# Patient Record
Sex: MALE | Race: WHITE | NOT HISPANIC OR LATINO | Employment: PART TIME | URBAN - METROPOLITAN AREA
[De-identification: names, ages, dates, MRNs, and addresses within clinical notes are randomized per-mention and may not be internally consistent; named-entity substitution may affect disease eponyms.]

---

## 2018-02-19 ENCOUNTER — EVALUATION (OUTPATIENT)
Dept: PHYSICAL THERAPY | Facility: CLINIC | Age: 63
End: 2018-02-19
Payer: COMMERCIAL

## 2018-02-19 DIAGNOSIS — M54.6 BILATERAL THORACIC BACK PAIN, UNSPECIFIED CHRONICITY: Primary | ICD-10-CM

## 2018-02-19 PROCEDURE — G8991 OTHER PT/OT GOAL STATUS: HCPCS

## 2018-02-19 PROCEDURE — G8990 OTHER PT/OT CURRENT STATUS: HCPCS

## 2018-02-19 PROCEDURE — 97162 PT EVAL MOD COMPLEX 30 MIN: CPT

## 2018-02-19 PROCEDURE — 97535 SELF CARE MNGMENT TRAINING: CPT

## 2018-02-19 RX ORDER — OXYCODONE AND ACETAMINOPHEN 10; 325 MG/1; MG/1
1 TABLET ORAL EVERY 4 HOURS PRN
COMMUNITY

## 2018-02-19 RX ORDER — SERTRALINE HYDROCHLORIDE 100 MG/1
50 TABLET, FILM COATED ORAL DAILY
COMMUNITY

## 2018-02-19 RX ORDER — ARIPIPRAZOLE 10 MG/1
10 TABLET ORAL DAILY
COMMUNITY

## 2018-02-19 RX ORDER — MORPHINE SULFATE 10 MG/5ML
SOLUTION ORAL EVERY 2 HOUR PRN
COMMUNITY

## 2018-02-19 NOTE — LETTER
2018    Jalyn Ding DO  [address] [facility] [address] [address]    Patient: Nilo Marquez   YOB: 1955   Date of Visit: 2018     Encounter Diagnosis     ICD-10-CM    1  Bilateral thoracic back pain, unspecified chronicity M54 6        Dear Dr Leigha Nugent:    Please review the attached Plan of Care from [facility] recent visit  Please verify that you agree therapy should continue by signing the attached document and sending it back to our office  If you have any questions or concerns, please don't hesitate to call  Sincerely,    Marycruz Luo, PT      Referring Provider:      I certify that I have read the below Plan of Care and certify the need for these services furnished under this plan of treatment while under my care  Jalyn Ding DO  [address] [facility]  [address] [place] [id number]  VIA Facsimile: [phone]          PT Evaluation     Today's date: 2018  Patient name: Nilo Marquez  : 1955  MRN: [id number]  Referring provider: Jackeline Escalante DO  Dx:   Encounter Diagnosis     ICD-10-CM    1  Bilateral thoracic back pain, unspecified chronicity M54 6                   Assessment  Impairments: abnormal gait, abnormal or restricted ROM, activity intolerance, impaired physical strength and pain with function    Assessment details: Pt presents with chronic LBP/LLE symptoms  Reports pain primarily left low back with symptoms radiating to lower extremity  PT notes decreased strength, rom, and impaired gait  Pt will benefit from PT tx to decrease symptoms and improve functional level  Prognosis: good    Goals  ST  Decrease pain 25% 4 wk  2  Increase trunk strength to Fair+ 4 wk  4  Increase BLE strength by 1/2 MMT grade 4 wk  LT  Pt will report 50% or greater decrease in pain 8 wk  2  Increase trunk ROM to WNL 8 wk  3  Increase trunk strength to Good 8 wk  4  Pt will report no limitations with ADL's 8 wk  5    Pt will report no limitations with ambulation 8 wk    Plan  Patient would benefit from: PT eval  Planned modality interventions: cryotherapy, thermotherapy: hydrocollator packs and unattended electrical stimulation  Planned therapy interventions: manual therapy, strengthening, stretching, therapeutic exercise, home exercise program, functional ROM exercises and flexibility  Frequency: 3x week  Duration in weeks: 8  Treatment plan discussed with: patient  Plan details: PT instructed and issued HEP  Pt had no questions/concerns regarding this  Subjective Evaluation    History of Present Illness  Mechanism of injury: Reports left sided sciatic symptoms with pain radiating from low back to left gluteal region  Reports bilateral feet going numb intermittently  Reports pain bilateral shoulder as well  Pt reports hx falling off a roof with Lumbar fusion afterwards  Had 8-9 months therapy afterwards  Has TENS unit which helps  Heat helps with symptoms  No BLE instability noted  No imaging performed  Sleep disrupted  Pain chronic in nature  Pain  Current pain ratin  At best pain ratin  At worst pain ratin  Quality: discomfort and sharp  Aggravating factors: standing and sitting          Objective     Tenderness     Lumbar Spine  Tenderness in the left transverse process and right transverse process  Left Hip   Tenderness in the PSIS  Right Hip   Tenderness in the PSIS  Additional Tenderness Details  Tender to palpation bilateral lumbar paraspinal mms Left > Right      Well healed incision noted lower T-spine/upper L-spine region with no tenderness    Active Range of Motion     Lumbar   Flexion: WFL and with pain  Extension: WFL and with pain  Left lateral flexion: WFL and with pain  Right lateral flexion: WFL and with pain  Left rotation: WFL and with pain  Right rotation: WFL and with pain    Strength/Myotome Testing     Left Hip   Planes of Motion   Flexion: 4  Extension: 4  Abduction: 4  Adduction: 4    Right Hip   Planes of Motion   Flexion: 4  Extension: 4  Abduction: 4  Adduction: 4    Left Knee   Flexion: 4  Extension: 4    Right Knee   Flexion: 4  Extension: 4    Left Ankle/Foot   Dorsiflexion: 3+  Plantar flexion: 4-    Right Ankle/Foot   Dorsiflexion: 4  Plantar flexion: 4    Additional Strength Details  Trunk strength Fair with seated resisted movement    Tests     Lumbar     Left   Positive passive SLR  Right   Negative passive SLR       Ambulation     Observational Gait     Additional Observational Gait Details  Intermittent foot drop noted left LE       Flowsheet Rows    Flowsheet Row Most Recent Value   PT/OT G-Codes   Assessment Type  Evaluation   G code set  Other PT/OT Primary   Other PT Primary Current Status ()  CL   Other PT Primary Goal Status ()  CJ          Precautions Hx Fusion    Specialty Daily Treatment Diary     Manual         Stretch BLE                                             Exercise Diary         [address] [facility]        UBE        St hip flex/abd/ext        TR/HR        Mini squats        U stance  (samantha)        LTP/MTP        Lat pulldown        PPT        LTR        abd crunch        bridges        Add sq        t-band hip abd                                                            Modalities        IFC/MHP

## 2018-02-19 NOTE — PROGRESS NOTES
PT Evaluation     Today's date: 2018  Patient name: Harvinder Flores  : 1955  MRN: [id number]  Referring provider: Sheeba Hurt DO  Dx:   Encounter Diagnosis     ICD-10-CM    1  Bilateral thoracic back pain, unspecified chronicity M54 6                   Assessment  Impairments: abnormal gait, abnormal or restricted ROM, activity intolerance, impaired physical strength and pain with function    Assessment details: Pt presents with chronic LBP/LLE symptoms  Reports pain primarily left low back with symptoms radiating to lower extremity  PT notes decreased strength, rom, and impaired gait  Pt will benefit from PT tx to decrease symptoms and improve functional level  Prognosis: good    Goals  ST  Decrease pain 25% 4 wk  2  Increase trunk strength to Fair+ 4 wk  4  Increase BLE strength by 1/2 MMT grade 4 wk  LT  Pt will report 50% or greater decrease in pain 8 wk  2  Increase trunk ROM to WNL 8 wk  3  Increase trunk strength to Good 8 wk  4  Pt will report no limitations with ADL's 8 wk  5  Pt will report no limitations with ambulation 8 wk    Plan  Patient would benefit from: PT eval  Planned modality interventions: cryotherapy, thermotherapy: hydrocollator packs and unattended electrical stimulation  Planned therapy interventions: manual therapy, strengthening, stretching, therapeutic exercise, home exercise program, functional ROM exercises and flexibility  Frequency: 3x week  Duration in weeks: 8  Treatment plan discussed with: patient  Plan details: PT instructed and issued HEP  Pt had no questions/concerns regarding this  Subjective Evaluation    History of Present Illness  Mechanism of injury: Reports left sided sciatic symptoms with pain radiating from low back to left gluteal region  Reports bilateral feet going numb intermittently  Reports pain bilateral shoulder as well  Pt reports hx falling off a roof with Lumbar fusion afterwards    Had 8-9 months therapy afterwards  Has TENS unit which helps  Heat helps with symptoms  No BLE instability noted  No imaging performed  Sleep disrupted  Pain chronic in nature  Pain  Current pain ratin  At best pain ratin  At worst pain ratin  Quality: discomfort and sharp  Aggravating factors: standing and sitting          Objective     Tenderness     Lumbar Spine  Tenderness in the left transverse process and right transverse process  Left Hip   Tenderness in the PSIS  Right Hip   Tenderness in the PSIS  Additional Tenderness Details  Tender to palpation bilateral lumbar paraspinal mms Left > Right  Well healed incision noted lower T-spine/upper L-spine region with no tenderness    Active Range of Motion     Lumbar   Flexion: WFL and with pain  Extension: WFL and with pain  Left lateral flexion: WFL and with pain  Right lateral flexion: WFL and with pain  Left rotation: WFL and with pain  Right rotation: WFL and with pain    Strength/Myotome Testing     Left Hip   Planes of Motion   Flexion: 4  Extension: 4  Abduction: 4  Adduction: 4    Right Hip   Planes of Motion   Flexion: 4  Extension: 4  Abduction: 4  Adduction: 4    Left Knee   Flexion: 4  Extension: 4    Right Knee   Flexion: 4  Extension: 4    Left Ankle/Foot   Dorsiflexion: 3+  Plantar flexion: 4-    Right Ankle/Foot   Dorsiflexion: 4  Plantar flexion: 4    Additional Strength Details  Trunk strength Fair with seated resisted movement    Tests     Lumbar     Left   Positive passive SLR  Right   Negative passive SLR       Ambulation     Observational Gait     Additional Observational Gait Details  Intermittent foot drop noted left LE       Flowsheet Rows    Flowsheet Row Most Recent Value   PT/OT G-Codes   Assessment Type  Evaluation   G code set  Other PT/OT Primary   Other PT Primary Current Status ()  CL   Other PT Primary Goal Status ()  CJ          Precautions Hx Fusion    Specialty Daily Treatment Diary     Manual Stretch BLE                                             Exercise Diary         [address] [facility]        UBE        St hip flex/abd/ext        TR/HR        Mini squats        U stance  (samantha)        LTP/MTP        Lat pulldown        PPT        LTR        abd crunch        bridges        Add sq        t-band hip abd                                                            Modalities        IFC/MHP

## 2018-02-21 ENCOUNTER — OFFICE VISIT (OUTPATIENT)
Dept: PHYSICAL THERAPY | Facility: CLINIC | Age: 63
End: 2018-02-21
Payer: COMMERCIAL

## 2018-02-21 ENCOUNTER — TRANSCRIBE ORDERS (OUTPATIENT)
Dept: PHYSICAL THERAPY | Facility: CLINIC | Age: 63
End: 2018-02-21

## 2018-02-21 DIAGNOSIS — M54.6 BILATERAL THORACIC BACK PAIN, UNSPECIFIED CHRONICITY: Primary | ICD-10-CM

## 2018-02-21 PROCEDURE — 97140 MANUAL THERAPY 1/> REGIONS: CPT

## 2018-02-21 PROCEDURE — 97110 THERAPEUTIC EXERCISES: CPT

## 2018-02-21 NOTE — PROGRESS NOTES
Daily Note     Today's date: 2018  Patient name: Chau Ledesma  : 1955  MRN: [id number]  Referring provider: Lorelei Justice DO  Dx:   Encounter Diagnosis     ICD-10-CM    1  Bilateral thoracic back pain, unspecified chronicity M54 6                   Subjective: The patient states that he is sore  Objective: See treatment diary below      Assessment: Tolerated treatment well  Patient demonstrated fatigue post treatment, exhibited good technique with therapeutic exercises and would benefit from continued PT  The patient did have some discomfort in his back with exercises  Plan: Continue per plan of care     Precautions Hx Fusion     Specialty Daily Treatment Diary      Manual  18           Stretch BLE   15'                                                                         Exercise Diary  18           [address] [facility] L1 12'           UBE             St hip flex/abd/ext             TR/HR             Mini squats             U stance  (samantha)             LTP/MTP Green 30x            Lat pulldown             PPT :05 30x           LTR :10x10           abd crunch             bridges 20x           Add sq :05 20x           t-band hip abd                                                                                                      Modalities 18           IFC/MHP Next Visit

## 2018-02-22 ENCOUNTER — OFFICE VISIT (OUTPATIENT)
Dept: PHYSICAL THERAPY | Facility: CLINIC | Age: 63
End: 2018-02-22
Payer: COMMERCIAL

## 2018-02-22 DIAGNOSIS — M54.6 BILATERAL THORACIC BACK PAIN, UNSPECIFIED CHRONICITY: Primary | ICD-10-CM

## 2018-02-22 PROCEDURE — 97140 MANUAL THERAPY 1/> REGIONS: CPT

## 2018-02-22 PROCEDURE — 97014 ELECTRIC STIMULATION THERAPY: CPT

## 2018-02-22 PROCEDURE — 97110 THERAPEUTIC EXERCISES: CPT

## 2018-02-26 ENCOUNTER — OFFICE VISIT (OUTPATIENT)
Dept: PHYSICAL THERAPY | Facility: CLINIC | Age: 63
End: 2018-02-26
Payer: COMMERCIAL

## 2018-02-26 DIAGNOSIS — M54.6 BILATERAL THORACIC BACK PAIN, UNSPECIFIED CHRONICITY: Primary | ICD-10-CM

## 2018-02-26 PROCEDURE — 97140 MANUAL THERAPY 1/> REGIONS: CPT

## 2018-02-26 PROCEDURE — 97110 THERAPEUTIC EXERCISES: CPT

## 2018-02-26 NOTE — PROGRESS NOTES
Daily Note     Today's date: 2018  Patient name: Davon Upton  : 1955  MRN: [id number]  Referring provider: Jing Knapp DO  Dx:   Encounter Diagnosis     ICD-10-CM    1  Bilateral thoracic back pain, unspecified chronicity M54 6                   Subjective: The patient states that he is doing ok today  Objective: See treatment diary below  Pain in 4/10  Pain out 3/10  Assessment: Tolerated treatment well  Patient exhibited good technique with therapeutic exercises and would benefit from continued PT  The patient had fair ROM in his B Le  Continue to work on increasing strength  Plan: Continue per plan of care     Specialty Daily Treatment Diary      Manual  18       Stretch BLE   15'  15  15'                                                                                               18  [address] [facility] L1 12'   L2 12'       UBE  10  10 10'        St hip flex/abd/ext            TR/HR    20 30x       Mini squats    10 30x       U stance  (samantha)             LTP/MTP Green 30x   green 30x Green 30x       Lat pulldown             PPT :05 30x  5 30x :05 30x       LTR :10x10  10x10 10x10       abd crunch             bridges 20x  20x 30x       Add sq :05 20x  20x :05 30x       t-band hip abd                                                                                                       Modalities 18         IFC/MHP Next Visit  15 MHP

## 2018-05-24 NOTE — PROGRESS NOTES
PT DISCHARGE    Today's date: 2018  Patient name: Justin Nurse  : 1955  MRN: [id number]  Referring provider: Natalia Pozo DO  Dx:   Encounter Diagnosis     ICD-10-CM    1  Bilateral thoracic back pain, unspecified chronicity M54 6        Start Time: 1500  Stop Time: 1600  Total time in clinic (min): 60 minutes    Assessment  Impairments: abnormal gait, abnormal or restricted ROM, activity intolerance, impaired physical strength and pain with function    Assessment details: Pt presents with chronic LBP/LLE symptoms  Reports pain primarily left low back with symptoms radiating to lower extremity  Pt attended 4 total PT sessions with last session on 18  He did not return after that time  D/C PT services  Findings per initial evaluation  Prognosis: good    Goals  ST  Decrease pain 25% 4 wk  2  Increase trunk strength to Fair+ 4 wk  4  Increase BLE strength by 1/2 MMT grade 4 wk  LT  Pt will report 50% or greater decrease in pain 8 wk  2  Increase trunk ROM to WNL 8 wk  3  Increase trunk strength to Good 8 wk  4  Pt will report no limitations with ADL's 8 wk  5  Pt will report no limitations with ambulation 8 wk    Plan  Patient would benefit from: PT eval  Planned modality interventions: cryotherapy, thermotherapy: hydrocollator packs and unattended electrical stimulation  Planned therapy interventions: manual therapy, strengthening, stretching, therapeutic exercise, home exercise program, functional ROM exercises and flexibility  Plan details: D/C PT services  Findings per initial evaluation         Subjective Evaluation    History of Present Illness  Mechanism of injury: Reports left sided sciatic symptoms with pain radiating from low back to left gluteal region  Reports bilateral feet going numb intermittently  Reports pain bilateral shoulder as well  Pt reports hx falling off a roof with Lumbar fusion afterwards  Had 8-9 months therapy afterwards    Has TENS unit which helps  Heat helps with symptoms  No BLE instability noted  No imaging performed  Sleep disrupted  Pain chronic in nature  Pain  Current pain ratin  At best pain ratin  At worst pain ratin  Quality: discomfort and sharp  Aggravating factors: standing and sitting          Objective     Tenderness     Lumbar Spine  Tenderness in the left transverse process and right transverse process  Left Hip   Tenderness in the PSIS  Right Hip   Tenderness in the PSIS  Additional Tenderness Details  Tender to palpation bilateral lumbar paraspinal mms Left > Right  Well healed incision noted lower T-spine/upper L-spine region with no tenderness    Active Range of Motion     Lumbar   Flexion: WFL and with pain  Extension: WFL and with pain  Left lateral flexion: WFL and with pain  Right lateral flexion: WFL and with pain  Left rotation: WFL and with pain  Right rotation: WFL and with pain    Strength/Myotome Testing     Left Hip   Planes of Motion   Flexion: 4  Extension: 4  Abduction: 4  Adduction: 4    Right Hip   Planes of Motion   Flexion: 4  Extension: 4  Abduction: 4  Adduction: 4    Left Knee   Flexion: 4  Extension: 4    Right Knee   Flexion: 4  Extension: 4    Left Ankle/Foot   Dorsiflexion: 3+  Plantar flexion: 4-    Right Ankle/Foot   Dorsiflexion: 4  Plantar flexion: 4    Additional Strength Details  Trunk strength Fair with seated resisted movement    Tests     Lumbar     Left   Positive passive SLR  Right   Negative passive SLR       Ambulation     Observational Gait     Additional Observational Gait Details  Intermittent foot drop noted left LE           Precautions Hx Fusion    Specialty Daily Treatment Diary     Manual         Stretch BLE                                             Exercise Diary         [address] [facility]        UBE        St hip flex/abd/ext        TR/HR        Mini squats        U stance  (samantha)        LTP/MTP        Lat pulldown        PPT        LTR        abd crunch        bridges        Add sq        t-band hip abd                                                            Modalities        IFC/MHP

## 2019-12-03 RX ORDER — NITROGLYCERIN 2.5 MG/D
PATCH TRANSDERMAL
COMMUNITY

## 2019-12-03 RX ORDER — OXYCODONE HYDROCHLORIDE 10 MG/1
TABLET ORAL
COMMUNITY
Start: 2016-06-11

## 2019-12-03 RX ORDER — NICOTINE POLACRILEX 4 MG/1
20 GUM, CHEWING ORAL
COMMUNITY
Start: 2018-02-05

## 2019-12-03 RX ORDER — TADALAFIL 20 MG/1
20 TABLET ORAL
COMMUNITY
Start: 2015-09-15

## 2019-12-04 ENCOUNTER — OFFICE VISIT (OUTPATIENT)
Dept: GASTROENTEROLOGY | Facility: CLINIC | Age: 64
End: 2019-12-04
Payer: COMMERCIAL

## 2019-12-04 ENCOUNTER — PREP FOR PROCEDURE (OUTPATIENT)
Dept: GASTROENTEROLOGY | Facility: CLINIC | Age: 64
End: 2019-12-04

## 2019-12-04 VITALS
WEIGHT: 220 LBS | HEART RATE: 72 BPM | SYSTOLIC BLOOD PRESSURE: 120 MMHG | HEIGHT: 68 IN | BODY MASS INDEX: 33.34 KG/M2 | RESPIRATION RATE: 18 BRPM | DIASTOLIC BLOOD PRESSURE: 80 MMHG

## 2019-12-04 DIAGNOSIS — R19.7 DIARRHEA, UNSPECIFIED TYPE: Primary | ICD-10-CM

## 2019-12-04 DIAGNOSIS — K21.9 GASTROESOPHAGEAL REFLUX DISEASE WITHOUT ESOPHAGITIS: ICD-10-CM

## 2019-12-04 DIAGNOSIS — R19.7 DIARRHEA, UNSPECIFIED TYPE: ICD-10-CM

## 2019-12-04 DIAGNOSIS — K21.9 GASTROESOPHAGEAL REFLUX DISEASE WITHOUT ESOPHAGITIS: Primary | ICD-10-CM

## 2019-12-04 DIAGNOSIS — R13.10 DYSPHAGIA, UNSPECIFIED TYPE: ICD-10-CM

## 2019-12-04 DIAGNOSIS — K62.5 RECTAL BLEEDING: ICD-10-CM

## 2019-12-04 DIAGNOSIS — K92.1 MELENA: ICD-10-CM

## 2019-12-04 PROCEDURE — 99203 OFFICE O/P NEW LOW 30 MIN: CPT | Performed by: PHYSICIAN ASSISTANT

## 2019-12-04 RX ORDER — CHOLESTYRAMINE 4 G/9G
4 POWDER, FOR SUSPENSION ORAL
COMMUNITY
Start: 2019-04-30 | End: 2019-12-12

## 2019-12-04 NOTE — PROGRESS NOTES
[facility] - Outpatient Consultation  Rick Bell 60 y o  male MRN: [id number]  Encounter: [id number]          ASSESSMENT AND PLAN:      1  Diarrhea, unspecified type  2  Dysphagia, unspecified type  3  Gastroesophageal reflux disease without esophagitis  4  Melena  5  Rectal bleeding  Will continue patient on cholestyramine 1 to 2 times a day  Will do EGD and colonoscopy with biopsy  Will start fiber supplementation and probiotics  Patient will have a follow-up appointment after procedures  ______________________________________________________________________    HPI:    73-year-old male who is here with diarrhea for 8 months  Patient reports that for the past 8 months he has been suffering with diarrhea that alternates between formed stool soft stool and liquid stool  He reports he goes multiple times a day  He reports abdominal tightness and discomfort  He also reports occasional melena and rectal bleeding  He reports that he thinks he does have a hemorrhoid  He reports his last colonoscopy was 3 years ago and he did have polyps removed at that time  He reports he also had endoscopy done 3 years ago and he is unsure with the findings of this were  He most recently reports that his primary care doctor started him on Questran and he has been taking 1 to 2 times a day which seems to be helping slightly  We have no information on patient's family history since he was adopted  Patient also reports that he is on omeprazole and he started taking this 5 years ago  He denies any hematemesis  He does report dysphagia  REVIEW OF SYSTEMS:    CONSTITUTIONAL: Denies any fever, chills, rigors, and weight loss  HEENT: No earache or tinnitus  Denies hearing loss or visual disturbances  CARDIOVASCULAR: No chest pain or palpitations  RESPIRATORY: Denies any cough, hemoptysis, shortness of breath or dyspnea on exertion    GASTROINTESTINAL: As noted in the History of Present Illness  GENITOURINARY: No problems with urination  Denies any hematuria or dysuria  NEUROLOGIC: No dizziness or vertigo, denies headaches  MUSCULOSKELETAL: Denies any muscle or joint pain  SKIN: Denies skin rashes or itching  ENDOCRINE: Denies excessive thirst  Denies intolerance to heat or cold  PSYCHOSOCIAL: Denies depression or anxiety  Denies any recent memory loss  Historical Information   Past Medical History:   Diagnosis Date    Bipolar 1 disorder ([facility] [address] )      Past Surgical History:   Procedure Laterality Date    CHOLECYSTECTOMY      LUMBAR FUSION      SPINE SURGERY       Social History   Social History     Substance and Sexual Activity   Alcohol Use Yes    Frequency: 2-4 times a month    Drinks per session: 3 or 4     Social History     Substance and Sexual Activity   Drug Use Not on file     Social History     Tobacco Use   Smoking Status Never Smoker   Smokeless Tobacco Never Used     Family History   Adopted: Yes       Meds/Allergies       Current Outpatient Medications:     ARIPiprazole (ABILIFY) 10 mg tablet    cholestyramine (QUESTRAN) 4 g packet    morphine 10 mg/5 mL solution    nitroglycerin (NITRODUR) 0 1 mg/hr    Omeprazole 20 MG TBEC    sertraline (ZOLOFT) 100 mg tablet    tadalafil (CIALIS) 20 MG tablet    oxyCODONE (ROXICODONE) 10 MG TABS    oxyCODONE-acetaminophen (PERCOCET)  mg per tablet    No Known Allergies        Objective     Blood pressure 120/80, pulse 72, resp  rate 18, height 5' 8" (1 727 m), weight 99 8 kg (220 lb)  Body mass index is 33 45 kg/m²  PHYSICAL EXAM:      General Appearance:   Alert, cooperative, no distress   HEENT:   Normocephalic, atraumatic, anicteric      Neck:  Supple, symmetrical, trachea midline   Lungs:   Clear to auscultation bilaterally; no rales, rhonchi or wheezing; respirations unlabored    Heart[de-identified]   Regular rate and rhythm; no murmur, rub, or gallop     Abdomen:   Soft, non-tender, non-distended; normal bowel sounds; no masses, no organomegaly    Genitalia:   Deferred    Rectal:   Deferred    Extremities:  No cyanosis, clubbing or edema    Pulses:  2+ and symmetric    Skin:  No jaundice, rashes, or lesions    Lymph nodes:  No palpable cervical lymphadenopathy        Lab Results:   No visits with results within 1 Day(s) from this visit  Latest known visit with results is:   No results found for any previous visit  Radiology Results:   No results found

## 2019-12-04 NOTE — PATIENT INSTRUCTIONS
Abdominal Pain   WHAT YOU NEED TO KNOW:   Abdominal pain can be dull, achy, or sharp  You may have pain in one area of your abdomen, or in your entire abdomen  Your pain may be caused by a condition such as constipation, food sensitivity or poisoning, infection, or a blockage  Abdominal pain can also be from a hernia, appendicitis, or an ulcer  Liver, gallbladder, or kidney conditions can also cause abdominal pain  The cause of your abdominal pain may be unknown  DISCHARGE INSTRUCTIONS:   Return to the emergency department if:   · You have new chest pain or shortness of breath  · You have pulsing pain in your upper abdomen or lower back that suddenly becomes constant  · Your pain is in the right lower abdominal area and worsens with movement  · You have a fever over 100 4°F (38°C) or shaking chills  · You are vomiting and cannot keep food or liquids down  · Your pain does not improve or gets worse over the next 8 to 12 hours  · You see blood in your vomit or bowel movements, or they look black and tarry  · Your skin or the whites of your eyes turn yellow  · You are a woman and have a large amount of vaginal bleeding that is not your monthly period  Contact your healthcare provider if:   · You have pain in your lower back  · You are a man and have pain in your testicles  · You have pain when you urinate  · You have questions or concerns about your condition or care  Follow up with your healthcare provider within 24 hours or as directed:  Write down your questions so you remember to ask them during your visits  Medicines:   · Medicines  may be given to calm your stomach and prevent vomiting or to decrease pain  Ask how to take pain medicine safely  · Take your medicine as directed  Contact your healthcare provider if you think your medicine is not helping or if you have side effects  Tell him of her if you are allergic to any medicine   Keep a list of the medicines, vitamins, and herbs you take  Include the amounts, and when and why you take them  Bring the list or the pill bottles to follow-up visits  Carry your medicine list with you in case of an emergency  © 2017 [employer] Information is for End User's use only and may not be sold, redistributed or otherwise used for commercial purposes  All illustrations and images included in CareNotes® are the copyrighted property of A D A M [employer] Inc  or [employer]  The above information is an  only  It is not intended as medical advice for individual conditions or treatments  Talk to your doctor, nurse or pharmacist before following any medical regimen to see if it is safe and effective for you

## 2019-12-09 ENCOUNTER — TELEPHONE (OUTPATIENT)
Dept: GASTROENTEROLOGY | Facility: CLINIC | Age: 64
End: 2019-12-09

## 2019-12-09 NOTE — TELEPHONE ENCOUNTER
Pt needs to know what time his procedure is on Thursday, or it has to be between 9am and noon due to his ride  Please call [phone]   Ty

## 2019-12-09 NOTE — TELEPHONE ENCOUNTER
Let patient know they will call Wednesday with time but put request to arrive at 10 si he can be done around noon time

## 2019-12-11 ENCOUNTER — ANESTHESIA EVENT (OUTPATIENT)
Dept: GASTROENTEROLOGY | Facility: HOSPITAL | Age: 64
End: 2019-12-11

## 2019-12-12 ENCOUNTER — HOSPITAL ENCOUNTER (OUTPATIENT)
Dept: GASTROENTEROLOGY | Facility: HOSPITAL | Age: 64
Setting detail: OUTPATIENT SURGERY
Discharge: HOME | End: 2019-12-12
Attending: INTERNAL MEDICINE | Admitting: INTERNAL MEDICINE
Payer: COMMERCIAL

## 2019-12-12 ENCOUNTER — ANESTHESIA (OUTPATIENT)
Dept: GASTROENTEROLOGY | Facility: HOSPITAL | Age: 64
End: 2019-12-12

## 2019-12-12 VITALS
BODY MASS INDEX: 33.04 KG/M2 | HEART RATE: 69 BPM | OXYGEN SATURATION: 96 % | SYSTOLIC BLOOD PRESSURE: 156 MMHG | HEIGHT: 67 IN | TEMPERATURE: 98.6 F | RESPIRATION RATE: 21 BRPM | DIASTOLIC BLOOD PRESSURE: 72 MMHG | WEIGHT: 210.54 LBS

## 2019-12-12 DIAGNOSIS — R19.7 DIARRHEA, UNSPECIFIED TYPE: ICD-10-CM

## 2019-12-12 DIAGNOSIS — R13.10 DYSPHAGIA, UNSPECIFIED TYPE: ICD-10-CM

## 2019-12-12 DIAGNOSIS — K21.9 GASTROESOPHAGEAL REFLUX DISEASE WITHOUT ESOPHAGITIS: ICD-10-CM

## 2019-12-12 PROCEDURE — 88305 TISSUE EXAM BY PATHOLOGIST: CPT | Performed by: PATHOLOGY

## 2019-12-12 PROCEDURE — NC001 PR NO CHARGE: Performed by: INTERNAL MEDICINE

## 2019-12-12 PROCEDURE — 88342 IMHCHEM/IMCYTCHM 1ST ANTB: CPT | Performed by: PATHOLOGY

## 2019-12-12 PROCEDURE — 45378 DIAGNOSTIC COLONOSCOPY: CPT | Performed by: INTERNAL MEDICINE

## 2019-12-12 PROCEDURE — 43239 EGD BIOPSY SINGLE/MULTIPLE: CPT | Performed by: INTERNAL MEDICINE

## 2019-12-12 PROCEDURE — 43248 EGD GUIDE WIRE INSERTION: CPT | Performed by: INTERNAL MEDICINE

## 2019-12-12 RX ORDER — PROPOFOL 10 MG/ML
INJECTION, EMULSION INTRAVENOUS AS NEEDED
Status: DISCONTINUED | OUTPATIENT
Start: 2019-12-12 | End: 2019-12-12 | Stop reason: SURG

## 2019-12-12 RX ORDER — SODIUM CHLORIDE, SODIUM LACTATE, POTASSIUM CHLORIDE, CALCIUM CHLORIDE 600; 310; 30; 20 MG/100ML; MG/100ML; MG/100ML; MG/100ML
125 INJECTION, SOLUTION INTRAVENOUS CONTINUOUS
Status: DISCONTINUED | OUTPATIENT
Start: 2019-12-12 | End: 2019-12-16 | Stop reason: HOSPADM

## 2019-12-12 RX ORDER — GLYCOPYRROLATE 0.2 MG/ML
INJECTION INTRAMUSCULAR; INTRAVENOUS AS NEEDED
Status: DISCONTINUED | OUTPATIENT
Start: 2019-12-12 | End: 2019-12-12 | Stop reason: SURG

## 2019-12-12 RX ORDER — METOCLOPRAMIDE HYDROCHLORIDE 5 MG/ML
10 INJECTION INTRAMUSCULAR; INTRAVENOUS ONCE
Status: COMPLETED | OUTPATIENT
Start: 2019-12-12 | End: 2019-12-12

## 2019-12-12 RX ORDER — LIDOCAINE HYDROCHLORIDE 20 MG/ML
INJECTION, SOLUTION INFILTRATION; PERINEURAL AS NEEDED
Status: DISCONTINUED | OUTPATIENT
Start: 2019-12-12 | End: 2019-12-12 | Stop reason: SURG

## 2019-12-12 RX ADMIN — PROPOFOL 100 MG: 10 INJECTION, EMULSION INTRAVENOUS at 10:13

## 2019-12-12 RX ADMIN — PROPOFOL 30 MG: 10 INJECTION, EMULSION INTRAVENOUS at 10:14

## 2019-12-12 RX ADMIN — METOCLOPRAMIDE 10 MG: 5 INJECTION, SOLUTION INTRAMUSCULAR; INTRAVENOUS at 11:03

## 2019-12-12 RX ADMIN — LIDOCAINE HYDROCHLORIDE 5 ML: 20 INJECTION, SOLUTION INFILTRATION; PERINEURAL at 10:13

## 2019-12-12 RX ADMIN — PROPOFOL 30 MG: 10 INJECTION, EMULSION INTRAVENOUS at 10:23

## 2019-12-12 RX ADMIN — PROPOFOL 20 MG: 10 INJECTION, EMULSION INTRAVENOUS at 10:20

## 2019-12-12 RX ADMIN — SODIUM CHLORIDE, SODIUM LACTATE, POTASSIUM CHLORIDE, AND CALCIUM CHLORIDE 125 ML/HR: .6; .31; .03; .02 INJECTION, SOLUTION INTRAVENOUS at 09:41

## 2019-12-12 RX ADMIN — PROPOFOL 30 MG: 10 INJECTION, EMULSION INTRAVENOUS at 10:17

## 2019-12-12 RX ADMIN — GLYCOPYRROLATE 0.2 MG: 0.2 INJECTION, SOLUTION INTRAMUSCULAR; INTRAVENOUS at 10:23

## 2019-12-12 RX ADMIN — PROPOFOL 20 MG: 10 INJECTION, EMULSION INTRAVENOUS at 10:15

## 2019-12-12 NOTE — ANESTHESIA PREPROCEDURE EVALUATION
Review of Systems/Medical History  Patient summary reviewed  Chart reviewed  No history of anesthetic complications     Cardiovascular  Exercise tolerance (METS): >4, Exercise comment: SOB with second flight of stairs    Pulmonary  Not a smoker , Asthma (uses albuterol ~1x per year associated with seasonal allergy symptoms) , well controlled/ stable Asthma type of rescue: PRN inhaler, No recent URI , No sleep apnea ,        GI/Hepatic    GERD well controlled, Bowel prep  Comment: Confirmed NPO appropriate     Negative  ROS        Endo/Other    Obesity    GYN       Hematology  Negative hematology ROS      Musculoskeletal  Back pain , lumbar pain,        Neurology  Negative neurology ROS      Psychology   Depression , bipolar disorder,   Chronic opioid dependence Chronic pain,            Physical Exam    Airway    Mallampati score: II  TM Distance: >3 FB  Neck ROM: full     Dental       Cardiovascular  Rhythm: regular, Rate: normal, Cardiovascular exam normal    Pulmonary  Pulmonary exam normal Breath sounds clear to auscultation,     Other Findings        Anesthesia Plan  ASA Score- 2     Anesthesia Type- IV sedation with anesthesia with ASA Monitors  Additional Monitors:   Airway Plan:     Comment: I discussed the risks and benefits of IV sedation anesthesia including the possibility of the need to convert to general anesthesia and the potential risk of awareness  The patient was given the opportunity to ask questions, which were answered        Plan Factors-    Induction- intravenous  Postoperative Plan-     Informed Consent- Anesthetic plan and risks discussed with patient

## 2019-12-12 NOTE — ANESTHESIA POSTPROCEDURE EVALUATION
Post-Op Assessment Note    CV Status:  Stable  Pain Score: 0    Pain management: adequate     Mental Status:  Lethargic   Hydration Status:  Stable and euvolemic   PONV Controlled:  None   Airway Patency:  Patent   Post Op Vitals Reviewed: Yes      Staff: CRNA           BP   128/84   Temp      Pulse  68   Resp   18   SpO2 98

## 2019-12-12 NOTE — H&P
History and Physical - [facility]  Kimber Oliveira 60 y o  male MRN: [id number]      HPI: Kimber Oliveira is a 60y o  year old male who presents for the evaluation of epigastric abdominal pain, diarrhea, gastroesophageal reflux disease, melena, and dysphagia      REVIEW OF SYSTEMS: Per the HPI, and otherwise unremarkable  Historical Information   Past Medical History:   Diagnosis Date    Bipolar 1 disorder ([facility] [address] )      Past Surgical History:   Procedure Laterality Date    CHOLECYSTECTOMY      LUMBAR FUSION      SPINE SURGERY       Social History   Social History     Substance and Sexual Activity   Alcohol Use Yes    Frequency: 2-4 times a month    Drinks per session: 3 or 4     Social History     Substance and Sexual Activity   Drug Use Not on file     Social History     Tobacco Use   Smoking Status Never Smoker   Smokeless Tobacco Never Used     Family History   Adopted: Yes       Meds/Allergies       (Not in a hospital admission)    No Known Allergies    Objective     There were no vitals taken for this visit  PHYSICAL EXAM    Gen: NAD  CV: RRR  CHEST: Clear  ABD: soft, NT/ND  EXT: no edema      ASSESSMENT/PLAN:  This is a 60y o  year old male here for esophagogastroduodenoscopy with possible biopsy and dilation, colonoscopy, and he is stable and optimized for his procedure

## 2019-12-12 NOTE — DISCHARGE INSTRUCTIONS

## 2020-12-07 ENCOUNTER — EVALUATION (OUTPATIENT)
Dept: PHYSICAL THERAPY | Facility: CLINIC | Age: 65
End: 2020-12-07
Payer: COMMERCIAL

## 2020-12-07 DIAGNOSIS — Z98.1 S/P CERVICAL SPINAL FUSION: ICD-10-CM

## 2020-12-07 DIAGNOSIS — M54.12 CERVICAL RADICULOPATHY: Primary | ICD-10-CM

## 2020-12-07 PROCEDURE — 97535 SELF CARE MNGMENT TRAINING: CPT

## 2020-12-07 PROCEDURE — 97162 PT EVAL MOD COMPLEX 30 MIN: CPT

## 2020-12-08 ENCOUNTER — TRANSCRIBE ORDERS (OUTPATIENT)
Dept: PHYSICAL THERAPY | Facility: CLINIC | Age: 65
End: 2020-12-08

## 2020-12-08 DIAGNOSIS — M54.12 CERVICAL RADICULOPATHY: Primary | ICD-10-CM

## 2020-12-09 ENCOUNTER — APPOINTMENT (OUTPATIENT)
Dept: PHYSICAL THERAPY | Facility: CLINIC | Age: 65
End: 2020-12-09
Payer: COMMERCIAL

## 2020-12-11 ENCOUNTER — OFFICE VISIT (OUTPATIENT)
Dept: PHYSICAL THERAPY | Facility: CLINIC | Age: 65
End: 2020-12-11
Payer: COMMERCIAL

## 2020-12-11 DIAGNOSIS — Z98.1 S/P CERVICAL SPINAL FUSION: ICD-10-CM

## 2020-12-11 DIAGNOSIS — M54.12 CERVICAL RADICULOPATHY: Primary | ICD-10-CM

## 2020-12-11 PROCEDURE — 97110 THERAPEUTIC EXERCISES: CPT

## 2020-12-14 ENCOUNTER — OFFICE VISIT (OUTPATIENT)
Dept: PHYSICAL THERAPY | Facility: CLINIC | Age: 65
End: 2020-12-14
Payer: COMMERCIAL

## 2020-12-14 DIAGNOSIS — M54.12 CERVICAL RADICULOPATHY: Primary | ICD-10-CM

## 2020-12-14 DIAGNOSIS — Z98.1 S/P CERVICAL SPINAL FUSION: ICD-10-CM

## 2020-12-14 PROCEDURE — 97110 THERAPEUTIC EXERCISES: CPT

## 2020-12-16 ENCOUNTER — APPOINTMENT (OUTPATIENT)
Dept: PHYSICAL THERAPY | Facility: CLINIC | Age: 65
End: 2020-12-16
Payer: COMMERCIAL

## 2020-12-18 ENCOUNTER — OFFICE VISIT (OUTPATIENT)
Dept: PHYSICAL THERAPY | Facility: CLINIC | Age: 65
End: 2020-12-18
Payer: COMMERCIAL

## 2020-12-18 DIAGNOSIS — M54.12 CERVICAL RADICULOPATHY: Primary | ICD-10-CM

## 2020-12-18 DIAGNOSIS — Z98.1 S/P CERVICAL SPINAL FUSION: ICD-10-CM

## 2020-12-18 PROCEDURE — 97110 THERAPEUTIC EXERCISES: CPT

## 2020-12-21 ENCOUNTER — OFFICE VISIT (OUTPATIENT)
Dept: PHYSICAL THERAPY | Facility: CLINIC | Age: 65
End: 2020-12-21
Payer: COMMERCIAL

## 2020-12-21 DIAGNOSIS — M54.12 CERVICAL RADICULOPATHY: Primary | ICD-10-CM

## 2020-12-21 DIAGNOSIS — Z98.1 S/P CERVICAL SPINAL FUSION: ICD-10-CM

## 2020-12-21 PROCEDURE — 97110 THERAPEUTIC EXERCISES: CPT

## 2020-12-23 ENCOUNTER — APPOINTMENT (OUTPATIENT)
Dept: PHYSICAL THERAPY | Facility: CLINIC | Age: 65
End: 2020-12-23
Payer: COMMERCIAL

## 2020-12-23 ENCOUNTER — OFFICE VISIT (OUTPATIENT)
Dept: PHYSICAL THERAPY | Facility: CLINIC | Age: 65
End: 2020-12-23
Payer: COMMERCIAL

## 2020-12-23 DIAGNOSIS — M54.12 CERVICAL RADICULOPATHY: Primary | ICD-10-CM

## 2020-12-23 DIAGNOSIS — Z98.1 S/P CERVICAL SPINAL FUSION: ICD-10-CM

## 2020-12-23 PROCEDURE — 97110 THERAPEUTIC EXERCISES: CPT

## 2021-01-07 ENCOUNTER — OFFICE VISIT (OUTPATIENT)
Dept: PHYSICAL THERAPY | Facility: CLINIC | Age: 66
End: 2021-01-07
Payer: COMMERCIAL

## 2021-01-07 DIAGNOSIS — Z98.1 S/P CERVICAL SPINAL FUSION: ICD-10-CM

## 2021-01-07 DIAGNOSIS — M54.12 CERVICAL RADICULOPATHY: Primary | ICD-10-CM

## 2021-01-07 PROCEDURE — 97110 THERAPEUTIC EXERCISES: CPT | Performed by: PHYSICAL THERAPIST

## 2021-01-07 NOTE — PROGRESS NOTES
Daily Note     Today's date: 2021  Patient name: Clarisa Elena  : 1955  MRN: [id number]  Referring provider: Kodi Barney MD  Dx:   Encounter Diagnosis     ICD-10-CM    1  Cervical radiculopathy  M54 12    2  S/P cervical spinal fusion  Z98 1        Start Time: 1300  Stop Time: 1345  Total time in clinic (min): 45 minutes    Subjective: Pt had f/u with doctor last week  Ok to d/c cervical collar  Lifting restriction no > than 5-10#  Objective: See treatment diary below      Assessment: Tolerated treatment well  Occasional cuing required for proper exercise technique  Added TB exercises with pt reporting increased challenge  Continue to progress as tolerated per restrictions  Patient would benefit from continued PT      Plan: Continue per plan of care        Precautions:  S/P Cervical Fusion  *No lifting >5-10#*       Manuals --20 20                                   Neuro Re-Ed                         Ther Ex        c-spine AROM 20x  Ea  3" 20x3" ea 20x ea dir 20x ea dir 20x ea dir   Shrugs/rolls 20x  3"  20x ea 20x ea 20x ea   retractions 20x  3"  20x 20x 20x   Posture slouch/correct   4 x 15" 4 x 15" 4 x 15"   nustep L2  12' L5 12 min  L1 12' L1 12' L2 12'   Cervical isometrics  15x ea 5" 15x5" ea  10x ea dir 5" hold 10x ea dir 5" 10x ea dir 5" hold   Seated T-spine ext in chair (in collar) 10x  10"        TB MTP/LTP  20x Green       TB T & Y   20x Green               HEP        Ther Activity                        Gait Training                        Modalities        MHP/CP  Declined  5' MHP 5' MHP 5'

## 2021-01-12 ENCOUNTER — APPOINTMENT (OUTPATIENT)
Dept: PHYSICAL THERAPY | Facility: CLINIC | Age: 66
End: 2021-01-12
Payer: COMMERCIAL

## 2021-01-14 ENCOUNTER — OFFICE VISIT (OUTPATIENT)
Dept: PHYSICAL THERAPY | Facility: CLINIC | Age: 66
End: 2021-01-14
Payer: COMMERCIAL

## 2021-01-14 DIAGNOSIS — M54.12 CERVICAL RADICULOPATHY: Primary | ICD-10-CM

## 2021-01-14 DIAGNOSIS — Z98.1 S/P CERVICAL SPINAL FUSION: ICD-10-CM

## 2021-01-14 PROCEDURE — 97110 THERAPEUTIC EXERCISES: CPT | Performed by: PHYSICAL THERAPIST

## 2021-01-19 ENCOUNTER — OFFICE VISIT (OUTPATIENT)
Dept: PHYSICAL THERAPY | Facility: CLINIC | Age: 66
End: 2021-01-19
Payer: COMMERCIAL

## 2021-01-19 DIAGNOSIS — Z98.1 S/P CERVICAL SPINAL FUSION: ICD-10-CM

## 2021-01-19 DIAGNOSIS — M54.12 CERVICAL RADICULOPATHY: Primary | ICD-10-CM

## 2021-01-19 PROCEDURE — 97110 THERAPEUTIC EXERCISES: CPT

## 2021-01-19 NOTE — PROGRESS NOTES
Daily Note     Today's date: 2021  Patient name: Rick Bell  : 1955  MRN: [id number]  Referring provider: Verónica Rose MD  Dx:   Encounter Diagnosis     ICD-10-CM    1  Cervical radiculopathy  M54 12    2  S/P cervical spinal fusion  Z98 1                   Subjective: The patient states that he is doing alright  Objective: See treatment diary below      Assessment: Tolerated treatment well  Patient exhibited good technique with therapeutic exercises and would benefit from continued PT  Patient demonstrated better strength  Plan: Continue per plan of care        Precautions:  S/P Cervical Fusion  *No lifting >5-10#*       Manuals -20 20                                   Neuro Re-Ed                         Ther Ex        c-spine AROM 20x  Ea  3" 20x3" ea 20x3" ea 20x ea dir 20x ea dir   Shrugs/rolls 20x  3"  20x3" ea 20x ea 20x ea   retractions 20x  3"  20x3" ea 20x 20x   Posture slouch/correct     4 x 15"   nustep L2  12' L5 12 min  L5 12 min L5 12' L2 12'   Cervical isometrics  15x ea 5" 15x5" ea  15x5" ea 15x ea dir 5" 10x ea dir 5" hold   Seated T-spine ext in chair (in collar) 10x  10"        TB MTP/LTP  20x Green  20x Green 20x Green    TB T & Y   20x Green  20x Green 20x Green            HEP        Ther Activity                        Gait Training                        Modalities        MHP/CP  Declined  Declined  Declined 5'

## 2021-01-21 ENCOUNTER — OFFICE VISIT (OUTPATIENT)
Dept: PHYSICAL THERAPY | Facility: CLINIC | Age: 66
End: 2021-01-21
Payer: COMMERCIAL

## 2021-01-21 DIAGNOSIS — Z98.1 S/P CERVICAL SPINAL FUSION: ICD-10-CM

## 2021-01-21 DIAGNOSIS — M54.12 CERVICAL RADICULOPATHY: Primary | ICD-10-CM

## 2021-01-21 PROCEDURE — 97110 THERAPEUTIC EXERCISES: CPT

## 2021-01-21 NOTE — PROGRESS NOTES
Daily Note     Today's date: 2021  Patient name: Jennifer Mc  : 1955  MRN: [id number]  Referring provider: Lisette Monet MD  Dx:   Encounter Diagnosis     ICD-10-CM    1  Cervical radiculopathy  M54 12    2  S/P cervical spinal fusion  Z98 1                   Subjective: The patient states that he is doing alright, just a little sore today  Objective: See treatment diary below      Assessment: Tolerated treatment well  Patient exhibited good technique with therapeutic exercises and would benefit from continued PT  Patient did have good ROM, but did have some difficulty with strengthening exercises  Plan: Continue per plan of care        Precautions:  S/P Cervical Fusion  *No lifting >5-10#*       Manuals 12-23-20 20                                   Neuro Re-Ed                         Ther Ex        c-spine AROM 20x  Ea  3" 20x3" ea 20x3" ea 20x ea dir 20x ea dir   Shrugs/rolls 20x  3"  20x3" ea 20x ea 20x ea   retractions 20x  3"  20x3" ea 20x 20x   Posture slouch/correct        nustep L2  12' L5 12 min  L5 12 min L5 12' L5 12'   Cervical isometrics  15x ea 5" 15x5" ea  15x5" ea 15x ea dir 5" 10x ea dir 5" hold   Seated T-spine ext in chair (in collar) 10x  10"        TB MTP/LTP  20x Green  20x Green 20x Green 20x Green   TB T & Y   20x Green  20x Green 20x Green 20x Green           HEP        Ther Activity                        Gait Training                        Modalities        MHP/CP  Declined  Declined  Declined 5'

## 2021-01-26 ENCOUNTER — APPOINTMENT (OUTPATIENT)
Dept: PHYSICAL THERAPY | Facility: CLINIC | Age: 66
End: 2021-01-26
Payer: COMMERCIAL

## 2021-01-27 ENCOUNTER — OFFICE VISIT (OUTPATIENT)
Dept: PHYSICAL THERAPY | Facility: CLINIC | Age: 66
End: 2021-01-27
Payer: COMMERCIAL

## 2021-01-27 DIAGNOSIS — Z98.1 S/P CERVICAL SPINAL FUSION: ICD-10-CM

## 2021-01-27 DIAGNOSIS — M54.12 CERVICAL RADICULOPATHY: Primary | ICD-10-CM

## 2021-01-27 PROCEDURE — 97110 THERAPEUTIC EXERCISES: CPT

## 2021-01-27 NOTE — PROGRESS NOTES
Daily Note     Today's date: 2021  Patient name: Army Marie  : 1955  MRN: [id number]  Referring provider: Luis Alberto Wheeler MD  Dx:   Encounter Diagnosis     ICD-10-CM    1  Cervical radiculopathy  M54 12    2  S/P cervical spinal fusion  Z98 1                   Subjective: The patient states that he is pretty sore today  Objective: See treatment diary below      Assessment: Tolerated treatment well  Patient exhibited good technique with therapeutic exercises and would benefit from continued PT  The patient's treatment was modified today secondary to patient request   Reported soreness with exercises  Progress as able  Plan: Continue per plan of care        Precautions:  S/P Cervical Fusion  *No lifting >5-10#*       Manuals 21                                   Neuro Re-Ed                         Ther Ex        c-spine AROM 20x  Ea  3" 20x3" ea 20x3" ea 20x ea dir 20x ea dir   Shrugs/rolls 20x  3"  20x3" ea 20x ea 20x ea   retractions 20x  3"  20x3" ea 20x 20x   Posture slouch/correct        nustep L5  12' L5 12 min  L5 12 min L5 12' L5 12'   Cervical isometrics  15x ea 5" 15x5" ea  15x5" ea 15x ea dir 5" 10x ea dir 5" hold   Seated T-spine ext in chair :10 x 15       TB MTP/LTP Held per pt request 20x Green  20x Green 20x Green 20x Green   TB T & Y  Held per pt request 20x Green  20x Green 20x Green 20x Green           HEP        Ther Activity                        Gait Training                        Modalities        MHP/CP Declined Declined  Declined  Declined 5'

## 2021-01-28 ENCOUNTER — APPOINTMENT (OUTPATIENT)
Dept: PHYSICAL THERAPY | Facility: CLINIC | Age: 66
End: 2021-01-28
Payer: COMMERCIAL

## 2021-01-29 ENCOUNTER — OFFICE VISIT (OUTPATIENT)
Dept: PHYSICAL THERAPY | Facility: CLINIC | Age: 66
End: 2021-01-29
Payer: COMMERCIAL

## 2021-01-29 DIAGNOSIS — Z98.1 S/P CERVICAL SPINAL FUSION: ICD-10-CM

## 2021-01-29 DIAGNOSIS — M54.12 CERVICAL RADICULOPATHY: Primary | ICD-10-CM

## 2021-01-29 PROCEDURE — 97110 THERAPEUTIC EXERCISES: CPT

## 2021-01-29 NOTE — PROGRESS NOTES
Daily Note     Today's date: 2021  Patient name: Peg Levin  : 1955  MRN: [id number]  Referring provider: Annita Merlos MD  Dx:   Encounter Diagnosis     ICD-10-CM    1  Cervical radiculopathy  M54 12    2  S/P cervical spinal fusion  Z98 1                   Subjective:  My mid-back is sore today  It was bothering me last night too  Objective: See treatment diary below      Assessment: Tolerated treatment well  Complains of symptoms mid back region  Modified program due to this  Reports improved c-spine AROM all planes  Improving functional levels reported  Patient would benefit from continued PT      Plan: Continue per plan of care        Precautions:  S/P Cervical Fusion  *No lifting >5-10#*       Manuals 21                                   Neuro Re-Ed                         Ther Ex        c-spine AROM 20x  Ea  3" 20x3" ea 20x3" ea 20x ea dir 20x ea dir   Shrugs/rolls 20x  3" 20x  3" 20x3" ea 20x ea 20x ea   retractions 20x  3" 20x  3" 20x3" ea 20x 20x   Posture slouch/correct        nustep L5  12' L5 12' L5 12 min L5 12' L5 12'   UBE  4'  Alt        Cervical isometrics  15x ea 5" 15x5" ea  15x5" ea 15x ea dir 5" 10x ea dir 5" hold   Seated T-spine ext in chair :10 x 15       TB MTP/LTP Held per pt request  20x Green 20x Green 20x Green   TB T & Y  Held per pt request   20x Green 20x Green 20x Green           HEP        Ther Activity                        Gait Training                        Modalities        MHP/CP Declined Declined  Declined  Declined 5'

## 2021-02-03 ENCOUNTER — APPOINTMENT (OUTPATIENT)
Dept: PHYSICAL THERAPY | Facility: CLINIC | Age: 66
End: 2021-02-03
Payer: COMMERCIAL

## 2021-02-03 ENCOUNTER — OFFICE VISIT (OUTPATIENT)
Dept: PHYSICAL THERAPY | Facility: CLINIC | Age: 66
End: 2021-02-03
Payer: COMMERCIAL

## 2021-02-03 DIAGNOSIS — M54.12 CERVICAL RADICULOPATHY: Primary | ICD-10-CM

## 2021-02-03 DIAGNOSIS — Z98.1 S/P CERVICAL SPINAL FUSION: ICD-10-CM

## 2021-02-03 PROCEDURE — 97110 THERAPEUTIC EXERCISES: CPT

## 2021-02-03 NOTE — PROGRESS NOTES
Daily Note     Today's date: 2/3/2021  Patient name: Ben Ac  : 1955  MRN: [id number]  Referring provider: Fadi Anaya MD  Dx:   Encounter Diagnosis     ICD-10-CM    1  Cervical radiculopathy  M54 12    2  S/P cervical spinal fusion  Z98 1                   Subjective: The patient states that he is very sore from shoveling  Objective: See treatment diary below      Assessment: Tolerated treatment well  Patient exhibited good technique with therapeutic exercises and would benefit from continued PT  The patient did not want to do any exercises other than the NuStep due to being sore  Plan: Continue per plan of care        Precautions:  S/P Cervical Fusion  *No lifting >5-10#*       Manuals 1-27-21 1/29/21 2/3/2021 1/19/21 1/21/21                                   Neuro Re-Ed                         Ther Ex        c-spine AROM 20x  Ea  3" 20x3" ea  20x ea dir 20x ea dir   Shrugs/rolls 20x  3" 20x  3"  20x ea 20x ea   retractions 20x  3" 20x  3"  20x 20x   Posture slouch/correct        nustep L5  12' L5 12' L5 12 min L5 12' L5 12'   UBE  4'  Alt        Cervical isometrics  15x ea 5" 15x5" ea   15x ea dir 5" 10x ea dir 5" hold   Seated T-spine ext in chair :10 x 15       TB MTP/LTP Held per pt request   20x Green 20x Green   TB T & Y  Held per pt request    20x Green 20x Green           HEP        Ther Activity                        Gait Training                        Modalities        MHP/CP Declined Declined  Declined  Declined 5'

## 2021-02-10 ENCOUNTER — OFFICE VISIT (OUTPATIENT)
Dept: PHYSICAL THERAPY | Facility: CLINIC | Age: 66
End: 2021-02-10
Payer: COMMERCIAL

## 2021-02-10 ENCOUNTER — TRANSCRIBE ORDERS (OUTPATIENT)
Dept: PHYSICAL THERAPY | Facility: CLINIC | Age: 66
End: 2021-02-10

## 2021-02-10 DIAGNOSIS — M54.12 CERVICAL RADICULOPATHY: Primary | ICD-10-CM

## 2021-02-10 DIAGNOSIS — Z98.1 S/P CERVICAL SPINAL FUSION: ICD-10-CM

## 2021-02-10 PROCEDURE — 97164 PT RE-EVAL EST PLAN CARE: CPT

## 2021-02-10 PROCEDURE — 97110 THERAPEUTIC EXERCISES: CPT

## 2021-02-10 NOTE — PROGRESS NOTES
PT RE-EVALUATION     Today's date: 2/10/2021  Patient name: Daryl Marquez  : 1955  MRN: [id number]  Referring provider: Bettina Reed MD  Dx:   Encounter Diagnosis     ICD-10-CM    1  Cervical radiculopathy  M54 12    2  S/P cervical spinal fusion  Z98 1                   Assessment  Assessment details: Pt presented s/p anterior cervical spine fusion C4-7  Presented in [id number] [employer] collar  2-10-21:  Pt progressing steadily with PT tx  He reports decreasing pain levels  Improving ROM and strength noted C-spine  No headaches noted  Pt still demonstrates deficits in functional level as higher level ADL's somewhat difficult  Reports most difficulty with activity above shoulder level  Needs continued improvement in strength, ROM, and activity tolerance  Pt would benefit from continued PT tx to reduce symptoms and restore normal functional level  Impairments: abnormal or restricted ROM, activity intolerance, impaired physical strength, pain with function and poor posture     Goals  ST  Decrease pain 50% 6 wk  2  Increase c-spine ROM to [facility]  6 wk  3  Increase c-spine strength to 4/5  6 wk  4  Increase BUE strength to 4/5 6 wk (partially met)  LT  Pt will report no pain 12 wk  2  Increase c-spine AROM to WNL 12 wk  3  Increase c-spine strength to WNL 12 wk  4  Pt will report no limitations with ADL's 12 wk  5    Pt will report no limitations with ambulation 12 wk  (not met)    Plan  Patient would benefit from: PT eval and skilled physical therapy  Planned modality interventions: cryotherapy and thermotherapy: hydrocollator packs  Planned therapy interventions: manual therapy, postural training, strengthening, stretching, therapeutic activities, therapeutic exercise, neuromuscular re-education, flexibility, functional ROM exercises and home exercise program  Frequency: 3x week  Duration in weeks: 12  Treatment plan discussed with: patient        Subjective Evaluation    History of Present Illness  Date of surgery: 2020  Mechanism of injury: Pt presents s/p Anterior Cervical Fusion on 20  Present in [facility] cervical collar  Surgery due to radiating symptoms LUE (pain/altered sensation)  Had 3 levels fused  Reports no symptoms LUE since surgery  Continued pain c-spine  Sleep disrupted due to symptoms (1-3 hours)  Reports difficulty with ambulation  No headaches  No difficulty swallowing  Reports MD advised 10# lifting restriction and will be in collar a total of 6 weeks  Pain  Current pain ratin  At best pain rating: 3  At worst pain ratin  Location: C-spine   Quality: dull ache  Relieving factors: heat and medications  Aggravating factors: lifting  Progression: improved    Exercise history: plays guitar/keyboard     Treatments  Previous treatment: injection treatment and medication  Patient Goals  Patient goals for therapy: decreased pain, return to sport/leisure activities, independence with ADLs/IADLs and increased strength  Patient goal: walk longer distances, play music         Objective     Concurrent Complaints  Positive for disturbed sleep  Negative for night pain, headaches and trouble swallowing    Additional Special Questions  Well healing incision noted anterior c-spine  Minimal tenderness to palpation proximal aspect  Sensation intact around incision     Postural Observations  Seated posture: fair  Standing posture: fair    Additional Postural Observation Details  Kyphotic posture seated  Forward Head and Forward Rounded Shoulders    Palpation   Left   Tenderness of the upper trapezius       Neurological Testing     Sensation   Cervical/Thoracic   Left   Intact: light touch    Right   Intact: light touch    Active Range of Motion   Cervical/Thoracic Spine       Cervical    Flexion: 30 degrees   Extension: 12 degrees      Left lateral flexion: 20 degrees      Right lateral flexion: 22 degrees      Left rotation: 45 degrees  Right rotation: 45 degrees Additional Active Range of Motion Details  Reports tightness with movement c-spine region    Strength/Myotome Testing   Cervical Spine   Neck extension: 3+  Neck flexion: 3+    Left   Neck lateral flexion (C3): 3+    Right   Neck lateral flexion (C3): 3+    Left Shoulder     Planes of Motion   Flexion: 4   Abduction: 4   External rotation at 0°: 4   Internal rotation at 0°: 4     Isolated Muscles   Upper trapezius: 4     Right Shoulder     Planes of Motion   Flexion: 4   Abduction: 4   External rotation at 0°: 4   Internal rotation at 0°: 4     Isolated Muscles   Upper trapezius: 4     Left Elbow   Flexion: 4  Extension: 4    Right Elbow   Flexion: 4  Extension: 4    General Comments:      Shoulder Comments   Shoulder AROM WFL  Neuro Exam:     Headaches   Patient reports headaches: No         Precautions:  S/P Cervical Fusion  *No lifting >5-10#*       Manuals 1-27-21 1/29/21 2/3/2021 2/10/21 1/21/21                                   Neuro Re-Ed                         Ther Ex        c-spine AROM 20x  Ea  3" 20x3" ea  20x ea 3" 20x ea dir   Shrugs/rolls 20x  3" 20x  3"  20x ea  3" 20x ea   retractions 20x  3" 20x  3"  20x  3" 20x   Posture slouch/correct        nustep L5  12' L5 12' L5 12 min L5 12' L5 12'   UBE  4'  Alt    6'  alt    Cervical isometrics  15x ea 5" 15x5" ea   15x ea dir 5" 10x ea dir 5" hold   Seated T-spine ext in chair :10 x 15       TB MTP/LTP Held per pt request    20x Green   TB T & Y  Held per pt request     20x Green           HEP        Ther Activity                        Gait Training                        Modalities        MHP/CP Declined Declined  Declined  Declined 5'

## 2021-02-10 NOTE — LETTER
February 10, 2021    MD Matheus Pryor [id number]    Patient: Norberto Peterson   YOB: 1955   Date of Visit: 2/10/2021     Encounter Diagnosis     ICD-10-CM    1  Cervical radiculopathy  M54 12    2  S/P cervical spinal fusion  Z98 1        Dear Dr Law Sow: Thank you for your recent referral of Norberto Peterson  Please review the attached evaluation summary from Scottie's recent visit  Please verify that you agree with the plan of care by signing the attached order  If you have any questions or concerns, please do not hesitate to call  I sincerely appreciate the opportunity to share in the care of one of your patients and hope to have another opportunity to work with you in the near future  Sincerely,    Anuja Russo PT      Referring Provider:      I certify that I have read the below Plan of Care and certify the need for these services furnished under this plan of treatment while under my care  MD Matheus Pryor [id number]  Via Fax: [phone]          PT RE-EVALUATION     Today's date: 2/10/2021  Patient name: Norberto Peterson  : 1955  MRN: [id number]  Referring provider: Omi Ceron MD  Dx:   Encounter Diagnosis     ICD-10-CM    1  Cervical radiculopathy  M54 12    2  S/P cervical spinal fusion  Z98 1                   Assessment  Assessment details: Pt presented s/p anterior cervical spine fusion C4-7  Presented in [address] [facility]  2-10-21:  Pt progressing steadily with PT tx  He reports decreasing pain levels  Improving ROM and strength noted C-spine  No headaches noted  Pt still demonstrates deficits in functional level as higher level ADL's somewhat difficult  Reports most difficulty with activity above shoulder level  Needs continued improvement in strength, ROM, and activity tolerance    Pt would benefit from continued PT tx to reduce symptoms and restore normal functional level     Impairments: abnormal or restricted ROM, activity intolerance, impaired physical strength, pain with function and poor posture     Goals  ST  Decrease pain 50% 6 wk  2  Increase c-spine ROM to [facility]  6 wk  3  Increase c-spine strength to 4/5  6 wk  4  Increase BUE strength to 4/5 6 wk (partially met)  LT  Pt will report no pain 12 wk  2  Increase c-spine AROM to WNL 12 wk  3  Increase c-spine strength to WNL 12 wk  4  Pt will report no limitations with ADL's 12 wk  5  Pt will report no limitations with ambulation 12 wk  (not met)    Plan  Patient would benefit from: PT eval and skilled physical therapy  Planned modality interventions: cryotherapy and thermotherapy: hydrocollator packs  Planned therapy interventions: manual therapy, postural training, strengthening, stretching, therapeutic activities, therapeutic exercise, neuromuscular re-education, flexibility, functional ROM exercises and home exercise program  Frequency: 3x week  Duration in weeks: 12  Treatment plan discussed with: patient        Subjective Evaluation    History of Present Illness  Date of surgery: 2020  Mechanism of injury: Pt presents s/p Anterior Cervical Fusion on 20  Present in [place] cervical collar  Surgery due to radiating symptoms LUE (pain/altered sensation)  Had 3 levels fused  Reports no symptoms LUE since surgery  Continued pain c-spine  Sleep disrupted due to symptoms (1-3 hours)  Reports difficulty with ambulation  No headaches  No difficulty swallowing  Reports MD advised 10# lifting restriction and will be in collar a total of 6 weeks      Pain  Current pain ratin  At best pain rating: 3  At worst pain ratin  Location: C-spine   Quality: dull ache  Relieving factors: heat and medications  Aggravating factors: lifting  Progression: improved    Exercise history: plays guitar/keyboard     Treatments  Previous treatment: injection treatment and medication  Patient Goals  Patient goals for therapy: decreased pain, return to sport/leisure activities, independence with ADLs/IADLs and increased strength  Patient goal: walk longer distances, play music         Objective     Concurrent Complaints  Positive for disturbed sleep  Negative for night pain, headaches and trouble swallowing    Additional Special Questions  Well healing incision noted anterior c-spine  Minimal tenderness to palpation proximal aspect  Sensation intact around incision     Postural Observations  Seated posture: fair  Standing posture: fair    Additional Postural Observation Details  Kyphotic posture seated  Forward Head and Forward Rounded Shoulders    Palpation   Left   Tenderness of the upper trapezius       Neurological Testing     Sensation   Cervical/Thoracic   Left   Intact: light touch    Right   Intact: light touch    Active Range of Motion   Cervical/Thoracic Spine       Cervical    Flexion: 30 degrees   Extension: 12 degrees      Left lateral flexion: 20 degrees      Right lateral flexion: 22 degrees      Left rotation: 45 degrees  Right rotation: 45 degrees           Additional Active Range of Motion Details  Reports tightness with movement c-spine region    Strength/Myotome Testing   Cervical Spine   Neck extension: 3+  Neck flexion: 3+    Left   Neck lateral flexion (C3): 3+    Right   Neck lateral flexion (C3): 3+    Left Shoulder     Planes of Motion   Flexion: 4   Abduction: 4   External rotation at 0°: 4   Internal rotation at 0°: 4     Isolated Muscles   Upper trapezius: 4     Right Shoulder     Planes of Motion   Flexion: 4   Abduction: 4   External rotation at 0°: 4   Internal rotation at 0°: 4     Isolated Muscles   Upper trapezius: 4     Left Elbow   Flexion: 4  Extension: 4    Right Elbow   Flexion: 4  Extension: 4    General Comments:      Shoulder Comments   Shoulder AROM WFL  Neuro Exam:     Headaches   Patient reports headaches: No         Precautions:  S/P Cervical Fusion  *No lifting >5-10#*       Manuals 1-27-21 1/29/21 2/3/2021 2/10/21 1/21/21                                   Neuro Re-Ed                         Ther Ex        c-spine AROM 20x  Ea  3" 20x3" ea  20x ea 3" 20x ea dir   Shrugs/rolls 20x  3" 20x  3"  20x ea  3" 20x ea   retractions 20x  3" 20x  3"  20x  3" 20x   Posture slouch/correct        nustep L5  12' L5 12' L5 12 min L5 12' L5 12'   UBE  4'  Alt    6'  alt    Cervical isometrics  15x ea 5" 15x5" ea   15x ea dir 5" 10x ea dir 5" hold   Seated T-spine ext in chair :10 x 15       TB MTP/LTP Held per pt request    20x Green   TB T & Y  Held per pt request     20x Green           HEP        Ther Activity                        Gait Training                        Modalities        MHP/CP Declined Declined  Declined  Declined 5'

## 2021-02-15 ENCOUNTER — OFFICE VISIT (OUTPATIENT)
Dept: PHYSICAL THERAPY | Facility: CLINIC | Age: 66
End: 2021-02-15
Payer: COMMERCIAL

## 2021-02-17 ENCOUNTER — OFFICE VISIT (OUTPATIENT)
Dept: PHYSICAL THERAPY | Facility: CLINIC | Age: 66
End: 2021-02-17
Payer: COMMERCIAL

## 2021-02-17 DIAGNOSIS — Z98.1 S/P CERVICAL SPINAL FUSION: ICD-10-CM

## 2021-02-17 DIAGNOSIS — M54.12 CERVICAL RADICULOPATHY: Primary | ICD-10-CM

## 2021-02-17 PROCEDURE — 97110 THERAPEUTIC EXERCISES: CPT

## 2021-02-17 NOTE — PROGRESS NOTES
Daily Note     Today's date: 2021  Patient name: Ben Ac  : 1955  MRN: [id number]  Referring provider: Fadi Anaya MD  Dx:   Encounter Diagnosis     ICD-10-CM    1  Cervical radiculopathy  M54 12    2  S/P cervical spinal fusion  Z98 1                   Subjective:  I saw the doctor  He said everything looks good      Objective: See treatment diary below      Assessment: Tolerated treatment well  No pain noted with TE  Does continue to fatigue with activity  Feels he needs continued improvement in strength  Patient would benefit from continued PT      Plan: Continue per plan of care        Precautions:  S/P Cervical Fusion  *No lifting >5-10#*       Manuals 1-27-21 1/29/21 2/3/2021 2/10/21 2/17/21                                   Neuro Re-Ed                         Ther Ex        c-spine AROM 20x  Ea  3" 20x3" ea  20x ea 3" 20x ea dir   Shrugs/rolls 20x  3" 20x  3"  20x ea  3" 20x ea   retractions 20x  3" 20x  3"  20x  3" 20x   Posture slouch/correct        nustep L5  12' L5 12' L5 12 min L5 12' L5 12'   UBE  4'  Alt    6'  alt 4' alt  (80 speed)   Cervical isometrics  15x ea 5" 15x5" ea   15x ea dir 5" 15x ea dir 5" hold   Seated T-spine ext in chair :10 x 15       T-band Press     20x  Red   TB MTP/LTP Held per pt request    20x Red   TB T & Y  Held per pt request                HEP        Ther Activity                        Gait Training                        Modalities        MHP/CP Declined Declined  Declined  Declined Declined

## 2021-02-24 ENCOUNTER — APPOINTMENT (OUTPATIENT)
Dept: PHYSICAL THERAPY | Facility: CLINIC | Age: 66
End: 2021-02-24
Payer: COMMERCIAL

## 2021-03-12 ENCOUNTER — OFFICE VISIT (OUTPATIENT)
Dept: PHYSICAL THERAPY | Facility: CLINIC | Age: 66
End: 2021-03-12
Payer: COMMERCIAL

## 2021-03-12 DIAGNOSIS — M54.12 CERVICAL RADICULOPATHY: Primary | ICD-10-CM

## 2021-03-12 DIAGNOSIS — Z98.1 S/P CERVICAL SPINAL FUSION: ICD-10-CM

## 2021-03-12 PROCEDURE — 97110 THERAPEUTIC EXERCISES: CPT

## 2021-03-17 ENCOUNTER — OFFICE VISIT (OUTPATIENT)
Dept: PHYSICAL THERAPY | Facility: CLINIC | Age: 66
End: 2021-03-17
Payer: COMMERCIAL

## 2021-03-17 DIAGNOSIS — M54.12 CERVICAL RADICULOPATHY: Primary | ICD-10-CM

## 2021-03-17 DIAGNOSIS — Z98.1 S/P CERVICAL SPINAL FUSION: ICD-10-CM

## 2021-03-17 PROCEDURE — 97110 THERAPEUTIC EXERCISES: CPT

## 2021-03-17 NOTE — PROGRESS NOTES
Daily Note     Today's date: 3/17/2021  Patient name: Tommy Castro  : 1955  MRN: [id number]  Referring provider: Erica Richardson MD  Dx:   Encounter Diagnosis     ICD-10-CM    1  Cervical radiculopathy  M54 12    2  S/P cervical spinal fusion  Z98 1                   Subjective: Patient states that he is doing alright  Objective: See treatment diary below      Assessment: Tolerated treatment well  Patient exhibited good technique with therapeutic exercises and would benefit from continued PT  Increase resistance next visit as able  Patient was fatigued with exercises  Plan: Continue per plan of care        Precautions:  S/P Cervical Fusion         Manuals 3-12-21 3/17/21 2/3/2021 2/10/21 2/17/21                                   Neuro Re-Ed                         Ther Ex        c-spine AROM 20x  Ea  3" lateral flexion and rotation right held 20x3" ea  20x ea 3" 20x ea dir   Shrugs/rolls 20x  3" 20x  3"  20x ea  3" 20x ea   retractions 20x  3" 20x  3"  20x  3" 20x   Posture slouch/correct        nustep L5  12' L5 12' L5 12 min L5 12' L5 12'   UBE  7' Alt 80 speed 7'  Alt 80 Speed    6'  alt 4' alt  (80 speed)   Cervical isometrics  15x  5" ea 15x5" ea   15x ea dir 5" 15x ea dir 5" hold   Lat Pulldown 15#  30x 15# 30x      Seated Row 10#  30x 10# 30x      Chest Press  10#  20x 10# 30x              T-band Press     20x  Red   TB MTP/LTP     20x Red   TB T & Y                  HEP        Ther Activity                        Gait Training                        Modalities        MHP/CP MHP 5' mid-session Declined  Declined  Declined Declined

## 2021-03-24 ENCOUNTER — OFFICE VISIT (OUTPATIENT)
Dept: PHYSICAL THERAPY | Facility: CLINIC | Age: 66
End: 2021-03-24
Payer: COMMERCIAL

## 2021-03-24 DIAGNOSIS — M54.12 CERVICAL RADICULOPATHY: Primary | ICD-10-CM

## 2021-03-24 DIAGNOSIS — Z98.1 S/P CERVICAL SPINAL FUSION: ICD-10-CM

## 2021-03-24 PROCEDURE — 97110 THERAPEUTIC EXERCISES: CPT

## 2021-03-24 NOTE — PROGRESS NOTES
Daily Note     Today's date: 3/24/2021  Patient name: Paloma Harper  : 1955  MRN: [id number]  Referring provider: Gianna Garza MD  Dx:   Encounter Diagnosis     ICD-10-CM    1  Cervical radiculopathy  M54 12    2  S/P cervical spinal fusion  Z98 1                   Subjective: The patient states that he is doing alright but does have some soreness on his right side  Objective: See treatment diary below      Assessment: Tolerated treatment well  Patient exhibited good technique with therapeutic exercises and would benefit from continued PT  Increased resistance to help with increasing function  Continue to work on strength as able  Plan: Continue per plan of care        Precautions:  S/P Cervical Fusion         Manuals 3-12-21 3/17/21 3/24/2021 2/10/21 2/17/21                                   Neuro Re-Ed                         Ther Ex        c-spine AROM 20x  Ea  3" lateral flexion and rotation right held 20x3" ea 20x 3" R side held 20x ea 3" 20x ea dir   Shrugs/rolls 20x  3" 20x  3" 20x 3" 20x ea  3" 20x ea   retractions 20x  3" 20x  3" 20x 3" 20x  3" 20x   Posture slouch/correct        nustep L5  12' L5 12' L5 12 min L5 12' L5 12'   UBE  7' Alt 80 speed 7'  Alt 80 Speed   8' alt 80 RPM 6'  alt 4' alt  (80 speed)   Cervical isometrics  15x  5" ea 15x5" ea   15x ea dir 5" 15x ea dir 5" hold   Lat Pulldown 15#  30x 15# 30x 20# 30x     Seated Row 10#  30x 10# 30x 15# 30x     Chest Press  10#  20x 10# 30x 15# 30x             T-band Press     20x  Red   TB MTP/LTP     20x Red   TB T & Y                  HEP        Ther Activity                        Gait Training                        Modalities        MHP/CP MHP 5' mid-session Declined  Declined  Declined Declined

## 2021-03-31 ENCOUNTER — OFFICE VISIT (OUTPATIENT)
Dept: PHYSICAL THERAPY | Facility: CLINIC | Age: 66
End: 2021-03-31
Payer: COMMERCIAL

## 2021-03-31 DIAGNOSIS — Z98.1 S/P CERVICAL SPINAL FUSION: ICD-10-CM

## 2021-03-31 DIAGNOSIS — M54.12 CERVICAL RADICULOPATHY: Primary | ICD-10-CM

## 2021-03-31 PROCEDURE — 97110 THERAPEUTIC EXERCISES: CPT

## 2021-03-31 NOTE — PROGRESS NOTES
Daily Note     Today's date: 3/31/2021  Patient name: Abel Villavicencio  : 1955  MRN: [id number]  Referring provider: Grace Massey MD  Dx:   Encounter Diagnosis     ICD-10-CM    1  Cervical radiculopathy  M54 12    2  S/P cervical spinal fusion  Z98 1                   Subjective: The patient states that he is in severe pain today and he had to call his MD         Objective: See treatment diary below      Assessment: Tolerated treatment fair  Patient exhibited good technique with therapeutic exercises and would benefit from continued PT  Modified treatment today due to increase in pain  Patient arrived to his therapy treatment 15 minutes late  Plan: Continue per plan of care        Precautions:  S/P Cervical Fusion         Manuals 3-12-21 3/17/21 3/24/2021 3/31/21 2/17/21                                   Neuro Re-Ed                         Ther Ex        c-spine AROM 20x  Ea  3" lateral flexion and rotation right held 20x3" ea 20x 3" R side held  20x ea dir   Shrugs/rolls 20x  3" 20x  3" 20x 3"  20x ea   retractions 20x  3" 20x  3" 20x 3"  20x   Posture slouch/correct        nustep L5  12' L5 12' L5 12 min L5 12' L5 12'   UBE  7' Alt 80 speed 7'  Alt 80 Speed   8' alt 80 RPM 7'  Alt 80 RPM 4' alt  (80 speed)   Cervical isometrics  15x  5" ea 15x5" ea    15x ea dir 5" hold   Lat Pulldown 15#  30x 15# 30x 20# 30x 20# 30x    Seated Row 10#  30x 10# 30x 15# 30x 15# 30x    Chest Press  10#  20x 10# 30x 15# 30x 15# 30x            T-band Press     20x  Red   TB MTP/LTP     20x Red   TB T & Y                  HEP        Ther Activity                        Gait Training                        Modalities        MHP/CP MHP 5' mid-session Declined  Declined  Declined Declined

## 2021-04-23 NOTE — PROGRESS NOTES
PT DISCHARGE     Today's date: 2021  Patient name: Peg Levin  : 1955  MRN: [id number]  Referring provider: Annita Merlos MD  Dx:   Encounter Diagnosis     ICD-10-CM    1  Cervical radiculopathy  M54 12    2  S/P cervical spinal fusion  Z98 1        Start Time: 0830  Stop Time: 0900  Total time in clinic (min): 30 minutes    Assessment  Assessment details: Pt presented s/p anterior cervical spine fusion C4-7  Presented [place] [address] [employer] collar  D/C:  Pt progressed well with PT tx  He reported decreasing pain levels  Improved ROM and strength noted C-spine  No headaches noted  He reported continued improvement with ADL's and activity outside PT  Pt last attended session was on 3-31-21  He did not return after that time  D/C PT services  Impairments: abnormal or restricted ROM, activity intolerance, impaired physical strength, pain with function and poor posture     Goals  ST  Decrease pain 50% 6 wk  2  Increase c-spine ROM to [facility]  6 wk  3  Increase c-spine strength to 4/5  6 wk  4  Increase BUE strength to 4/5 6 wk (partially met)  LT  Pt will report no pain 12 wk  2  Increase c-spine AROM to WNL 12 wk  3  Increase c-spine strength to WNL 12 wk  4  Pt will report no limitations with ADL's 12 wk  5    Pt will report no limitations with ambulation 12 wk  (Partially met)    Plan  Plan details: D/C PT services  Findings per last re-evaluation   Patient would benefit from: PT eval and skilled physical therapy  Planned modality interventions: cryotherapy and thermotherapy: hydrocollator packs  Planned therapy interventions: manual therapy, postural training, strengthening, stretching, therapeutic activities, therapeutic exercise, neuromuscular re-education, flexibility, functional ROM exercises and home exercise program        Subjective Evaluation    History of Present Illness  Date of surgery: 2020  Mechanism of injury: Pt presents s/p Anterior Cervical Fusion on 11-20  Present in [place] cervical collar  Surgery due to radiating symptoms LUE (pain/altered sensation)  Had 3 levels fused  Reports no symptoms LUE since surgery  Continued pain c-spine  Sleep disrupted due to symptoms (1-3 hours)  Reports difficulty with ambulation  No headaches  No difficulty swallowing  Reports MD advised 10# lifting restriction and will be in collar a total of 6 weeks  Pain  Current pain ratin  At best pain rating: 3  At worst pain ratin  Location: C-spine   Quality: dull ache  Relieving factors: heat and medications  Aggravating factors: lifting  Progression: improved    Exercise history: plays guitar/keyboard     Treatments  Previous treatment: injection treatment and medication  Patient Goals  Patient goals for therapy: decreased pain, return to sport/leisure activities, independence with ADLs/IADLs and increased strength  Patient goal: walk longer distances, play music         Objective     Concurrent Complaints  Positive for disturbed sleep  Negative for night pain, headaches and trouble swallowing    Additional Special Questions  Well healing incision noted anterior c-spine  Minimal tenderness to palpation proximal aspect  Sensation intact around incision     Postural Observations  Seated posture: fair  Standing posture: fair    Additional Postural Observation Details  Kyphotic posture seated  Forward Head and Forward Rounded Shoulders    Palpation   Left   Tenderness of the upper trapezius       Neurological Testing     Sensation   Cervical/Thoracic   Left   Intact: light touch    Right   Intact: light touch    Active Range of Motion   Cervical/Thoracic Spine       Cervical    Flexion: 30 degrees   Extension: 12 degrees      Left lateral flexion: 20 degrees      Right lateral flexion: 22 degrees      Left rotation: 45 degrees  Right rotation: 45 degrees           Additional Active Range of Motion Details  Reports tightness with movement c-spine region    Strength/Myotome Testing   Cervical Spine   Neck extension: 3+  Neck flexion: 3+    Left   Neck lateral flexion (C3): 3+    Right   Neck lateral flexion (C3): 3+    Left Shoulder     Planes of Motion   Flexion: 4   Abduction: 4   External rotation at 0°: 4   Internal rotation at 0°: 4     Isolated Muscles   Upper trapezius: 4     Right Shoulder     Planes of Motion   Flexion: 4   Abduction: 4   External rotation at 0°: 4   Internal rotation at 0°: 4     Isolated Muscles   Upper trapezius: 4     Left Elbow   Flexion: 4  Extension: 4    Right Elbow   Flexion: 4  Extension: 4    General Comments:      Shoulder Comments   Shoulder AROM WFL  Neuro Exam:     Headaches   Patient reports headaches: No        Flowsheet Rows      Most Recent Value   PT/OT G-Codes   Current Score  65   Projected Score  55

## 2021-05-20 NOTE — LETTER
December 4, 2019     Elisabeth Valdivia, DO  [facility] [address] [facility]  [address]    Patient: Clary Claudio   YOB: 1955   Date of Visit: 12/4/2019       Dear Dr Osei Virk: Thank you for referring Albino Calhoun to me for evaluation  Below are my notes for this consultation  If you have questions, please do not hesitate to call me  I look forward to following your patient along with you  Sincerely,        Irma Holbrook PA-C        CC: No Recipients  Irma Holbrook [place]  12/4/2019 10:06 AM  Sign at close encounter  [facility] - Outpatient Consultation  Clary Claudio 60 y o  male MRN: [id number]  Encounter: [id number]          ASSESSMENT AND PLAN:      1  Diarrhea, unspecified type  2  Dysphagia, unspecified type  3  Gastroesophageal reflux disease without esophagitis  4  Melena  5  Rectal bleeding  Will continue patient on cholestyramine 1 to 2 times a day  Will do EGD and colonoscopy with biopsy  Will start fiber supplementation and probiotics  Patient will have a follow-up appointment after procedures  ______________________________________________________________________    HPI:    72-year-old male who is here with diarrhea for 8 months  Patient reports that for the past 8 months he has been suffering with diarrhea that alternates between formed stool soft stool and liquid stool  He reports he goes multiple times a day  He reports abdominal tightness and discomfort  He also reports occasional melena and rectal bleeding  He reports that he thinks he does have a hemorrhoid  He reports his last colonoscopy was 3 years ago and he did have polyps removed at that time  He reports he also had endoscopy done 3 years ago and he is unsure with the findings of this were  He most recently reports that his primary care doctor started him on Questran and he has been taking 1 to 2 times a day which seems to be helping slightly    We have no information on patient's family history since he was adopted  Patient also reports that he is on omeprazole and he started taking this 5 years ago  He denies any hematemesis  He does report dysphagia  REVIEW OF SYSTEMS:    CONSTITUTIONAL: Denies any fever, chills, rigors, and weight loss  HEENT: No earache or tinnitus  Denies hearing loss or visual disturbances  CARDIOVASCULAR: No chest pain or palpitations  RESPIRATORY: Denies any cough, hemoptysis, shortness of breath or dyspnea on exertion  GASTROINTESTINAL: As noted in the History of Present Illness  GENITOURINARY: No problems with urination  Denies any hematuria or dysuria  NEUROLOGIC: No dizziness or vertigo, denies headaches  MUSCULOSKELETAL: Denies any muscle or joint pain  SKIN: Denies skin rashes or itching  ENDOCRINE: Denies excessive thirst  Denies intolerance to heat or cold  PSYCHOSOCIAL: Denies depression or anxiety  Denies any recent memory loss         Historical Information   Past Medical History:   Diagnosis Date    Bipolar 1 disorder ([facility] [address] )      Past Surgical History:   Procedure Laterality Date    CHOLECYSTECTOMY      LUMBAR FUSION      SPINE SURGERY       Social History   Social History     Substance and Sexual Activity   Alcohol Use Yes    Frequency: 2-4 times a month    Drinks per session: 3 or 4     Social History     Substance and Sexual Activity   Drug Use Not on file     Social History     Tobacco Use   Smoking Status Never Smoker   Smokeless Tobacco Never Used     Family History   Adopted: Yes       Meds/Allergies       Current Outpatient Medications:     ARIPiprazole (ABILIFY) 10 mg tablet    cholestyramine (QUESTRAN) 4 g packet    morphine 10 mg/5 mL solution    nitroglycerin (NITRODUR) 0 1 mg/hr    Omeprazole 20 MG TBEC    sertraline (ZOLOFT) 100 mg tablet    tadalafil (CIALIS) 20 MG tablet    oxyCODONE (ROXICODONE) 10 MG TABS    oxyCODONE-acetaminophen (PERCOCET)  mg per tablet    No Known Allergies        Objective     Blood pressure 120/80, pulse 72, resp  rate 18, height 5' 8" (1 727 m), weight 99 8 kg (220 lb)  Body mass index is 33 45 kg/m²  PHYSICAL EXAM:      General Appearance:   Alert, cooperative, no distress   HEENT:   Normocephalic, atraumatic, anicteric      Neck:  Supple, symmetrical, trachea midline   Lungs:   Clear to auscultation bilaterally; no rales, rhonchi or wheezing; respirations unlabored    Heart[de-identified]   Regular rate and rhythm; no murmur, rub, or gallop  Abdomen:   Soft, non-tender, non-distended; normal bowel sounds; no masses, no organomegaly    Genitalia:   Deferred    Rectal:   Deferred    Extremities:  No cyanosis, clubbing or edema    Pulses:  2+ and symmetric    Skin:  No jaundice, rashes, or lesions    Lymph nodes:  No palpable cervical lymphadenopathy        Lab Results:   No visits with results within 1 Day(s) from this visit  Latest known visit with results is:   No results found for any previous visit  Radiology Results:   No results found  Cryotherapy Text: The wound bed was treated with cryotherapy after the biopsy was performed.

## 2022-02-13 ENCOUNTER — HOSPITAL ENCOUNTER (EMERGENCY)
Facility: HOSPITAL | Age: 67
Discharge: HOME | End: 2022-02-13
Attending: EMERGENCY MEDICINE | Admitting: EMERGENCY MEDICINE
Payer: COMMERCIAL

## 2022-02-13 VITALS
RESPIRATION RATE: 18 BRPM | HEIGHT: 67 IN | SYSTOLIC BLOOD PRESSURE: 134 MMHG | WEIGHT: 210 LBS | BODY MASS INDEX: 32.96 KG/M2 | TEMPERATURE: 98.2 F | DIASTOLIC BLOOD PRESSURE: 80 MMHG | OXYGEN SATURATION: 98 % | HEART RATE: 68 BPM

## 2022-02-13 DIAGNOSIS — S39.92XA INJURY OF LOW BACK, INITIAL ENCOUNTER: Primary | ICD-10-CM

## 2022-02-13 DIAGNOSIS — S39.012A STRAIN OF LUMBAR PARASPINOUS MUSCLE, INITIAL ENCOUNTER: ICD-10-CM

## 2022-02-13 PROCEDURE — 99284 EMERGENCY DEPT VISIT MOD MDM: CPT | Performed by: EMERGENCY MEDICINE

## 2022-02-13 PROCEDURE — 99283 EMERGENCY DEPT VISIT LOW MDM: CPT

## 2022-02-13 RX ORDER — OXYCODONE HYDROCHLORIDE 10 MG/1
10 TABLET ORAL EVERY 6 HOURS PRN
Qty: 20 TABLET | Refills: 0 | Status: SHIPPED | OUTPATIENT
Start: 2022-02-13

## 2022-02-13 NOTE — DISCHARGE INSTRUCTIONS
Ice to the area of soreness  Rest  Oxycodone 10 mg, 1 every 6 hours as needed for pain  Contact here pain specialist next business day  Follow-up with your doctor

## 2022-02-13 NOTE — ED PROVIDER NOTES
History  Chief Complaint   Patient presents with    Back Pain     Pt went to move something that was heavier than he thought  He tried grabbing it and pulled his back out     HPI patient is a 70-year-old male, reports that he was moving a box, apparently it was heavier than he thought, as he moved off of what it was standing on he felt a pull in his back  Patient reports a twisting kind of motion with a pole in his left lower back and developed pain in that area  Denies any pain radiating down his legs  Denies any numbness or weakness  The patient reports he can walk  Patient is reports severe pain a muscle spasm in that area  Patient has history of chronic low back pain chronically on oxycodone for pain  Patient has a pain specialist cc  Patient reports today due to the injury requires something else for pain  He denies any direct trauma to his back  Denies any falling  Patient primarily reports a pulling sensation in his left low back after lifting an object  Past medical history of back disorder, back surgery neck surgery anxiety asthma  Family history noncontributory  Social history nonsmoker no history of drug abuse  Prior to Admission Medications   Prescriptions Last Dose Informant Patient Reported? Taking?    ARIPiprazole (ABILIFY) 10 mg tablet   Yes No   Sig: Take 10 mg by mouth daily   Omeprazole 20 MG TBEC   Yes No   Sig: Take 20 mg by mouth   morphine 10 mg/5 mL solution   Yes No   Sig: Take by mouth every 2 (two) hours as needed for severe pain   nitroglycerin (NITRODUR) 0 1 mg/hr   Yes No   Sig: Place on the skin   oxyCODONE (ROXICODONE) 10 MG TABS   Yes No   oxyCODONE-acetaminophen (PERCOCET)  mg per tablet   Yes No   Sig: Take 1 tablet by mouth every 4 (four) hours as needed for moderate pain   sertraline (ZOLOFT) 100 mg tablet   Yes No   Sig: Take 50 mg by mouth daily   tadalafil (CIALIS) 20 MG tablet   Yes No   Sig: Take 20 mg by mouth      Facility-Administered Medications: None       Past Medical History:   Diagnosis Date    Anxiety     Asthma     Bipolar 1 disorder ([facility] [address] )     Depression        Past Surgical History:   Procedure Laterality Date    ANTERIOR FUSION CERVICAL SPINE      CATARACT EXTRACTION      CHOLECYSTECTOMY      EYE SURGERY      LUMBAR FUSION      SPINE SURGERY         Family History   Adopted: Yes     I have reviewed and agree with the history as documented  E-Cigarette/Vaping     E-Cigarette/Vaping Substances     Social History     Tobacco Use    Smoking status: Never Smoker    Smokeless tobacco: Never Used   Substance Use Topics    Alcohol use: Not Currently    Drug use: Yes     Frequency: 5 0 times per week     Types: Marijuana     Comment: 3 days ago       Review of Systems   Constitutional: Negative for diaphoresis, fatigue and fever  HENT: Negative for congestion, ear pain, nosebleeds and sore throat  Eyes: Negative for photophobia, pain, discharge and visual disturbance  Respiratory: Negative for cough, choking, chest tightness, shortness of breath and wheezing  Cardiovascular: Negative for chest pain and palpitations  Gastrointestinal: Negative for abdominal distention, abdominal pain, diarrhea and vomiting  Genitourinary: Negative for dysuria, flank pain and frequency  Musculoskeletal: Positive for back pain  Negative for gait problem and joint swelling  Skin: Negative for color change and rash  Neurological: Negative for dizziness, syncope and headaches  Psychiatric/Behavioral: Negative for behavioral problems and confusion  The patient is not nervous/anxious  All other systems reviewed and are negative  Physical Exam  Physical Exam  Vitals and nursing note reviewed  Constitutional:       Appearance: He is well-developed  HENT:      Head: Normocephalic        Right Ear: External ear normal       Left Ear: External ear normal       Nose: Nose normal    Eyes:      General: Lids are normal       Pupils: Pupils are equal, round, and reactive to light  Cardiovascular:      Rate and Rhythm: Normal rate and regular rhythm  Pulses: Normal pulses  Heart sounds: Normal heart sounds  Pulmonary:      Effort: Pulmonary effort is normal  No respiratory distress  Musculoskeletal:         General: No deformity  Cervical back: Normal range of motion and neck supple  Comments: There is left-sided paraspinal tenderness and muscle spasm, negative bilateral straight leg raise, no focal weakness  The patient can stand and walk  Skin:     General: Skin is warm and dry  Neurological:      Mental Status: He is alert and oriented to person, place, and time  Pulse oximetry 98% on room air adequate oxygenation, there is no hypoxia    Vital Signs  ED Triage Vitals [02/13/22 1116]   Temperature Pulse Respirations Blood Pressure SpO2   98 2 °F (36 8 °C) 68 18 134/80 98 %      Temp Source Heart Rate Source Patient Position - Orthostatic VS BP Location FiO2 (%)   Tympanic Monitor Sitting Left arm --      Pain Score       --           Vitals:    02/13/22 1116   BP: 134/80   Pulse: 68   Patient Position - Orthostatic VS: Sitting         Visual Acuity      ED Medications  Medications - No data to display    Diagnostic Studies  Results Reviewed     None                 No orders to display              Procedures  Procedures         ED Course                Patient was seen during the outbreak of the corona virus epidemic  Resources are limited due to the severity of patient illnesses associated with virus  Testing is also limited at this time  Discussed with patient at the time of this evaluation  Due to the fact that limited resources are available -treatment options are limited  Seen in the hallway             discussed with patient, mechanism is a twisting injury there is no fall there was no direct trauma to the area    I think it is unlikely there is a fracture and I do not believe there is any indication for imaging he agrees  SBIRT 20yo+      Most Recent Value   SBIRT (24 yo +)    In order to provide better care to our patients, we are screening all of our patients for alcohol and drug use  Would it be okay to ask you these screening questions? Yes Filed at: 02/13/2022 1130   Initial Alcohol Screen: US AUDIT-C     1  How often do you have a drink containing alcohol? 0 Filed at: 02/13/2022 1130   2  How many drinks containing alcohol do you have on a typical day you are drinking? 0 Filed at: 02/13/2022 1130   3a  Male UNDER 65: How often do you have five or more drinks on one occasion? 0 Filed at: 02/13/2022 1130   3b  FEMALE Any Age, or MALE 65+: How often do you have 4 or more drinks on one occassion? 0 Filed at: 02/13/2022 1130   Audit-C Score 0 Filed at: 02/13/2022 1130   JD: How many times in the past year have you    Used an illegal drug or used a prescription medication for non-medical reasons? Never Filed at: 02/13/2022 1130                    [facility] medical decision making 61-year-old male presents emergency department with left-sided paraspinal pain after lifting a box and twisting complains of severe muscle spasm and pain  Denies any focal weakness  Denies any direct trauma to the area  Discussed analgesics and follow-up with his spine specialist we discussed indications to return      Disposition  Final diagnoses:   Injury of low back, initial encounter   Strain of lumbar paraspinous muscle, initial encounter     Time reflects when diagnosis was documented in both MDM as applicable and the Disposition within this note     Time User Action Codes Description Comment    2/13/2022 11:25 AM Darryl Yee Add [S39 92XA] Injury of low back, initial encounter     2/13/2022 11:25 AM Darryl Yee Add [S39 012A] Strain of lumbar paraspinous muscle, initial encounter       ED Disposition     ED Disposition Condition Date/Time Comment    Discharge Stable Sun Feb 13, 2022 11:25 AM [facility] discharge to home/self care  Follow-up Information    None         Discharge Medication List as of 2/13/2022 11:28 AM      START taking these medications    Details   !! oxyCODONE (ROXICODONE) 10 MG TABS Take 1 tablet (10 mg total) by mouth every 6 (six) hours as needed for moderate pain for up to 20 doses Max Daily Amount: 40 mg, Starting Sun 2/13/2022, Normal       !! - Potential duplicate medications found  Please discuss with provider  CONTINUE these medications which have NOT CHANGED    Details   ARIPiprazole (ABILIFY) 10 mg tablet Take 10 mg by mouth daily, Historical Med      morphine 10 mg/5 mL solution Take by mouth every 2 (two) hours as needed for severe pain, Historical Med      nitroglycerin (NITRODUR) 0 1 mg/hr Place on the skin, Historical Med      Omeprazole 20 MG TBEC Take 20 mg by mouth, Starting Mon 2/5/2018, Historical Med      !! oxyCODONE (ROXICODONE) 10 MG TABS Starting Sat 6/11/2016, Historical Med      oxyCODONE-acetaminophen (PERCOCET)  mg per tablet Take 1 tablet by mouth every 4 (four) hours as needed for moderate pain, Historical Med      sertraline (ZOLOFT) 100 mg tablet Take 50 mg by mouth daily, Historical Med      tadalafil (CIALIS) 20 MG tablet Take 20 mg by mouth, Starting Tue 9/15/2015, Historical Med       !! - Potential duplicate medications found  Please discuss with provider  No discharge procedures on file      PDMP Review     None          ED Provider  Electronically Signed by           Zoila Toribio MD  02/13/22 4176

## 2022-04-16 ENCOUNTER — HOSPITAL ENCOUNTER (EMERGENCY)
Facility: HOSPITAL | Age: 67
Discharge: HOME | End: 2022-04-17
Attending: EMERGENCY MEDICINE
Payer: OTHER MISCELLANEOUS

## 2022-04-16 ENCOUNTER — APPOINTMENT (EMERGENCY)
Dept: CT IMAGING | Facility: HOSPITAL | Age: 67
End: 2022-04-16
Payer: OTHER MISCELLANEOUS

## 2022-04-16 DIAGNOSIS — M54.9 BACK PAIN: ICD-10-CM

## 2022-04-16 DIAGNOSIS — M25.511 RIGHT SHOULDER PAIN: Primary | ICD-10-CM

## 2022-04-16 LAB
ALBUMIN SERPL BCP-MCNC: 4.1 G/DL (ref 3.5–5)
ALP SERPL-CCNC: 105 U/L (ref 46–116)
ALT SERPL W P-5'-P-CCNC: 34 U/L (ref 12–78)
ANION GAP SERPL CALCULATED.3IONS-SCNC: 6 MMOL/L (ref 4–13)
AST SERPL W P-5'-P-CCNC: 36 U/L (ref 5–45)
BASOPHILS # BLD AUTO: 0.03 THOUSANDS/ΜL (ref 0–0.1)
BASOPHILS NFR BLD AUTO: 0 % (ref 0–1)
BILIRUB SERPL-MCNC: 0.5 MG/DL (ref 0.2–1)
BUN SERPL-MCNC: 14 MG/DL (ref 5–25)
CALCIUM SERPL-MCNC: 8.5 MG/DL (ref 8.3–10.1)
CHLORIDE SERPL-SCNC: 104 MMOL/L (ref 100–108)
CO2 SERPL-SCNC: 28 MMOL/L (ref 21–32)
CREAT SERPL-MCNC: 0.61 MG/DL (ref 0.6–1.3)
EOSINOPHIL # BLD AUTO: 0.83 THOUSAND/ΜL (ref 0–0.61)
EOSINOPHIL NFR BLD AUTO: 8 % (ref 0–6)
ERYTHROCYTE [DISTWIDTH] IN BLOOD BY AUTOMATED COUNT: 12.2 % (ref 11.6–15.1)
GFR SERPL CREATININE-BSD FRML MDRD: 105 ML/MIN/1.73SQ M
GLUCOSE SERPL-MCNC: 89 MG/DL (ref 65–140)
HCT VFR BLD AUTO: 41.7 % (ref 36.5–49.3)
HGB BLD-MCNC: 15.1 G/DL (ref 12–17)
IMM GRANULOCYTES # BLD AUTO: 0.07 THOUSAND/UL (ref 0–0.2)
IMM GRANULOCYTES NFR BLD AUTO: 1 % (ref 0–2)
LYMPHOCYTES # BLD AUTO: 2.37 THOUSANDS/ΜL (ref 0.6–4.47)
LYMPHOCYTES NFR BLD AUTO: 23 % (ref 14–44)
MCH RBC QN AUTO: 33.9 PG (ref 26.8–34.3)
MCHC RBC AUTO-ENTMCNC: 36.2 G/DL (ref 31.4–37.4)
MCV RBC AUTO: 94 FL (ref 82–98)
MONOCYTES # BLD AUTO: 0.7 THOUSAND/ΜL (ref 0.17–1.22)
MONOCYTES NFR BLD AUTO: 7 % (ref 4–12)
NEUTROPHILS # BLD AUTO: 6.29 THOUSANDS/ΜL (ref 1.85–7.62)
NEUTS SEG NFR BLD AUTO: 61 % (ref 43–75)
NRBC BLD AUTO-RTO: 0 /100 WBCS
PLATELET # BLD AUTO: 204 THOUSANDS/UL (ref 149–390)
PMV BLD AUTO: 9.3 FL (ref 8.9–12.7)
POTASSIUM SERPL-SCNC: 3.9 MMOL/L (ref 3.5–5.3)
PROT SERPL-MCNC: 7.5 G/DL (ref 6.4–8.2)
RBC # BLD AUTO: 4.46 MILLION/UL (ref 3.88–5.62)
SODIUM SERPL-SCNC: 138 MMOL/L (ref 136–145)
WBC # BLD AUTO: 10.29 THOUSAND/UL (ref 4.31–10.16)

## 2022-04-16 PROCEDURE — 36415 COLL VENOUS BLD VENIPUNCTURE: CPT | Performed by: EMERGENCY MEDICINE

## 2022-04-16 PROCEDURE — 99284 EMERGENCY DEPT VISIT MOD MDM: CPT

## 2022-04-16 PROCEDURE — 80053 COMPREHEN METABOLIC PANEL: CPT | Performed by: EMERGENCY MEDICINE

## 2022-04-16 PROCEDURE — 96374 THER/PROPH/DIAG INJ IV PUSH: CPT

## 2022-04-16 PROCEDURE — 71260 CT THORAX DX C+: CPT

## 2022-04-16 PROCEDURE — 85025 COMPLETE CBC W/AUTO DIFF WBC: CPT | Performed by: EMERGENCY MEDICINE

## 2022-04-16 RX ORDER — KETOROLAC TROMETHAMINE 30 MG/ML
15 INJECTION, SOLUTION INTRAMUSCULAR; INTRAVENOUS ONCE
Status: COMPLETED | OUTPATIENT
Start: 2022-04-16 | End: 2022-04-16

## 2022-04-16 RX ORDER — KETOROLAC TROMETHAMINE 30 MG/ML
15 INJECTION, SOLUTION INTRAMUSCULAR; INTRAVENOUS ONCE
Status: DISCONTINUED | OUTPATIENT
Start: 2022-04-16 | End: 2022-04-16

## 2022-04-16 RX ADMIN — KETOROLAC TROMETHAMINE 15 MG: 30 INJECTION, SOLUTION INTRAMUSCULAR at 21:36

## 2022-04-16 RX ADMIN — IOHEXOL 100 ML: 350 INJECTION, SOLUTION INTRAVENOUS at 22:14

## 2022-04-16 NOTE — Clinical Note
Wilmer Stapleton was seen and treated in our emergency department on 4/16/2022  Diagnosis:     Milana Cushing  may return to work on return date  He may return on this date: 04/25/2022         If you have any questions or concerns, please don't hesitate to call        Edwin Brock MD    ______________________________           _______________          _______________  Hospital Representative                              Date                                Time

## 2022-04-17 VITALS
TEMPERATURE: 97.9 F | DIASTOLIC BLOOD PRESSURE: 79 MMHG | HEART RATE: 78 BPM | BODY MASS INDEX: 32.91 KG/M2 | OXYGEN SATURATION: 97 % | WEIGHT: 210.1 LBS | SYSTOLIC BLOOD PRESSURE: 146 MMHG | RESPIRATION RATE: 18 BRPM

## 2022-04-17 PROCEDURE — 99285 EMERGENCY DEPT VISIT HI MDM: CPT | Performed by: EMERGENCY MEDICINE

## 2022-04-17 PROCEDURE — 96375 TX/PRO/DX INJ NEW DRUG ADDON: CPT

## 2022-04-17 RX ORDER — MORPHINE SULFATE 4 MG/ML
4 INJECTION, SOLUTION INTRAMUSCULAR; INTRAVENOUS ONCE
Status: COMPLETED | OUTPATIENT
Start: 2022-04-17 | End: 2022-04-17

## 2022-04-17 RX ORDER — METHOCARBAMOL 500 MG/1
500 TABLET, FILM COATED ORAL 2 TIMES DAILY
Qty: 20 TABLET | Refills: 0 | Status: SHIPPED | OUTPATIENT
Start: 2022-04-17

## 2022-04-17 RX ORDER — IBUPROFEN 800 MG/1
800 TABLET ORAL 3 TIMES DAILY
Qty: 21 TABLET | Refills: 0 | Status: SHIPPED | OUTPATIENT
Start: 2022-04-17

## 2022-04-17 RX ADMIN — MORPHINE SULFATE 4 MG: 4 INJECTION INTRAVENOUS at 00:10

## 2022-04-18 NOTE — ED PROVIDER NOTES
History  Chief Complaint   Patient presents with    Shoulder Injury     Pt reports being hit in the back and right shoulder by a large truss support while at work  17-year-old male presents emergency department for evaluation shoulder injury  Patient states that was working, he was struck in the upper left back/shoulder by a heavy construction draws, he has aching pain from his right-sided parathoracic back radiating her shoulder, no pain going down the arm the elbow or hand  No numbness weakness or tingling  No head strike, pain or injury, no numbness weakness or tingling  Prior to Admission Medications   Prescriptions Last Dose Informant Patient Reported? Taking?    ARIPiprazole (ABILIFY) 10 mg tablet   Yes No   Sig: Take 10 mg by mouth daily   Omeprazole 20 MG TBEC   Yes No   Sig: Take 20 mg by mouth   morphine 10 mg/5 mL solution   Yes No   Sig: Take by mouth every 2 (two) hours as needed for severe pain   nitroglycerin (NITRODUR) 0 1 mg/hr   Yes No   Sig: Place on the skin   oxyCODONE (ROXICODONE) 10 MG TABS   Yes No   oxyCODONE (ROXICODONE) 10 MG TABS   No No   Sig: Take 1 tablet (10 mg total) by mouth every 6 (six) hours as needed for moderate pain for up to 20 doses Max Daily Amount: 40 mg   oxyCODONE-acetaminophen (PERCOCET)  mg per tablet   Yes No   Sig: Take 1 tablet by mouth every 4 (four) hours as needed for moderate pain   sertraline (ZOLOFT) 100 mg tablet   Yes No   Sig: Take 50 mg by mouth daily   tadalafil (CIALIS) 20 MG tablet   Yes No   Sig: Take 20 mg by mouth      Facility-Administered Medications: None       Past Medical History:   Diagnosis Date    Anxiety     Asthma     Bipolar 1 disorder ([facility] [address] )     Depression        Past Surgical History:   Procedure Laterality Date    ANTERIOR FUSION CERVICAL SPINE      CATARACT EXTRACTION      CHOLECYSTECTOMY      EYE SURGERY      LUMBAR FUSION      SPINE SURGERY         Family History   Adopted: Yes     I have reviewed and agree with the history as documented  E-Cigarette/Vaping     E-Cigarette/Vaping Substances     Social History     Tobacco Use    Smoking status: Never Smoker    Smokeless tobacco: Never Used   Substance Use Topics    Alcohol use: Not Currently    Drug use: Yes     Frequency: 5 0 times per week     Types: Marijuana     Comment: 3 days ago       Review of Systems   Constitutional: Negative for appetite change, chills, fatigue and fever  HENT: Negative for sneezing and sore throat  Eyes: Negative for visual disturbance  Respiratory: Negative for cough, choking, chest tightness, shortness of breath and wheezing  Cardiovascular: Negative for chest pain and palpitations  Gastrointestinal: Negative for abdominal pain, constipation, diarrhea, nausea and vomiting  Genitourinary: Negative for difficulty urinating and dysuria  Musculoskeletal: Positive for arthralgias and back pain  Neurological: Negative for dizziness, weakness, light-headedness, numbness and headaches  All other systems reviewed and are negative  Physical Exam  Physical Exam  Vitals and nursing note reviewed  Constitutional:       General: He is not in acute distress  Appearance: He is well-developed  He is not diaphoretic  HENT:      Head: Normocephalic and atraumatic  Eyes:      Pupils: Pupils are equal, round, and reactive to light  Neck:      Vascular: No JVD  Trachea: No tracheal deviation  Cardiovascular:      Rate and Rhythm: Regular rhythm  Tachycardia present  Heart sounds: Normal heart sounds  No murmur heard  No friction rub  No gallop  Pulmonary:      Effort: Pulmonary effort is normal  No respiratory distress  Breath sounds: Normal breath sounds  No wheezing or rales  Abdominal:      General: Bowel sounds are normal  There is no distension  Palpations: Abdomen is soft  Tenderness: There is no abdominal tenderness  There is no guarding or rebound     Musculoskeletal: Comments: Right-sided parathoracic tenderness, right shoulder tenderness  Neurovascularly intact distally in the right upper extremity  Skin:     General: Skin is warm and dry  Coloration: Skin is not pale  Comments: No external signs of trauma  No bruises ecchymosis or contusions, no abrasions  Neurological:      Mental Status: He is alert and oriented to person, place, and time  Cranial Nerves: No cranial nerve deficit  Motor: No abnormal muscle tone     Psychiatric:         Behavior: Behavior normal          Vital Signs  ED Triage Vitals   Temperature Pulse Respirations Blood Pressure SpO2   04/16/22 1832 04/16/22 1832 04/16/22 1832 04/16/22 1832 04/16/22 1832   97 9 °F (36 6 °C) 71 18 143/70 96 %      Temp Source Heart Rate Source Patient Position - Orthostatic VS BP Location FiO2 (%)   04/16/22 1832 04/16/22 1832 04/16/22 2147 04/16/22 2147 --   Oral Monitor Sitting Left arm       Pain Score       04/16/22 2136       10 - Worst Possible Pain           Vitals:    04/16/22 1832 04/16/22 2147 04/17/22 0105   BP: 143/70 140/78 146/79   Pulse: 71 70 78   Patient Position - Orthostatic VS:  Sitting          Visual Acuity      ED Medications  Medications   ketorolac (TORADOL) injection 15 mg (15 mg Intravenous Given 4/16/22 2136)   iohexol (OMNIPAQUE) 350 MG/ML injection (SINGLE-DOSE) 100 mL (100 mL Intravenous Given 4/16/22 2214)   morphine (PF) 4 mg/mL injection 4 mg (4 mg Intravenous Given 4/17/22 0010)       Diagnostic Studies  Results Reviewed     Procedure Component Value Units Date/Time    Comprehensive metabolic panel [[id number]] Collected: 04/16/22 2135    Lab Status: Final result Specimen: Blood from Arm, Left Updated: 04/16/22 2159     Sodium 138 mmol/L      Potassium 3 9 mmol/L      Chloride 104 mmol/L      CO2 28 mmol/L      ANION GAP 6 mmol/L      BUN 14 mg/dL      Creatinine 0 61 mg/dL      Glucose 89 mg/dL      Calcium 8 5 mg/dL      AST 36 U/L      ALT 34 U/L      Alkaline Phosphatase 105 U/L      Total Protein 7 5 g/dL      Albumin 4 1 g/dL      Total Bilirubin 0 50 mg/dL      eGFR 105 ml/min/1 73sq m     Narrative:      [place] guidelines for Chronic Kidney Disease (CKD):     Stage 1 with normal or high GFR (GFR > 90 mL/min/1 73 square meters)    Stage 2 Mild CKD (GFR = 60-89 mL/min/1 73 square meters)    Stage 3A Moderate CKD (GFR = 45-59 mL/min/1 73 square meters)    Stage 3B Moderate CKD (GFR = 30-44 mL/min/1 73 square meters)    Stage 4 Severe CKD (GFR = 15-29 mL/min/1 73 square meters)    Stage 5 End Stage CKD (GFR <15 mL/min/1 73 square meters)  Note: GFR calculation is accurate only with a steady state creatinine    CBC and differential [[id number]]  (Abnormal) Collected: 04/16/22 2135    Lab Status: Final result Specimen: Blood from Arm, Left Updated: 04/16/22 2144     WBC 10 29 Thousand/uL      RBC 4 46 Million/uL      Hemoglobin 15 1 g/dL      Hematocrit 41 7 %      MCV 94 fL      MCH 33 9 pg      MCHC 36 2 g/dL      RDW 12 2 %      MPV 9 3 fL      Platelets 603 Thousands/uL      nRBC 0 /100 WBCs      Neutrophils Relative 61 %      Immat GRANS % 1 %      Lymphocytes Relative 23 %      Monocytes Relative 7 %      Eosinophils Relative 8 %      Basophils Relative 0 %      Neutrophils Absolute 6 29 Thousands/µL      Immature Grans Absolute 0 07 Thousand/uL      Lymphocytes Absolute 2 37 Thousands/µL      Monocytes Absolute 0 70 Thousand/µL      Eosinophils Absolute 0 83 Thousand/µL      Basophils Absolute 0 03 Thousands/µL                  CT chest with contrast   Final Result by Bijal Elliott MD (04/17 0059)      No evidence of acute traumatic injury within the thorax  No definite acute fracture  Postsurgical changes in the visualized spine as above with hardware grossly intact           Workstation performed: [id number]                    Procedures  Procedures         ED Course                                             MDM  Number of Diagnoses or Management Options  Back pain  Right shoulder pain  Diagnosis management comments: 30-year-old male with traumatic right upper back/shoulder pain  Will check CT to evaluate for posterior rib fracture or other acute traumatic injury, will give pain meds, reassess  Disposition  Final diagnoses:   Right shoulder pain   Back pain     Time reflects when diagnosis was documented in both MDM as applicable and the Disposition within this note     Time User Action Codes Description Comment    4/17/2022  1:08 AM Karley Jensen Add [M25 511] Right shoulder pain     4/17/2022  1:08 AM Kaley Tyson Add [M54 9] Back pain       ED Disposition     ED Disposition Condition Date/Time Comment    Discharge Stable Sun Apr 17, 2022  1:08 AM [facility] discharge to home/self care              Follow-up Information     Follow up With Specialties Details Why [address] [place] [address]   [address] [address] [place] [address]  [address]  [phone]            Discharge Medication List as of 4/17/2022  1:10 AM      START taking these medications    Details   ibuprofen (MOTRIN) 800 mg tablet Take 1 tablet (800 mg total) by mouth 3 (three) times a day, Starting Sun 4/17/2022, Normal      methocarbamol (ROBAXIN) 500 mg tablet Take 1 tablet (500 mg total) by mouth 2 (two) times a day, Starting Sun 4/17/2022, Normal         CONTINUE these medications which have NOT CHANGED    Details   ARIPiprazole (ABILIFY) 10 mg tablet Take 10 mg by mouth daily, Historical Med      morphine 10 mg/5 mL solution Take by mouth every 2 (two) hours as needed for severe pain, Historical Med      nitroglycerin (NITRODUR) 0 1 mg/hr Place on the skin, Historical Med      Omeprazole 20 MG TBEC Take 20 mg by mouth, Starting Mon 2/5/2018, Historical Med      !! oxyCODONE (ROXICODONE) 10 MG TABS Starting Sat 6/11/2016, Historical Med      !! oxyCODONE (ROXICODONE) 10 MG TABS Take 1 tablet (10 mg total) by mouth every 6 (six) hours as needed for moderate pain for up to 20 doses Max Daily Amount: 40 mg, Starting Sun 2/13/2022, Normal      oxyCODONE-acetaminophen (PERCOCET)  mg per tablet Take 1 tablet by mouth every 4 (four) hours as needed for moderate pain, Historical Med      sertraline (ZOLOFT) 100 mg tablet Take 50 mg by mouth daily, Historical Med      tadalafil (CIALIS) 20 MG tablet Take 20 mg by mouth, Starting Tue 9/15/2015, Historical Med       !! - Potential duplicate medications found  Please discuss with provider  No discharge procedures on file      PDMP Review     None          ED Provider  Electronically Signed by           Callie Wharton MD  04/18/22 7401

## 2022-04-25 ENCOUNTER — OFFICE VISIT (OUTPATIENT)
Dept: URGENT CARE | Facility: CLINIC | Age: 67
End: 2022-04-25
Payer: OTHER MISCELLANEOUS

## 2022-04-25 ENCOUNTER — APPOINTMENT (OUTPATIENT)
Dept: RADIOLOGY | Facility: CLINIC | Age: 67
End: 2022-04-25
Payer: OTHER MISCELLANEOUS

## 2022-04-25 DIAGNOSIS — S40.011A CONTUSION OF RIGHT SHOULDER, INITIAL ENCOUNTER: ICD-10-CM

## 2022-04-25 DIAGNOSIS — S40.011A CONTUSION OF RIGHT SHOULDER, INITIAL ENCOUNTER: Primary | ICD-10-CM

## 2022-04-25 PROCEDURE — 99213 OFFICE O/P EST LOW 20 MIN: CPT | Performed by: PHYSICIAN ASSISTANT

## 2022-04-25 PROCEDURE — 73030 X-RAY EXAM OF SHOULDER: CPT

## 2022-06-08 ENCOUNTER — EVALUATION (OUTPATIENT)
Dept: PHYSICAL THERAPY | Facility: CLINIC | Age: 67
End: 2022-06-08
Payer: OTHER MISCELLANEOUS

## 2022-06-08 DIAGNOSIS — M54.2 NECK PAIN: ICD-10-CM

## 2022-06-08 DIAGNOSIS — G95.9 CERVICAL MYELOPATHY (HCC): Primary | ICD-10-CM

## 2022-06-08 PROCEDURE — 97140 MANUAL THERAPY 1/> REGIONS: CPT

## 2022-06-08 PROCEDURE — 97535 SELF CARE MNGMENT TRAINING: CPT

## 2022-06-08 PROCEDURE — 97162 PT EVAL MOD COMPLEX 30 MIN: CPT

## 2022-06-08 NOTE — PROGRESS NOTES
PT Evaluation     Today's date: 2022  Patient name: Peg Levin  : 1955  MRN: [id number]  Referring provider: Annita Merlos MD  Dx:   Encounter Diagnosis     ICD-10-CM    1  Cervical myelopathy (HCC)  G95 9    2  Neck pain  M54 2                   Assessment  Assessment details: Pt presents with signs and symptoms of myelopathy in the right UE  Symptoms mostly increased with tension and palpation of right brachial plexus and ulnar nerve bed  Pt's significantly poor posture appears to be a large contributor to symptoms  Impairments: abnormal or restricted ROM, impaired physical strength, lacks appropriate home exercise program, pain with function and poor posture     Symptom irritability: moderateUnderstanding of Dx/Px/POC: good   Prognosis: fair    Goals  ST) Pt  Will be able to sleep through the night without being awoken with pain and with minimal to no pain upon waking in 6 weeks  2) Pt  Will have not headaches while at work or with computer work at home  3)  Pt 's radicular symptoms will be centralized to neck in 6 weeks  LT) Pt  Will be able to complete all chores at home without pain or limitations in 12 weeks  2)  Pt  Will be able to to complete all physical tasks at work without restriction or limitations in 12 weeks  ST) Pt  Will be able to sleep through the night without being awoken with pain and with minimal to no pain upon waking in 6 weeks  2) Pt  Will have not headaches while at work or with computer work at home  3)  Pt 's radicular symptoms will be centralized to neck in 6 weeks  LT) Pt  Will be able to complete all chores at home without pain or limitations in 12 weeks  2)  Pt  Will be able to to complete all physical tasks at work without restriction or limitations in 12 weeks        Plan  Patient would benefit from: skilled physical therapy and PT eval  Referral necessary: Yes  Planned modality interventions: cryotherapy, thermotherapy: hydrocollator packs and unattended electrical stimulation  Planned therapy interventions: manual therapy, neuromuscular re-education, patient education, self care, therapeutic exercise, therapeutic activities and home exercise program  Other planned therapy interventions: cupping, graston and K-taping of right plexus and ulnar nerve beds  Frequency: 3x week  Duration in weeks: 12  Treatment plan discussed with: patient        Subjective Evaluation    History of Present Illness  Date of onset: 2022  Mechanism of injury: Hit by a large roof tress  Has history of multiple cervical fusions in 2019 and 2020  Not a recurrent problem   Quality of life: fair    Pain  Current pain ratin  At best pain ratin  At worst pain ratin  Location: upper cervical pain  Quality: burning, radiating, discomfort, dull ache and cramping  Relieving factors: change in position and medications  Progression: no change      Diagnostic Tests    FCE comments: Not working at this time due to injury  Was working as a  but was tasked with helping to build a [employer], where he hurt himself  Treatments  No previous or current treatments  Patient Goals  Patient goal: wants to get his neck fixed and return to work  Objective     Static Posture     Head  Forward  Shoulders  Asymmetric shoulders  Postural Observations  Seated posture: poor  Standing posture: poor  Correction of posture: has no consistent effect    Additional Postural Observation Details  Significant forward head and rounded shoulders along with bilateral scapular protraction  Palpation     Additional Palpation Details  Palpation along right brachial plexus and ulnar nerve beds yields discomfort and reproduction of ulnar nerve symptoms      Neurological Testing     Reflexes   Left   Biceps (C5/C6): trace (1+)  Brachioradialis (C6): trace (1+)  Triceps (C7): trace (1+)    Right   Biceps (C5/C6): trace (1+)  Brachioradialis (C6): trace (1+)  Triceps (C7): trace (1+)    Active Range of Motion   Cervical/Thoracic Spine       Cervical  Subcranial protraction:   Restriction level: minimal  Subcranial retraction:  with pain   Restriction level: moderate  Flexion:  Restriction level: minimal  Extension:  with pain Restriction level: maximal  Left lateral flexion:  Restriction level: moderate  Right lateral flexion:  with pain Restriction level maximal  Left rotation:  with pain Restriction level: moderate  Right rotation:  with pain Restriction level: maximal    Strength/Myotome Testing   Cervical Spine     Left   Interossei strength (t1): 3+    Right   Interossei strength (t1): 2+    Left Shoulder     Planes of Motion   Abduction: 4   External rotation at 0°: 4     Right Shoulder     Planes of Motion   Abduction: 2+   External rotation at 0°: 3+     Left Elbow   Flexion: 4  Extension: 4    Right Elbow   Flexion: 2+  Extension: 2+    Left Wrist/Hand   Wrist extension: 4  Wrist flexion: 4  Thumb extension: 4    Right Wrist/Hand   Wrist extension: 3  Wrist flexion: 3  Thumb extension: 3                Precautions:  Multi-Level cervical fusion       Manuals 6-8-22       Right brachial plexus and ulnar nerve mobilization reshma Will and K-taping  25' total                               Neuro Re-Ed         Cervical retraction        Chin tucks        Thoracic extension seated w/ roll        LTP and MTP while holding good scapular and cervical posture  Ther Ex                                                                        Ther Activity                                                                        Pt Ed/ HEP Pathology and involved anatomy as well as issuing and reviewing of HEP   14'               Gait Training                        Modalities

## 2022-06-10 ENCOUNTER — APPOINTMENT (OUTPATIENT)
Dept: PHYSICAL THERAPY | Facility: CLINIC | Age: 67
End: 2022-06-10
Payer: OTHER MISCELLANEOUS

## 2022-06-14 ENCOUNTER — APPOINTMENT (OUTPATIENT)
Dept: PHYSICAL THERAPY | Facility: CLINIC | Age: 67
End: 2022-06-14
Payer: OTHER MISCELLANEOUS

## 2022-06-14 NOTE — PROGRESS NOTES
Daily Note     Today's date: 2022  Patient name: Regina Katz  : 1955  MRN: [id number]  Referring provider: Samantha Nagy MD  Dx:   Encounter Diagnosis     ICD-10-CM    1  Cervical myelopathy (HCC)  G95 9    2  Neck pain  M54 2                   Subjective: ***      Objective: See treatment diary below      Assessment: Tolerated treatment {Tolerated treatment :}  Patient {assessment:}      Plan: {PLAN:[id number]}        Precautions:  Multi-Level cervical fusion       Manuals 22       Right brachial plexus and ulnar nerve mobilization Graston, cupping and K-taping  25' total                               Neuro Re-Ed         Cervical retraction        Chin tucks        Thoracic extension seated w/ roll        LTP and MTP while holding good scapular and cervical posture  Ther Ex                                                                        Ther Activity                                                                        Pt Ed/ HEP Pathology and involved anatomy as well as issuing and reviewing of HEP   14'               Gait Training                        Modalities

## 2022-06-17 ENCOUNTER — OFFICE VISIT (OUTPATIENT)
Dept: PHYSICAL THERAPY | Facility: CLINIC | Age: 67
End: 2022-06-17
Payer: OTHER MISCELLANEOUS

## 2022-06-17 DIAGNOSIS — G95.9 CERVICAL MYELOPATHY (HCC): Primary | ICD-10-CM

## 2022-06-17 DIAGNOSIS — M54.2 NECK PAIN: ICD-10-CM

## 2022-06-17 PROCEDURE — 97010 HOT OR COLD PACKS THERAPY: CPT

## 2022-06-17 PROCEDURE — 97112 NEUROMUSCULAR REEDUCATION: CPT

## 2022-06-17 PROCEDURE — 97140 MANUAL THERAPY 1/> REGIONS: CPT

## 2022-06-17 NOTE — PROGRESS NOTES
Daily Note     Today's date: 2022  Patient name: Peg Levin  : 1955  MRN: [id number]  Referring provider: Annita Merlos MD  Dx:   Encounter Diagnosis     ICD-10-CM    1  Cervical myelopathy (HCC)  G95 9    2  Neck pain  M54 2                   Subjective: Pt reports numbness is getting worse      Objective: See treatment diary below      Assessment: Tolerated treatment well  Patient would benefit from continued PT      Plan: Progress treatment as tolerated  Precautions:  Multi-Level cervical fusion       Manuals 22      Right brachial plexus and ulnar nerve mobilization Graston, cupian and K-taping  25' total 25'                              Neuro Re-Ed         Cervical retraction  2x10      Chin tucks  10" x12      Thoracic extension seated w/ roll  10" x12      LTP and MTP while holding good scapular and cervical posture  Green 3x10 each                              Ther Ex                                                                        Ther Activity                                                                        Pt Ed/ HEP Pathology and involved anatomy as well as issuing and reviewing of HEP   15' MHP 10' post tx              Gait Training                        Modalities

## 2022-06-21 ENCOUNTER — OFFICE VISIT (OUTPATIENT)
Dept: PHYSICAL THERAPY | Facility: CLINIC | Age: 67
End: 2022-06-21
Payer: OTHER MISCELLANEOUS

## 2022-06-21 DIAGNOSIS — M54.2 NECK PAIN: ICD-10-CM

## 2022-06-21 DIAGNOSIS — G95.9 CERVICAL MYELOPATHY (HCC): Primary | ICD-10-CM

## 2022-06-21 PROCEDURE — 97112 NEUROMUSCULAR REEDUCATION: CPT

## 2022-06-21 PROCEDURE — 97140 MANUAL THERAPY 1/> REGIONS: CPT

## 2022-06-21 NOTE — PROGRESS NOTES
Daily Note     Today's date: 2022  Patient name: Krishna Ceron  : 1955  MRN: [id number]  Referring provider: Alix Gonsalez MD  Dx:   Encounter Diagnosis     ICD-10-CM    1  Cervical myelopathy (HCC)  G95 9    2  Neck pain  M54 2                   Subjective: Pt reports no changes in symptoms for the better or worse  Objective: See treatment diary below      Assessment: Tolerated treatment fair  Patient would benefit from continued PT      Plan: Progress treatment as tolerated  Precautions:  Multi-Level cervical fusion       Manuals 22     Right brachial plexus and ulnar nerve mobilization Graston, cupping and K-taping  25' total 25' 25'                             Neuro Re-Ed         Cervical retraction  2x10 2x10     Chin tucks  10" x12 10" x12     Thoracic extension seated w/ roll  10" x12 10" x12     LTP and MTP while holding good scapular and cervical posture  Green 3x10 each Green 3x10                             Ther Ex                                                                        Ther Activity                                                                        Pt Ed/ HEP Pathology and involved anatomy as well as issuing and reviewing of HEP   15' MHP 10' post tx              Gait Training                        Modalities

## 2022-06-23 ENCOUNTER — OFFICE VISIT (OUTPATIENT)
Dept: PHYSICAL THERAPY | Facility: CLINIC | Age: 67
End: 2022-06-23
Payer: OTHER MISCELLANEOUS

## 2022-06-23 DIAGNOSIS — M54.2 NECK PAIN: ICD-10-CM

## 2022-06-23 DIAGNOSIS — G95.9 CERVICAL MYELOPATHY (HCC): Primary | ICD-10-CM

## 2022-06-23 PROCEDURE — 97140 MANUAL THERAPY 1/> REGIONS: CPT

## 2022-06-23 PROCEDURE — 97112 NEUROMUSCULAR REEDUCATION: CPT

## 2022-06-23 NOTE — PROGRESS NOTES
Daily Note     Today's date: 2022  Patient name: Annmarie Wells  : 1955  MRN: [id number]  Referring provider: Camden Moreira MD  Dx:   Encounter Diagnosis     ICD-10-CM    1  Cervical myelopathy (HCC)  G95 9    2  Neck pain  M54 2                   Subjective: No new complaints      Objective: See treatment diary below      Assessment: Tolerated treatment fair  Patient would benefit from continued PT      Plan: Progress treatment as tolerated  Precautions:  Multi-Level cervical fusion       Manuals 22    Right brachial plexus and ulnar nerve mobilization Graston, cupping and K-taping  25' total 25' 25' 25'                            Neuro Re-Ed         Cervical retraction  2x10 2x10 2x10    Chin tucks  10" x12 10" x12 10"x12    Thoracic extension seated w/ roll  10" x12 10" x12 10"x12    LTP and MTP while holding good scapular and cervical posture  Green 3x10 each Green 3x10 Green 3x10                            Ther Ex                                                                        Ther Activity                                                                        Pt Ed/ HEP Pathology and involved anatomy as well as issuing and reviewing of HEP   15' MHP 10' post tx              Gait Training                        Modalities

## 2022-06-27 ENCOUNTER — OFFICE VISIT (OUTPATIENT)
Dept: PHYSICAL THERAPY | Facility: CLINIC | Age: 67
End: 2022-06-27
Payer: OTHER MISCELLANEOUS

## 2022-06-27 DIAGNOSIS — M54.2 NECK PAIN: ICD-10-CM

## 2022-06-27 DIAGNOSIS — G95.9 CERVICAL MYELOPATHY (HCC): Primary | ICD-10-CM

## 2022-06-27 PROCEDURE — 97112 NEUROMUSCULAR REEDUCATION: CPT

## 2022-06-27 PROCEDURE — 97140 MANUAL THERAPY 1/> REGIONS: CPT

## 2022-06-27 PROCEDURE — 97014 ELECTRIC STIMULATION THERAPY: CPT

## 2022-06-27 NOTE — PROGRESS NOTES
Daily Note     Today's date: 2022  Patient name: Jessica Gustafson  : 1955  MRN: [id number]  Referring provider: Mi Griffin MD  Dx:   Encounter Diagnosis     ICD-10-CM    1  Cervical myelopathy (HCC)  G95 9    2  Neck pain  M54 2                   Subjective: Pt reports having a strong right sided headache as well as strong radiating pain down right arm  Objective: See treatment diary below      Assessment: Tolerated treatment fair  Patient would benefit from continued PT      Plan: Progress treatment as tolerated  Precautions:  Multi-Level cervical fusion       Manuals 22   Right brachial plexus and ulnar nerve mobilization Graston, cupping and K-taping  25' total 25' 25' 25' 25'                           Neuro Re-Ed         Cervical retraction  2x10 2x10 2x10 2x10   Chin tucks  10" x12 10" x12 10"x12 10" x12   Thoracic extension seated w/ roll  10" x12 10" x12 10"x12 10" x12   LTP and MTP while holding good scapular and cervical posture  Green 3x10 each Green 3x10 Green 3x10 Blue 3x10                           Ther Ex                                                                        Ther Activity                                                                        Pt Ed/ HEP Pathology and involved anatomy as well as issuing and reviewing of HEP   14' MHP 10' post tx              Gait Training                        Modalities             MHP and IF stim to left shoulder girdle 15'

## 2022-06-29 ENCOUNTER — OFFICE VISIT (OUTPATIENT)
Dept: PHYSICAL THERAPY | Facility: CLINIC | Age: 67
End: 2022-06-29
Payer: OTHER MISCELLANEOUS

## 2022-06-29 DIAGNOSIS — M54.2 NECK PAIN: ICD-10-CM

## 2022-06-29 DIAGNOSIS — G95.9 CERVICAL MYELOPATHY (HCC): Primary | ICD-10-CM

## 2022-06-29 PROCEDURE — 97010 HOT OR COLD PACKS THERAPY: CPT

## 2022-06-29 PROCEDURE — 97112 NEUROMUSCULAR REEDUCATION: CPT

## 2022-06-29 PROCEDURE — 97164 PT RE-EVAL EST PLAN CARE: CPT

## 2022-06-29 PROCEDURE — 97140 MANUAL THERAPY 1/> REGIONS: CPT

## 2022-06-29 NOTE — LETTER
2022    Gladis Galdamez MD  [address] [place] [address] Osteopathy    Patient: Jenniefr Mc   YOB: 1955   Date of Visit: 2022     Encounter Diagnosis     ICD-10-CM    1  Cervical myelopathy (HCC)  G95 9    2  Neck pain  M54 2        Dear Dr Alejandra Villa: Thank you for your recent referral of Jennifer Mc  Please review the attached evaluation summary from Scottie's recent visit  Please verify that you agree with the plan of care by signing the attached order  If you have any questions or concerns, please do not hesitate to call  I sincerely appreciate the opportunity to share in the care of one of your patients and hope to have another opportunity to work with you in the near future  Sincerely,    Andres Martinez, PT      Referring Provider:      I certify that I have read the below Plan of Care and certify the need for these services furnished under this plan of treatment while under my care  Gladis Galdamez MD  [facility] [address]  Via Fax: [phone]          PT Evaluation     Today's date: 2022  Patient name: Jennifer Mc  : 1955  MRN: [id number]  Referring provider: Lisette Monet MD  Dx:   Encounter Diagnosis     ICD-10-CM    1  Cervical myelopathy (HCC)  G95 9    2  Neck pain  M54 2                   Assessment  Assessment details: Pt presents with signs and symptoms of myelopathy in the right UE  Symptoms mostly increased with tension and palpation of right brachial plexus and ulnar nerve bed  Pt's significantly poor posture appears to be a large contributor to symptoms  22:  -No changes in patient's significant postural abnormalities, no changes in strength, no changes in neck pain or radicular symptoms  Pt originally presented with significant chronic postural abnormalities and mobility deficiencies; which may indicate that changing these may take some more time    Impairments: abnormal or restricted ROM, impaired physical strength, lacks appropriate home exercise program, pain with function and poor posture     Symptom irritability: moderateUnderstanding of Dx/Px/POC: good   Prognosis: fair    Goals  ST) Pt  Will be able to sleep through the night without being awoken with pain and with minimal to no pain upon waking in 6 weeks  -SLIGHT PROGRESS 2) Pt  Will have not headaches while at work or with computer work at home  -NO PROGRESS 3)  Pt 's radicular symptoms will be centralized to neck in 6 weeks  LT) Pt  Will be able to complete all chores at home without pain or limitations in 12 weeks  2)  Pt  Will be able to to complete all physical tasks at work without restriction or limitations in 12 weeks  ST) Pt  Will be able to sleep through the night without being awoken with pain and with minimal to no pain upon waking in 6 weeks  2) Pt  Will have not headaches while at work or with computer work at home  3)  Pt 's radicular symptoms will be centralized to neck in 6 weeks  -NO PROGRESS  LT) Pt  Will be able to complete all chores at home without pain or limitations in 12 weeks  -NO PROGRESS  2)  Pt  Will be able to to complete all physical tasks at work without restriction or limitations in 12 weeks  -NO PROGRESS    Plan  Patient would benefit from: skilled physical therapy and PT eval  Referral necessary: Yes  Planned modality interventions: cryotherapy, thermotherapy: hydrocollator packs and unattended electrical stimulation  Planned therapy interventions: manual therapy, neuromuscular re-education, patient education, self care, therapeutic exercise, therapeutic activities and home exercise program  Other planned therapy interventions: cupping, graston and K-taping of right plexus and ulnar nerve beds    Frequency: 2x week  Duration in weeks: 8  Treatment plan discussed with: patient        Subjective Evaluation    History of Present Illness  Date of onset: 2022  Mechanism of injury: Hit by a large roof tress  Has history of multiple cervical fusions in 2019 and 22:  -Pt reports no significant changes in neck discomfort or numbness in his right arm  Not a recurrent problem   Quality of life: fair    Pain  Current pain ratin  At best pain ratin  At worst pain ratin  Location: upper cervical pain  Quality: burning, radiating, discomfort, dull ache and cramping  Relieving factors: change in position and medications  Progression: no change      Diagnostic Tests    FCE comments: Not working at this time due to injury  Was working as a  but was tasked with helping to build a [employer], where he hurt himself  Treatments  No previous or current treatments  Current treatment: physical therapy  Patient Goals  Patient goal: wants to get his neck fixed and return to work  Objective     Static Posture     Head  Forward  Shoulders  Asymmetric shoulders  Postural Observations  Seated posture: poor  Standing posture: poor  Correction of posture: has no consistent effect    Additional Postural Observation Details  Significant forward head and rounded shoulders along with bilateral scapular protraction  Palpation     Additional Palpation Details  Palpation along right brachial plexus and ulnar nerve beds yields discomfort and reproduction of ulnar nerve symptoms      Neurological Testing     Reflexes   Left   Biceps (C5/C6): trace (1+)  Brachioradialis (C6): trace (1+)  Triceps (C7): trace (1+)    Right   Biceps (C5/C6): trace (1+)  Brachioradialis (C6): trace (1+)  Triceps (C7): trace (1+)    Active Range of Motion   Cervical/Thoracic Spine       Cervical  Subcranial protraction:   Restriction level: minimal  Subcranial retraction:  with pain   Restriction level: moderate  Flexion:  Restriction level: minimal  Extension:  with pain Restriction level: maximal  Left lateral flexion:  Restriction level: moderate  Right lateral flexion:  with pain Restriction level maximal  Left rotation:  with pain Restriction level: moderate  Right rotation:  with pain Restriction level: maximal    Strength/Myotome Testing   Cervical Spine     Left   Interossei strength (t1): 3+    Right   Interossei strength (t1): 2+    Left Shoulder     Planes of Motion   Abduction: 4   External rotation at 0°: 4     Right Shoulder     Planes of Motion   Abduction: 2+   External rotation at 0°: 3+     Left Elbow   Flexion: 4  Extension: 4    Right Elbow   Flexion: 2+  Extension: 2+    Left Wrist/Hand   Wrist extension: 4  Wrist flexion: 4  Thumb extension: 4    Right Wrist/Hand   Wrist extension: 3  Wrist flexion: 3  Thumb extension: 3                Precautions:  Multi-Level cervical fusion       Manuals 6-29-22 6-17-22 6-21-22 6-23-22 6-27-22   Right brachial plexus and ulnar nerve mobilization reshma Will and K-taping  25' total 25' 25' 25' 25'                           Neuro Re-Ed         Cervical retraction 2x10 2x10 2x10 2x10 2x10   Chin tucks 10"x12 10" x12 10" x12 10"x12 10" x12   Thoracic extension seated w/ roll 10"x12 10" x12 10" x12 10"x12 10" x12   LTP and MTP while holding good scapular and cervical posture  10"x12 Green 3x10 each Green 3x10 Green 3x10 Blue 3x10                           Ther Ex                                                                        Ther Activity                                                                        Pt Ed/ HEP Pathology and involved anatomy as well as issuing and reviewing of HEP   14' MHP 10' post tx              Gait Training                        Modalities         MHP 15'    MHP and IF stim to left shoulder girdle 15'

## 2022-06-29 NOTE — PROGRESS NOTES
PT Evaluation     Today's date: 2022  Patient name: Kimber Oliveira  : 1955  MRN: [id number]  Referring provider: Gary Arnold MD  Dx:   Encounter Diagnosis     ICD-10-CM    1  Cervical myelopathy (HCC)  G95 9    2  Neck pain  M54 2                   Assessment  Assessment details: Pt presents with signs and symptoms of myelopathy in the right UE  Symptoms mostly increased with tension and palpation of right brachial plexus and ulnar nerve bed  Pt's significantly poor posture appears to be a large contributor to symptoms  22:  -No changes in patient's significant postural abnormalities, no changes in strength, no changes in neck pain or radicular symptoms  Pt originally presented with significant chronic postural abnormalities and mobility deficiencies; which may indicate that changing these may take some more time  Impairments: abnormal or restricted ROM, impaired physical strength, lacks appropriate home exercise program, pain with function and poor posture     Symptom irritability: moderateUnderstanding of Dx/Px/POC: good   Prognosis: fair    Goals  ST) Pt  Will be able to sleep through the night without being awoken with pain and with minimal to no pain upon waking in 6 weeks  -SLIGHT PROGRESS 2) Pt  Will have not headaches while at work or with computer work at home  -NO PROGRESS 3)  Pt 's radicular symptoms will be centralized to neck in 6 weeks  LT) Pt  Will be able to complete all chores at home without pain or limitations in 12 weeks  2)  Pt  Will be able to to complete all physical tasks at work without restriction or limitations in 12 weeks  ST) Pt  Will be able to sleep through the night without being awoken with pain and with minimal to no pain upon waking in 6 weeks  2) Pt  Will have not headaches while at work or with computer work at home  3)  Pt 's radicular symptoms will be centralized to neck in 6 weeks  -NO PROGRESS  LT) Pt   Will be able to complete all chores at home without pain or limitations in 12 weeks  -NO PROGRESS  2)  Pt  Will be able to to complete all physical tasks at work without restriction or limitations in 12 weeks  -NO PROGRESS    Plan  Patient would benefit from: skilled physical therapy and PT eval  Referral necessary: Yes  Planned modality interventions: cryotherapy, thermotherapy: hydrocollator packs and unattended electrical stimulation  Planned therapy interventions: manual therapy, neuromuscular re-education, patient education, self care, therapeutic exercise, therapeutic activities and home exercise program  Other planned therapy interventions: cupping, graston and K-taping of right plexus and ulnar nerve beds  Frequency: 2x week  Duration in weeks: 8  Treatment plan discussed with: patient        Subjective Evaluation    History of Present Illness  Date of onset: 2022  Mechanism of injury: Hit by a large roof tress  Has history of multiple cervical fusions in 2019 and 22:  -Pt reports no significant changes in neck discomfort or numbness in his right arm  Not a recurrent problem   Quality of life: fair    Pain  Current pain ratin  At best pain ratin  At worst pain ratin  Location: upper cervical pain  Quality: burning, radiating, discomfort, dull ache and cramping  Relieving factors: change in position and medications  Progression: no change      Diagnostic Tests    FCE comments: Not working at this time due to injury  Was working as a  but was tasked with helping to build a [employer], where he hurt himself  Treatments  No previous or current treatments  Current treatment: physical therapy  Patient Goals  Patient goal: wants to get his neck fixed and return to work  Objective     Static Posture     Head  Forward  Shoulders  Asymmetric shoulders      Postural Observations  Seated posture: poor  Standing posture: poor  Correction of posture: has no consistent effect    Additional Postural Observation Details  Significant forward head and rounded shoulders along with bilateral scapular protraction  Palpation     Additional Palpation Details  Palpation along right brachial plexus and ulnar nerve beds yields discomfort and reproduction of ulnar nerve symptoms      Neurological Testing     Reflexes   Left   Biceps (C5/C6): trace (1+)  Brachioradialis (C6): trace (1+)  Triceps (C7): trace (1+)    Right   Biceps (C5/C6): trace (1+)  Brachioradialis (C6): trace (1+)  Triceps (C7): trace (1+)    Active Range of Motion   Cervical/Thoracic Spine       Cervical  Subcranial protraction:   Restriction level: minimal  Subcranial retraction:  with pain   Restriction level: moderate  Flexion:  Restriction level: minimal  Extension:  with pain Restriction level: maximal  Left lateral flexion:  Restriction level: moderate  Right lateral flexion:  with pain Restriction level maximal  Left rotation:  with pain Restriction level: moderate  Right rotation:  with pain Restriction level: maximal    Strength/Myotome Testing   Cervical Spine     Left   Interossei strength (t1): 3+    Right   Interossei strength (t1): 2+    Left Shoulder     Planes of Motion   Abduction: 4   External rotation at 0°: 4     Right Shoulder     Planes of Motion   Abduction: 2+   External rotation at 0°: 3+     Left Elbow   Flexion: 4  Extension: 4    Right Elbow   Flexion: 2+  Extension: 2+    Left Wrist/Hand   Wrist extension: 4  Wrist flexion: 4  Thumb extension: 4    Right Wrist/Hand   Wrist extension: 3  Wrist flexion: 3  Thumb extension: 3                Precautions:  Multi-Level cervical fusion       Manuals 6-29-22 6-17-22 6-21-22 6-23-22 6-27-22   Right brachial plexus and ulnar nerve mobilization reshma Will and K-taping  25' total 25' 25' 25' 25'                           Neuro Re-Ed         Cervical retraction 2x10 2x10 2x10 2x10 2x10   Chin tucks 10"x12 10" x12 10" x12 10"x12 10" x12   Thoracic extension seated w/ roll 10"x12 10" x12 10" x12 10"x12 10" x12   LTP and MTP while holding good scapular and cervical posture  10"x12 Green 3x10 each Green 3x10 Green 3x10 Blue 3x10                           Ther Ex                                                                        Ther Activity                                                                        Pt Ed/ HEP Pathology and involved anatomy as well as issuing and reviewing of HEP   14' MHP 10' post tx              Gait Training                        Modalities         MHP 15'    MHP and IF stim to left shoulder girdle 15'

## 2022-07-05 ENCOUNTER — OFFICE VISIT (OUTPATIENT)
Dept: PHYSICAL THERAPY | Facility: CLINIC | Age: 67
End: 2022-07-05
Payer: OTHER MISCELLANEOUS

## 2022-07-05 DIAGNOSIS — G95.9 CERVICAL MYELOPATHY (HCC): Primary | ICD-10-CM

## 2022-07-05 DIAGNOSIS — M54.2 NECK PAIN: ICD-10-CM

## 2022-07-05 PROCEDURE — 97140 MANUAL THERAPY 1/> REGIONS: CPT

## 2022-07-05 PROCEDURE — 97112 NEUROMUSCULAR REEDUCATION: CPT

## 2022-07-05 NOTE — PROGRESS NOTES
Daily Note     Today's date: 2022  Patient name: Nilo Marquez  : 1955  MRN: [id number]  Referring provider: Belén Priest MD  Dx:   Encounter Diagnosis     ICD-10-CM    1  Cervical myelopathy (HCC)  G95 9    2  Neck pain  M54 2                   Subjective: Only getting temporary relief from the (sub occipital distraction)  Objective: See treatment diary below      Assessment: Tolerated treatment fair  Patient would benefit from continued PT      Plan: Progress treatment as tolerated  Precautions:  Multi-Level cervical fusion       Manuals 22   Right brachial plexus and ulnar nerve mobilization Graston, cupping and K-taping  25' total 25' 25' 25' 25'                           Neuro Re-Ed         Cervical retraction 2x10 2x10 2x10 2x10 2x10   Chin tucks 10"x12 10" x12 10" x12 10"x12 10" x12   Thoracic extension seated w/ roll 10"x12 10" x12 10" x12 10"x12 10" x12   LTP and MTP while holding good scapular and cervical posture  10"x12 Blue 3x10 each Green 3x10 Green 3x10 Blue 3x10                           Ther Ex                                                                        Ther Activity                                                                        Pt Ed/ HEP Pathology and involved anatomy as well as issuing and reviewing of HEP   14'               Gait Training                        Modalities         MHP 15'    MHP and IF stim to left shoulder girdle 15'

## 2022-07-07 ENCOUNTER — OFFICE VISIT (OUTPATIENT)
Dept: PHYSICAL THERAPY | Facility: CLINIC | Age: 67
End: 2022-07-07
Payer: OTHER MISCELLANEOUS

## 2022-07-07 DIAGNOSIS — M54.2 NECK PAIN: ICD-10-CM

## 2022-07-07 DIAGNOSIS — G95.9 CERVICAL MYELOPATHY (HCC): Primary | ICD-10-CM

## 2022-07-07 PROCEDURE — 97140 MANUAL THERAPY 1/> REGIONS: CPT

## 2022-07-07 PROCEDURE — 97112 NEUROMUSCULAR REEDUCATION: CPT

## 2022-07-07 NOTE — PROGRESS NOTES
Daily Note     Today's date: 2022  Patient name: Lilly Pradhan  : 1955  MRN: [id number]  Referring provider: Janay Bhardwaj MD  Dx:   Encounter Diagnosis     ICD-10-CM    1  Cervical myelopathy (HCC)  G95 9    2  Neck pain  M54 2                   Subjective: Pt continues to report only relief is had through (sub-occipital inhibitive distraction)  Objective: See treatment diary below      Assessment: Tolerated treatment well  Patient would benefit from continued PT      Plan: Progress treatment as tolerated  Precautions:  Multi-Level cervical fusion       Manuals 22   Right brachial plexus and ulnar nerve mobilization Graston, cupping and K-taping  25' total 25' 25' 25' 25'                           Neuro Re-Ed         Cervical retraction 2x10 2x10 2x10 2x10 2x10   Chin tucks 10"x12 10" x12 10" x12 10"x12 10" x12   Thoracic extension seated w/ roll 10"x12 10" x12 10" x12 10"x12 10" x12   LTP and MTP while holding good scapular and cervical posture  10"x12 Blue 3x10 each Blue 3x10 Green 3x10 Blue 3x10                           Ther Ex                                                                        Ther Activity                                                                        Pt Ed/ HEP Pathology and involved anatomy as well as issuing and reviewing of HEP   14'               Gait Training                        Modalities         MHP 15'    MHP and IF stim to left shoulder girdle 15'

## 2022-07-12 ENCOUNTER — OFFICE VISIT (OUTPATIENT)
Dept: PHYSICAL THERAPY | Facility: CLINIC | Age: 67
End: 2022-07-12
Payer: OTHER MISCELLANEOUS

## 2022-07-12 DIAGNOSIS — G95.9 CERVICAL MYELOPATHY (HCC): Primary | ICD-10-CM

## 2022-07-12 DIAGNOSIS — M54.2 NECK PAIN: ICD-10-CM

## 2022-07-12 PROCEDURE — 97112 NEUROMUSCULAR REEDUCATION: CPT

## 2022-07-12 PROCEDURE — 97140 MANUAL THERAPY 1/> REGIONS: CPT

## 2022-07-12 NOTE — PROGRESS NOTES
Daily Note     Today's date: 2022  Patient name: Davon Upton  : 1955  MRN: [id number]  Referring provider: Tamara Rivas MD  Dx:   Encounter Diagnosis     ICD-10-CM    1  Cervical myelopathy (HCC)  G95 9    2  Neck pain  M54 2                   Subjective: Pt reports still having the same level of pain and radiating symptoms in right arm  Objective: See treatment diary below      Assessment: Tolerated treatment well  Patient would benefit from continued PT      Plan: Progress treatment as tolerated  Precautions:  Multi-Level cervical fusion       Manuals 22   Right brachial plexus and ulnar nerve mobilization Graston, cupping and K-taping  25' total 25' 25' 25' 25'                           Neuro Re-Ed         Cervical retraction 2x10 2x10 2x10 2x10 2x10   Chin tucks 10"x12 10" x12 10" x12 10"x12 10" x12   Thoracic extension seated w/ roll 10"x12 10" x12 10" x12 10"x12 10" x12   LTP and MTP while holding good scapular and cervical posture  10"x12 Blue 3x10 each Blue 3x10 Blue 3x10 Blue 3x10                           Ther Ex                                                                        Ther Activity                                                                        Pt Ed/ HEP Pathology and involved anatomy as well as issuing and reviewing of HEP   14'               Gait Training                        Modalities         MHP 15'    MHP and IF stim to left shoulder girdle 15'

## 2022-07-14 ENCOUNTER — APPOINTMENT (OUTPATIENT)
Dept: PHYSICAL THERAPY | Facility: CLINIC | Age: 67
End: 2022-07-14
Payer: OTHER MISCELLANEOUS

## 2022-07-20 ENCOUNTER — OFFICE VISIT (OUTPATIENT)
Dept: PHYSICAL THERAPY | Facility: CLINIC | Age: 67
End: 2022-07-20
Payer: OTHER MISCELLANEOUS

## 2022-07-20 DIAGNOSIS — M54.2 NECK PAIN: Primary | ICD-10-CM

## 2022-07-20 DIAGNOSIS — G95.9 CERVICAL MYELOPATHY (HCC): ICD-10-CM

## 2022-07-20 PROCEDURE — 97112 NEUROMUSCULAR REEDUCATION: CPT

## 2022-07-20 PROCEDURE — 97140 MANUAL THERAPY 1/> REGIONS: CPT

## 2022-07-20 NOTE — PROGRESS NOTES
Daily Note     Today's date: 2022  Patient name: Geo Gilbert  : 1955  MRN: [id number]  Referring provider: Lucho Carvajal MD  Dx:   Encounter Diagnosis     ICD-10-CM    1  Cervical myelopathy (HCC)  G95 9    2  Neck pain  M54 2                   Subjective: patient reports slow improvement with therapy in regards to neck pain  He continues to have tingling/numbness down R UE and into all digits  Objective: See treatment diary below      Assessment: Patient educated to avoid pushing through an increase in tingling and numbness down R UE with improved tolerance  Pillow underneath arm during manuals lessened radicular intensity  Tactile cues to relax b/l UT during seated and standing exercises  Improvement in soft tissue quality following STM to R UT/cerv paraspinals  Pt would continue to benefit from skilled PT  Plan: Continue with current POC to address pt deficits  Precautions:  Multi-Level cervical fusion       Manuals 22   Right brachial plexus and ulnar nerve mobilization Graston, cupping and K-taping  25' total 25' 25' 25' 23' STM R UT/cerv paraspinals, gentle NG                           Neuro Re-Ed         Cervical retraction 2x10 2x10 2x10 2x10 2x10   Chin tucks 10"x12 10" x12 10" x12 10"x12 10"x12   Thoracic extension seated w/ roll 10"x12 10" x12 10" x12 10"x12 10"x12   LTP and MTP while holding good scapular and cervical posture  10"x12 Blue 3x10 each Blue 3x10 Blue 3x10 Blue 3x10   Self NG     5'                    Ther Ex                                                                        Ther Activity                                                                        Pt Ed/ HEP Pathology and involved anatomy as well as issuing and reviewing of HEP   14'               Gait Training                        Modalities         P 15'

## 2022-07-22 ENCOUNTER — OFFICE VISIT (OUTPATIENT)
Dept: PHYSICAL THERAPY | Facility: CLINIC | Age: 67
End: 2022-07-22
Payer: OTHER MISCELLANEOUS

## 2022-07-22 DIAGNOSIS — M54.2 NECK PAIN: Primary | ICD-10-CM

## 2022-07-22 DIAGNOSIS — G95.9 CERVICAL MYELOPATHY (HCC): ICD-10-CM

## 2022-07-22 PROCEDURE — 97112 NEUROMUSCULAR REEDUCATION: CPT

## 2022-07-22 PROCEDURE — 97140 MANUAL THERAPY 1/> REGIONS: CPT

## 2022-07-22 NOTE — PROGRESS NOTES
Daily Note     Today's date: 2022  Patient name: Savanna Fatima  : 1955  MRN: [id number]  Referring provider: Hailey Ward MD  Dx:   Encounter Diagnosis     ICD-10-CM    1  Neck pain  M54 2    2  Cervical myelopathy ([facility] [address] )  G95 9        Start Time: 48  Stop Time: 0820  Total time in clinic (min): 42 minutes    Subjective: Pt denies any medical changes since previous session  He reports pain was elevated this morning, requiring him to take a pain medication at 5am, he reports mod relief of sx and residual drowsiness  He verbalizes pain is min at the moment  Objective: See treatment diary below      Assessment: Tolerated treatment well  Pt is given intermittent cues on proper form/reps for each exercise  He reports mild increase in nerve sx with manual technique, sx subside after modifying ROM  He reports no sig increase in sx at the end of his session only soreness from completing exercise  Patient demonstrated fatigue post treatment, exhibited good technique with therapeutic exercises and would benefit from continued PT      Plan: Continue per plan of care  Precautions:  Multi-Level cervical fusion       Manuals 22   Right brachial plexus and ulnar nerve mobilization Graston, cupping and K-taping  25' total 25' 25' 25' 23' STM R UT/cerv paraspinals, gentle NG 25' STM R UT/cerv paraspinals, gentle NG                              Neuro Re-Ed          Cervical retraction 2x10 2x10 2x10 2x10 2x10 2x10   Chin tucks 10"x12 10" x12 10" x12 10"x12 10"x12 10''x12   Thoracic extension seated w/ roll 10"x12 10" x12 10" x12 10"x12 10"x12 10'''x12   LTP and MTP while holding good scapular and cervical posture   10"x12 Blue 3x10 each Blue 3x10 Blue 3x10 Blue 3x10 Blue 3x10   Self NG     5'  5'                     Ther Ex                                                                                 Ther Activity Pt Ed/ HEP Pathology and involved anatomy as well as issuing and reviewing of HEP   14'                 Gait Training                           Modalities          P 15'

## 2022-07-26 ENCOUNTER — OFFICE VISIT (OUTPATIENT)
Dept: PHYSICAL THERAPY | Facility: CLINIC | Age: 67
End: 2022-07-26
Payer: OTHER MISCELLANEOUS

## 2022-07-26 DIAGNOSIS — G95.9 CERVICAL MYELOPATHY (HCC): ICD-10-CM

## 2022-07-26 DIAGNOSIS — M54.2 NECK PAIN: Primary | ICD-10-CM

## 2022-07-26 PROCEDURE — 97112 NEUROMUSCULAR REEDUCATION: CPT

## 2022-07-26 PROCEDURE — 97110 THERAPEUTIC EXERCISES: CPT

## 2022-07-26 PROCEDURE — 97140 MANUAL THERAPY 1/> REGIONS: CPT

## 2022-07-26 NOTE — PROGRESS NOTES
Daily Note     Today's date: 2022  Patient name: Savanna Fatima  : 1955  MRN: [id number]  Referring provider: Hailey Ward MD  Dx:   Encounter Diagnosis     ICD-10-CM    1  Neck pain  M54 2    2  Cervical myelopathy (HCC)  G95 9                   Subjective: No major changes noted  Objective: See treatment diary below      Assessment: Tolerated treatment fair  Patient would benefit from continued PT      Plan: Progress treatment as tolerated  Precautions:  Multi-Level cervical fusion       Manuals 22   Right brachial plexus and ulnar nerve mobilization Graston, cupping and K-taping  25' total 25' 25' 25' 23' STM R UT/cerv paraspinals, gentle NG 25' STM R UT/cerv paraspinals, gentle NG                              Neuro Re-Ed          Cervical retraction 2x10 2x10 2x10 2x10 2x10 2x10   Chin tucks 10"x12 10" x12 10" x12 10"x12 10"x12 10''x12   Thoracic extension seated w/ roll 10"x12 @ 2 levels 10" x12 10" x12 10"x12 10"x12 10'''x12   LTP and MTP while holding good scapular and cervical posture  Blue 3x10 Blue 3x10 each Blue 3x10 Blue 3x10 Blue 3x10 Blue 3x10   Self NG 5'    5'  5'   scap retract/ ER at doorway Green 2x10                 Ther Ex         Biceps curls 5# 3x10        Triceps extension 3x10 blue band                                                              Ther Activity                                                                                 Pt Ed/ HEP Pathology and involved anatomy as well as issuing and reviewing of HEP   14'                 Gait Training                           Modalities          [facility] 15'

## 2022-07-28 ENCOUNTER — OFFICE VISIT (OUTPATIENT)
Dept: PHYSICAL THERAPY | Facility: CLINIC | Age: 67
End: 2022-07-28
Payer: OTHER MISCELLANEOUS

## 2022-07-28 DIAGNOSIS — G95.9 CERVICAL MYELOPATHY (HCC): ICD-10-CM

## 2022-07-28 DIAGNOSIS — M54.2 NECK PAIN: Primary | ICD-10-CM

## 2022-07-28 PROCEDURE — 97112 NEUROMUSCULAR REEDUCATION: CPT

## 2022-07-28 PROCEDURE — 97140 MANUAL THERAPY 1/> REGIONS: CPT

## 2022-07-28 NOTE — PROGRESS NOTES
Daily Note     Today's date: 2022  Patient name: Naif Mccain  : 1955  MRN: [id number]  Referring provider: Marquis Anita MD  Dx:   Encounter Diagnosis     ICD-10-CM    1  Neck pain  M54 2    2  Cervical myelopathy (HCC)  G95 9                   Subjective: Slight improvements after last visit but nothing substantial       Objective: See treatment diary below      Assessment: Tolerated treatment well  Patient would benefit from continued PT      Plan: Progress treatment as tolerated  Precautions:  Multi-Level cervical fusion       Manuals 22   Right brachial plexus and ulnar nerve mobilization Graston, cupping and K-taping  25' total 25' 25' 25' 23' STM R UT/cerv paraspinals, gentle NG 25' STM R UT/cerv paraspinals, gentle NG                              Neuro Re-Ed          Cervical retraction 2x10 2x10 2x10 2x10 2x10 2x10   Chin tucks 10"x12 10" x12 10" x12 10"x12 10"x12 10''x12   Thoracic extension seated w/ roll 10"x12 @ 2 levels 10" x12 10" x12 10"x12 10"x12 10'''x12   LTP and MTP while holding good scapular and cervical posture  Blue 3x10 Blue 3x10 each Blue 3x10 Blue 3x10 Blue 3x10 Blue 3x10   Self NG 5' 5'   5'  5'   scap retract/ ER at doorway Green 2x10 Green 3x10                Ther Ex         Biceps curls 5# 3x10 5# 3x10       Triceps extension 3x10 blue band 3x10 blue                                                             Ther Activity                                                                                 Pt Ed/ HEP Pathology and involved anatomy as well as issuing and reviewing of HEP   14'                 Gait Training                           Modalities          P 15'

## 2022-08-02 ENCOUNTER — OFFICE VISIT (OUTPATIENT)
Dept: PHYSICAL THERAPY | Facility: CLINIC | Age: 67
End: 2022-08-02
Payer: OTHER MISCELLANEOUS

## 2022-08-02 DIAGNOSIS — G95.9 CERVICAL MYELOPATHY (HCC): ICD-10-CM

## 2022-08-02 DIAGNOSIS — M54.2 NECK PAIN: Primary | ICD-10-CM

## 2022-08-02 PROCEDURE — 97112 NEUROMUSCULAR REEDUCATION: CPT

## 2022-08-02 PROCEDURE — 97140 MANUAL THERAPY 1/> REGIONS: CPT

## 2022-08-02 PROCEDURE — 97110 THERAPEUTIC EXERCISES: CPT

## 2022-08-02 NOTE — PROGRESS NOTES
Daily Note     Today's date: 2022  Patient name: Rashid Hernandez  : 1955  MRN: [id number]  Referring provider: Ramsey David MD  Dx:   Encounter Diagnosis     ICD-10-CM    1  Neck pain  M54 2    2  Cervical myelopathy (HCC)  G95 9                   Subjective: pt reports slightly less pain after being able to rest over the weekend  Objective: See treatment diary below      Assessment: Tolerated treatment well  He reports less pain after manuals  Good effort noted throughout session  Patient demonstrated fatigue post treatment and would benefit from continued PT  Plan: Continue per plan of care  Precautions:  Multi-Level cervical fusion       Manuals -22   Right brachial plexus and ulnar nerve mobilization Graston, cupping and K-taping  25' total 25' 25' STM R UT, paraspinals, R posterior shoulder 25' 23' STM R UT/cerv paraspinals, gentle NG 25' STM R UT/cerv paraspinals, gentle NG                              Neuro Re-Ed          Cervical retraction 2x10 2x10 2x10 2x10 2x10 2x10   Chin tucks 10"x12 10" x12 10" x12 10"x12 10"x12 10''x12   Thoracic extension seated w/ roll 10"x12 @ 2 levels 10" x12 10" x12 10"x12 10"x12 10'''x12   LTP and MTP while holding good scapular and cervical posture  Blue 3x10 Blue 3x10 each Blue 3x10 Blue 3x10 Blue 3x10 Blue 3x10   Self NG 5' 5'   5'  5'   scap retract/ ER at doorway Green 2x10 Green 3x10 Blue 3x10               Ther Ex         Biceps curls 5# 3x10 5# 3x10 5# 3x10      Triceps extension 3x10 blue band 3x10 blue 3x10 blue                                                            Ther Activity                                                                                 Pt Ed/ HEP Pathology and involved anatomy as well as issuing and reviewing of HEP   14'                 Gait Training                           Modalities          [facility] 15'

## 2022-08-04 ENCOUNTER — APPOINTMENT (OUTPATIENT)
Dept: PHYSICAL THERAPY | Facility: CLINIC | Age: 67
End: 2022-08-04
Payer: OTHER MISCELLANEOUS

## 2022-08-04 NOTE — PROGRESS NOTES
Daily Note     Today's date: 2022  Patient name: Abhijeet Massey  : 1955  MRN: [id number]  Referring provider: Marylen Bryant, MD  Dx:   Encounter Diagnosis     ICD-10-CM    1  Neck pain  M54 2    2  Cervical myelopathy (HCC)  G95 9                   Subjective: Kiara Dupont reports less constant nerve pain down R UE; reports it being more intermittent  Objective: See treatment diary below      Assessment: Visual and VC t/o visit to ensure correct scap postioning/posture to avoid increases in radicular symptoms with improved tolerance  Decreased strength in R UE vs L UE noted during bicep curls  Appropriate fatigue following therapy session but denied any increases in pain or radicular symptoms  Progress as able  Plan: Continue with current POC to address pt deficits  Precautions:  Multi-Level cervical fusion       Manuals --22   Right brachial plexus and ulnar nerve mobilization Graston, cupping and K-taping  25' total 25' 25' STM R UT, paraspinals, R posterior shoulder 23' STM R UT, paraspinals, R post shoulder 23' STM R UT/cerv paraspinals, gentle NG 25' STM R UT/cerv paraspinals, gentle NG                              Neuro Re-Ed          Cervical retraction 2x10 2x10 2x10 2x10 2x10 2x10   Chin tucks 10"x12 10" x12 10" x12 held 10"x12 10''x12   Thoracic extension seated w/ roll 10"x12 @ 2 levels 10" x12 10" x12 10"x12 10"x12 10'''x12   LTP and MTP while holding good scapular and cervical posture   Blue 3x10 Blue 3x10 each Blue 3x10 Blue 3x10 ea Blue 3x10 Blue 3x10   Self NG 5' 5'  5'  5'  5'   scap retract/ ER at doorway Green 2x10 Green 3x10 Blue 3x10 Blue 3x10              Ther Ex         Biceps curls 5# 3x10 5# 3x10 5# 3x10 5# 3x10     Triceps extension 3x10 blue band 3x10 blue 3x10 blue 3x10 blue                                                            Ther Activity Pt Ed/ HEP Pathology and involved anatomy as well as issuing and reviewing of HEP   14'                 Gait Training                           Modalities          P 15'

## 2022-08-05 ENCOUNTER — OFFICE VISIT (OUTPATIENT)
Dept: PHYSICAL THERAPY | Facility: CLINIC | Age: 67
End: 2022-08-05
Payer: OTHER MISCELLANEOUS

## 2022-08-05 DIAGNOSIS — G95.9 CERVICAL MYELOPATHY (HCC): ICD-10-CM

## 2022-08-05 DIAGNOSIS — M54.2 NECK PAIN: Primary | ICD-10-CM

## 2022-08-05 PROCEDURE — 97112 NEUROMUSCULAR REEDUCATION: CPT

## 2022-08-05 PROCEDURE — 97110 THERAPEUTIC EXERCISES: CPT

## 2022-08-09 ENCOUNTER — OFFICE VISIT (OUTPATIENT)
Dept: PHYSICAL THERAPY | Facility: CLINIC | Age: 67
End: 2022-08-09
Payer: OTHER MISCELLANEOUS

## 2022-08-09 DIAGNOSIS — M54.2 NECK PAIN: Primary | ICD-10-CM

## 2022-08-09 DIAGNOSIS — G95.9 CERVICAL MYELOPATHY (HCC): ICD-10-CM

## 2022-08-09 PROCEDURE — 97110 THERAPEUTIC EXERCISES: CPT

## 2022-08-09 PROCEDURE — 97530 THERAPEUTIC ACTIVITIES: CPT

## 2022-08-09 PROCEDURE — 97140 MANUAL THERAPY 1/> REGIONS: CPT

## 2022-08-09 NOTE — PROGRESS NOTES
Daily Note     Today's date: 2022  Patient name: Thomas Oliveira  : 1955  MRN: [id number]  Referring provider: Liberty Lubin MD  Dx:   Encounter Diagnosis     ICD-10-CM    1  Neck pain  M54 2    2  Cervical myelopathy (HCC)  G95 9                   Subjective: Pt reports he finally has an appointment set up with pain management in a couple days  Objective: See treatment diary below      Assessment: Tolerated treatment well  Patient would benefit from continued PT      Plan: Progress treatment as tolerated  Precautions:  Multi-Level cervical fusion       Manuals -22   Right brachial plexus and ulnar nerve mobilization Graston, cupping and K-taping  25' total 25' 25' STM R UT, paraspinals, R posterior shoulder 23' STM R UT, paraspinals, R post shoulder 23' STM R UT/cerv paraspinals, gentle NG 25' STM R UT/cerv paraspinals, gentle NG                              Neuro Re-Ed          Cervical retraction 2x10 2x10 2x10 2x10 2x10 2x10   Chin tucks 10"x12 10" x12 10" x12 held 10"x12 10''x12   Thoracic extension seated w/ roll 10"x12 @ 2 levels 10" x12 10" x12 10"x12 10"x12 10'''x12   LTP and MTP while holding good scapular and cervical posture  Blue 3x10 Blue 3x10 each Blue 3x10 Blue 3x10 ea Blue 3x10 Blue 3x10   Self NG 5' 5'  5'  5'  5'   scap retract/ ER at doorway Green 2x10 Green 3x10 Blue 3x10 Blue 3x10 Blue 3x10             Ther Ex         Biceps curls 5# 3x10 5# 3x10 5# 3x10 5# 3x10 5# 3x10    Triceps extension 3x10 blue band 3x10 blue 3x10 blue 3x10 blue  3x10 Blue                                                          Ther Activity                                                                                 Pt Ed/ HEP Pathology and involved anatomy as well as issuing and reviewing of HEP   14'                 Gait Training                           Modalities          P 15'

## 2022-08-11 ENCOUNTER — APPOINTMENT (OUTPATIENT)
Dept: PHYSICAL THERAPY | Facility: CLINIC | Age: 67
End: 2022-08-11
Payer: OTHER MISCELLANEOUS

## 2022-08-12 ENCOUNTER — OFFICE VISIT (OUTPATIENT)
Dept: PHYSICAL THERAPY | Facility: CLINIC | Age: 67
End: 2022-08-12
Payer: OTHER MISCELLANEOUS

## 2022-08-12 DIAGNOSIS — M54.2 NECK PAIN: Primary | ICD-10-CM

## 2022-08-12 DIAGNOSIS — G95.9 CERVICAL MYELOPATHY (HCC): ICD-10-CM

## 2022-08-12 PROCEDURE — 97140 MANUAL THERAPY 1/> REGIONS: CPT

## 2022-08-12 PROCEDURE — 97112 NEUROMUSCULAR REEDUCATION: CPT

## 2022-08-12 PROCEDURE — 97110 THERAPEUTIC EXERCISES: CPT

## 2022-08-12 NOTE — PROGRESS NOTES
Daily Note     Today's date: 2022  Patient name: Harvinder Flores  : 1955  MRN: [id number]  Referring provider: Leoncio Tse MD  Dx:   Encounter Diagnosis     ICD-10-CM    1  Neck pain  M54 2    2  Cervical myelopathy (HCC)  G95 9                   Subjective: Pt reports being able to get a consult with pain management and needs to have workers comp approve injections  Objective: See treatment diary below      Assessment: Tolerated treatment well  Patient would benefit from continued PT      Plan: Progress treatment as tolerated  Precautions:  Multi-Level cervical fusion       Manuals -22   Right brachial plexus and ulnar nerve mobilization Graston, cupping and K-taping  25' total 25' 25' STM R UT, paraspinals, R posterior shoulder 23' STM R UT, paraspinals, R post shoulder 23' STM R UT/cerv paraspinals, gentle NG 25' STM R UT/cerv paraspinals, gentle NG                              Neuro Re-Ed          Cervical retraction 2x10 2x10 2x10 2x10 2x10 2x10   Chin tucks 10"x12 10" x12 10" x12 held 10"x12 10''x12   Thoracic extension seated w/ roll 10"x12 @ 2 levels 10" x12 10" x12 10"x12 10"x12 10'''x12   LTP and MTP while holding good scapular and cervical posture  Blue 3x10 Blue 3x10 each Blue 3x10 Blue 3x10 ea Blue 3x10 Blue 3x10   Self NG 5' 5'  5'  5'  5'   scap retract/ ER at doorway Green 2x10 Green 3x10 Blue 3x10 Blue 3x10 Blue 3x10 Blue 3x10            Ther Ex         Biceps curls 5# 3x10 5# 3x10 5# 3x10 5# 3x10 5# 3x10 6# 3x10   Triceps extension 3x10 blue band 3x10 blue 3x10 blue 3x10 blue  3x10 Blue 3x10                                                         Ther Activity                                                                                 Pt Ed/ HEP Pathology and involved anatomy as well as issuing and reviewing of HEP   14'                 Gait Training                           Modalities          [facility] 15

## 2022-08-16 ENCOUNTER — OFFICE VISIT (OUTPATIENT)
Dept: PHYSICAL THERAPY | Facility: CLINIC | Age: 67
End: 2022-08-16
Payer: OTHER MISCELLANEOUS

## 2022-08-16 DIAGNOSIS — M54.2 NECK PAIN: Primary | ICD-10-CM

## 2022-08-16 DIAGNOSIS — G95.9 CERVICAL MYELOPATHY (HCC): ICD-10-CM

## 2022-08-16 PROCEDURE — 97112 NEUROMUSCULAR REEDUCATION: CPT

## 2022-08-16 PROCEDURE — 97140 MANUAL THERAPY 1/> REGIONS: CPT

## 2022-08-16 NOTE — PROGRESS NOTES
Daily Note     Today's date: 2022  Patient name: Nav Beck  : 1955  MRN: [id number]  Referring provider: Lauri Moran MD  Dx:   Encounter Diagnosis     ICD-10-CM    1  Neck pain  M54 2    2  Cervical myelopathy (HCC)  G95 9                   Subjective: Pt reports his (radicular symptoms) are improving slightly      Objective: See treatment diary below      Assessment: Tolerated treatment well  Patient would benefit from continued PT  Pt still having a lot of difficulty       Plan: Progress treatment as tolerated  Precautions:  Multi-Level cervical fusion       Manuals 22   Right brachial plexus and ulnar nerve mobilization Graston, cupping and K-taping  25' total 25' 25' STM R UT, paraspinals, R posterior shoulder 23' STM R UT, paraspinals, R post shoulder 23' STM R UT/cerv paraspinals, gentle NG 25' STM R UT/cerv paraspinals, gentle NG                              Neuro Re-Ed          Cervical retraction 2x10 2x10 2x10 2x10 2x10 2x10   Chin tucks 10"x12 10" x12 10" x12 held 10"x12 10''x12   Thoracic extension seated w/ roll 10"x12 @ 2 levels 10" x12 10" x12 10"x12 10"x12 10'''x12   LTP and MTP while holding good scapular and cervical posture  Blue 3x10 Blue 3x10 each Blue 3x10 Blue 3x10 ea Blue 3x10 Blue 3x10   Self NG 5' 5'  5'  5'  5'   scap retract/ ER at doorway Green 2x10 Green 3x10 Blue 3x10 Blue 3x10 Blue 3x10 Blue 3x10            Ther Ex         Biceps curls 6# 3x10 5# 3x10 5# 3x10 5# 3x10 5# 3x10 6# 3x10   Triceps extension 3x10 blue band 3x10 blue 3x10 blue 3x10 blue  3x10 Blue 3x10                                                         Ther Activity                                                                                 Pt Ed/ HEP Pathology and involved anatomy as well as issuing and reviewing of HEP   14'                 Gait Training                           Modalities          [facility] 15

## 2022-08-18 ENCOUNTER — OFFICE VISIT (OUTPATIENT)
Dept: PHYSICAL THERAPY | Facility: CLINIC | Age: 67
End: 2022-08-18
Payer: OTHER MISCELLANEOUS

## 2022-08-18 DIAGNOSIS — M54.2 NECK PAIN: Primary | ICD-10-CM

## 2022-08-18 DIAGNOSIS — G95.9 CERVICAL MYELOPATHY (HCC): ICD-10-CM

## 2022-08-18 PROCEDURE — 97112 NEUROMUSCULAR REEDUCATION: CPT

## 2022-08-18 PROCEDURE — 97110 THERAPEUTIC EXERCISES: CPT

## 2022-08-18 PROCEDURE — 97140 MANUAL THERAPY 1/> REGIONS: CPT

## 2022-08-18 NOTE — PROGRESS NOTES
Daily Note     Today's date: 2022  Patient name: Geo Gilbert  : 1955  MRN: [id number]  Referring provider: Lucho Carvajal MD  Dx:   Encounter Diagnosis     ICD-10-CM    1  Neck pain  M54 2    2  Cervical myelopathy (HCC)  G95 9                   Subjective: Patient reports his R UE symptoms are becoming less intense  Objective: See treatment diary below      Assessment: Tolerated treatment well  Cuing throughout for proper posture  Patient is fatigued maintaining neutral c-spine posture  Adhesions felt R cervical paraspinals  Improved tissue quality following manuals  Attempted to increase sets with B ER, but patient was appropriately fatigued by first two sets  Demonstrates R UE weakness >L with bicep curls  Patient demonstrated fatigue post treatment and would benefit from continued PT      Plan: Continue per plan of care  Precautions:  Multi-Level cervical fusion       Manuals --22   Right brachial plexus and ulnar nerve mobilization Graston, cupping and K-taping  25' total 10' STM, R UT/cerv paraspinals JUNIOR  20' Graston, cupping, taping - [facility]  23' STM R UT, paraspinals, R post shoulder 23' STM R UT/cerv paraspinals, gentle NG 25' STM R UT/cerv paraspinals, gentle NG                              Neuro Re-Ed          Cervical retraction 2x10   2x10 2x10 2x10   Chin tucks 10"x12 10" x 12  held 10"x12 10''x12   Thoracic extension seated w/ roll 10"x12 @ 2 levels 10" x12 @ 2 levels   10"x12 10"x12 10'''x12   LTP and MTP while holding good scapular and cervical posture   Blue 3x10 Blue 3x10  Blue 3x10 ea Blue 3x10 Blue 3x10   Self NG 5' Supine 3'   5'  5'  5'   scap retract/ ER at doorway Green 2x10 Green 2x10  Blue 3x10 Blue 3x10 Blue 3x10            Ther Ex         Biceps curls 6# 3x10 5# 3x10   5# 3x10 5# 3x10 6# 3x10   Triceps extension 3x10 blue band 3x10 blue band  3x10 blue  3x10 Blue 3x10 Ther Activity                                                                                 Pt Ed/ HEP Pathology and involved anatomy as well as issuing and reviewing of HEP   14'                 Gait Training                           Modalities          P 15'

## 2022-08-23 ENCOUNTER — OFFICE VISIT (OUTPATIENT)
Dept: PHYSICAL THERAPY | Facility: CLINIC | Age: 67
End: 2022-08-23
Payer: OTHER MISCELLANEOUS

## 2022-08-23 DIAGNOSIS — G95.9 CERVICAL MYELOPATHY (HCC): ICD-10-CM

## 2022-08-23 DIAGNOSIS — M54.2 NECK PAIN: Primary | ICD-10-CM

## 2022-08-23 PROCEDURE — 97112 NEUROMUSCULAR REEDUCATION: CPT

## 2022-08-23 PROCEDURE — 97140 MANUAL THERAPY 1/> REGIONS: CPT

## 2022-08-23 PROCEDURE — 97110 THERAPEUTIC EXERCISES: CPT

## 2022-08-23 PROCEDURE — 97014 ELECTRIC STIMULATION THERAPY: CPT

## 2022-08-23 NOTE — PROGRESS NOTES
Daily Note     Today's date: 2022  Patient name: Eliot Magana  : 1955  MRN: [id number]  Referring provider: Deepika Healy MD  Dx:   Encounter Diagnosis     ICD-10-CM    1  Neck pain  M54 2    2  Cervical myelopathy (HCC)  G95 9                   Subjective: Pt reports neck is feeling a little better but right arm remains weak  Objective: See treatment diary below      Assessment: Tolerated treatment well  Patient would benefit from continued PT      Plan: Progress treatment as tolerated  Precautions:  Multi-Level cervical fusion       Manuals 22   Right brachial plexus and ulnar nerve mobilization Graston, cupping and K-taping  25' total 10' STM, R UT/cerv paraspinals JUNIOR  20' Graston, cupping, taping - [facility] 23' 23' STM R UT, paraspinals, R post shoulder 23' STM R UT/cerv paraspinals, gentle NG 25' STM R UT/cerv paraspinals, gentle NG                              Neuro Re-Ed          Cervical retraction 2x10  home 2x10 2x10 2x10   Chin tucks 10"x12 10" x 12 home held 10"x12 10''x12   Thoracic extension seated w/ roll 10"x12 @ 2 levels 10" x12 @ 2 levels  10" nx12 @ 2 levels 10"x12 10"x12 10'''x12   LTP and MTP while holding good scapular and cervical posture  Blue 3x10 Blue 3x10 Blue 3x10 each Blue 3x10 ea Blue 3x10 Blue 3x10   Self NG 5' Supine 3'  5' 5'  5'  5'   scap retract/ ER at doorway Green 2x10 Green 2x10 Blue 3x10 Blue 3x10 Blue 3x10 Blue 3x10            Ther Ex         Biceps curls 6# 3x10 5# 3x10  6# 3x10 5# 3x10 5# 3x10 6# 3x10   Triceps extension 3x10 blue band 3x10 blue band 3x12 blue 3x10 blue  3x10 Blue 3x10                                                         Ther Activity                                                                                 Pt Ed/ HEP Pathology and involved anatomy as well as issuing and reviewing of HEP   14'                 Gait Training                           Modalities          [facility] 15' P 12'

## 2022-08-25 ENCOUNTER — APPOINTMENT (OUTPATIENT)
Dept: PHYSICAL THERAPY | Facility: CLINIC | Age: 67
End: 2022-08-25
Payer: OTHER MISCELLANEOUS

## 2022-08-26 ENCOUNTER — OFFICE VISIT (OUTPATIENT)
Dept: PHYSICAL THERAPY | Facility: CLINIC | Age: 67
End: 2022-08-26
Payer: OTHER MISCELLANEOUS

## 2022-08-26 DIAGNOSIS — G95.9 CERVICAL MYELOPATHY (HCC): ICD-10-CM

## 2022-08-26 DIAGNOSIS — M54.2 NECK PAIN: Primary | ICD-10-CM

## 2022-08-26 PROCEDURE — 97112 NEUROMUSCULAR REEDUCATION: CPT

## 2022-08-26 PROCEDURE — 97110 THERAPEUTIC EXERCISES: CPT

## 2022-08-26 PROCEDURE — 97140 MANUAL THERAPY 1/> REGIONS: CPT

## 2022-08-26 NOTE — PROGRESS NOTES
Daily Note     Today's date: 2022  Patient name: Geo Gilbert  : 1955  MRN: [id number]  Referring provider: Lucho Carvajal MD  Dx:   Encounter Diagnosis     ICD-10-CM    1  Neck pain  M54 2    2  Cervical myelopathy (HCC)  G95 9                   Subjective: no new complaints      Objective: See treatment diary below      Assessment: Tolerated treatment well  Patient would benefit from continued PT      Plan: Progress treatment as tolerated  Precautions:  Multi-Level cervical fusion       Manuals 22   Right brachial plexus and ulnar nerve mobilization Graston, cupping and K-taping  25' total 10' STM, R UT/cerv paraspinals JUNIOR  20' Graston, cupping, taping - [facility] 23' 23' STM R UT, paraspinals, R post shoulder 23' STM R UT/cerv paraspinals, gentle NG 25' STM R UT/cerv paraspinals, gentle NG                              Neuro Re-Ed          Cervical retraction 2x10  home 2x10 2x10 2x10   Chin tucks 10"x12 10" x 12 home held 10"x12 10''x12   Thoracic extension seated w/ roll 10"x12 @ 2 levels 10" x12 @ 2 levels  10" nx12 @ 2 levels 10"x12 10"x12 10'''x12   LTP and MTP while holding good scapular and cervical posture  Blue 3x10 Blue 3x10 Blue 3x10 each Blue 3x10 ea Blue 3x10 Blue 3x10   Self NG 5' Supine 3'  5' 5'  5'  5'   scap retract/ ER at doorway Green 2x10 Green 2x10 Blue 3x10 Blue 3x10 Blue 3x10 Blue 3x10            Ther Ex         Biceps curls 6# 3x10 5# 3x10  6# 3x10 6# 3x10 5# 3x10 6# 3x10   Triceps extension 3x10 blue band 3x10 blue band 3x12 blue 3x10 blue  3x10 Blue 3x10                                                         Ther Activity                                                                                 Pt Ed/ HEP Pathology and involved anatomy as well as issuing and reviewing of HEP   14'                 Gait Training                           Modalities          [facility] 15'  [facility] 12'

## 2022-08-30 ENCOUNTER — EVALUATION (OUTPATIENT)
Dept: PHYSICAL THERAPY | Facility: CLINIC | Age: 67
End: 2022-08-30
Payer: OTHER MISCELLANEOUS

## 2022-08-30 DIAGNOSIS — M54.2 NECK PAIN: Primary | ICD-10-CM

## 2022-08-30 DIAGNOSIS — G95.9 CERVICAL MYELOPATHY (HCC): ICD-10-CM

## 2022-08-30 PROCEDURE — 97140 MANUAL THERAPY 1/> REGIONS: CPT

## 2022-08-30 PROCEDURE — 97164 PT RE-EVAL EST PLAN CARE: CPT

## 2022-08-30 PROCEDURE — 97112 NEUROMUSCULAR REEDUCATION: CPT

## 2022-08-30 NOTE — LETTER
2022    April Gonsalez MD  [address] [place] [address] Osteopathy    Patient: Cecilia Mckeon   YOB: 1955   Date of Visit: 2022     Encounter Diagnosis     ICD-10-CM    1  Neck pain  M54 2    2  Cervical myelopathy [facility])  G95 9        Dear Dr Hiwot Butterfield: Thank you for your recent referral of Cecilia Mckeon  Please review the attached evaluation summary from Scottie's recent visit  Please verify that you agree with the plan of care by signing the attached order  If you have any questions or concerns, please do not hesitate to call  I sincerely appreciate the opportunity to share in the care of one of your patients and hope to have another opportunity to work with you in the near future  Sincerely,    Johana Virk, PT      Referring Provider:      I certify that I have read the below Plan of Care and certify the need for these services furnished under this plan of treatment while under my care  April Gonsalez MD  [facility] [address]  Via Fax: [phone]          PT Re-Evaluation     Today's date: 2022  Patient name: Cecilia Mckeon  : 1955  MRN: [id number]  Referring provider: Jake Ta MD  Dx:   Encounter Diagnosis     ICD-10-CM    1  Neck pain  M54 2    2  Cervical myelopathy ([facility])  G95 9          Assessment  Assessment details: Pt presents with signs and symptoms of myelopathy in the right UE  Symptoms mostly increased with tension and palpation of right brachial plexus and ulnar nerve bed  Pt's significantly poor posture appears to be a large contributor to symptoms  22:  -No changes in patient's significant postural abnormalities, no changes in strength, no changes in neck pain or radicular symptoms  Pt originally presented with significant chronic postural abnormalities and mobility deficiencies; which may indicate that changing these may take some more time    Impairments: abnormal or restricted ROM, impaired physical strength, lacks appropriate home exercise program, pain with function and poor posture     22:  Pt having some slight reduction in right arm symptoms but neck remains very painful and overall function has not improved  Symptom irritability: moderateUnderstanding of Dx/Px/POC: good   Prognosis: fair    Goals  ST) Pt  Will be able to sleep through the night without being awoken with pain and with minimal to no pain upon waking in 6 weeks  -NO PROGRESS SINCE LAST ASSESSMENT   2) Pt  Will have not headaches while at work or with computer work at home  -NO PROGRESS 3)  Pt 's radicular symptoms will be centralized to neck in 6 weeks  -SLIGHT PROGRESS  LT) Pt  Will be able to complete all chores at home without pain or limitations in 12 weeks  -SLIGHT PROGRESS  2)  Pt  Will be able to to complete all physical tasks at work without restriction or limitations in 12 weeks  -NO PROGRESS  ST) Pt  Will be able to sleep through the night without being awoken with pain and with minimal to no pain upon waking in 6 weeks  -SLIGHT PROGRESS 2) Pt  Will have not headaches while at work or with computer work at home  SLIGHT PROGRESS  3)  Pt 's radicular symptoms will be centralized to neck in 6 weeks  -NO PROGRESS  LT) Pt  Will be able to complete all chores at home without pain or limitations in 12 weeks  -NO PROGRESS  2)  Pt  Will be able to to complete all physical tasks at work without restriction or limitations in 12 weeks    -NO PROGRESS    Plan  Patient would benefit from: skilled physical therapy and PT eval  Referral necessary: Yes  Planned modality interventions: cryotherapy, thermotherapy: hydrocollator packs and unattended electrical stimulation  Planned therapy interventions: manual therapy, neuromuscular re-education, patient education, self care, therapeutic exercise, therapeutic activities and home exercise program  Other planned therapy interventions: cupping, graston and K-taping of right plexus and ulnar nerve beds  Frequency: 2x week  Duration in weeks: 8  Treatment plan discussed with: patient        Subjective Evaluation    History of Present Illness  Date of onset: 2022  Mechanism of injury: Hit by a large roof tress  Has history of multiple cervical fusions in 2019 and 22:  -Pt reports no significant changes in neck discomfort or numbness in his right arm  Not a recurrent problem   Quality of life: fair    Pain  Current pain ratin  At best pain ratin  At worst pain ratin  Location: upper cervical pain  Quality: burning, radiating, discomfort, dull ache and cramping  Relieving factors: change in position and medications  Progression: no change      Diagnostic Tests    FCE comments: Not working at this time due to injury  Was working as a  but was tasked with helping to build a [employer], where he hurt himself  Treatments  No previous or current treatments  Current treatment: physical therapy  Patient Goals  Patient goal: wants to get his neck fixed and return to work  Objective     Static Posture     Head  Forward  Shoulders  Asymmetric shoulders  Postural Observations  Seated posture: poor  Standing posture: poor  Correction of posture: has no consistent effect    Additional Postural Observation Details  Significant forward head and rounded shoulders along with bilateral scapular protraction  Palpation     Additional Palpation Details  Palpation along right brachial plexus and ulnar nerve beds yields discomfort and reproduction of ulnar nerve symptoms      Neurological Testing     Reflexes   Left   Biceps (C5/C6): trace (1+)  Brachioradialis (C6): trace (1+)  Triceps (C7): trace (1+)    Right   Biceps (C5/C6): trace (1+)  Brachioradialis (C6): trace (1+)  Triceps (C7): trace (1+)    Active Range of Motion   Cervical/Thoracic Spine       Cervical  Subcranial protraction: Restriction level: minimal  Subcranial retraction:  with pain   Restriction level: moderate  Flexion:  Restriction level: minimal  Extension:  with pain Restriction level: maximal  Left lateral flexion:  Restriction level: moderate  Right lateral flexion:  with pain Restriction level maximal  Left rotation:  with pain Restriction level: moderate  Right rotation:  with pain Restriction level: maximal    Strength/Myotome Testing   Cervical Spine     Left   Interossei strength (t1): 3+    Right   Interossei strength (t1): 2+    Left Shoulder     Planes of Motion   Abduction: 4   External rotation at 0°: 4     Right Shoulder     Planes of Motion   Abduction: 2+   External rotation at 0°: 3+     Left Elbow   Flexion: 4  Extension: 4    Right Elbow   Flexion: 2+  Extension: 2+    Left Wrist/Hand   Wrist extension: 4  Wrist flexion: 4  Thumb extension: 4    Right Wrist/Hand   Wrist extension: 3  Wrist flexion: 3  Thumb extension: 3            Assessment/Plan    Subjective    Objective           Precautions:  Multi-Level cervical fusion       Manuals 8-16--22 8/18 8-23-22 8/26/22 8-30-22 8-12-22   Right brachial plexus and ulnar nerve mobilization Graston, cupping and K-taping  25' total 10' STM, R UT/cerv paraspinals JUNIOR  20' Graston, cupping, taping - [facility] 23' 23' STM R UT, paraspinals, R post shoulder 23' STM R UT/cerv paraspinals, gentle NG 25' STM R UT/cerv paraspinals, gentle NG                              Neuro Re-Ed          Cervical retraction 2x10  home 2x10 2x10 2x10   Chin tucks 10"x12 10" x 12 home held 10"x12 10''x12   Thoracic extension seated w/ roll 10"x12 @ 2 levels 10" x12 @ 2 levels  10" nx12 @ 2 levels 10"x12 10"x12 10'''x12   LTP and MTP while holding good scapular and cervical posture   Blue 3x10 Blue 3x10 Blue 3x10 each Blue 3x10 ea Blue 3x10 Blue 3x10   Self NG 5' Supine 3'  5' 5'  5'  5'   scap retract/ ER at doorway Green 2x10 Green 2x10 Blue 3x10 Blue 3x10 Blue 3x10 Blue 3x10            Ther Ex Biceps curls 6# 3x10 5# 3x10  6# 3x10 6# 3x10 6# 3x10 6# 3x10   Triceps extension 3x10 blue band 3x10 blue band 3x12 blue 3x10 blue  3x10 Blue 3x10                                                         Ther Activity                                                                                 Pt Ed/ HEP Pathology and involved anatomy as well as issuing and reviewing of HEP   14'                 Gait Training                           Modalities          P 15'  [facility] 12'

## 2022-08-30 NOTE — PROGRESS NOTES
PT Re-Evaluation     Today's date: 2022  Patient name: Harvinder Flores  : 1955  MRN: [id number]  Referring provider: Leoncio Tse MD  Dx:   Encounter Diagnosis     ICD-10-CM    1  Neck pain  M54 2    2  Cervical myelopathy (HCC)  G95 9          Assessment  Assessment details: Pt presents with signs and symptoms of myelopathy in the right UE  Symptoms mostly increased with tension and palpation of right brachial plexus and ulnar nerve bed  Pt's significantly poor posture appears to be a large contributor to symptoms  22:  -No changes in patient's significant postural abnormalities, no changes in strength, no changes in neck pain or radicular symptoms  Pt originally presented with significant chronic postural abnormalities and mobility deficiencies; which may indicate that changing these may take some more time  Impairments: abnormal or restricted ROM, impaired physical strength, lacks appropriate home exercise program, pain with function and poor posture     22:  Pt having some slight reduction in right arm symptoms but neck remains very painful and overall function has not improved  Symptom irritability: moderateUnderstanding of Dx/Px/POC: good   Prognosis: fair    Goals  ST) Pt  Will be able to sleep through the night without being awoken with pain and with minimal to no pain upon waking in 6 weeks  -NO PROGRESS SINCE LAST ASSESSMENT   2) Pt  Will have not headaches while at work or with computer work at home  -NO PROGRESS 3)  Pt 's radicular symptoms will be centralized to neck in 6 weeks  -SLIGHT PROGRESS  LT) Pt  Will be able to complete all chores at home without pain or limitations in 12 weeks  -SLIGHT PROGRESS  2)  Pt  Will be able to to complete all physical tasks at work without restriction or limitations in 12 weeks  -NO PROGRESS  ST) Pt   Will be able to sleep through the night without being awoken with pain and with minimal to no pain upon waking in 6 weeks  -SLIGHT PROGRESS 2) Pt  Will have not headaches while at work or with computer work at home  SLIGHT PROGRESS  3)  Pt 's radicular symptoms will be centralized to neck in 6 weeks  -NO PROGRESS  LT) Pt  Will be able to complete all chores at home without pain or limitations in 12 weeks  -NO PROGRESS  2)  Pt  Will be able to to complete all physical tasks at work without restriction or limitations in 12 weeks  -NO PROGRESS    Plan  Patient would benefit from: skilled physical therapy and PT eval  Referral necessary: Yes  Planned modality interventions: cryotherapy, thermotherapy: hydrocollator packs and unattended electrical stimulation  Planned therapy interventions: manual therapy, neuromuscular re-education, patient education, self care, therapeutic exercise, therapeutic activities and home exercise program  Other planned therapy interventions: cupping, graston and K-taping of right plexus and ulnar nerve beds  Frequency: 2x week  Duration in weeks: 8  Treatment plan discussed with: patient        Subjective Evaluation    History of Present Illness  Date of onset: 2022  Mechanism of injury: Hit by a large roof tress  Has history of multiple cervical fusions in 2019 and 22:  -Pt reports no significant changes in neck discomfort or numbness in his right arm  Not a recurrent problem   Quality of life: fair    Pain  Current pain ratin  At best pain ratin  At worst pain ratin  Location: upper cervical pain  Quality: burning, radiating, discomfort, dull ache and cramping  Relieving factors: change in position and medications  Progression: no change      Diagnostic Tests    FCE comments: Not working at this time due to injury  Was working as a  but was tasked with helping to build a [employer], where he hurt himself  Treatments  No previous or current treatments  Current treatment: physical therapy  Patient Goals  Patient goal: wants to get his neck fixed and return to work  Objective     Static Posture     Head  Forward  Shoulders  Asymmetric shoulders  Postural Observations  Seated posture: poor  Standing posture: poor  Correction of posture: has no consistent effect    Additional Postural Observation Details  Significant forward head and rounded shoulders along with bilateral scapular protraction  Palpation     Additional Palpation Details  Palpation along right brachial plexus and ulnar nerve beds yields discomfort and reproduction of ulnar nerve symptoms      Neurological Testing     Reflexes   Left   Biceps (C5/C6): trace (1+)  Brachioradialis (C6): trace (1+)  Triceps (C7): trace (1+)    Right   Biceps (C5/C6): trace (1+)  Brachioradialis (C6): trace (1+)  Triceps (C7): trace (1+)    Active Range of Motion   Cervical/Thoracic Spine       Cervical  Subcranial protraction:   Restriction level: minimal  Subcranial retraction:  with pain   Restriction level: moderate  Flexion:  Restriction level: minimal  Extension:  with pain Restriction level: maximal  Left lateral flexion:  Restriction level: moderate  Right lateral flexion:  with pain Restriction level maximal  Left rotation:  with pain Restriction level: moderate  Right rotation:  with pain Restriction level: maximal    Strength/Myotome Testing   Cervical Spine     Left   Interossei strength (t1): 3+    Right   Interossei strength (t1): 2+    Left Shoulder     Planes of Motion   Abduction: 4   External rotation at 0°: 4     Right Shoulder     Planes of Motion   Abduction: 2+   External rotation at 0°: 3+     Left Elbow   Flexion: 4  Extension: 4    Right Elbow   Flexion: 2+  Extension: 2+    Left Wrist/Hand   Wrist extension: 4  Wrist flexion: 4  Thumb extension: 4    Right Wrist/Hand   Wrist extension: 3  Wrist flexion: 3  Thumb extension: 3            Assessment/Plan    Subjective    Objective           Precautions:  Multi-Level cervical fusion       Manuals 8-16--22 8/18 8-23-22 8/26/22 8-30-22 8-12-22   Right brachial plexus and ulnar nerve mobilization Graston, cupping and K-taping  25' total 10' STM, R UT/cerv paraspinals JUNIOR  20' Graston, cupping, taping - [facility] 23' 23' STM R UT, paraspinals, R post shoulder 23' STM R UT/cerv paraspinals, gentle NG 25' STM R UT/cerv paraspinals, gentle NG                              Neuro Re-Ed          Cervical retraction 2x10  home 2x10 2x10 2x10   Chin tucks 10"x12 10" x 12 home held 10"x12 10''x12   Thoracic extension seated w/ roll 10"x12 @ 2 levels 10" x12 @ 2 levels  10" nx12 @ 2 levels 10"x12 10"x12 10'''x12   LTP and MTP while holding good scapular and cervical posture  Blue 3x10 Blue 3x10 Blue 3x10 each Blue 3x10 ea Blue 3x10 Blue 3x10   Self NG 5' Supine 3'  5' 5'  5'  5'   scap retract/ ER at doorway Green 2x10 Green 2x10 Blue 3x10 Blue 3x10 Blue 3x10 Blue 3x10            Ther Ex         Biceps curls 6# 3x10 5# 3x10  6# 3x10 6# 3x10 6# 3x10 6# 3x10   Triceps extension 3x10 blue band 3x10 blue band 3x12 blue 3x10 blue  3x10 Blue 3x10                                                         Ther Activity                                                                                 Pt Ed/ HEP Pathology and involved anatomy as well as issuing and reviewing of HEP   14'                 Gait Training                           Modalities          MHP 15'  MHP 12'

## 2022-09-01 ENCOUNTER — OFFICE VISIT (OUTPATIENT)
Dept: PHYSICAL THERAPY | Facility: CLINIC | Age: 67
End: 2022-09-01
Payer: OTHER MISCELLANEOUS

## 2022-09-01 DIAGNOSIS — G95.9 CERVICAL MYELOPATHY (HCC): ICD-10-CM

## 2022-09-01 DIAGNOSIS — M54.2 NECK PAIN: Primary | ICD-10-CM

## 2022-09-01 PROCEDURE — 97112 NEUROMUSCULAR REEDUCATION: CPT

## 2022-09-01 PROCEDURE — 97140 MANUAL THERAPY 1/> REGIONS: CPT

## 2022-09-01 NOTE — PROGRESS NOTES
Daily Note     Today's date: 2022  Patient name: Jonathon Crook  : 1955  MRN: [id number]  Referring provider: Staci Singleton MD  Dx:   Encounter Diagnosis     ICD-10-CM    1  Neck pain  M54 2    2  Cervical myelopathy (HCC)  G95 9                   Subjective: Patient reports his neck and thoracic spine are stiff today  Objective: See treatment diary below      Assessment: Improvement in soft tissue quality/tone following STM to R UT/cerv paraspinals  Held cerv retractions this visit as they were causing an increase in R sided radicular symptoms but did resolve with rest  Appropriate fatigue following exercises  Intermittent tactile cues to avoid UT compensation during tband exercises  Pt would continue from skilled PT  Plan: Continue with current POC to address pt deficits  Precautions:  Multi-Level cervical fusion       Manuals 22   Right brachial plexus and ulnar nerve mobilization Graston, cupping and K-taping  25' total 10' STM, R UT/cerv paraspinals JUNIOR  20' Graston, cupping, taping - [facility] 23' 23' STM R UT, paraspinals, R post shoulder 23' STM R UT/cerv paraspinals, gentle NG 15' STM R UT/cerv paraspinals-TB  KT tape, cupping:                              Neuro Re-Ed          Cervical retraction 2x10  home 2x10 2x10 x10 then held due to increase in radic symptoms   Chin tucks 10"x12 10" x 12 home held 10"x12 10"x12   Thoracic extension seated w/ roll 10"x12 @ 2 levels 10" x12 @ 2 levels  10" nx12 @ 2 levels 10"x12 10"x12 10"x12   LTP and MTP while holding good scapular and cervical posture   Blue 3x10 Blue 3x10 Blue 3x10 each Blue 3x10 ea Blue 3x10 Blue 3x10   Self NG 5' Supine 3'  5' 5'  5'  5'    scap retract/ ER at doorway Green 2x10 Green 2x10 Blue 3x10 Blue 3x10 Blue 3x10 Blue 3x10            Ther Ex         Biceps curls 6# 3x10 5# 3x10  6# 3x10 6# 3x10 6# 3x10 6# 3x10   Triceps extension 3x10 blue band 3x10 blue band 3x12 blue 3x10 blue  3x10 Blue 3x10 blue                                                         Ther Activity                                                                                 Pt Ed/ HEP Pathology and involved anatomy as well as issuing and reviewing of HEP   14'                 Gait Training                           Modalities          MHP 15'  MHP 12'

## 2022-09-06 ENCOUNTER — OFFICE VISIT (OUTPATIENT)
Dept: PHYSICAL THERAPY | Facility: CLINIC | Age: 67
End: 2022-09-06
Payer: OTHER MISCELLANEOUS

## 2022-09-06 DIAGNOSIS — G95.9 CERVICAL MYELOPATHY (HCC): ICD-10-CM

## 2022-09-06 DIAGNOSIS — M54.2 NECK PAIN: Primary | ICD-10-CM

## 2022-09-06 PROCEDURE — 97112 NEUROMUSCULAR REEDUCATION: CPT

## 2022-09-06 PROCEDURE — 97140 MANUAL THERAPY 1/> REGIONS: CPT

## 2022-09-06 NOTE — PROGRESS NOTES
Daily Note     Today's date: 2022  Patient name: Vince Steele  : 1955  MRN: [id number]  Referring provider: Nabeel Lofton MD  Dx:   Encounter Diagnosis     ICD-10-CM    1  Neck pain  M54 2    2  Cervical myelopathy (HCC)  G95 9                   Subjective: Pt reports he is noticing less pain in his right arm, but neck continues to hurt significantly  Objective: See treatment diary below      Assessment: Tolerated treatment well  Pt appears to be benefiting from nerve bed treatments for radicular pain  Patient would benefit from continued PT      Plan: Progress treatment as tolerated  Precautions:  Multi-Level cervical fusion       Manuals -22   Right brachial plexus and ulnar nerve mobilization Graston, cupping and K-taping  25' total 10' STM, R UT/cerv paraspinals JUNIOR  20' Graston, cupping, taping - [facility] 23' 23' STM R UT, paraspinals, R post shoulder 23' STM R UT/cerv paraspinals, gentle NG 15' STM R UT/cerv paraspinals-TB  KT tape, cupping:                              Neuro Re-Ed          Cervical retraction 2x10  home 2x10 2x10 x10 then held due to increase in radic symptoms   Chin tucks 10"x12 10" x 12 home held 10"x12 10"x12   Thoracic extension seated w/ roll 10"x12 @ 2 levels 10" x12 @ 2 levels  10" nx12 @ 2 levels 10"x12 10"x12 10"x12   LTP and MTP while holding good scapular and cervical posture   Blue 3x10 Blue 3x10 Blue 3x10 each Blue 3x10 ea Blue 3x10 Blue 3x10   Self NG 5' Supine 3'  5' 5'  5'  5'    scap retract/ ER at doorway Green 2x10 Green 2x10 Blue 3x10 Blue 3x10 Blue 3x10 Blue 3x10            Ther Ex         Biceps curls 6# 3x10 5# 3x10  6# 3x10 6# 3x10 6# 3x10 6# 3x10   Triceps extension 3x10 blue band 3x10 blue band 3x12 blue 3x10 blue  3x10 Blue 3x10 blue                                                         Ther Activity                                                                                 Pt Ed/ HEP Pathology and involved anatomy as well as issuing and reviewing of HEP   14'                 Gait Training                           Modalities          MHP 15'  MHP 12'

## 2022-09-08 ENCOUNTER — OFFICE VISIT (OUTPATIENT)
Dept: PHYSICAL THERAPY | Facility: CLINIC | Age: 67
End: 2022-09-08
Payer: OTHER MISCELLANEOUS

## 2022-09-08 DIAGNOSIS — M54.2 NECK PAIN: Primary | ICD-10-CM

## 2022-09-08 DIAGNOSIS — G95.9 CERVICAL MYELOPATHY (HCC): ICD-10-CM

## 2022-09-08 PROCEDURE — 97110 THERAPEUTIC EXERCISES: CPT

## 2022-09-08 PROCEDURE — 97140 MANUAL THERAPY 1/> REGIONS: CPT

## 2022-09-08 PROCEDURE — 97112 NEUROMUSCULAR REEDUCATION: CPT

## 2022-09-08 NOTE — PROGRESS NOTES
Daily Note     Today's date: 2022  Patient name: Wes Felix  : 1955  MRN: [id number]  Referring provider: Gene Aviles MD  Dx:   Encounter Diagnosis     ICD-10-CM    1  Neck pain  M54 2    2  Cervical myelopathy (HCC)  G95 9                   Subjective: Pt reports having improvements over the past couple weeks and feels PT is starting to work a little  Objective: See treatment diary below      Assessment: Tolerated treatment well  Patient would benefit from continued PT      Plan: Progress treatment as tolerated  Precautions:  Multi-Level cervical fusion       Manuals --22   Right brachial plexus and ulnar nerve mobilization Graston, cupping and K-taping  25' total 10' STM, R UT/cerv paraspinals JUNIOR  20' Graston, cupping, taping - [facility] 23' 23' STM R UT, paraspinals, R post shoulder 23' STM R UT/cerv paraspinals, gentle NG 15' STM R UT/cerv paraspinals-TB  KT tape, cupping:                              Neuro Re-Ed          Cervical retraction 2x10  home 2x10 2x10 x10 then held due to increase in radic symptoms   Chin tucks 10"x12 10" x 12 home held 10"x12 10"x12   Thoracic extension seated w/ roll 10"x12 @ 2 levels 10" x12 @ 2 levels  10" nx12 @ 2 levels 10"x12 10"x12 10"x12   LTP and MTP while holding good scapular and cervical posture   Blue 3x10 Blue 3x10 Blue 3x12 each Blue 3x10 ea Blue 3x10 Blue 3x10   Self NG 5' Supine 3'  5' 5'  5'  5'    scap retract/ ER at doorway Green 2x10 Green 2x10 Blue 3x10 Blue 3x10 Blue 3x10 Blue 3x10            Ther Ex         Biceps curls 6# 3x10 5# 3x10  6# 3x12 6# 3x10 6# 3x10 6# 3x10   Triceps extension 3x10 blue band 3x10 blue band 3x12 blue 3x10 blue  3x10 Blue 3x10 blue   Lateral raises   2# 2x10                                                   Ther Activity                                                                                 Pt Ed/ HEP Pathology and involved anatomy as well as issuing and reviewing of HEP   14'                 Gait Training                           Modalities          MHP 15'  MHP 12'

## 2022-09-13 ENCOUNTER — OFFICE VISIT (OUTPATIENT)
Dept: PHYSICAL THERAPY | Facility: CLINIC | Age: 67
End: 2022-09-13
Payer: OTHER MISCELLANEOUS

## 2022-09-13 DIAGNOSIS — G95.9 CERVICAL MYELOPATHY (HCC): ICD-10-CM

## 2022-09-13 DIAGNOSIS — M54.2 NECK PAIN: Primary | ICD-10-CM

## 2022-09-13 PROCEDURE — 97112 NEUROMUSCULAR REEDUCATION: CPT

## 2022-09-13 PROCEDURE — 97140 MANUAL THERAPY 1/> REGIONS: CPT

## 2022-09-13 NOTE — PROGRESS NOTES
Daily Note     Today's date: 2022  Patient name: Rashid Hernandez  : 1955  MRN: [id number]  Referring provider: Ramsey David MD  Dx:   Encounter Diagnosis     ICD-10-CM    1  Neck pain  M54 2    2  Cervical myelopathy (HCC)  G95 9                   Subjective: Pt reports his arm symptoms are less intense and only reaching up to his elbow now  Objective: See treatment diary below      Assessment: Tolerated treatment well  Patient would benefit from continued PT      Plan: Progress treatment as tolerated  Precautions:  Multi-Level cervical fusion       Manuals -22   Right brachial plexus and ulnar nerve mobilization Graston, cupping and K-taping  25' total 10' STM, R UT/cerv paraspinals JUNIOR  20' Graston, cupping, taping - [facility] 23' 23' STM R UT, paraspinals, R post shoulder 23' STM R UT/cerv paraspinals, gentle NG 15' STM R UT/cerv paraspinals-TB  KT tape, cupping:                              Neuro Re-Ed          Cervical retraction 2x10  home 2x10 2x10 x10 then held due to increase in radic symptoms   Chin tucks 10"x12 10" x 12 home held 10"x12 10"x12   Thoracic extension seated w/ roll 10"x12 @ 2 levels 10" x12 @ 2 levels  10" nx12 @ 2 levels 10"x12 10"x12 10"x12   LTP and MTP while holding good scapular and cervical posture   Blue 3x10 Blue 3x10 Blue 3x12 each Blue 3x10 ea Blue 3x10 Blue 3x10   Self NG 5' Supine 3'  5' 5'  5'  5'    scap retract/ ER at doorway Green 2x10 Green 2x10 Blue 3x10 Blue 3x10 Blue 3x10 Blue 3x10            Ther Ex         Biceps curls 6# 3x10 5# 3x10  6# 3x12 6# 3x10 6# 3x10 6# 3x10   Triceps extension 3x10 blue band 3x10 blue band 3x12 blue 3x10 blue  3x10 Blue 3x10 blue   Lateral raises   2# 2x10                                                   Ther Activity                                                                                 Pt Ed/ HEP Pathology and involved anatomy as well as issuing and reviewing of HEP  14'                 Gait Training                           Modalities          MHP 15'  MHP 15'

## 2022-09-15 ENCOUNTER — OFFICE VISIT (OUTPATIENT)
Dept: PHYSICAL THERAPY | Facility: CLINIC | Age: 67
End: 2022-09-15
Payer: OTHER MISCELLANEOUS

## 2022-09-15 DIAGNOSIS — M54.2 NECK PAIN: Primary | ICD-10-CM

## 2022-09-15 DIAGNOSIS — G95.9 CERVICAL MYELOPATHY (HCC): ICD-10-CM

## 2022-09-15 PROCEDURE — 97140 MANUAL THERAPY 1/> REGIONS: CPT

## 2022-09-15 PROCEDURE — 97112 NEUROMUSCULAR REEDUCATION: CPT

## 2022-09-15 NOTE — PROGRESS NOTES
Daily Note     Today's date: 9/15/2022  Patient name: Vince Steele  : 1955  MRN: [id number]  Referring provider: Nabeel Lofton MD  Dx:   Encounter Diagnosis     ICD-10-CM    1  Neck pain  M54 2    2  Cervical myelopathy (HCC)  G95 9                   Subjective: overall achiness is present but numbness still doing better  Objective: See treatment diary below      Assessment: Tolerated treatment well  Patient would benefit from continued PT      Plan: Progress treatment as tolerated  Precautions:  Multi-Level cervical fusion       Manuals -22 9-8-22 9-13-22 9-15-22 8-30-22 9/1/22   Right brachial plexus and ulnar nerve mobilization Graston, cupping and K-taping  25' total 10' STM, R UT/cerv paraspinals JUNIOR  20' Graston, cupping, taping - [facility] 23' 23' STM R UT, paraspinals, R post shoulder 23' STM R UT/cerv paraspinals, gentle NG 15' STM R UT/cerv paraspinals-TB  KT tape, cupping:                              Neuro Re-Ed          Cervical retraction 2x10  home 2x10 2x10 x10 then held due to increase in radic symptoms   Chin tucks 10"x12 10" x 12 home held 10"x12 10"x12   Thoracic extension seated w/ roll 10"x12 @ 2 levels 10" x12 @ 2 levels  10" nx12 @ 2 levels 10"x12 10"x12 10"x12   LTP and MTP while holding good scapular and cervical posture  Blue 3x10 Blue 3x10 Blue 3x12 each Blue 3x12 ea Blue 3x10 Blue 3x10   Self NG 5' Supine 3'  5' 5'  5'  5'    scap retract/ ER at doorway Green 2x10 Green 2x10 Blue 3x10 Blue 3x10 Blue 3x10 Blue 3x10            Ther Ex         Biceps curls 6# 3x10 5# 3x10  6# 3x12 7# 3x10 6# 3x10 6# 3x10   Triceps extension 3x10 blue band 3x10 blue band 3x12 blue 3x20 blue  3x10 Blue 3x10 blue   Lateral raises   2# 2x10 2# 2x10                                                  Ther Activity                                                                                 Pt Ed/ HEP Pathology and involved anatomy as well as issuing and reviewing of HEP   14 Gait Training                           Modalities          MHP 15'  MHP 15' MHP 15'

## 2022-09-19 ENCOUNTER — APPOINTMENT (OUTPATIENT)
Dept: PHYSICAL THERAPY | Facility: CLINIC | Age: 67
End: 2022-09-19
Payer: OTHER MISCELLANEOUS

## 2022-09-20 ENCOUNTER — OFFICE VISIT (OUTPATIENT)
Dept: PHYSICAL THERAPY | Facility: CLINIC | Age: 67
End: 2022-09-20
Payer: OTHER MISCELLANEOUS

## 2022-09-20 DIAGNOSIS — M54.2 NECK PAIN: Primary | ICD-10-CM

## 2022-09-20 DIAGNOSIS — G95.9 CERVICAL MYELOPATHY (HCC): ICD-10-CM

## 2022-09-20 PROCEDURE — 97140 MANUAL THERAPY 1/> REGIONS: CPT

## 2022-09-20 PROCEDURE — 97110 THERAPEUTIC EXERCISES: CPT

## 2022-09-20 PROCEDURE — 97112 NEUROMUSCULAR REEDUCATION: CPT

## 2022-09-20 NOTE — PROGRESS NOTES
Daily Note     Today's date: 2022  Patient name: Davon Upton  : 1955  MRN: [id number]  Referring provider: Tamara Rivas MD  Dx:   Encounter Diagnosis     ICD-10-CM    1  Neck pain  M54 2    2  Cervical myelopathy (HCC)  G95 9                   Subjective: Pt reports having some extra sensitivity in his neck and (anterior brachial plexus pattern)  Symptoms still aren't traveling past his elbow  Objective: See treatment diary below      Assessment: Tolerated treatment well  Patient would benefit from continued PT      Plan: Progress treatment as tolerated  Precautions:  Multi-Level cervical fusion       Manuals 9-6--22 9-8-22 9-13-22 9-15-22 9-20-22 9/1/22   Right brachial plexus and ulnar nerve mobilization Graston, cupping and K-taping  25' total 10' STM, R UT/cerv paraspinals JUNIOR  20' Graston, cupping, taping - [facility] 23' 23' STM R UT, paraspinals, R post shoulder 23' STM R UT/cerv paraspinals, gentle NG 15' STM R UT/cerv paraspinals-TB  KT tape, cupping:                              Neuro Re-Ed          Cervical retraction 2x10  home 2x10 2x10 x10 then held due to increase in radic symptoms   Chin tucks 10"x12 10" x 12 home held 10"x12 10"x12   Thoracic extension seated w/ roll 10"x12 @ 2 levels 10" x12 @ 2 levels  10" nx12 @ 2 levels 10"x12 10"x12 10"x12   LTP and MTP while holding good scapular and cervical posture   Blue 3x10 Blue 3x10 Blue 3x12 each Blue 3x12 ea Blue 3x12 Blue 3x10   Self NG 5' Supine 3'  5' 5'  5'  5'    scap retract/ ER at doorway Green 2x10 Green 2x10 Blue 3x10 Blue 3x10 Blue 3x10 Blue 3x10            Ther Ex         Biceps curls 6# 3x10 5# 3x10  6# 3x12 7# 3x10 6# 3x12 6# 3x10   Triceps extension 3x10 blue band 3x10 blue band 3x12 blue 3x20 blue  3x20 Blue 3x10 blue   Lateral raises   2# 2x10 2# 2x10 2# 2x10                                                 Ther Activity                                                                                 Pt Ed/ HEP Pathology and involved anatomy as well as issuing and reviewing of HEP   14'                 Gait Training                           Modalities          MHP 15'  MHP 15' MHP 12'

## 2022-09-22 ENCOUNTER — OFFICE VISIT (OUTPATIENT)
Dept: PHYSICAL THERAPY | Facility: CLINIC | Age: 67
End: 2022-09-22
Payer: OTHER MISCELLANEOUS

## 2022-09-22 DIAGNOSIS — M54.2 NECK PAIN: Primary | ICD-10-CM

## 2022-09-22 DIAGNOSIS — G95.9 CERVICAL MYELOPATHY (HCC): ICD-10-CM

## 2022-09-22 PROCEDURE — 97110 THERAPEUTIC EXERCISES: CPT

## 2022-09-22 PROCEDURE — 97112 NEUROMUSCULAR REEDUCATION: CPT

## 2022-09-22 PROCEDURE — 97140 MANUAL THERAPY 1/> REGIONS: CPT

## 2022-09-22 NOTE — PROGRESS NOTES
Daily Note     Today's date: 2022  Patient name: Luis Waterman  : 1955  MRN: [id number]  Referring provider: Christine Leung MD  Dx:   Encounter Diagnosis     ICD-10-CM    1  Neck pain  M54 2    2  Cervical myelopathy (HCC)  G95 9                   Subjective: Pt reports he is trying, and is so far able, to get a little more tasks/ light projects done at home  Objective: See treatment diary below      Assessment: Tolerated treatment well  Patient would benefit from continued PT      Plan: Progress treatment as tolerated  Precautions:  Multi-Level cervical fusion       Manuals -22 9-8-22 9-13-22 9-15-22 9-20-22 9/22/22   Right brachial plexus and ulnar nerve mobilization Graston, cupping and K-taping  25' total 10' STM, R UT/cerv paraspinals JUNIOR  20' Graston, cupping, taping - [facility] 23' 23' STM R UT, paraspinals, R post shoulder 23' STM R UT/cerv paraspinals, gentle NG 15' STM R UT/cerv paraspinals-TB  KT tape, cupping:                              Neuro Re-Ed          Cervical retraction 2x10  home 2x10 2x10 x10 then held due to increase in radic symptoms   Chin tucks 10"x12 10" x 12 home held 10"x12 10"x12   Thoracic extension seated w/ roll 10"x12 @ 2 levels 10" x12 @ 2 levels  10" nx12 @ 2 levels 10"x12 10"x12 10"x12   LTP and MTP while holding good scapular and cervical posture   Blue 3x10 Blue 3x10 Blue 3x12 each Blue 3x12 ea Blue 3x12 Blue 3x10   Self NG 5' Supine 3'  5' 5'  5'  5'    scap retract/ ER at doorway Green 2x10 Green 2x10 Blue 3x10 Blue 3x10 Blue 3x10 Blue 3x10            Ther Ex         Biceps curls 6# 3x10 5# 3x10  6# 3x12 7# 3x10 6# 3x12 7# 3x10   Triceps extension 3x10 blue band 3x10 blue band 3x12 blue 3x20 blue  3x20 Blue 3x20 blue   Lateral raises   2# 2x10 2# 2x10 2# 2x10 Unable due to pain                                                Ther Activity                                                                                 Pt Ed/ HEP Pathology and involved anatomy as well as issuing and reviewing of HEP   14'                 Gait Training                           Modalities          MHP 15'  MHP 15' MHP 12'

## 2022-09-27 ENCOUNTER — OFFICE VISIT (OUTPATIENT)
Dept: PHYSICAL THERAPY | Facility: CLINIC | Age: 67
End: 2022-09-27
Payer: OTHER MISCELLANEOUS

## 2022-09-27 DIAGNOSIS — G95.9 CERVICAL MYELOPATHY (HCC): ICD-10-CM

## 2022-09-27 DIAGNOSIS — M54.2 NECK PAIN: Primary | ICD-10-CM

## 2022-09-27 PROCEDURE — 97110 THERAPEUTIC EXERCISES: CPT

## 2022-09-27 PROCEDURE — 97140 MANUAL THERAPY 1/> REGIONS: CPT

## 2022-09-27 PROCEDURE — 97112 NEUROMUSCULAR REEDUCATION: CPT

## 2022-09-27 NOTE — PROGRESS NOTES
Daily Note     Today's date: 2022  Patient name: Vera Altamirano  : 1955  MRN: [id number]  Referring provider: Courtney Doty MD  Dx:   Encounter Diagnosis     ICD-10-CM    1  Neck pain  M54 2    2  Cervical myelopathy (HCC)  G95 9                   Subjective: Pt reports some areas of the neck are tender but arm is feeling better  Objective: See treatment diary below      Assessment: Tolerated treatment well  Patient would benefit from continued PT      Plan: Progress treatment as tolerated  Precautions:  Multi-Level cervical fusion       Manuals 9-27--22 9-8-22 9-13-22 9-15-22 9-20-22 9/22/22   Right brachial plexus and ulnar nerve mobilization Graston, cupping and K-taping  25' total 10' STM, R UT/cerv paraspinals JUNIOR  20' Graston, cupping, taping - [facility] BOB 23' 23' STM R UT, paraspinals, R post shoulder 23' STM R UT/cerv paraspinals, gentle NG 15' STM R UT/cerv paraspinals-TB  KT tape, cupping:                              Neuro Re-Ed          Cervical retraction 2x10  home 2x10 2x10 x10 then held due to increase in radic symptoms   Chin tucks 10"x12 10" x 12 home held 10"x12 10"x12   Thoracic extension seated w/ roll 10"x12 @ 2 levels 10" x12 @ 2 levels  10" nx12 @ 2 levels 10"x12 10"x12 10"x12   LTP and MTP while holding good scapular and cervical posture   Blue 3x10 Blue 3x10 Blue 3x12 each Blue 3x12 ea Blue 3x12 Blue 3x10   Self NG 5' Supine 3'  5' 5'  5'  5'    scap retract/ ER at doorway Blue 3x10 Green 2x10 Blue 3x10 Blue 3x10 Blue 3x10 Blue 3x10            Ther Ex         Biceps curls 7# 3x10 5# 3x10  6# 3x12 7# 3x10 6# 3x12 7# 3x10   Triceps extension 3x10 blue band 3x10 blue band 3x12 blue 3x20 blue  3x20 Blue 3x20 blue   Lateral raises 0# 2x10 samantha  2# 2x10 2# 2x10 2# 2x10 Unable due to pain                                                Ther Activity                                                                                 Pt Ed/ HEP Pathology and involved anatomy as well as issuing and reviewing of HEP   14'                 Gait Training                           Modalities            MHP 12' MHP 12'

## 2022-09-29 ENCOUNTER — APPOINTMENT (OUTPATIENT)
Dept: PHYSICAL THERAPY | Facility: CLINIC | Age: 67
End: 2022-09-29
Payer: OTHER MISCELLANEOUS

## 2022-09-30 ENCOUNTER — OFFICE VISIT (OUTPATIENT)
Dept: PHYSICAL THERAPY | Facility: CLINIC | Age: 67
End: 2022-09-30
Payer: OTHER MISCELLANEOUS

## 2022-09-30 DIAGNOSIS — M54.2 NECK PAIN: Primary | ICD-10-CM

## 2022-09-30 DIAGNOSIS — G95.9 CERVICAL MYELOPATHY (HCC): ICD-10-CM

## 2022-09-30 PROCEDURE — 97112 NEUROMUSCULAR REEDUCATION: CPT

## 2022-09-30 PROCEDURE — 97010 HOT OR COLD PACKS THERAPY: CPT

## 2022-09-30 PROCEDURE — 97140 MANUAL THERAPY 1/> REGIONS: CPT

## 2022-09-30 PROCEDURE — 97110 THERAPEUTIC EXERCISES: CPT

## 2022-10-04 ENCOUNTER — OFFICE VISIT (OUTPATIENT)
Dept: PHYSICAL THERAPY | Facility: CLINIC | Age: 67
End: 2022-10-04
Payer: OTHER MISCELLANEOUS

## 2022-10-04 DIAGNOSIS — G95.9 CERVICAL MYELOPATHY (HCC): ICD-10-CM

## 2022-10-04 DIAGNOSIS — M54.2 NECK PAIN: Primary | ICD-10-CM

## 2022-10-04 PROCEDURE — 97140 MANUAL THERAPY 1/> REGIONS: CPT

## 2022-10-04 PROCEDURE — 97110 THERAPEUTIC EXERCISES: CPT

## 2022-10-04 PROCEDURE — 97112 NEUROMUSCULAR REEDUCATION: CPT

## 2022-10-04 NOTE — PROGRESS NOTES
Daily Note     Today's date: 10/4/2022  Patient name: Regina Katz  : 1955  MRN: [id number]  Referring provider: Samantha Nagy MD  Dx:   Encounter Diagnosis     ICD-10-CM    1  Neck pain  M54 2    2  Cervical myelopathy (HCC)  G95 9                   Subjective: Pt reports doctor is pleased with the progress made thus far and wants him to continue therapyu  Objective: See treatment diary below      Assessment: Tolerated treatment well  Patient would benefit from continued PT      Plan: Progress treatment as tolerated  Precautions:  Multi-Level cervical fusion       Manuals -22 9-30-22 10-4-22 9-15-22 9-20-22 9/22/22   Right brachial plexus and ulnar nerve mobilization Graston, cupping and K-taping  25' total 10' STM, R UT/cerv paraspinals JUNIOR  20' Graston, cupping, taping - [facility] BBO 23' 23' STM R UT, paraspinals, R post shoulder 23' STM R UT/cerv paraspinals, gentle NG 15' STM R UT/cerv paraspinals-TB  KT tape, cupping:                              Neuro Re-Ed          Cervical retraction 2x10  home 2x10 2x10 x10 then held due to increase in radic symptoms   Chin tucks 10"x12 10" x 12 home held 10"x12 10"x12   Thoracic extension seated w/ roll 10"x12 @ 2 levels 10" x12 @ 2 levels  10" nx12 @ 2 levels 10"x12 10"x12 10"x12   LTP and MTP while holding good scapular and cervical posture   Blue 3x10 Blue 3x10 Blue 3x12 each Blue 3x12 ea Blue 3x12 Blue 3x10   Self NG 5' Supine 3'  5' 5'  5'  5'    scap retract/ ER at doorway Blue 3x10 Blue 3x10 Blue 3x10 Blue 3x10 Blue 3x10 Blue 3x10            Ther Ex         Biceps curls 7# 3x10 7# 3x10  7# 3x12 7# 3x10 6# 3x12 7# 3x10   Triceps extension 3x10 blue band 3x10 blue band 3x12 blue 3x20 blue  3x20 Blue 3x20 blue   Lateral raises 0# 2x10 samantha 0# 2x10 0# 2x10 2# 2x10 2# 2x10 Unable due to pain                                                Ther Activity                                                                                 Pt Ed/ HEP Pathology and involved anatomy as well as issuing and reviewing of HEP   14'                 Gait Training                           Modalities           MHP 12' MHP 15' P 12'

## 2022-10-06 ENCOUNTER — OFFICE VISIT (OUTPATIENT)
Dept: PHYSICAL THERAPY | Facility: CLINIC | Age: 67
End: 2022-10-06
Payer: OTHER MISCELLANEOUS

## 2022-10-06 DIAGNOSIS — M54.2 NECK PAIN: Primary | ICD-10-CM

## 2022-10-06 DIAGNOSIS — G95.9 CERVICAL MYELOPATHY (HCC): ICD-10-CM

## 2022-10-06 PROCEDURE — 97140 MANUAL THERAPY 1/> REGIONS: CPT

## 2022-10-06 PROCEDURE — 97110 THERAPEUTIC EXERCISES: CPT

## 2022-10-06 NOTE — PROGRESS NOTES
Daily Note     Today's date: 10/6/2022  Patient name: Beryle Quest  : 1955  MRN: [id number]  Referring provider: Agnieszka Hope MD  Dx:   Encounter Diagnosis     ICD-10-CM    1  Neck pain  M54 2    2  Cervical myelopathy (HCC)  G95 9                   Subjective: Eduarda Fan reports not sleeping well secondary to neck pain  Objective: See treatment diary below      Assessment: Improvement in soft tissue quality following STM to R UT/ant delt/bicep  K-tape/cupping performed by primary therapist [facility]  Denied any increases in pain with exercises performed  Progress as able per patient tolerance  Plan: Continue with current POC to address pt deficits  Precautions:  Multi-Level cervical fusion       Manuals -22 9-30-22 10-4-22 10/6/22 9-20-22 9/22/22   Right brachial plexus and ulnar nerve mobilization Graston, cupping and K-taping  25' total 10' STM, R UT/cerv paraspinals JUNIOR  20' Graston, cupping, taping - [facility] 23' 15' STM R UT/following R brachial plexus/ulnar N  mobilization  21' STM R UT/cerv paraspinals, gentle NG 15' STM R UT/cerv paraspinals-TB  KT tape, cupping:DARCIE   K-tape/cupping brachial plexus     [address] [place] [address]                       Neuro Re-Ed          Cervical retraction 2x10  home  2x10 x10 then held due to increase in radic symptoms   Chin tucks 10"x12 10" x 12 home 10"x12 10"x12 10"x12   Thoracic extension seated w/ roll 10"x12 @ 2 levels 10" x12 @ 2 levels  10" nx12 @ 2 levels  10"x12 10"x12   LTP and MTP while holding good scapular and cervical posture   Blue 3x10 Blue 3x10 Blue 3x12 each Blue 3x12 ea  Blue 3x12 Blue 3x10   Self NG 5' Supine 3'  5'  5'  5'    scap retract/ ER at doorway Blue 3x10 Blue 3x10 Blue 3x10  Blue 3x10 Blue 3x10            Ther Ex         Biceps curls 7# 3x10 7# 3x10  7# 3x12 5# 3x12 6# 3x12 7# 3x10   Triceps extension 3x10 blue band 3x10 blue band 3x12 blue 3x12 blue  3x20 Blue 3x20 blue   Lateral raises 0# 2x10 samantha 0# 2x10 0# 2x10 0# 2x10 2# 2x10 Unable due to pain                                                Ther Activity                                                                                 Pt Ed/ HEP Pathology and involved anatomy as well as issuing and reviewing of HEP   14'                 Gait Training                           Modalities           MHP 12' MHP 12'

## 2022-10-11 ENCOUNTER — OFFICE VISIT (OUTPATIENT)
Dept: PHYSICAL THERAPY | Facility: CLINIC | Age: 67
End: 2022-10-11
Payer: OTHER MISCELLANEOUS

## 2022-10-11 DIAGNOSIS — M54.2 NECK PAIN: Primary | ICD-10-CM

## 2022-10-11 DIAGNOSIS — G95.9 CERVICAL MYELOPATHY (HCC): ICD-10-CM

## 2022-10-11 PROCEDURE — 97110 THERAPEUTIC EXERCISES: CPT

## 2022-10-11 PROCEDURE — 97112 NEUROMUSCULAR REEDUCATION: CPT

## 2022-10-11 PROCEDURE — 97140 MANUAL THERAPY 1/> REGIONS: CPT

## 2022-10-11 NOTE — PROGRESS NOTES
Daily Note     Today's date: 10/11/2022  Patient name: Mary Lou Schumacher  : 1955  MRN: [id number]  Referring provider: Tai Joseph MD  Dx:   Encounter Diagnosis     ICD-10-CM    1  Neck pain  M54 2    2  Cervical myelopathy (HCC)  G95 9                   Subjective: ***      Objective: See treatment diary below      Assessment: Tolerated treatment {Tolerated treatment :2431555715}  Patient {assessment:9150099367}      Plan: {PLAN:5268802646}     Precautions:  Multi-Level cervical fusion       Manuals -22 9-30-22 10-4-22 10/6/22 10-11-22 9/22/22   Right brachial plexus and ulnar nerve mobilization Graston, cupping and K-taping  25' total 10' STM, R UT/cerv paraspinals JUNIOR  20' Graston, cupping, taping - [facility] 23' 15' STM R UT/following R brachial plexus/ulnar N  mobilization  21' STM R UT/cerv paraspinals, gentle NG 15' STM R UT/cerv paraspinals-TB  KT tape, cupping:   K-tape/cupping brachial plexus     [facility]                       Neuro Re-Ed          Cervical retraction 2x10  home  2x10 x10 then held due to increase in radic symptoms   Chin tucks 10"x12 10" x 12 home 10"x12 10"x12 10"x12   Thoracic extension seated w/ roll 10"x12 @ 2 levels 10" x12 @ 2 levels  10" nx12 @ 2 levels  10"x12 10"x12   LTP and MTP while holding good scapular and cervical posture   Blue 3x10 Blue 3x10 Blue 3x12 each Blue 3x12 ea  Blue 3x12 Blue 3x10   Self NG 5' Supine 3'  5'  5'  5'    scap retract/ ER at doorway Blue 3x10 Blue 3x10 Blue 3x10  Blue 3x10 Blue 3x10            Ther Ex         Biceps curls 7# 3x10 7# 3x10  7# 3x12 5# 3x12 8# 3x12 7# 3x10   Triceps extension 3x10 blue band 3x10 blue band 3x12 blue 3x12 blue  3x20 Blue 3x20 blue   Lateral raises 0# 2x10 samantha 0# 2x10 0# 2x10 0# 2x10 0# 2x10 Unable due to pain                                                Ther Activity                                                                                 Pt Ed/ HEP Pathology and involved anatomy as well as issuing and reviewing of HEP  14'                 Gait Training                           Modalities           MHP 12' MHP 12'                  Daily Note     Today's date: 10/11/2022  Patient name: Nona Dominguez  : 1955  MRN: [id number]  Referring provider: Dave Ohaar MD  Dx:   Encounter Diagnosis     ICD-10-CM    1  Neck pain  M54 2    2  Cervical myelopathy (HCC)  G95 9                   Subjective: ***      Objective: See treatment diary below      Assessment: Tolerated treatment {Tolerated treatment :9817224667}  Patient {assessment:[id number]}      Plan: {PLAN:2596453932}     Precautions:  Multi-Level cervical fusion       Manuals -22 9-30-22 10-4-22 10/6/22 9-20-22 9/22/22   Right brachial plexus and ulnar nerve mobilization Graston, cupping and K-taping  25' total 10' STM, R UT/cerv paraspinals JUNIOR  20' Graston, cupping, taping - [facility] BOB 23' 15' STM R UT/following R brachial plexus/ulnar N  mobilization  21' STM R UT/cerv paraspinals, gentle NG 15' STM R UT/cerv paraspinals-TB  KT tape, cupping:   K-tape/cupping brachial plexus     [facility]                       Neuro Re-Ed          Cervical retraction 2x10  home  2x10 x10 then held due to increase in radic symptoms   Chin tucks 10"x12 10" x 12 home 10"x12 10"x12 10"x12   Thoracic extension seated w/ roll 10"x12 @ 2 levels 10" x12 @ 2 levels  10" nx12 @ 2 levels  10"x12 10"x12   LTP and MTP while holding good scapular and cervical posture   Blue 3x10 Blue 3x10 Blue 3x12 each Blue 3x12 ea  Blue 3x12 Blue 3x10   Self NG 5' Supine 3'  5'  5'  5'    scap retract/ ER at doorway Blue 3x10 Blue 3x10 Blue 3x10  Blue 3x10 Blue 3x10            Ther Ex         Biceps curls 7# 3x10 7# 3x10  7# 3x12 5# 3x12 6# 3x12 7# 3x10   Triceps extension 3x10 blue band 3x10 blue band 3x12 blue 3x12 blue  3x20 Blue 3x20 blue   Lateral raises 0# 2x10 samantha 0# 2x10 0# 2x10 0# 2x10 2# 2x10 Unable due to pain                                                Ther Activity Pt Ed/ HEP Pathology and involved anatomy as well as issuing and reviewing of HEP   14'                 Gait Training                           Modalities           MHP 12' MHP 12'

## 2022-10-11 NOTE — PROGRESS NOTES
Daily Note     Today's date: 10/11/2022  Patient name: Flako Lawton  : 1955  MRN: [id number]  Referring provider: Wicho Anne MD  Dx:   Encounter Diagnosis     ICD-10-CM    1  Neck pain  M54 2    2  Cervical myelopathy ([facility] [address] )  G95 9        Start Time: 0730  Stop Time: 0830  Total time in clinic (min): 60 minutes    Subjective: neck continues small but steady improvements    Objective: See treatment diary below      Assessment: Tolerated treatment well  Patient would benefit from continued PT      Plan: Continue with plan     Precautions:  Multi-Level cervical fusion       Manuals -22 9-30-22 10-4-22 10/6/22 10-11-22 9/22/22   Right brachial plexus and ulnar nerve mobilization Graston, cupping and K-taping  25' total 10' STM, R UT/cerv paraspinals JUNIOR  20' Graston, cupping, taping - [facility] BOB 23' 15' STM R UT/following R brachial plexus/ulnar N  mobilization  21' STM R UT/cerv paraspinals, gentle NG 15' STM R UT/cerv paraspinals-TB  KT tape, cupping:   K-tape/cupping brachial plexus     [facility]                       Neuro Re-Ed          Cervical retraction 2x10  home  2x10 x10 then held due to increase in radic symptoms   Chin tucks 10"x12 10" x 12 home 10"x12 10"x12 10"x12   Thoracic extension seated w/ roll 10"x12 @ 2 levels 10" x12 @ 2 levels  10" nx12 @ 2 levels  10"x12 10"x12   LTP and MTP while holding good scapular and cervical posture   Blue 3x10 Blue 3x10 Blue 3x12 each Blue 3x12 ea  Blue 3x12 Blue 3x10   Self NG 5' Supine 3'  5'  5'  5'    scap retract/ ER at doorway Blue 3x10 Blue 3x10 Blue 3x10  Blue 3x10 Blue 3x10            Ther Ex         Biceps curls 7# 3x10 7# 3x10  7# 3x12 5# 3x12 6# 3x12 7# 3x10   Triceps extension 3x10 blue band 3x10 blue band 3x12 blue 3x12 blue  3x20 Blue 3x20 blue   Lateral raises 0# 2x10 samantha 0# 2x10 0# 2x10 0# 2x10 2# 2x10 Unable due to pain                                                Ther Activity Pt Ed/ HEP Pathology and involved anatomy as well as issuing and reviewing of HEP   14'                 Gait Training                           Modalities           MHP 12' MHP 12'

## 2022-10-13 ENCOUNTER — OFFICE VISIT (OUTPATIENT)
Dept: PHYSICAL THERAPY | Facility: CLINIC | Age: 67
End: 2022-10-13
Payer: OTHER MISCELLANEOUS

## 2022-10-13 DIAGNOSIS — M54.2 NECK PAIN: Primary | ICD-10-CM

## 2022-10-13 DIAGNOSIS — G95.9 CERVICAL MYELOPATHY (HCC): ICD-10-CM

## 2022-10-13 PROCEDURE — 97112 NEUROMUSCULAR REEDUCATION: CPT

## 2022-10-13 PROCEDURE — 97140 MANUAL THERAPY 1/> REGIONS: CPT

## 2022-10-13 PROCEDURE — 97110 THERAPEUTIC EXERCISES: CPT

## 2022-10-13 NOTE — PROGRESS NOTES
Daily Note     Today's date: 10/13/2022  Patient name: Beryle Quest  : 1955  MRN: [id number]  Referring provider: Agnieszka Hope MD  Dx:   Encounter Diagnosis     ICD-10-CM    1  Neck pain  M54 2    2  Cervical myelopathy (HCC)  G95 9                   Subjective: Eduarda Fan reports that physical therapy has been helping  He reports radicular symptoms down to R  Medial arm today  Objective: See treatment diary below      Assessment: Progressed tband exercises to black with tactile/visual cues to avoid UT compensation  Improvement in soft tissue quality tone to cerv parapsinals/UT following STM  Cues for "thumbs up" during lateral raises decreased pain in neck  Pt would continue to benefit from skilled PT  Plan: Continue with current POC to address pt deficits  Precautions:  Multi-Level cervical fusion       Manuals -22 9-30-22 10-4-22 10/6/22 10-11-22 10/13/22   Right brachial plexus and ulnar nerve mobilization Graston, cupping and K-taping  25' total 10' STM, R UT/cerv paraspinals JUNIOR  20' Graston, cupping, taping - [facility] 23' 15' STM R UT/following R brachial plexus/ulnar N  mobilization  21' STM R UT/cerv paraspinals, gentle NG 15' STM R UT/cerv paraspinals   K-tape/cupping brachial plexus     10' [facility] BOB  5' [facility] B[facility]                     Neuro Re-Ed          Cervical retraction 2x10  home  2x10 2x10   Chin tucks 10"x12 10" x 12 home 10"x12 10"x12 10"x12   Thoracic extension seated w/ roll 10"x12 @ 2 levels 10" x12 @ 2 levels  10" nx12 @ 2 levels  10"x12 10"x12   LTP and MTP while holding good scapular and cervical posture   Blue 3x10 Blue 3x10 Blue 3x12 each Blue 3x12 ea  Blue [id number] Back [id number] ea    Self NG 5' Supine 3'  5'  5'     scap retract/ ER at doorway Blue 3x10 Blue 3x10 Blue 3x10  Blue 3x10 Blue 3x10            Ther Ex         Biceps curls 7# 3x10 7# 3x10  7# 3x12 5# 3x12 6# 3x12 6# 3x12   Triceps extension 3x10 blue band 3x10 blue band 3x12 blue 3x12 blue  3x20 Blue 3x12 black    Lateral raises 0# 2x10 samantha 0# 2x10 0# 2x10 0# 2x10 2# 2x10 0# 2x10                                                Ther Activity                                                                                 Pt Ed/ HEP Pathology and involved anatomy as well as issuing and reviewing of HEP   14'                 Gait Training                           Modalities           P 12' [facility] 12'

## 2022-10-18 ENCOUNTER — EVALUATION (OUTPATIENT)
Dept: PHYSICAL THERAPY | Facility: CLINIC | Age: 67
End: 2022-10-18
Payer: COMMERCIAL

## 2022-10-18 ENCOUNTER — OFFICE VISIT (OUTPATIENT)
Dept: PHYSICAL THERAPY | Facility: CLINIC | Age: 67
End: 2022-10-18
Payer: OTHER MISCELLANEOUS

## 2022-10-18 DIAGNOSIS — M54.50 CHRONIC LOW BACK PAIN, UNSPECIFIED BACK PAIN LATERALITY, UNSPECIFIED WHETHER SCIATICA PRESENT: ICD-10-CM

## 2022-10-18 DIAGNOSIS — M96.1 POSTLAMINECTOMY SYNDROME, THORACIC: Primary | ICD-10-CM

## 2022-10-18 DIAGNOSIS — M54.2 NECK PAIN: Primary | ICD-10-CM

## 2022-10-18 DIAGNOSIS — G95.9 CERVICAL MYELOPATHY (HCC): ICD-10-CM

## 2022-10-18 DIAGNOSIS — G89.29 CHRONIC LOW BACK PAIN, UNSPECIFIED BACK PAIN LATERALITY, UNSPECIFIED WHETHER SCIATICA PRESENT: ICD-10-CM

## 2022-10-18 PROCEDURE — 97535 SELF CARE MNGMENT TRAINING: CPT

## 2022-10-18 PROCEDURE — 97110 THERAPEUTIC EXERCISES: CPT

## 2022-10-18 PROCEDURE — 97112 NEUROMUSCULAR REEDUCATION: CPT

## 2022-10-18 PROCEDURE — 97140 MANUAL THERAPY 1/> REGIONS: CPT

## 2022-10-18 PROCEDURE — 97162 PT EVAL MOD COMPLEX 30 MIN: CPT

## 2022-10-18 NOTE — LETTER
2022    Scharlene Epley, [address] [place] [id number]    Patient: Mary Lou Schumacher   YOB: 1955   Date of Visit: 10/18/2022     Encounter Diagnosis     ICD-10-CM    1  Postlaminectomy syndrome, thoracic  M96 1    2  Chronic low back pain, unspecified back pain laterality, unspecified whether sciatica present  M54 50     G89 29        Dear Dr Miky Gonzalez:    Thank you for your recent referral of Mary Lou Schumacher  Please review the attached evaluation summary from Scottie's recent visit  Please verify that you agree with the plan of care by signing the attached order  If you have any questions or concerns, please do not hesitate to call  I sincerely appreciate the opportunity to share in the care of one of your patients and hope to have another opportunity to work with you in the near future  Sincerely,    Charla Garduno, PT      Referring Provider:      I certify that I have read the below Plan of Care and certify the need for these services furnished under this plan of treatment while under my care  Scharlene Epley, MD  [facility] [address] [facility] [address]  Via Fax: [phone]          PT Evaluation     Today's date: 10/18/2022  Patient name: Mary Lou Schumacher  : 1955  MRN: [id number]  Referring provider: Tai Joseph MD  Dx:   Encounter Diagnosis     ICD-10-CM    1  Postlaminectomy syndrome, thoracic  M96 1    2  Chronic low back pain, unspecified back pain laterality, unspecified whether sciatica present  M54 50     G89 29                   Assessment  Assessment details: Pt presents with signs and symptoms consistent with significant degenerative changes in the thoraco-lumbar spine  He appears to be responding to flexion movement well    Impairments: abnormal or restricted ROM, impaired physical strength, lacks appropriate home exercise program, pain with function and poor posture     Symptom irritability: moderateUnderstanding of Dx/Px/POC: good   Prognosis: good    Goals  ST) Pt  Will have centralized radicular symptoms in 6 weeks  2) Pt  Will be able to sit as long as desired without pain in 6 weeks  3)  Pt  Will be able to return to work under light/ modified duty in 6 weeks  4)  Pt  Will be able to perform all routine chores at home without pain or limitations in 6 weeks  5) Pt  Will be able to perform all LE dressing tasks without limitations  LT) Pt  Will be phyllis to stand as long as desired without pain in 12 weeks  2) Pt  Will be able to return to full time full duty work in 12 weeks  3)  Pt  Will be able to perform all heavy activity at home, such as garbage, moving furniture, carrying weighted items on all surfaces without pain or limitation in 12 weeks  Plan  Patient would benefit from: skilled physical therapy and PT eval  Planned modality interventions: cryotherapy, thermotherapy: hydrocollator packs and unattended electrical stimulation  Planned therapy interventions: manual therapy, neuromuscular re-education, patient education, self care, therapeutic activities, therapeutic exercise and home exercise program  Frequency: 3x week  Duration in weeks: 8  Treatment plan discussed with: patient        Subjective Evaluation    History of Present Illness  Date of onset: 10/18/2009  Date of surgery: 2010 (exact date of surgery unknown to PT)  Mechanism of injury: surgery and trauma  Mechanism of injury: Laminectomy/ Fusion on T11-L1          Recurrent probem    Quality of life: fair    Pain  Current pain ratin  At best pain ratin  At worst pain ratin  Location: lumbar spine with occasional radicular symptoms into left buttox    Quality: dull ache and throbbing  Relieving factors: change in position  Aggravating factors: sitting  Progression: no change      Diagnostic Tests    FCE comments: Pt not working at the time due to this and cervical painTreatments  Previous treatment: injection treatment  Current treatment: injection treatment  Patient Goals  Patient goal: Pt wants to decrease his pain  Objective     Static Posture     Lumbar Spine   Decreased lordosis  Active Range of Motion   Cervical/Thoracic Spine       Thoracic    Flexion:  Restriction level: moderate  Extension:  Restriction level: moderate    Lumbar   Flexion:  Restriction level: moderate  Extension:  with pain Restriction level: maximal  Left lateral flexion:  Restriction level: moderate  Right lateral flexion:  Restriction level: moderate    Joint Play     Hypomobile: T10, T11, T12, L1 and L2     Strength/Myotome Testing     Left Hip   Planes of Motion   Flexion: 4    Right Hip   Planes of Motion   Flexion: 4    Left Knee   Flexion: 4  Extension: 4-    Right Knee   Flexion: 4  Extension: 4    Left Ankle/Foot   Plantar flexion: 4  Great toe extension: 4    Right Ankle/Foot   Plantar flexion: 4  Great toe extension: 4    Muscle Activation   Patient able to activate left transverse abdominals, left external obliques, left internal obliques, right transverse abdominals, right external obliques and right internal obliques  Tests     Lumbar     Left   Positive quadrant  Left Hip   Positive CHEMO                   Precautions: None Identified       Manuals                                        Neuro Re-Ed                                                                 Ther Ex                                                        Pt Ed/ HEP Pathology and involved anatomy as well as isuing and reviewing of HEP- 15'               Ther Activity                        Gait Training                        Modalities

## 2022-10-18 NOTE — PROGRESS NOTES
Daily Note     Today's date: 10/18/2022  Patient name: Cecilia Mckeon  : 1955  MRN: [id number]  Referring provider: Jake Ta MD  Dx:   Encounter Diagnosis     ICD-10-CM    1  Neck pain  M54 2    2  Cervical myelopathy (HCC)  G95 9                   Subjective: no new complaint      Objective: See treatment diary below      Assessment: Tolerated treatment well  Patient would benefit from continued PT      Plan: Progress treatment as tolerated  Precautions:  Multi-Level cervical fusion       Manuals 10-18--22 9-30-22 10-4-22 10/6/22 10-11-22 10/13/22   Right brachial plexus and ulnar nerve mobilization Graston, cupping and K-taping  25' total 10' STM, R UT/cerv paraspinals JUNIOR  20' Graston, cupping, taping - [facility] 23' 15' STM R UT/following R brachial plexus/ulnar N  mobilization  21' STM R UT/cerv paraspinals, gentle NG 15' STM R UT/cerv paraspinals   K-tape/cupping brachial plexus     10' [facility]  5' [facility]                     Neuro Re-Ed          Cervical retraction 2x10  home  2x10 2x10   Chin tucks 10"x12 10" x 12 home 10"x12 10"x12 10"x12   Thoracic extension seated w/ roll 10"x12 @ 2 levels 10" x12 @ 2 levels  10" nx12 @ 2 levels  10"x12 10"x12   LTP and MTP while holding good scapular and cervical posture   Blue 3x10 Blue 3x10 Blue 3x12 each Blue 3x12 ea  Blue [id number] Back [id number] ea    Self NG 5' Supine 3'  5'  5'     scap retract/ ER at doorway Blue 3x10 Blue 3x10 Blue 3x10  Blue 3x10 Blue 3x10            Ther Ex         Biceps curls 7# 3x10 7# 3x10  7# 3x12 5# 3x12 6# 3x12 6# 3x12   Triceps extension 3x10 blue band 3x10 blue band 3x12 blue 3x12 blue  3x20 Blue 3x12 black    Lateral raises 0# 2x10 samantha 0# 2x10 0# 2x10 0# 2x10 2# 2x10 0# 2x10                                                Ther Activity                                                                                 Pt Ed/ HEP                  Gait Training                           Modalities           MHP 12' MHP 12'

## 2022-10-18 NOTE — PROGRESS NOTES
PT Evaluation     Today's date: 10/18/2022  Patient name: Sid Nino  : 1955  MRN: [id number]  Referring provider: Jason Young MD  Dx:   Encounter Diagnosis     ICD-10-CM    1  Postlaminectomy syndrome, thoracic  M96 1    2  Chronic low back pain, unspecified back pain laterality, unspecified whether sciatica present  M54 50     G89 29                   Assessment  Assessment details: Pt presents with signs and symptoms consistent with significant degenerative changes in the thoraco-lumbar spine  He appears to be responding to flexion movement well  Impairments: abnormal or restricted ROM, impaired physical strength, lacks appropriate home exercise program, pain with function and poor posture     Symptom irritability: moderateUnderstanding of Dx/Px/POC: good   Prognosis: good    Goals  ST) Pt  Will have centralized radicular symptoms in 6 weeks  2) Pt  Will be able to sit as long as desired without pain in 6 weeks  3)  Pt  Will be able to return to work under light/ modified duty in 6 weeks  4)  Pt  Will be able to perform all routine chores at home without pain or limitations in 6 weeks  5) Pt  Will be able to perform all LE dressing tasks without limitations  LT) Pt  Will be phyllis to stand as long as desired without pain in 12 weeks  2) Pt  Will be able to return to full time full duty work in 12 weeks  3)  Pt  Will be able to perform all heavy activity at home, such as garbage, moving furniture, carrying weighted items on all surfaces without pain or limitation in 12 weeks        Plan  Patient would benefit from: skilled physical therapy and PT eval  Planned modality interventions: cryotherapy, thermotherapy: hydrocollator packs and unattended electrical stimulation  Planned therapy interventions: manual therapy, neuromuscular re-education, patient education, self care, therapeutic activities, therapeutic exercise and home exercise program  Frequency: 3x week  Duration in weeks: 8  Treatment plan discussed with: patient        Subjective Evaluation    History of Present Illness  Date of onset: 10/18/2009  Date of surgery: 2010 (exact date of surgery unknown to PT)  Mechanism of injury: surgery and trauma  Mechanism of injury: Laminectomy/ Fusion on T11-L1          Recurrent probem    Quality of life: fair    Pain  Current pain ratin  At best pain ratin  At worst pain ratin  Location: lumbar spine with occasional radicular symptoms into left buttox  Quality: dull ache and throbbing  Relieving factors: change in position  Aggravating factors: sitting  Progression: no change      Diagnostic Tests    FCE comments: Pt not working at the time due to this and cervical painTreatments  Previous treatment: injection treatment  Current treatment: injection treatment  Patient Goals  Patient goal: Pt wants to decrease his pain  Objective     Static Posture     Lumbar Spine   Decreased lordosis  Active Range of Motion   Cervical/Thoracic Spine       Thoracic    Flexion:  Restriction level: moderate  Extension:  Restriction level: moderate    Lumbar   Flexion:  Restriction level: moderate  Extension:  with pain Restriction level: maximal  Left lateral flexion:  Restriction level: moderate  Right lateral flexion:  Restriction level: moderate    Joint Play     Hypomobile: T10, T11, T12, L1 and L2     Strength/Myotome Testing     Left Hip   Planes of Motion   Flexion: 4    Right Hip   Planes of Motion   Flexion: 4    Left Knee   Flexion: 4  Extension: 4-    Right Knee   Flexion: 4  Extension: 4    Left Ankle/Foot   Plantar flexion: 4  Great toe extension: 4    Right Ankle/Foot   Plantar flexion: 4  Great toe extension: 4    Muscle Activation   Patient able to activate left transverse abdominals, left external obliques, left internal obliques, right transverse abdominals, right external obliques and right internal obliques  Tests     Lumbar     Left   Positive quadrant  Left Hip   Positive CHEMO                   Precautions: None Identified       Manuals                                        Neuro Re-Ed                                                                 Ther Ex                                                        Pt Ed/ HEP Pathology and involved anatomy as well as isuing and reviewing of HEP- 15'               Ther Activity                        Gait Training                        Modalities

## 2022-10-20 ENCOUNTER — OFFICE VISIT (OUTPATIENT)
Dept: PHYSICAL THERAPY | Facility: CLINIC | Age: 67
End: 2022-10-20
Payer: OTHER MISCELLANEOUS

## 2022-10-20 DIAGNOSIS — G95.9 CERVICAL MYELOPATHY (HCC): ICD-10-CM

## 2022-10-20 DIAGNOSIS — M54.2 NECK PAIN: Primary | ICD-10-CM

## 2022-10-20 PROCEDURE — 97110 THERAPEUTIC EXERCISES: CPT

## 2022-10-20 PROCEDURE — 97140 MANUAL THERAPY 1/> REGIONS: CPT

## 2022-10-20 PROCEDURE — 97112 NEUROMUSCULAR REEDUCATION: CPT

## 2022-10-20 NOTE — PROGRESS NOTES
Daily Note     Today's date: 10/20/2022  Patient name: Nona Dominguez  : 1955  MRN: [id number]  Referring provider: Dave Ohara MD  Dx:   Encounter Diagnosis     ICD-10-CM    1  Neck pain  M54 2    2  Cervical myelopathy (HCC)  G95 9                   Subjective: Continuing to see benefit from neck therapy  Objective: See treatment diary below      Assessment: Tolerated treatment well  Patient would benefit from continued PT      Plan: Progress treatment as tolerated  Precautions:  Multi-Level cervical fusion       Manuals 10-20-22 9-30-22 10-4-22 10/6/22 10-11-22 10/13/22   Right brachial plexus and ulnar nerve mobilization Graston, cupping and K-taping  25' total 10' STM, R UT/cerv paraspinals JUNIOR  20' Graston, cupping, taping - [facility] BOB 23' 15' STM R UT/following R brachial plexus/ulnar N  mobilization  21' STM R UT/cerv paraspinals, gentle NG 15' STM R UT/cerv paraspinals   K-tape/cupping brachial plexus     10' [facility] BOB  5' [facility] BOB                     Neuro Re-Ed          Cervical retraction 2x10  home  2x10 2x10   Chin tucks 10"x12 10" x 12 home 10"x12 10"x12 10"x12   Thoracic extension seated w/ roll 10"x12 @ 2 levels 10" x12 @ 2 levels  10" nx12 @ 2 levels  10"x12 10"x12   LTP and MTP while holding good scapular and cervical posture  Blue 3x10 Blue 3x10 Blue 3x12 each Blue 3x12 ea  Blue [id number] Back [id number] ea    Self NG 5' Supine 3'  5'  5'     scap retract/ ER at doorway Blue 3x10 Blue 3x10 Blue 3x10  Blue 3x10 Blue 3x10            Ther Ex         Biceps curls 7# 3x10 7# 3x10  7# 3x12 5# 3x12 6# 3x12 6# 3x12   Triceps extension 3x10 blue band 3x10 blue band 3x12 blue 3x12 blue  3x20 Blue 3x12 black    Lateral raises 0# 2x10 samantha 0# 2x10 0# 2x10 0# 2x10 2# 2x10 0# 2x10                                                Ther Activity                                                                                 Pt Ed/ HEP Pathology and involved anatomy as well as issuing and reviewing of HEP   14 Gait Training                           Modalities           MHP 12' MHP 15'

## 2022-10-24 ENCOUNTER — APPOINTMENT (OUTPATIENT)
Dept: PHYSICAL THERAPY | Facility: CLINIC | Age: 67
End: 2022-10-24

## 2022-10-24 ENCOUNTER — OFFICE VISIT (OUTPATIENT)
Dept: PHYSICAL THERAPY | Facility: CLINIC | Age: 67
End: 2022-10-24
Payer: COMMERCIAL

## 2022-10-24 ENCOUNTER — OFFICE VISIT (OUTPATIENT)
Dept: PHYSICAL THERAPY | Facility: CLINIC | Age: 67
End: 2022-10-24
Payer: OTHER MISCELLANEOUS

## 2022-10-24 DIAGNOSIS — G95.9 CERVICAL MYELOPATHY (HCC): ICD-10-CM

## 2022-10-24 DIAGNOSIS — M96.1 POSTLAMINECTOMY SYNDROME, THORACIC: Primary | ICD-10-CM

## 2022-10-24 DIAGNOSIS — M54.50 CHRONIC LOW BACK PAIN, UNSPECIFIED BACK PAIN LATERALITY, UNSPECIFIED WHETHER SCIATICA PRESENT: ICD-10-CM

## 2022-10-24 DIAGNOSIS — G89.29 CHRONIC LOW BACK PAIN, UNSPECIFIED BACK PAIN LATERALITY, UNSPECIFIED WHETHER SCIATICA PRESENT: ICD-10-CM

## 2022-10-24 DIAGNOSIS — M54.2 NECK PAIN: Primary | ICD-10-CM

## 2022-10-24 PROCEDURE — 97110 THERAPEUTIC EXERCISES: CPT

## 2022-10-24 PROCEDURE — 97112 NEUROMUSCULAR REEDUCATION: CPT

## 2022-10-24 PROCEDURE — 97140 MANUAL THERAPY 1/> REGIONS: CPT

## 2022-10-24 NOTE — PROGRESS NOTES
Daily Note     Today's date: 10/24/2022  Patient name: Hermilo Moore  : 1955  MRN: [id number]  Referring provider: Oj Molina MD  Dx:   Encounter Diagnosis     ICD-10-CM    1  Neck pain  M54 2    2  Cervical myelopathy (HCC)  G95 9                   Subjective: continued decrease in radicular sx      Objective: See treatment diary below      Assessment: Tolerated treatment well  Patient would benefit from continued PT      Plan: Progress treatment as tolerated  Precautions:  Multi-Level cervical fusion       Manuals 10-20-22 10-24-22 10-4-22 10/6/22 10-11-22 10/13/22   Right brachial plexus and ulnar nerve mobilization Graston, cupping and K-taping  25' total 10' STM, R UT/cerv paraspinals JUNIOR  20' Graston, cupping, taping - [employer] BOB 23' 15' STM R UT/following R brachial plexus/ulnar N  mobilization  21' STM R UT/cerv paraspinals, gentle NG 15' STM R UT/cerv paraspinals   K-tape/cupping brachial plexus     10' [employer] BOB  5' [employer] BOB                     Neuro Re-Ed          Cervical retraction 2x10  home  2x10 2x10   Chin tucks 10"x12 10" x 12 home 10"x12 10"x12 10"x12   Thoracic extension seated w/ roll 10"x12 @ 2 levels 10" x12 @ 2 levels  10" nx12 @ 2 levels  10"x12 10"x12   LTP and MTP while holding good scapular and cervical posture  Blue 3x10 Blue 3x12 Blue 3x12 each Blue 3x12 ea  Blue [id number] Back 3x12 ea    Self NG 5' Supine 3'  5'  5'     scap retract/ ER at doorway Blue 3x10 Blue 3x10 Blue 3x10  Blue 3x10 Blue 3x10            Ther Ex         Biceps curls 7# 3x10 7# 3x10  7# 3x12 5# 3x12 6# 3x12 6# 3x12   Triceps extension 3x10 blue band 3x12 blue band 3x12 blue 3x12 blue  3x20 Blue 3x12 black    Lateral raises 0# 2x10 samantha 0# 2x10 0# 2x10 0# 2x10 2# 2x10 0# 2x10                                                Ther Activity                                                                                 Pt Ed/ HEP Pathology and involved anatomy as well as issuing and reviewing of HEP   14' Gait Training                           Modalities           MHP 12' MHP 15'

## 2022-10-24 NOTE — PROGRESS NOTES
Daily Note     Today's date: 10/24/2022  Patient name: Viet Jay  : 1955  MRN: [id number]  Referring provider: Lulu Gonzalez MD  Dx:   Encounter Diagnosis     ICD-10-CM    1  Postlaminectomy syndrome, thoracic  M96 1    2  Chronic low back pain, unspecified back pain laterality, unspecified whether sciatica present  M54 50     G89 29                   Subjective: no new complaints      Objective: See treatment diary below      Assessment: Tolerated treatment fair  Patient would benefit from continued PT      Plan: Progress treatment as tolerated            Precautions: None Identified       Manuals  10-24-22                                      Neuro Re-Ed         PPT  10" hold, 2x10      Bridges w/ isometric abdominals  2x10 5" hold      Supine marches w/ isometric abs  3x10                                      Ther Ex        [address] [facility]  8' L1      DKTC  10" x12      LTR  10" x12      Supine hamstring stretch  10" x12 samantha                      Pt Ed/ HEP Pathology and involved anatomy as well as isuing and reviewing of HEP- 15'               Ther Activity                        Gait Training                        Modalities

## 2022-10-25 ENCOUNTER — APPOINTMENT (OUTPATIENT)
Dept: PHYSICAL THERAPY | Facility: CLINIC | Age: 67
End: 2022-10-25

## 2022-10-27 ENCOUNTER — OFFICE VISIT (OUTPATIENT)
Dept: PHYSICAL THERAPY | Facility: CLINIC | Age: 67
End: 2022-10-27
Payer: COMMERCIAL

## 2022-10-27 DIAGNOSIS — M54.2 NECK PAIN: Primary | ICD-10-CM

## 2022-10-27 DIAGNOSIS — G89.29 CHRONIC LOW BACK PAIN, UNSPECIFIED BACK PAIN LATERALITY, UNSPECIFIED WHETHER SCIATICA PRESENT: ICD-10-CM

## 2022-10-27 DIAGNOSIS — G95.9 CERVICAL MYELOPATHY (HCC): ICD-10-CM

## 2022-10-27 DIAGNOSIS — M54.50 CHRONIC LOW BACK PAIN, UNSPECIFIED BACK PAIN LATERALITY, UNSPECIFIED WHETHER SCIATICA PRESENT: ICD-10-CM

## 2022-10-27 DIAGNOSIS — M96.1 POSTLAMINECTOMY SYNDROME, THORACIC: ICD-10-CM

## 2022-10-27 DIAGNOSIS — M96.1 POSTLAMINECTOMY SYNDROME, THORACIC: Primary | ICD-10-CM

## 2022-10-27 PROCEDURE — 97140 MANUAL THERAPY 1/> REGIONS: CPT

## 2022-10-27 PROCEDURE — 97112 NEUROMUSCULAR REEDUCATION: CPT

## 2022-10-27 PROCEDURE — 97110 THERAPEUTIC EXERCISES: CPT

## 2022-10-27 NOTE — PROGRESS NOTES
Daily Note     Today's date: 10/27/2022  Patient name: Viet Jay  : 1955  MRN: [id number]  Referring provider: Awilda Fields MD  Dx:   Encounter Diagnosis     ICD-10-CM    1  Postlaminectomy syndrome, thoracic  M96 1    2  Chronic low back pain, unspecified back pain laterality, unspecified whether sciatica present  M54 50     G89 29                   Subjective: No new complaints      Objective: See treatment diary below      Assessment: Tolerated treatment well  Patient would benefit from continued PT      Plan: Progress treatment as tolerated            Precautions: None Identified       Manuals  10-24-22 10-27-22                                     Neuro Re-Ed         PPT  10" hold, 2x10 10" hold, 2x10     Bridges w/ isometric abdominals  2x10 5" hold 2x10 10" hold     Supine marches w/ isometric abs  3x10 3x10                                     Ther Ex        [address] [facility]  8' L1      DKTC  10" x12 10" x12     LTR  10" x12 10" x12     Supine hamstring stretch  10" x12 samantha 10" x12                     Pt Ed/ HEP Pathology and involved anatomy as well as isuing and reviewing of HEP- 15'               Ther Activity                        Gait Training                        Modalities

## 2022-10-27 NOTE — PROGRESS NOTES
Daily Note     Today's date: 10/27/2022  Patient name: Viet Jay  : 1955  MRN: [id number]  Referring provider: Awilda Fields MD  Dx:   Encounter Diagnosis     ICD-10-CM    1  Neck pain  M54 2    2  Cervical myelopathy (HCC)  G95 9    3  Postlaminectomy syndrome, thoracic  M96 1    4  Chronic low back pain, unspecified back pain laterality, unspecified whether sciatica present  M54 50     G89 29                   Subjective: Pt reports his neck more sensitive than his arms the past few days  Objective: See treatment diary below      Assessment: Tolerated treatment fair  Patient would benefit from continued PT      Plan: Progress treatment as tolerated  Precautions:  Multi-Level cervical fusion       Manuals 10-20-22 10-24-22 [id number] 10/6/22 10-11-22 10/13/22   Right brachial plexus and ulnar nerve mobilization Graston, cupping and K-taping  25' total 10' STM, R UT/cerv paraspinals JUNIOR  20' Graston, cupping, taping - [employer] BOB 23' 15' STM R UT/following R brachial plexus/ulnar N  mobilization  21' STM R UT/cerv paraspinals, gentle NG 15' STM R UT/cerv paraspinals   K-tape/cupping brachial plexus     10' [employer] BOB  5' [employer] BOB                     Neuro Re-Ed          Cervical retraction 2x10  home  2x10 2x10   Chin tucks 10"x12 10" x 12 home 10"x12 10"x12 10"x12   Thoracic extension seated w/ roll 10"x12 @ 2 levels 10" x12 @ 2 levels  10" nx12 @ 2 levels  10"x12 10"x12   LTP and MTP while holding good scapular and cervical posture   Blue 3x10 Blue 3x12 Blue 3x12 each Blue 3x12 ea  Blue [id number] Back 3x12 ea    Self NG 5' Supine 3'  5'  5'     scap retract/ ER at doorway Blue 3x10 Blue 3x10 Blue 3x10  Blue 3x10 Blue 3x10            Ther Ex         Biceps curls 7# 3x10 7# 3x10  7# 3x12 5# 3x12 6# 3x12 6# 3x12   Triceps extension 3x10 blue band 3x12 blue band 3x12 blue 3x12 blue  3x20 Blue 3x12 black    Lateral raises 0# 2x10 samantha 0# 2x10 0# 2x10 0# 2x10 2# 2x10 0# 2x10 Ther Activity                                                                                 Pt Ed/ HEP Pathology and involved anatomy as well as issuing and reviewing of HEP   14'                 Gait Training                           Modalities           MHP 12' MHP 12'

## 2022-11-02 ENCOUNTER — OFFICE VISIT (OUTPATIENT)
Dept: PHYSICAL THERAPY | Facility: CLINIC | Age: 67
End: 2022-11-02

## 2022-11-02 DIAGNOSIS — M96.1 POSTLAMINECTOMY SYNDROME, THORACIC: Primary | ICD-10-CM

## 2022-11-02 DIAGNOSIS — G95.9 CERVICAL MYELOPATHY (HCC): ICD-10-CM

## 2022-11-02 DIAGNOSIS — M54.50 CHRONIC LOW BACK PAIN, UNSPECIFIED BACK PAIN LATERALITY, UNSPECIFIED WHETHER SCIATICA PRESENT: ICD-10-CM

## 2022-11-02 DIAGNOSIS — M54.2 NECK PAIN: Primary | ICD-10-CM

## 2022-11-02 DIAGNOSIS — G89.29 CHRONIC LOW BACK PAIN, UNSPECIFIED BACK PAIN LATERALITY, UNSPECIFIED WHETHER SCIATICA PRESENT: ICD-10-CM

## 2022-11-02 NOTE — PROGRESS NOTES
Daily Note     Today's date: 2022  Patient name: Lesley Dahl  : 1955  MRN: [id number]  Referring provider: Annmarie Barboza MD  Dx:   Encounter Diagnosis     ICD-10-CM    1  Neck pain  M54 2    2  Cervical myelopathy (HCC)  G95 9                   Subjective: stiffness but much less radiating pain      Objective: See treatment diary below      Assessment: Tolerated treatment well  Patient would benefit from continued PT      Plan: Progress treatment as tolerated  Precautions:  Multi-Level cervical fusion       Manuals 10-20-22 10-24-22 [id number] 11-2-22 10-11-22 10/13/22   Right brachial plexus and ulnar nerve mobilization Graston, cupping and K-taping  25' total 10' STM, R UT/cerv paraspinals JUNIOR  20' Graston, cupping, taping - [employer] BOB 23' 15' STM R UT/following R brachial plexus/ulnar N  mobilization  21' STM R UT/cerv paraspinals, gentle NG 15' STM R UT/cerv paraspinals   K-tape/cupping brachial plexus     10' [employer] BOB  5' [employer] BOB                     Neuro Re-Ed          Cervical retraction 2x10  home  2x10 2x10   Chin tucks 10"x12 10" x 12 home 10"x12 10"x12 10"x12   Thoracic extension seated w/ roll 10"x12 @ 2 levels 10" x12 @ 2 levels  10" nx12 @ 2 levels 10" n x 12@ 2 levels 10"x12 10"x12   LTP and MTP while holding good scapular and cervical posture   Blue 3x10 Blue 3x12 Blue 3x12 each Blue 3x12 ea  Blue [id number] Back 3x12 ea    Self NG 5' Supine 3'  5'  5'     scap retract/ ER at doorway Blue 3x10 Blue 3x10 Blue 3x10 Blue 3x10 Blue 3x10 Blue 3x10            Ther Ex         Biceps curls 7# 3x10 7# 3x10  7# 3x12 7# 3x12 6# 3x12 6# 3x12   Triceps extension 3x10 blue band 3x12 blue band 3x12 blue 3x12 blue  3x20 Blue 3x12 black    Lateral raises 0# 2x10 samantha 0# 2x10 0# 2x10 0# 2x10 2# 2x10 0# 2x10                                                Ther Activity                                                                                 Pt Ed/ HEP Pathology and involved anatomy as well as issuing and reviewing of HEP   14'                 Gait Training                           Modalities           MHP 12' MHP 15' MHP 12'

## 2022-11-02 NOTE — PROGRESS NOTES
Daily Note     Today's date: 2022  Patient name: Scott Don  : 1955  MRN: [id number]  Referring provider: Dameon Appiah MD  Dx:   Encounter Diagnosis     ICD-10-CM    1  Postlaminectomy syndrome, thoracic  M96 1    2  Chronic low back pain, unspecified back pain laterality, unspecified whether sciatica present  M54 50     G89 29                    Subjective: No new complaints      Objective: See treatment diary below      Assessment: Pt showing increased tolerance to volume of exercise and performed all new ones well  Would benefit from gradual progression of flexibility and dynamic core stability  Plan: Pt will progress with flexibility and dynamic core stability          Manuals  10-24-22 10-27-22 11-2-22                                    Neuro Re-Ed         PPT  10" hold, 2x10 10" hold, 2x10 10" hold 2x10    Bridges w/ isometric abdominals  2x10 5" hold 2x10 10" hold 2x10 w/ 10" hold    Supine marches w/ isometric abs  3x10 3x10 3x10                                    Ther Ex        [address] [facility]  8' L1  8' L2    DKTC  10" x12 10" x12 10" x12    LTR  10" x12 10" x12 10" x12    Supine hamstring stretch  10" x12 samantha 10" x12 10" x12                    Pt Ed/ HEP Pathology and involved anatomy as well as isuing and reviewing of HEP- 15'               Ther Activity                        Gait Training                        Modalities

## 2022-11-04 ENCOUNTER — APPOINTMENT (OUTPATIENT)
Dept: PHYSICAL THERAPY | Facility: CLINIC | Age: 67
End: 2022-11-04

## 2022-11-04 ENCOUNTER — OFFICE VISIT (OUTPATIENT)
Dept: PHYSICAL THERAPY | Facility: CLINIC | Age: 67
End: 2022-11-04

## 2022-11-04 DIAGNOSIS — M54.2 NECK PAIN: Primary | ICD-10-CM

## 2022-11-04 DIAGNOSIS — G95.9 CERVICAL MYELOPATHY (HCC): ICD-10-CM

## 2022-11-04 NOTE — PROGRESS NOTES
Daily Note     Today's date: 2022  Patient name: Mehran Schmitt  : 1955  MRN: [id number]  Referring provider: Rob Bundy MD  Dx:   Encounter Diagnosis     ICD-10-CM    1  Neck pain  M54 2    2  Cervical myelopathy (HCC)  G95 9                   Subjective: Pt reports neck being very stiff today and points to more posterior right neck and upper trapezius area  Objective: See treatment diary below      Assessment: Pt had significant trigger points in right upper trap but these did resolve post treatment  K-Tape was additionally done to this area  Plan: Progress treatment as tolerated  Precautions:  Multi-Level cervical fusion       Manuals 10-20-22 10-24-22 [id number] 11-2-22 10-11-22 10/13/22   Right brachial plexus and ulnar nerve mobilization Graston, cupping and K-taping  25' total 10' STM, R UT/cerv paraspinals JUNIOR  20' Graston, cupping, taping - [employer] BOB 23' 15' STM R UT/following R brachial plexus/ulnar N  mobilization  21' STM R UT/cerv paraspinals, gentle NG 15' STM R UT/cerv paraspinals   K-tape/cupping brachial plexus     10' [employer] BOB  5' [employer] BOB                     Neuro Re-Ed          Cervical retraction 2x10  home  2x10 2x10   Chin tucks 10"x12 10" x 12 home 10"x12 10"x12 10"x12   Thoracic extension seated w/ roll 10"x12 @ 2 levels 10" x12 @ 2 levels  10" nx12 @ 2 levels 10" n x 12@ 2 levels 10"x12 10"x12   LTP and MTP while holding good scapular and cervical posture   Blue 3x10 Blue 3x12 Blue 3x12 each Blue 3x12 ea  Blue [id number] Back 3x12 ea    Self NG 5' Supine 3'  5'  5'     scap retract/ ER at doorway Blue 3x10 Blue 3x10 Blue 3x10 Blue 3x10 Blue 3x10 Blue 3x10            Ther Ex         Biceps curls 7# 3x10 7# 3x10  7# 3x12 7# 3x12 6# 3x12 6# 3x12   Triceps extension 3x10 blue band 3x12 blue band 3x12 blue 3x12 blue  3x20 Blue 3x12 black    Lateral raises 0# 2x10 samantha 0# 2x10 0# 2x10 0# 2x10 2# 2x10 0# 2x10                                                Ther Activity Pt Ed/ HEP Pathology and involved anatomy as well as issuing and reviewing of HEP   14'                 Gait Training                           Modalities           MHP 12' MHP 15' MHP 12'

## 2022-11-08 ENCOUNTER — OFFICE VISIT (OUTPATIENT)
Dept: PHYSICAL THERAPY | Facility: CLINIC | Age: 67
End: 2022-11-08

## 2022-11-08 ENCOUNTER — APPOINTMENT (OUTPATIENT)
Dept: PHYSICAL THERAPY | Facility: CLINIC | Age: 67
End: 2022-11-08

## 2022-11-08 DIAGNOSIS — M54.2 NECK PAIN: Primary | ICD-10-CM

## 2022-11-08 DIAGNOSIS — M96.1 POSTLAMINECTOMY SYNDROME, THORACIC: ICD-10-CM

## 2022-11-08 DIAGNOSIS — G89.29 CHRONIC LOW BACK PAIN, UNSPECIFIED BACK PAIN LATERALITY, UNSPECIFIED WHETHER SCIATICA PRESENT: ICD-10-CM

## 2022-11-08 DIAGNOSIS — G95.9 CERVICAL MYELOPATHY (HCC): ICD-10-CM

## 2022-11-08 DIAGNOSIS — M54.50 CHRONIC LOW BACK PAIN, UNSPECIFIED BACK PAIN LATERALITY, UNSPECIFIED WHETHER SCIATICA PRESENT: ICD-10-CM

## 2022-11-08 NOTE — PROGRESS NOTES
Daily Note     Today's date: 2022  Patient name: Tommy Castro  : 1955  MRN: [id number]  Referring provider: Erica Richardson MD  Dx:   Encounter Diagnosis     ICD-10-CM    1  Neck pain  M54 2    2  Cervical myelopathy (HCC)  G95 9    3  Postlaminectomy syndrome, thoracic  M96 1    4  Chronic low back pain, unspecified back pain laterality, unspecified whether sciatica present  M54 50     G89 29                   Subjective: Pt reports same levels of soreness in discomfort in low back  Objective: See treatment diary below      Assessment: Would benefit from progressing flexibility, mobility and core strength  Plan: Continue to progress mobility and dynamic core stability           Manuals  10-24-22 10-27-22 11-2-22 11-8-22                                   Neuro Re-Ed         PPT  10" hold, 2x10 10" hold, 2x10 10" hold 2x10 10" 2x10   Bridges w/ isometric abdominals  2x10 5" hold 2x10 10" hold 2x10 w/ 10" hold 2x10 w/ 10" hold   Supine marches w/ isometric abs  3x10 3x10 3x10 30x Darion                                   Ther Ex        [address] [facility]  8' L1  8' L2    DKTC  10" x12 10" x12 10" x12 10" x12   LTR  10" x12 10" x12 10" x12 10" x12   Supine hamstring stretch  10" x12 darion 10" x12 10" x12 10" x12                   Pt Ed/ HEP Pathology and involved anatomy as well as isuing and reviewing of HEP- 15'               Ther Activity                        Gait Training                        Modalities

## 2022-11-08 NOTE — PROGRESS NOTES
Daily Note     Today's date: 2022  Patient name: Clarisa Elena  : 1955  MRN: [id number]  Referring provider: Gilmer Ac MD  Dx:   Encounter Diagnosis     ICD-10-CM    1  Neck pain  M54 2    2  Cervical myelopathy (HCC)  G95 9                   Subjective: Posterio-lateral neck pain is resolved today but continues to have tenderness (in path of proximal brachial plexus)  Objective: See treatment diary below      Assessment: Pt able to tolerate program intensity well so far and would benefit from progression in dynamic core stability  Plan: Progress treatment as tolerated            Manuals  10-24-22 10-27-22 11-2-22 11-8-22                                   Neuro Re-Ed         PPT  10" hold, 2x10 10" hold, 2x10 10" hold 2x10 10" 2x10   Bridges w/ isometric abdominals  2x10 5" hold 2x10 10" hold 2x10 w/ 10" hold 2x10 w/ 10" hold   Supine marches w/ isometric abs  3x10 3x10 3x10 30x Darion                                   Ther Ex        [address] [facility]  8' L1  8' L2    DKTC  10" x12 10" x12 10" x12 10" x12   LTR  10" x12 10" x12 10" x12 10" x12   Supine hamstring stretch  10" x12 darion 10" x12 10" x12 10" x12                   Pt Ed/ HEP Pathology and involved anatomy as well as isuing and reviewing of HEP- 15'               Ther Activity                        Gait Training                        Modalities

## 2022-11-10 ENCOUNTER — OFFICE VISIT (OUTPATIENT)
Dept: PHYSICAL THERAPY | Facility: CLINIC | Age: 67
End: 2022-11-10

## 2022-11-10 ENCOUNTER — APPOINTMENT (OUTPATIENT)
Dept: PHYSICAL THERAPY | Facility: CLINIC | Age: 67
End: 2022-11-10

## 2022-11-10 DIAGNOSIS — M54.2 NECK PAIN: Primary | ICD-10-CM

## 2022-11-10 DIAGNOSIS — G95.9 CERVICAL MYELOPATHY (HCC): ICD-10-CM

## 2022-11-10 DIAGNOSIS — G89.29 CHRONIC LOW BACK PAIN, UNSPECIFIED BACK PAIN LATERALITY, UNSPECIFIED WHETHER SCIATICA PRESENT: ICD-10-CM

## 2022-11-10 DIAGNOSIS — M54.50 CHRONIC LOW BACK PAIN, UNSPECIFIED BACK PAIN LATERALITY, UNSPECIFIED WHETHER SCIATICA PRESENT: ICD-10-CM

## 2022-11-10 DIAGNOSIS — M96.1 POSTLAMINECTOMY SYNDROME, THORACIC: Primary | ICD-10-CM

## 2022-11-10 NOTE — PROGRESS NOTES
Daily Note     Today's date: 11/10/2022  Patient name: Norberto Peterson  : 1955  MRN: [id number]  Referring provider: Omi Ceron MD  Dx:   Encounter Diagnosis     ICD-10-CM    1  Postlaminectomy syndrome, thoracic  M96 1    2  Chronic low back pain, unspecified back pain laterality, unspecified whether sciatica present  M54 50     G89 29                   Subjective: no real changes one way or the other yet  Objective: See treatment diary below      Assessment: Still c/o significant fatigue with back exercises but does perform them well  Plan: Continue with attempts to progress mobility and dynamic core stability          Manuals 11-10-22 10-24-22 10-27-22 11-2-22 11-8-22                                   Neuro Re-Ed         PPT 10" hold 20x 10" hold, 2x10 10" hold, 2x10 10" hold 2x10 10" 2x10   Bridges w/ isometric abdominals 2x10 5" hold 2x10 5" hold 2x10 10" hold 2x10 w/ 10" hold 2x10 w/ 10" hold   Supine marches w/ isometric abs 3x10 3x10 3x10 3x10 30x Darion                                   Ther Ex        [facility]  8' L1  8' L2    DKTC 10" x12 10" x12 10" x12 10" x12 10" x12   LTR 10" x12 10" x12 10" x12 10" x12 10" x12   Supine hamstring stretch 10" x12 10" x12 darion 10" x12 10" x12 10" x12                   Pt Ed/ HEP Pathology and involved anatomy as well as isuing and reviewing of HEP- 15'               Ther Activity                        Gait Training                        Modalities

## 2022-11-10 NOTE — PROGRESS NOTES
Daily Note     Today's date: 11/10/2022  Patient name: Annmarie Wells  : 1955  MRN: [id number]  Referring provider: iGl Alfonso MD  Dx:   Encounter Diagnosis     ICD-10-CM    1  Neck pain  M54 2    2  Cervical myelopathy (HCC)  G95 9                   Subjective: Pt reports he felt pretty good the past few days since therapy  Objective: See treatment diary below      Assessment: Slow but continued improvements in reducing radicular pain and neck discomfort  Plan: Progress as able        Precautions:  Multi-Level cervical fusion       Manuals 10-20-22 10-24-22 [id number] 11-2-22 11-10-22 10/13/22   Right brachial plexus and ulnar nerve mobilization Graston, cupping and K-taping  25' total 10' STM, R UT/cerv paraspinals JUNIOR  20' [place], cupping, taping - [employer] BOB 23' 15' STM R UT/following R brachial plexus/ulnar N  mobilization  15' STM R UT/cerv paraspinals, gentle NG 15' STM R UT/cerv paraspinals   K-tape/cupping brachial plexus     10' [employer] BOB 10' 5' [employer] BOB                     Neuro Re-Ed          Cervical retraction 2x10  home  2x10 2x10   Chin tucks 10"x12 10" x 12 home 10"x12 10"x12 10"x12   Thoracic extension seated w/ roll 10"x12 @ 2 levels 10" x12 @ 2 levels  10" nx12 @ 2 levels 10" n x 12@ 2 levels 10"x12 10"x12   LTP and MTP while holding good scapular and cervical posture   Blue 3x10 Blue 3x12 Blue 3x12 each Blue 3x12 ea  Blue [id number] Back 3x12 ea    Self NG 5' Supine 3'  5'  5'     scap retract/ ER at doorway Blue 3x10 Blue 3x10 Blue 3x10 Blue 3x10 Blue 3x10 Blue 3x10            Ther Ex         Biceps curls 7# 3x10 7# 3x10  7# 3x12 7# 3x12 7# 3x12 6# 3x12   Triceps extension 3x10 blue band 3x12 blue band 3x12 blue 3x12 blue  3x20 Blue 3x12 black    Lateral raises 0# 2x10 samantha 0# 2x10 0# 2x10 0# 2x10 2# 2x10 0# 2x10                                                Ther Activity                                                                                 Pt Ed/ HEP Pathology and involved anatomy as well as issuing and reviewing of HEP   14'                 Gait Training                           Modalities           MHP 12' MHP 15' MHP 12'

## 2022-11-15 ENCOUNTER — OFFICE VISIT (OUTPATIENT)
Dept: PHYSICAL THERAPY | Facility: CLINIC | Age: 67
End: 2022-11-15

## 2022-11-15 DIAGNOSIS — G89.29 CHRONIC LOW BACK PAIN, UNSPECIFIED BACK PAIN LATERALITY, UNSPECIFIED WHETHER SCIATICA PRESENT: ICD-10-CM

## 2022-11-15 DIAGNOSIS — M54.2 NECK PAIN: Primary | ICD-10-CM

## 2022-11-15 DIAGNOSIS — M96.1 POSTLAMINECTOMY SYNDROME, THORACIC: Primary | ICD-10-CM

## 2022-11-15 DIAGNOSIS — G95.9 CERVICAL MYELOPATHY (HCC): ICD-10-CM

## 2022-11-15 DIAGNOSIS — M54.50 CHRONIC LOW BACK PAIN, UNSPECIFIED BACK PAIN LATERALITY, UNSPECIFIED WHETHER SCIATICA PRESENT: ICD-10-CM

## 2022-11-15 NOTE — PROGRESS NOTES
Daily Note     Today's date: 11/15/2022  Patient name: Nav Beck  : 1955  MRN: [id number]  Referring provider: Lauri Moran MD  Dx:   Encounter Diagnosis     ICD-10-CM    1  Neck pain  M54 2    2  Cervical myelopathy (HCC)  G95 9                   Subjective: Chandu Mercado reports his neck is painful today; he was unable to sleep due to pain in samantha shoulder blades  Objective: See treatment diary below      Assessment: Added STM/TPR to samantha rhomboids with improvement in soft tissue quality and tone  Visual and VC to ensure correct exercise technique and avoid UT compensation  Decreased resistance with bicep curls/lateral raises to ensure appropriate facilitation of musculature  Progress as able  Plan: Continue with current POC to address pt deficits  Precautions:  Multi-Level cervical fusion       Manuals 10-20-22 10-24-22 [id number] 11-2-22 11-10-22 11/15/22   Right brachial plexus and ulnar nerve mobilization Graston, cupping and K-taping  25' total 10' STM, R UT/cerv paraspinals JUNIOR  20' Gr[place], cupping, taping - [employer] BOB 23' 15' STM R UT/following R brachial plexus/ulnar N  mobilization  15' STM R UT/cerv paraspinals, gentle NG 12' STM R UT/cerv paraspinals, samantha rhomboids   K-tape/cupping brachial plexus     10' [employer] BOB 10' k-tape only 3'                      Neuro Re-Ed          Cervical retraction 2x10  home  2x10 2x10   Chin tucks 10"x12 10" x 12 home 10"x12 10"x12 10"x12   Thoracic extension seated w/ roll 10"x12 @ 2 levels 10" x12 @ 2 levels  10" nx12 @ 2 levels 10" n x 12@ 2 levels 10"x12 10"x12   LTP and MTP while holding good scapular and cervical posture   Blue 3x10 Blue 3x12 Blue 3x12 each Blue 3x12 [facility] 4U23 [place] 1K52 ea    Self NG 5' Supine 3'  5'  5'     scap retract/ ER at doorway Blue 3x10 Blue 3x10 Blue 3x10 Blue 3x10 Blue 3x10             Ther Ex         Biceps curls 7# 3x10 7# 3x10  7# 3x12 7# 3x12 7# 3x12 5# 3x10   Triceps extension 3x10 blue band 3x12 blue band 3x12 blue 3x12 blue  3x20 Blue 3x20 black    Lateral raises 0# 2x10 samantha 0# 2x10 0# 2x10 0# 2x10 2# 2x10 0# 2x10                                                Ther Activity                                                                                 Pt Ed/ HEP Pathology and involved anatomy as well as issuing and reviewing of HEP   14'                 Gait Training                           Modalities           P 12' [facility] 15' [facility] 12'

## 2022-11-15 NOTE — PROGRESS NOTES
Daily Note     Today's date: 11/15/2022  Patient name: Nilo Marquez  : 1955  MRN: [id number]  Referring provider: Phoenix Bateman MD  Dx:   Encounter Diagnosis     ICD-10-CM    1  Postlaminectomy syndrome, thoracic  M96 1    2  Chronic low back pain, unspecified back pain laterality, unspecified whether sciatica present  M54 50     G89 29                   Subjective: Daniele Gomez reports his low back has been more painful since starting physical therapy  Objective: See treatment diary below      Assessment: Visual and VC to ensure correct exercise technique and facilitate appropriate musculature to stretch/strengthen  Patient denied any increases in pain with exercises performed  Progress as able  Plan: Continue with current POC to address pt deficits           Precautions:  Multi-Level cervical fusion       Manuals 11-10-22 11/15/22 10-27-22 11-2-22 11-8-22                                   Neuro Re-Ed         PPT 10" hold 20x 5" x20 10" hold, 2x10 10" hold 2x10 10" 2x10   Bridges w/ isometric abdominals 2x10 5" hold  2x10 10" hold 2x10 w/ 10" hold 2x10 w/ 10" hold   Supine marches w/ isometric abs 3x10 3x10 samantha  3x10 3x10 30x Samantha                                   Ther Ex        [address] [facility]    8' L2    DKTC 10" x12 10"x12 10" x12 10" x12 10" x12   LTR 10" x12 10"x12 10" x12 10" x12 10" x12   Supine hamstring stretch 10" x12 10"x12 samantha  10" x12 10" x12 10" x12                   Pt Ed/ HEP Pathology and involved anatomy as well as isuing and reviewing of HEP- 15'               Ther Activity                        Gait Training                        Modalities

## 2022-11-17 ENCOUNTER — OFFICE VISIT (OUTPATIENT)
Dept: PHYSICAL THERAPY | Facility: CLINIC | Age: 67
End: 2022-11-17

## 2022-11-17 DIAGNOSIS — G95.9 CERVICAL MYELOPATHY (HCC): ICD-10-CM

## 2022-11-17 DIAGNOSIS — G89.29 CHRONIC LOW BACK PAIN, UNSPECIFIED BACK PAIN LATERALITY, UNSPECIFIED WHETHER SCIATICA PRESENT: ICD-10-CM

## 2022-11-17 DIAGNOSIS — M54.50 CHRONIC LOW BACK PAIN, UNSPECIFIED BACK PAIN LATERALITY, UNSPECIFIED WHETHER SCIATICA PRESENT: ICD-10-CM

## 2022-11-17 DIAGNOSIS — M96.1 POSTLAMINECTOMY SYNDROME, THORACIC: Primary | ICD-10-CM

## 2022-11-17 DIAGNOSIS — M54.2 NECK PAIN: Primary | ICD-10-CM

## 2022-11-17 NOTE — PROGRESS NOTES
Daily Note     Today's date: 2022  Patient name: Yeni Witt  : 1955  MRN: [id number]  Referring provider: Maria Antonia South MD  Dx:   Encounter Diagnosis     ICD-10-CM    1  Neck pain  M54 2       2  Cervical myelopathy (HCC)  G95 9                      Subjective: Sushma Scales reports improvement in pain levels in neck following manuals last visit  He reports discomfort to middle of spine today with insidious onset  Objective: See treatment diary below      Assessment: Decreased muscular tension with TPR to R rhomboids; overall improvement in soft tissue quality/tone following STM to R UT/cerv paraspinals/samantha rhomboids  Visual and VC to ensure correct exercise technique in order to facilitate appropriate musculature  Continues to have most difficulty with lateral raises  Plan: Continue with current POC to address pt deficits  Precautions:  Multi-Level cervical fusion       Manuals 11/17/22 10-24-22 [id number] 11-2-22 11-10-22 11/15/22   Right brachial plexus and ulnar nerve mobilization 15' STM R UT/cerv paraspinals/samantha rhomboids 10' STM, R UT/cerv paraspinals JUNIOR  20' [place], cupping, taping - [email or web address] BOB 23' 15' STM R UT/following R brachial plexus/ulnar N  mobilization  15' STM R UT/cerv paraspinals, gentle NG 12' STM R UT/cerv paraspinals, samantha rhomboids   K-tape/cupping brachial plexus     10' [employer] BOB 10' k-tape only 3'                      Neuro Re-Ed          Cervical retraction 2x10  home  2x10 2x10   Chin tucks  10" x 12 home 10"x12 10"x12 10"x12   Thoracic extension seated w/ roll 10"x12 @ 2 levels 10" x12 @ 2 levels  10" nx12 @ 2 levels 10" n x 12@ 2 levels 10"x12 10"x12   LTP and MTP while holding good scapular and cervical posture   Black 3x12 Blue 3x12 Blue 3x12 each Blue 3x12 [place] 2U87 [place] 1U86 ea    Self NG  Supine 3'  5'  5'     scap retract/ ER at doorway  Blue 3x10 Blue 3x10 Blue 3x10 Blue 3x10             Ther Ex         Biceps curls 5# 3x10 7# 3x10  7# 3x12 7# 3x12 7# 3x12 5# 3x10   Triceps extension 3x20 black  3x12 blue band 3x12 blue 3x12 blue  3x20 Blue 3x20 black    Lateral raises 0# 2x10 0# 2x10 0# 2x10 0# 2x10 2# 2x10 0# 2x10                                                Ther Activity                                                                                 Pt Ed/ HEP                  Gait Training                           Modalities           P 12' P 15' [facility] 12'

## 2022-11-17 NOTE — PROGRESS NOTES
Daily Note     Today's date: 2022  Patient name: Lilly Pradhan  : 1955  MRN: [id number]  Referring provider: Manuel Garcia MD  Dx:   Encounter Diagnosis     ICD-10-CM    1  Postlaminectomy syndrome, thoracic  M96 1       2  Chronic low back pain, unspecified back pain laterality, unspecified whether sciatica present  M54 50     G89 29                      Subjective: Kirti Martinez reports his mid back where is previous lumbar surgery was is painful with insidious onset today  Objective: See treatment diary below      Assessment: Visual and VC to ensure correct exercise technique  Added hip add/abd with core stab; denied any increases in pain  Cues to relax UE during stretches with strap  Progress as able  Plan: Continue with current POC to address pt deficits           Precautions:  Multi-Level cervical fusion       Manuals 11-10-22 11/15/22 11/17/22 11-2-22 11-8-22                                   Neuro Re-Ed         PPT 10" hold 20x 5" x20 5" x20 10" hold 2x10 10" 2x10   Bridges w/ isometric abdominals 2x10 5" hold   2x10 w/ 10" hold 2x10 w/ 10" hold   Supine marches w/ isometric abs 3x10 3x10 samantha  3x10 samantha 3x10 30x Samantha   Hip add iso w/core stab   5" x20     Hip abd w/core stab    5" x20 grn                      Ther Ex        [address] [facility]    8' L2    DKTC 10" x12 10"x12 SKTC w/strap 10"x12 samantha  10" x12 10" x12   LTR 10" x12 10"x12 10'x12 samantha  10" x12 10" x12   Supine hamstring stretch 10" x12 10"x12 samantha  10"x12 samantha  10" x12 10" x12                   Pt Ed/ HEP Pathology and involved anatomy as well as isuing and reviewing of HEP- 15'               Ther Activity                        Gait Training                        Modalities
no

## 2022-11-21 ENCOUNTER — APPOINTMENT (OUTPATIENT)
Dept: PHYSICAL THERAPY | Facility: CLINIC | Age: 67
End: 2022-11-21

## 2022-11-22 ENCOUNTER — APPOINTMENT (OUTPATIENT)
Dept: PHYSICAL THERAPY | Facility: CLINIC | Age: 67
End: 2022-11-22

## 2022-11-23 ENCOUNTER — OFFICE VISIT (OUTPATIENT)
Dept: PHYSICAL THERAPY | Facility: CLINIC | Age: 67
End: 2022-11-23

## 2022-11-23 DIAGNOSIS — M96.1 POSTLAMINECTOMY SYNDROME, THORACIC: Primary | ICD-10-CM

## 2022-11-23 DIAGNOSIS — M54.2 NECK PAIN: Primary | ICD-10-CM

## 2022-11-23 DIAGNOSIS — G95.9 CERVICAL MYELOPATHY (HCC): ICD-10-CM

## 2022-11-23 DIAGNOSIS — M54.50 CHRONIC LOW BACK PAIN, UNSPECIFIED BACK PAIN LATERALITY, UNSPECIFIED WHETHER SCIATICA PRESENT: ICD-10-CM

## 2022-11-23 DIAGNOSIS — G89.29 CHRONIC LOW BACK PAIN, UNSPECIFIED BACK PAIN LATERALITY, UNSPECIFIED WHETHER SCIATICA PRESENT: ICD-10-CM

## 2022-11-23 NOTE — PROGRESS NOTES
Daily Note     Today's date: 2022  Patient name: Kevin Andujar  : 1955  MRN: [id number]  Referring provider: Nichelle Alfonso MD  Dx:   Encounter Diagnosis     ICD-10-CM    1  Postlaminectomy syndrome, thoracic  M96 1       2  Chronic low back pain, unspecified back pain laterality, unspecified whether sciatica present  M54 50     G89 29                      Subjective: no new complaints      Objective: See treatment diary below      Assessment: Pt still weak in his abdominals but is showing signs of some progression        Plan: progress with flexibility and dynamic core stabilization          Precautions:  Multi-Level cervical fusion       Manuals 11-10-22 11/15/22 11/17/22 11-23-22 11-8-22                                   Neuro Re-Ed         PPT 10" hold 20x 5" x20 5" x20 10" hold 2x10 10" 2x10   Bridges w/ isometric abdominals 2x10 5" hold   2x10 w/ 10" hold 2x10 w/ 10" hold   Supine marches w/ isometric abs 3x10 3x10 darion  3x10 darion 3x10 30x Darion   Hip add iso w/core stab   5" x20 5" x20    Hip abd w/core stab    5" x20 grn  5" x20 Green                    Ther Ex        [address] [facility]    8' L2    DKTC 10" x12 10"x12 SKTC w/strap 10"x12 darion  10" x12 10" x12   LTR 10" x12 10"x12 10'x12 darion  10" x12 10" x12   Supine hamstring stretch 10" x12 10"x12 darion  10"x12 darion  10" x12 10" x12                   Pt Ed/ HEP Pathology and involved anatomy as well as isuing and reviewing of HEP- 15'               Ther Activity                        Gait Training                        Modalities

## 2022-11-23 NOTE — PROGRESS NOTES
Daily Note     Today's date: 2022  Patient name: Chun Ardon  : 1955  MRN: [id number]  Referring provider: Latisha Pollard MD  Dx:   Encounter Diagnosis     ICD-10-CM    1  Neck pain  M54 2       2  Cervical myelopathy (HCC)  G95 9                      Subjective: Pt reports that his chronic scapular/ UT spasm was eliminated with last treatment session  He also reports the pain in the right arm is better and strength slowly returning  Objective: See treatment diary below      Assessment:  Pt showing gradual and steady progress with centralizing radicular symptoms and increasing strength  Plan: Progress with cervical strengthening and postural stabilization along with upper body functional strengthening  Precautions:  Multi-Level cervical fusion       Manuals 22 [id number] 11-2-22 11-10-22 11/15/22   Right brachial plexus and ulnar nerve mobilization 15' STM R UT/cerv paraspinals/samantha rhomboids 10' STM, R UT/cerv paraspinals JUNIOR  20' [place], cupping, taping - [employer] B[facility] 23' 15' STM R UT/following R brachial plexus/ulnar N  mobilization  15' STM R UT/cerv paraspinals, gentle NG 12' STM R UT/cerv paraspinals, samantha rhomboids   K-tape/cupping brachial plexus     10' [employer] BOB 10' k-tape only 3'                      Neuro Re-Ed          Cervical retraction 2x10  home  2x10 2x10   Chin tucks  10" x 12 home 10"x12 10"x12 10"x12   Thoracic extension seated w/ roll 10"x12 @ 2 levels 10" x12 @ 2 levels  10" nx12 @ 2 levels 10" n x 12@ 2 levels 10"x12 10"x12   LTP and MTP while holding good scapular and cervical posture   Black 3x12 Blue 3x12 Blue 3x12 each Blue 3x12 [place] 2B42 [place] 7I06 ea    Self NG  Supine 3'  5'  5'     scap retract/ ER at doorway  Blue 3x10 Blue 3x10 Blue 3x10 Blue 3x10             Ther Ex         Biceps curls 5# 3x10 7# 3x10  7# 3x12 7# 3x12 7# 3x12 5# 3x10   Triceps extension 3x20 black  3x12 blue band 3x12 blue 3x12 blue  3x20 Blue 3x20 black    Lateral raises 0# 2x10 0# 2x10 0# 2x10 0# 2x10 2# 2x10 0# 2x10                                                Ther Activity                                                                                 Pt Ed/ HEP                  Gait Training                           Modalities           P 12' P 15' [facility] 12'

## 2022-11-29 ENCOUNTER — OFFICE VISIT (OUTPATIENT)
Dept: PHYSICAL THERAPY | Facility: CLINIC | Age: 67
End: 2022-11-29

## 2022-11-29 DIAGNOSIS — G89.29 CHRONIC LOW BACK PAIN, UNSPECIFIED BACK PAIN LATERALITY, UNSPECIFIED WHETHER SCIATICA PRESENT: ICD-10-CM

## 2022-11-29 DIAGNOSIS — G95.9 CERVICAL MYELOPATHY (HCC): ICD-10-CM

## 2022-11-29 DIAGNOSIS — M54.50 CHRONIC LOW BACK PAIN, UNSPECIFIED BACK PAIN LATERALITY, UNSPECIFIED WHETHER SCIATICA PRESENT: ICD-10-CM

## 2022-11-29 DIAGNOSIS — M54.2 NECK PAIN: Primary | ICD-10-CM

## 2022-11-29 DIAGNOSIS — M96.1 POSTLAMINECTOMY SYNDROME, THORACIC: Primary | ICD-10-CM

## 2022-11-29 NOTE — PROGRESS NOTES
Daily Note     Today's date: 2022  Patient name: Annmarie Wells  : 1955  MRN: [id number]  Referring provider: Camden Moreira MD  Dx:   Encounter Diagnosis     ICD-10-CM    1  Neck pain  M54 2       2  Cervical myelopathy (HCC)  G95 9                      Subjective: Pt reports having some right scapular spasms after carrying groceries yesterday  Objective: See treatment diary below      Assessment: Pt tolerated cupping and taping to newly spasmed area with improvement noted post treatment  Plan: continue to treat symptoms and progress postural and functional strengthening as able  Precautions:  Multi-Level cervical fusion       Manuals 22 [id number] 11-2-22 11-10-22 11/15/22   Right brachial plexus and ulnar nerve mobilization 15' STM R UT/cerv paraspinals/samantha rhomboids 10' STM, R UT/cerv paraspinals JUNIOR  20' [place], cupping, taping - [employer] BOB 15'  R UT and scapular area' 15' STM R UT/following R brachial plexus/ulnar N  mobilization  15' STM R UT/cerv paraspinals, gentle NG 12' STM R UT/cerv paraspinals, samantha rhomboids   K-tape/cupping brachial plexus    8' 10' [employer] BOB 10' k-tape only 3'                      Neuro Re-Ed          Cervical retraction 2x10  home  2x10 2x10   Chin tucks  10" x 12 home 10"x12 10"x12 10"x12   Thoracic extension seated w/ roll 10"x12 @ 2 levels 10" x12 @ 2 levels  10" nx12 @ 2 levels 10" n x 12@ 2 levels 10"x12 10"x12   LTP and MTP while holding good scapular and cervical posture   Black 3x12 Blue 3x12 Blue 3x12 each Blue 3x12 [place] 4S54 [place] 1T35 ea    Self NG  Supine 3'  5'  5'     scap retract/ ER at doorway  Blue 3x10 Blue 3x10 Blue 3x10 Blue 3x10             Ther Ex         Biceps curls 5# 3x10 7# 3x10  7# 3x12 7# 3x12 7# 3x12 5# 3x10   Triceps extension 3x20 black  3x12 blue band 3x12 blue 3x12 blue  3x20 Blue 3x20 black    Lateral raises 0# 2x10 0# 2x10 0# 2x10 0# 2x10 2# 2x10 0# 2x10                                                Ther Activity                                                                                 Pt Ed/ HEP                  Gait Training                           Modalities           MHP 12'  MHP 12'

## 2022-11-29 NOTE — PROGRESS NOTES
Daily Note     Today's date: 2022  Patient name: Abel Villavicencio  : 1955  MRN: [id number]  Referring provider: Tommy Pappas MD  Dx:   Encounter Diagnosis     ICD-10-CM    1  Postlaminectomy syndrome, thoracic  M96 1       2  Chronic low back pain, unspecified back pain laterality, unspecified whether sciatica present  M54 50     G89 29                      Subjective: Low back not bothering him today as much as upper thoracic spine  Objective: See treatment diary below      Assessment: Pt gradually allowing himself to be progressed in dynamic core stability  Need to continue to progress this in order to meet goals  Plan: Continue with dynamic core stabilization            Precautions:  Multi-Level cervical fusion       Manuals 11-10-22 11/15/22 11/17/22 11-23-22 11-29-22                                   Neuro Re-Ed         PPT 10" hold 20x 5" x20 5" x20 10" hold 2x10 10" 2x10   Bridges w/ isometric abdominals 2x10 5" hold   2x10 w/ 10" hold 2x10 w/ 10" hold   Supine marches w/ isometric abs 3x10 3x10 darion  3x10 darion 3x10 30x Darion   Hip add iso w/core stab   5" x20 5" x20    Hip abd w/core stab    5" x20 grn  5" x20 Green                    Ther Ex        [address] [facility]    8' L2    DKTC 10" x12 10"x12 SKTC w/strap 10"x12 darion  10" x12 10" x12   LTR 10" x12 10"x12 10'x12 darion  10" x12 10" x12   Supine hamstring stretch 10" x12 10"x12 darion  10"x12 darion  10" x12 10" x12                   Pt Ed/ HEP Pathology and involved anatomy as well as isuing and reviewing of HEP- 15'               Ther Activity                        Gait Training                        Modalities

## 2022-12-01 ENCOUNTER — OFFICE VISIT (OUTPATIENT)
Dept: PHYSICAL THERAPY | Facility: CLINIC | Age: 67
End: 2022-12-01

## 2022-12-01 DIAGNOSIS — M96.1 POSTLAMINECTOMY SYNDROME, THORACIC: Primary | ICD-10-CM

## 2022-12-01 DIAGNOSIS — G95.9 CERVICAL MYELOPATHY (HCC): ICD-10-CM

## 2022-12-01 DIAGNOSIS — G89.29 CHRONIC LOW BACK PAIN, UNSPECIFIED BACK PAIN LATERALITY, UNSPECIFIED WHETHER SCIATICA PRESENT: ICD-10-CM

## 2022-12-01 DIAGNOSIS — M54.50 CHRONIC LOW BACK PAIN, UNSPECIFIED BACK PAIN LATERALITY, UNSPECIFIED WHETHER SCIATICA PRESENT: ICD-10-CM

## 2022-12-01 DIAGNOSIS — M54.2 NECK PAIN: Primary | ICD-10-CM

## 2022-12-01 NOTE — PROGRESS NOTES
Daily Note     Today's date: 2022  Patient name: Abel Villavicencio  : 1955  MRN: [id number]  Referring provider: Tommy Pappas MD  Dx:   Encounter Diagnosis     ICD-10-CM    1  Postlaminectomy syndrome, thoracic  M96 1       2  Chronic low back pain, unspecified back pain laterality, unspecified whether sciatica present  M54 50     G89 29                      Subjective: No new complaints      Objective: See treatment diary below      Assessment: Tolerated treatment well  Patient would benefit from more progressive and dynamic core strengthening    Plan: Progress dynamic core stabilization                                                         Precautions:  Multi-Level cervical fusion       Manuals 12-1-22 11/15/22 11/17/22 11-23-22 11-29-22                                   Neuro Re-Ed         PPT 10" hold 20x 5" x20 5" x20 10" hold 2x10 10" 2x10   Bridges w/ isometric abdominals 2x10 5" hold   2x10 w/ 10" hold 2x10 w/ 10" hold   Supine marches w/ isometric abs 3x10 3x10 darion  3x10 darion 3x10 30x Darion   Hip add iso w/core stab   5" x20 5" x20    Hip abd w/core stab    5" x20 grn  5" x20 Green    Anti-rotations Green 2x10 bial               Ther Ex        [address] [facility]    8' L2    DKTC 10" x12 10"x12 SKTC w/strap 10"x12 darion  10" x12 10" x12   LTR 10" x12 10"x12 10'x12 darion  10" x12 10" x12   Supine hamstring stretch 10" x12 10"x12 darion  10"x12 darion  10" x12 10" x12                   Pt Ed/ HEP Pathology and involved anatomy as well as isuing and reviewing of HEP- 15'               Ther Activity                        Gait Training                        Modalities

## 2022-12-01 NOTE — PROGRESS NOTES
Daily Note     Today's date: 2022  Patient name: Clary Claudio  : 1955  MRN: [id number]  Referring provider: Leonela Garcia MD  Dx:   Encounter Diagnosis     ICD-10-CM    1  Neck pain  M54 2       2  Cervical myelopathy (HCC)  G95 9                      Subjective: Pt reports scapular pain from the previous visit is almost all gone  He does state he has had discomfort in his upper neck/ suboccipital area as well as significant rotation restrictions since last visit  Objective: See treatment diary below      Assessment: Significant improvement in pain and cervical rotation ROM following manual therapy  Plan: Plan to continue PT to improve functional mobility, posture and strength in order to reach goals  Precautions:  Multi-Level cervical fusion       Manuals 22 [id number] 11-30-22 11-10-22 11/15/22   Right brachial plexus and ulnar nerve mobilization 15' STM R UT/cerv paraspinals/samantha rhomboids 10' STM, R UT/cerv paraspinals JUNIOR  20' [place], cupping, taping - [facility] 15'  R UT and scapular area' 15' STM R UT/following R brachial plexus/ulnar N  mobilization  15' STM R UT/cerv paraspinals, gentle NG 12' STM R UT/cerv paraspinals, samantha rhomboids   K-tape/cupping brachial plexus    8'  5' taping [facility] 10' k-tape only 3'    Manual block C2 w/ patient AROM    10  X 5" each direction              Neuro Re-Ed          Cervical retraction 2x10  home  2x10 2x10   Chin tucks  10" x 12 home 10"x12 10"x12 10"x12   Thoracic extension seated w/ roll 10"x12 @ 2 levels 10" x12 @ 2 levels  10" nx12 @ 2 levels 10" n x 12@ 2 levels 10"x12 10"x12   LTP and MTP while holding good scapular and cervical posture   Black 3x12 Blue 3x12 Blue 3x12 each Blue 3x12 [employer] 6B71 [place] 3I28 ea    Self NG  Supine 3'  5'  5'     scap retract/ ER at doorway  Blue 3x10 Blue 3x10 Blue 3x10 Blue 3x10             Ther Ex         Biceps curls 5# 3x10 7# 3x10  7# 3x12 7# 3x12 7# 3x12 5# 3x10   Triceps extension 3x20 black  3x12 blue band 3x12 blue 3x12 blue  3x20 Blue 3x20 black    Lateral raises 0# 2x10 0# 2x10 0# 2x10 0# 2x10 2# 2x10 0# 2x10                                                Ther Activity                                                                                 Pt Ed/ HEP                  Gait Training                           Modalities           [facility] 12'  [facility] 12'

## 2022-12-06 ENCOUNTER — OFFICE VISIT (OUTPATIENT)
Dept: PHYSICAL THERAPY | Facility: CLINIC | Age: 67
End: 2022-12-06

## 2022-12-06 DIAGNOSIS — G95.9 CERVICAL MYELOPATHY (HCC): ICD-10-CM

## 2022-12-06 DIAGNOSIS — M54.2 NECK PAIN: Primary | ICD-10-CM

## 2022-12-06 DIAGNOSIS — M54.50 CHRONIC LOW BACK PAIN, UNSPECIFIED BACK PAIN LATERALITY, UNSPECIFIED WHETHER SCIATICA PRESENT: ICD-10-CM

## 2022-12-06 DIAGNOSIS — G89.29 CHRONIC LOW BACK PAIN, UNSPECIFIED BACK PAIN LATERALITY, UNSPECIFIED WHETHER SCIATICA PRESENT: ICD-10-CM

## 2022-12-06 DIAGNOSIS — M96.1 POSTLAMINECTOMY SYNDROME, THORACIC: Primary | ICD-10-CM

## 2022-12-06 NOTE — PROGRESS NOTES
Daily Note     Today's date: 2022  Patient name: Flako Lawton  : 1955  MRN: [id number]  Referring provider: Wicho Anne MD  Dx:   Encounter Diagnosis     ICD-10-CM    1  Neck pain  M54 2       2  Cervical myelopathy (HCC)  G95 9                      Subjective: Pt reports mostly stiffness in his neck but his right arm and scapula area are feeling good  Objective: See treatment diary below      Assessment: Tolerated treatment well  Patient would benefit from continued PT      Plan: Progress treatment as tolerated  Precautions:  Multi-Level cervical fusion       Manuals 22 17-10-63 11-30-22 12-6-22 11/15/22   Right brachial plexus and ulnar nerve mobilization 15' STM R UT/cerv paraspinals/samantha rhomboids 10' STM, R UT/cerv paraspinals JUNIOR  20' [place], cupping, taping - [facility] BOB 15'  R UT and scapular area' 15' STM R UT/following R brachial plexus/ulnar N  mobilization  15' STM R UT/cerv paraspinals, gentle NG 12' STM R UT/cerv paraspinals, samantha rhomboids   K-tape/cupping brachial plexus    8'  5' taping JH 10' k-tape only 3'    Manual block C2 w/ patient AROM    10  X 5" each direction              Neuro Re-Ed          Cervical retraction 2x10  home  2x10 2x10   Chin tucks  10" x 12 home 10"x12 10"x12 10"x12   Thoracic extension seated w/ roll 10"x12 @ 2 levels 10" x12 @ 2 levels  10" nx12 @ 2 levels 10" n x 12@ 2 levels 10"x12 10"x12   LTP and MTP while holding good scapular and cervical posture   Black 3x12 Blue 3x12 Blue 3x12 each Blue 3x12 [place] 2F37 [place] 7B78 ea    Self NG  Supine 3'  5'  5'     scap retract/ ER at doorway  Blue 3x10 Blue 3x10 Blue 3x10 Blue 3x10             Ther Ex         Biceps curls 5# 3x10 7# 3x10  7# 3x12 7# 3x12 7# 3x12 5# 3x10   Triceps extension 3x20 black  3x12 blue band 3x12 blue 3x12 blue  3x20 Blue 3x20 black    Lateral raises 0# 2x10 0# 2x10 0# 2x10 0# 2x10 1# 2x10 0# 2x10                                                Ther Activity                                                                                 Pt Ed/ HEP                  Gait Training                           Modalities           MHP 12'  MHP 12'

## 2022-12-08 ENCOUNTER — OFFICE VISIT (OUTPATIENT)
Dept: PHYSICAL THERAPY | Facility: CLINIC | Age: 67
End: 2022-12-08

## 2022-12-08 ENCOUNTER — APPOINTMENT (OUTPATIENT)
Dept: PHYSICAL THERAPY | Facility: CLINIC | Age: 67
End: 2022-12-08

## 2022-12-08 DIAGNOSIS — M96.1 POSTLAMINECTOMY SYNDROME, THORACIC: Primary | ICD-10-CM

## 2022-12-08 DIAGNOSIS — M54.2 NECK PAIN: ICD-10-CM

## 2022-12-08 DIAGNOSIS — M54.50 CHRONIC LOW BACK PAIN, UNSPECIFIED BACK PAIN LATERALITY, UNSPECIFIED WHETHER SCIATICA PRESENT: ICD-10-CM

## 2022-12-08 DIAGNOSIS — G95.9 CERVICAL MYELOPATHY (HCC): ICD-10-CM

## 2022-12-08 DIAGNOSIS — G89.29 CHRONIC LOW BACK PAIN, UNSPECIFIED BACK PAIN LATERALITY, UNSPECIFIED WHETHER SCIATICA PRESENT: ICD-10-CM

## 2022-12-08 NOTE — PROGRESS NOTES
Daily Note     Today's date: 2022  Patient name: Nihsi Weathers  : 1955  MRN: [id number]  Referring provider: Regine Michaels MD  Dx:   Encounter Diagnosis     ICD-10-CM    1  Postlaminectomy syndrome, thoracic  M96 1       2  Chronic low back pain, unspecified back pain laterality, unspecified whether sciatica present  M54 50     G89 29       3  Neck pain  M54 2       4  Cervical myelopathy (HCC)  G95 9                      Subjective: Pt reports progress has definitely been made on his neck  Objective: See treatment diary below      Assessment: still managing radicular symptoms well  Neck remains very stiff  Plan: Continue per plan of care  Precautions:  Multi-Level cervical fusion       Manuals 22   Right brachial plexus and ulnar nerve mobilization 15' STM R UT/cerv paraspinals/samantha rhomboids 10' STM, R UT/cerv paraspinals JUNIOR  20' [place], cupping, taping - [facility] 15'  R UT and scapular area' 15' STM R UT/following R brachial plexus/ulnar N  mobilization  15' STM R UT/cerv paraspinals, gentle NG 12' STM R UT/cerv paraspinals, samantha rhomboids   K-tape/cupping brachial plexus    8'  5' taping JH 10' k-tape only 3'    Manual block C2 w/ patient AROM    10  X 5" each direction              Neuro Re-Ed          Cervical retraction 2x10  home  2x10 2x10   Chin tucks  10" x 12 home 10"x12 10"x12 10"x12   Thoracic extension seated w/ roll 10"x12 @ 2 levels 10" x12 @ 2 levels  10" nx12 @ 2 levels 10" n x 12@ 2 levels 10"x12 10"x12   LTP and MTP while holding good scapular and cervical posture   Black 3x12 Blue 3x12 Blue 3x12 each Blue 3x12 Steve 3J96 [place] 2K90 ea    Self NG  Supine 3'  5'  5'     scap retract/ ER at doorway  Blue 3x10 Blue 3x10 Blue 3x10 Blue 3x10             Ther Ex         Biceps curls 5# 3x10 7# 3x10  7# 3x12 7# 3x12 7# 3x12 5# 3x10   Triceps extension 3x20 black  3x12 blue band 3x12 blue 3x12 blue  3x20 Blue 3x20 black    Lateral raises 0# 2x10 0# 2x10 0# 2x10 0# 2x10 1# 2x10 # 2x10                                                Ther Activity                                                                                 Pt Ed/ HEP                  Gait Training                           Modalities           [facility] 12'  [facility] 12'

## 2022-12-13 ENCOUNTER — APPOINTMENT (OUTPATIENT)
Dept: PHYSICAL THERAPY | Facility: CLINIC | Age: 67
End: 2022-12-13

## 2022-12-13 ENCOUNTER — OFFICE VISIT (OUTPATIENT)
Dept: PHYSICAL THERAPY | Facility: CLINIC | Age: 67
End: 2022-12-13

## 2022-12-13 DIAGNOSIS — M54.2 NECK PAIN: Primary | ICD-10-CM

## 2022-12-13 DIAGNOSIS — G95.9 CERVICAL MYELOPATHY (HCC): ICD-10-CM

## 2022-12-13 NOTE — PROGRESS NOTES
Daily Note     Today's date: 2022  Patient name: Juliet Vera  : 1955  MRN: [id number]  Referring provider: Salma Townsend MD  Dx:   Encounter Diagnosis     ICD-10-CM    1  Neck pain  M54 2       2  Cervical myelopathy (HCC)  G95 9                      Subjective: Pt reports his arm is still moving better but he remains weak  His neck also remains tight with spasms returning to right levator and UT  Objective: See treatment diary below      Assessment:  Pt struggling to gain functional strength as well as to increase significant mobility in his neck  His radicular symptoms are improved  Plan: Progress as able with functional strengthening as tolerated  Precautions:  Multi-Level cervical fusion       Manuals 22   Right brachial plexus and ulnar nerve mobilization 15' STM R UT/cerv paraspinals/samantha rhomboids 10' STM, R UT/cerv paraspinals JUNIOR  20' [place], cupping, taping - [facility] 15'  R UT and scapular area' 15' STM R UT/following R brachial plexus/ulnar N  mobilization  15' STM R UT/cerv paraspinals, gentle NG 12' STM R UT/cerv paraspinals, samantha rhomboids   K-tape/cupping brachial plexus    8'  5' taping JH 10' k-tape only 3'    Manual block C2 w/ patient AROM    10  X 5" each direction              Neuro Re-Ed          Cervical retraction 2x10  home  2x10 2x10   Chin tucks  10" x 12 home 10"x12 10"x12 10"x12   Thoracic extension seated w/ roll 10"x12 @ 2 levels 10" x12 @ 2 levels  10" nx12 @ 2 levels 10" n x 12@ 2 levels 10"x12 10"x12   LTP and MTP while holding good scapular and cervical posture   Black 3x12 Blue 3x12 Blue 3x12 each Blue 3x12 Steve 2D96 [place] 1R94 ea    Self NG  Supine 3'  5'  5'     scap retract/ ER at doorway  Blue 3x10 Blue 3x10 Blue 3x10 Blue 3x10             Ther Ex         Biceps curls 5# 3x10 7# 3x10  7# 3x12 7# 3x12 7# 3x12 5# 3x10   Triceps extension 3x20 black  3x12 blue band 3x12 blue 3x12 blue 3x20 Blue 3x20 black    Lateral raises 0# 2x10 0# 2x10 0# 2x10 0# 2x10 1# 2x10 # 2x10                                                Ther Activity                                                                                 Pt Ed/ HEP                  Gait Training                           Modalities           [facility] 12'  [facility] 12'

## 2022-12-15 ENCOUNTER — APPOINTMENT (OUTPATIENT)
Dept: PHYSICAL THERAPY | Facility: CLINIC | Age: 67
End: 2022-12-15

## 2022-12-20 ENCOUNTER — OFFICE VISIT (OUTPATIENT)
Dept: PHYSICAL THERAPY | Facility: CLINIC | Age: 67
End: 2022-12-20

## 2022-12-20 DIAGNOSIS — G95.9 CERVICAL MYELOPATHY (HCC): ICD-10-CM

## 2022-12-20 DIAGNOSIS — M54.50 CHRONIC LOW BACK PAIN, UNSPECIFIED BACK PAIN LATERALITY, UNSPECIFIED WHETHER SCIATICA PRESENT: ICD-10-CM

## 2022-12-20 DIAGNOSIS — G89.29 CHRONIC LOW BACK PAIN, UNSPECIFIED BACK PAIN LATERALITY, UNSPECIFIED WHETHER SCIATICA PRESENT: ICD-10-CM

## 2022-12-20 DIAGNOSIS — M96.1 POSTLAMINECTOMY SYNDROME, THORACIC: Primary | ICD-10-CM

## 2022-12-20 DIAGNOSIS — M54.2 NECK PAIN: Primary | ICD-10-CM

## 2022-12-20 NOTE — PROGRESS NOTES
Daily Note     Today's date: 2022  Patient name: Sirena Radford  : 1955  MRN: [id number]  Referring provider: Matt Vizcarra MD  Dx:   Encounter Diagnosis     ICD-10-CM    1  Neck pain  M54 2       2  Cervical myelopathy (HCC)  G95 9                      Subjective: Pt reports the manual therapy has really helped his mobility and radiating symptoms  Objective: See treatment diary below      Assessment: Tolerated treatment fair  Patient would benefit from continued PT      Plan: Progress treatment as tolerated  Precautions:  Multi-Level cervical fusion       Manuals 22   Right brachial plexus and ulnar nerve mobilization 15' STM R UT/cerv paraspinals/samantha rhomboids 10' STM, R UT/cerv paraspinals JUNIOR  20' [place], cupping, taping - [facility] BOB 15'  R UT and scapular area' 15' STM R UT/following R brachial plexus/ulnar N  mobilization  15' STM R UT/cerv paraspinals, gentle NG 12' STM R UT/cerv paraspinals, samantha rhomboids   K-tape/cupping brachial plexus    8'  5' taping JH 10' k-tape only 3'    Manual block C2 w/ patient AROM    10  X 5" each direction              Neuro Re-Ed          Cervical retraction 2x10  home  2x10 2x10   Chin tucks  10" x 12 home 10"x12 10"x12 10"x12   Thoracic extension seated w/ roll 10"x12 @ 2 levels 10" x12 @ 2 levels  10" nx12 @ 2 levels 10" n x 12@ 2 levels 10"x12 10"x12   LTP and MTP while holding good scapular and cervical posture   Black 3x12 Blue 3x12 Blue 3x12 each Blue 3x12 Steve 3J49 [place] 6Y92 ea    Self NG  Supine 3'  5'  5'     scap retract/ ER at doorway  Blue 3x10 Blue 3x10 Blue 3x10 Blue 3x10             Ther Ex         Biceps curls 5# 3x10 7# 3x10  7# 3x12 7# 3x12 7# 3x12 5# 3x10   Triceps extension 3x20 black  3x12 blue band 3x12 blue 3x12 blue  3x20 Blue 3x20 black    Lateral raises 0# 2x10 0# 2x10 0# 2x10 0# 2x10 1# 2x10 # 2x10 Ther Activity                                                                                 Pt Ed/ HEP                  Gait Training                           Modalities           MHP 12'  MHP 12'

## 2022-12-20 NOTE — PROGRESS NOTES
Daily Note     Today's date: 2022  Patient name: David Cruz  : 1955  MRN: [id number]  Referring provider: Nura Huertas MD  Dx:   Encounter Diagnosis     ICD-10-CM    1  Postlaminectomy syndrome, thoracic  M96 1       2  Chronic low back pain, unspecified back pain laterality, unspecified whether sciatica present  M54 50     G89 29                      Subjective: no new complaints      Objective: See treatment diary below      Assessment: Tolerated treatment well  Pt talked with again about the importance with consistent therapy on low back  Patient would benefit from continued PT      Plan: Progress treatment as tolerated  Daily Note     Today's date: 2022  Patient name: David Cruz  : 1955  MRN: [id number]  Referring provider: Nura Huertas MD  Dx:   Encounter Diagnosis     ICD-10-CM    1  Postlaminectomy syndrome, thoracic  M96 1       2  Chronic low back pain, unspecified back pain laterality, unspecified whether sciatica present  M54 50     G89 29                      Subjective: Pt reports progress has definitely been made on his neck  Objective: See treatment diary below      Assessment: still managing radicular symptoms well  Neck remains very stiff  Plan: Continue per plan of care                                                          Precautions:  Multi-Level cervical fusion       Manuals 22 22                                   Neuro Re-Ed         PPT 10" hold 20x 5" x20 5" x20 10" hold 2x10 10" 2x10   Bridges w/ isometric abdominals 2x10 5" hold 2x10 2x10 2x10 w/ 10" hold 2x10 w/ 10" hold   Supine marches w/ isometric abs 3x10 3x10 darion  3x10 darion 3x10 30x Darion   Hip add iso w/core stab   5" x20 5" x20    Hip abd w/core stab    5" x20 grn  5" x20 Green    Anti-rotations Green 2x10 bial Green 2x10 bilat              Ther Ex        [address] [facility]    8' L2    DKTC 10" x12 10"x12 SKTC w/strap 10"x12 darion  10" x12 10" x12   LTR 10" x12 10"x12 10'x12 samantha  10" x12 10" x12   Supine hamstring stretch 10" x12 10"x12 samantha  10"x12 samantha  10" x12 10" x12                   Pt Ed/ HEP Pathology and involved anatomy as well as isuing and reviewing of HEP- 15'               Ther Activity                        Gait Training                        Modalities

## 2022-12-22 ENCOUNTER — OFFICE VISIT (OUTPATIENT)
Dept: PHYSICAL THERAPY | Facility: CLINIC | Age: 67
End: 2022-12-22

## 2022-12-22 DIAGNOSIS — G95.9 CERVICAL MYELOPATHY (HCC): ICD-10-CM

## 2022-12-22 DIAGNOSIS — M54.2 NECK PAIN: Primary | ICD-10-CM

## 2022-12-22 DIAGNOSIS — G89.29 CHRONIC LOW BACK PAIN, UNSPECIFIED BACK PAIN LATERALITY, UNSPECIFIED WHETHER SCIATICA PRESENT: ICD-10-CM

## 2022-12-22 DIAGNOSIS — M96.1 POSTLAMINECTOMY SYNDROME, THORACIC: Primary | ICD-10-CM

## 2022-12-22 DIAGNOSIS — M54.50 CHRONIC LOW BACK PAIN, UNSPECIFIED BACK PAIN LATERALITY, UNSPECIFIED WHETHER SCIATICA PRESENT: ICD-10-CM

## 2022-12-22 NOTE — PROGRESS NOTES
Daily Note     Today's date: 2022  Patient name: Juan Carlos Kincaid  : 1955  MRN: [id number]  Referring provider: Alysha Chino MD  Dx:   Encounter Diagnosis     ICD-10-CM    1  Postlaminectomy syndrome, thoracic  M96 1       2  Chronic low back pain, unspecified back pain laterality, unspecified whether sciatica present  M54 50     G89 29                      Subjective: No new complaints      Objective: See treatment diary below      Assessment: Tolerated treatment fair  Patient would benefit from continued PT      Plan: Progress treatment as tolerated            Precautions:  Multi-Level cervical fusion       Manuals 22                                   Neuro Re-Ed         PPT 10" hold 20x 5" x20 5" x20 10" hold 2x10 10" 2x10   Bridges w/ isometric abdominals 2x10 5" hold 2x10 2x10 2x10 w/ 10" hold 2x10 w/ 10" hold   Supine marches w/ isometric abs 3x10 3x10 darion  3x10 darion 3x10 30x Darion   Hip add iso w/core stab   5" x20 5" x20    Hip abd w/core stab    5" x20 grn  5" x20 Green    Anti-rotations Green 2x10 bial Green 2x10 bilat              Ther Ex        [address] [facility]    8' L2    DKTC 10" x12 10"x12 SKTC w/strap 10"x12 darion  10" x12 10" x12   LTR 10" x12 10"x12 10'x12 darion  10" x12 10" x12   Supine hamstring stretch 10" x12 10"x12 darion  10"x12 darion  10" x12 10" x12                   Pt Ed/ HEP Pathology and involved anatomy as well as isuing and reviewing of HEP- 15'               Ther Activity                        Gait Training                        Modalities

## 2022-12-22 NOTE — PROGRESS NOTES
Daily Note     Today's date: 2022  Patient name: Zan Rosado  : 1955  MRN: [id number]  Referring provider: Sushma Browning MD  Dx:   Encounter Diagnosis     ICD-10-CM    1  Neck pain  M54 2       2  Cervical myelopathy (HCC)  G95 9                      Subjective: Right sided upper cervical spine sore with muscle knot  Objective: See treatment diary below      Assessment: Weakness still present on right along with muscular restrictions that are inconsistent in reducing  Plan: Progress treatment as tolerated  Precautions:  Multi-Level cervical fusion       Manuals 22 50-22   Right brachial plexus and ulnar nerve mobilization 15' STM R UT/cerv paraspinals/samantha rhomboids 10' STM, R UT/cerv paraspinals JUNIOR  20' [place], cupping, taping - [facility] BOB 15'  R UT and scapular area' 15' STM R UT/following R brachial plexus/ulnar N  mobilization  15' STM R UT/cerv paraspinals, gentle NG 12' STM R UT/cerv paraspinals, samantha rhomboids   K-tape/cupping brachial plexus    8'  5' taping JH 10' k-tape only 3'    Manual block C2 w/ patient AROM    10  X 5" each direction              Neuro Re-Ed          Cervical retraction 2x10  home  2x10 2x10   Chin tucks  10" x 12 home 10"x12 10"x12 10"x12   Thoracic extension seated w/ roll 10"x12 @ 2 levels 10" x12 @ 2 levels  10" nx12 @ 2 levels 10" n x 12@ 2 levels 10"x12 10"x12   LTP and MTP while holding good scapular and cervical posture   Black 3x12 Blue 3x12 Blue 3x12 each Blue 3x12 [place] 4I85 [place] 1M16 ea    Self NG  Supine 3'  5'  5'     scap retract/ ER at doorway  Blue 3x10 Blue 3x10 Blue 3x10 Blue 3x10             Ther Ex         Biceps curls 5# 3x10 7# 3x10  7# 3x12 7# 3x12 7# 3x12 5# 3x10   Triceps extension 3x20 black  3x12 blue band 3x12 blue 3x12 blue  3x20 Blue 3x20 black    Lateral raises 0# 2x10 0# 2x10 0# 2x10 0# 2x10 1# 2x10 # 2x10                                                Ther Activity Pt Ed/ HEP                  Gait Training                           Modalities           MHP 12'  MHP 12'

## 2022-12-27 ENCOUNTER — OFFICE VISIT (OUTPATIENT)
Dept: PHYSICAL THERAPY | Facility: CLINIC | Age: 67
End: 2022-12-27

## 2022-12-27 ENCOUNTER — APPOINTMENT (OUTPATIENT)
Dept: PHYSICAL THERAPY | Facility: CLINIC | Age: 67
End: 2022-12-27

## 2022-12-27 DIAGNOSIS — M54.2 NECK PAIN: Primary | ICD-10-CM

## 2022-12-27 DIAGNOSIS — G95.9 CERVICAL MYELOPATHY (HCC): ICD-10-CM

## 2022-12-27 NOTE — PROGRESS NOTES
Daily Note     Today's date: 2022  Patient name: Jasmin Weiss  : 1955  MRN: [id number]  Referring provider: Luis Massey MD  Dx:   Encounter Diagnosis     ICD-10-CM    1  Neck pain  M54 2       2  Cervical myelopathy (HCC)  G95 9                      Subjective: Pt reports his arm is feeling good and he got a neck heating pad for [place] which is helping  Objective: See treatment diary below      Assessment: Still dealing with one major muscle knot/ spasm in upper cervical spine as well as continued significant postural issues  Plan: Attempt to progress with functional strengthening  Precautions:  Multi-Level cervical fusion       Manuals 22 30-35-59 22   Right brachial plexus and ulnar nerve mobilization 15' STM R UT/cerv paraspinals/samantha rhomboids 10' STM, R UT/cerv paraspinals JUNIOR  20' [place], cupping, taping - [facility] BOB 15'  R UT and scapular area' 15' STM R UT/following R brachial plexus/ulnar N  mobilization  15' STM R UT/cerv paraspinals, gentle NG 12' STM R UT/cerv paraspinals, samantha rhomboids   K-tape/cupping brachial plexus    8'  5' taping JH 10' k-tape only 3'    Manual block C2 w/ patient AROM    10  X 5" each direction              Neuro Re-Ed          Cervical retraction 2x10  home  2x10 2x10   Chin tucks  10" x 12 home 10"x12 10"x12 10"x12   Thoracic extension seated w/ roll 10"x12 @ 2 levels 10" x12 @ 2 levels  10" nx12 @ 2 levels 10" n x 12@ 2 levels 10"x12 10"x12   LTP and MTP while holding good scapular and cervical posture   Black 3x12 Blue 3x12 Blue 3x12 each Blue 3x12 Steve 9C52 [place] 2E49 ea    Self NG  Supine 3'  5'  5'     scap retract/ ER at doorway  Blue 3x10 Blue 3x10 Blue 3x10 Blue 3x10             Ther Ex         Biceps curls 5# 3x10 7# 3x10  7# 3x12 7# 3x12 7# 3x12 5# 3x10   Triceps extension 3x20 black  3x12 blue band 3x12 blue 3x12 blue  3x20 Blue 3x20 black    Lateral raises 0# 2x10 0# 2x10 0# 2x10 0# 2x10 1# 2x10 # 2x10                                                Ther Activity                                                                                 Pt Ed/ HEP                  Gait Training                           Modalities           P 12'  MHP 12'

## 2022-12-29 ENCOUNTER — OFFICE VISIT (OUTPATIENT)
Dept: PHYSICAL THERAPY | Facility: CLINIC | Age: 67
End: 2022-12-29

## 2022-12-29 DIAGNOSIS — M54.50 CHRONIC LOW BACK PAIN, UNSPECIFIED BACK PAIN LATERALITY, UNSPECIFIED WHETHER SCIATICA PRESENT: ICD-10-CM

## 2022-12-29 DIAGNOSIS — G89.29 CHRONIC LOW BACK PAIN, UNSPECIFIED BACK PAIN LATERALITY, UNSPECIFIED WHETHER SCIATICA PRESENT: ICD-10-CM

## 2022-12-29 DIAGNOSIS — G95.9 CERVICAL MYELOPATHY (HCC): ICD-10-CM

## 2022-12-29 DIAGNOSIS — M96.1 POSTLAMINECTOMY SYNDROME, THORACIC: Primary | ICD-10-CM

## 2022-12-29 DIAGNOSIS — M54.2 NECK PAIN: Primary | ICD-10-CM

## 2022-12-29 NOTE — PROGRESS NOTES
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  Objective: See treatment diary below      Assessment:  Still battling posture issues along with degenerative and surgical changes in his neck  Plan: Progress treatment as tolerated  Precautions:  Multi-Level cervical fusion       Manuals 22 33-44-72 22   Right brachial plexus and ulnar nerve mobilization 15' STM R UT/cerv paraspinals/samantha rhomboids 10' STM, R UT/cerv paraspinals JUNIOR  20' [place], cupping, taping - [facility] BOB 15'  R UT and scapular area' 15' STM R UT/following R brachial plexus/ulnar N  mobilization  15' STM R UT/cerv paraspinals, gentle NG 12' STM R UT/cerv paraspinals, samantha rhomboids   K-tape/cupping brachial plexus    8'  5' taping JH 10' k-tape only 3'    Manual block C2 w/ patient AROM    10  X 5" each direction              Neuro Re-Ed          Cervical retraction 2x10  home  2x10 2x10   Chin tucks  10" x 12 home 10"x12 10"x12 10"x12   Thoracic extension seated w/ roll 10"x12 @ 2 levels 10" x12 @ 2 levels  10" nx12 @ 2 levels 10" n x 12@ 2 levels 10"x12 10"x12   LTP and MTP while holding good scapular and cervical posture   Black 3x12 Blue 3x12 Blue 3x12 each Black 3x12 [place] 6D55 Black 0V21 ea    Self NG  Supine 3'  5'  5'     scap retract/ ER at doorway  Blue 3x10 Blue 3x10 Blue 3x10 Blue 3x10             Ther Ex         Biceps curls 5# 3x10 7# 3x10  7# 3x12 7# 3x12 7# 3x12 5# 3x10   Triceps extension 3x20 black  3x12 blue band 3x12 blue 3x12 blue  3x20 Blue 3x20 black    Lateral raises 0# 2x10 0# 2x10 0# 2x10 0# 2x10 1# 2x10 # 2x10                                                Ther Activity                                                                                 Pt Ed/ HEP                  Gait Training                           Modalities           P 12'  [facility] 12'

## 2022-12-29 NOTE — PROGRESS NOTES
Daily Note     Today's date: 2022  Patient name: Sher Horowitz  : 1955  MRN: [id number]  Referring provider: Percy Gonzalez MD  Dx:   Encounter Diagnosis     ICD-10-CM    1  Postlaminectomy syndrome, thoracic  M96 1       2  Chronic low back pain, unspecified back pain laterality, unspecified whether sciatica present  M54 50     G89 29                      Subjective: no new complaints      Objective: See treatment diary below  Recumbent bike w/ moist heat added to program       Assessment: Pt does not seem to tolerate much of any activity with his low back  Attempt to improve tolerance to activity      Plan: Progress with more activities as tolerated  If not, consider discharge        Precautions:  Multi-Level cervical fusion       Manuals 22                                   Neuro Re-Ed         PPT 10" hold 20x 5" x20 5" x20 10" hold 2x10 Pt defer   Bridges w/ isometric abdominals 2x10 5" hold 2x10 2x10 2x10 w/ 10" hold Pt Defer   Supine marches w/ isometric abs 3x10 3x10 samantha  3x10 samantha 3x10 Pt defer   Hip add iso w/core stab   5" x20 5" x20    Hip abd w/core stab    5" x20 grn  5" x20 Green    Anti-rotations Green 2x10 bial Green 2x10 bilat              Ther Ex        [facility]    8' L2 10' L1 w/ heat   DKTC 10" x12 10"x12 SKTC w/strap 10"x12 samantha  10" x12 10" x12   LTR 10" x12 10"x12 10'x12 samantha  10" x12 10" x12   Supine hamstring stretch 10" x12 10"x12 samantha  10"x12 samantha  10" x12 10" x12                   Pt Ed/ HEP Pathology and involved anatomy as well as isuing and reviewing of HEP- 15'               Ther Activity                        Gait Training                        Modalities

## 2023-01-03 ENCOUNTER — OFFICE VISIT (OUTPATIENT)
Dept: PHYSICAL THERAPY | Facility: CLINIC | Age: 68
End: 2023-01-03

## 2023-01-03 ENCOUNTER — APPOINTMENT (OUTPATIENT)
Dept: PHYSICAL THERAPY | Facility: CLINIC | Age: 68
End: 2023-01-03

## 2023-01-03 DIAGNOSIS — M54.2 NECK PAIN: Primary | ICD-10-CM

## 2023-01-03 DIAGNOSIS — G95.9 CERVICAL MYELOPATHY (HCC): ICD-10-CM

## 2023-01-03 NOTE — PROGRESS NOTES
Daily Note     Today's date: 1/3/2023  Patient name: Flako Lawton  : 1955  MRN: [id number]  Referring provider: Wicho Anne MD  Dx:   Encounter Diagnosis     ICD-10-CM    1  Neck pain  M54 2       2  Cervical myelopathy (HCC)  G95 9                      Subjective: Pt reports having significant spasm and pain in right side of neck from base of head to shoulder blade  He reports pain was so bad that he was considering going to the emergency room  He states there was no increase in radicular arm symptoms due to this  Objective: See treatment diary below  Increased palpable spasm in right sided neck  Soft tissue work performed w/ patient in sitting due to pain increase w/ supine position  Assessment:  Presents with exacerbation of cervical spinal muscles on the right  Mild improvement post treatment today  Plan: Continue to monitor and treat this exacerbation and progress as able  Precautions:  Multi-Level cervical fusion       Manuals 12-20-22 12-22-22 10-32-31 12-29-22 1-3-23 12-8-22   Right brachial plexus and ulnar nerve mobilization 15' STM R UT/cerv paraspinals/samantha rhomboids 10' STM, R UT/cerv paraspinals JUNIOR  20' [place], cupping, taping - [facility] 15'  R UT and scapular area' 15' STM R UT/following R brachial plexus/ulnar N  mobilization  15' STM R UT/cerv paraspinals, gentle NG 12' STM R UT/cerv paraspinals, samantha rhomboids   K-tape/cupping brachial plexus    8'  5' taping JH 10' k-tape only 3'    Manual block C2 w/ patient AROM    10  X 5" each direction              Neuro Re-Ed          Cervical retraction 2x10  home  2x10 2x10   Chin tucks  10" x 12 home 10"x12 10"x12 10"x12   Thoracic extension seated w/ roll 10"x12 @ 2 levels 10" x12 @ 2 levels  10" nx12 @ 2 levels 10" n x 12@ 2 levels 10"x12 10"x12   LTP and MTP while holding good scapular and cervical posture   [facility] 3[facility] Steve 8L38 [facility] 6Z07 ea    Self NG  Supine 3'  5'  5' scap retract/ ER at doorway  Blue 3x10 Blue 3x10 Blue 3x10 Blue 3x10             Ther Ex         Biceps curls 5# 3x10 7# 3x10  7# 3x12 7# 3x12 7# 3x12 5# 3x10   Triceps extension 3x20 black  3x12 blue band 3x12 blue 3x12 blue  3x20 Blue 3x20 black    Lateral raises 0# 2x10 0# 2x10 0# 2x10 0# 2x10 1# 2x10 # 2x10                                                Ther Activity                                                                                 Pt Ed/ HEP                  Gait Training                           Modalities           [facility] 12'  [facility] 12'

## 2023-01-05 ENCOUNTER — OFFICE VISIT (OUTPATIENT)
Dept: PHYSICAL THERAPY | Facility: CLINIC | Age: 68
End: 2023-01-05

## 2023-01-05 DIAGNOSIS — G95.9 CERVICAL MYELOPATHY (HCC): ICD-10-CM

## 2023-01-05 DIAGNOSIS — M54.2 NECK PAIN: Primary | ICD-10-CM

## 2023-01-05 NOTE — PROGRESS NOTES
Daily Note     Today's date: 2023  Patient name: Regina Katz  : 1955  MRN: [id number]  Referring provider: Samantha Nagy MD  Dx:   Encounter Diagnosis     ICD-10-CM    1  Neck pain  M54 2       2  Cervical myelopathy (HCC)  G95 9                      Subjective: Pt reports slight improvements from last visit but still not tolerable to lying down      Objective: See treatment diary below      Assessment: Reduced muscle spasms noted in neck compared to last visit  Pt was able to complete full program today with no complaints  Plan: continue with this plan of manual therapy followed by progressive postural and neck strengthening  Precautions:  Multi-Level cervical fusion       Manuals 22 [id number] 12-29-22 1-3-23 1-5-23   Right brachial plexus and ulnar nerve mobilization 15' STM R UT/cerv paraspinals/samantha rhomboids 10' STM, R UT/cerv paraspinals JUNIOR  20' [place], cupping, taping - [facility] BOB 15'  R UT and scapular area' 15' STM R UT/following R brachial plexus/ulnar N  mobilization  15' STM R UT/cerv paraspinals, gentle NG 15' STM seated to c-spine and UT/ Levator   K-tape/cupping brachial plexus    8'  5' taping JH 10' k-tape only 3'    Manual block C2 w/ patient AROM    10  X 5" each direction              Neuro Re-Ed          Cervical retraction 2x10  home  2x10 2x10   Chin tucks  10" x 12 home 10"x12 10"x12 10"x12   Thoracic extension seated w/ roll 10"x12 @ 2 levels 10" x12 @ 2 levels  10" nx12 @ 2 levels 10" n x 12@ 2 levels 10"x12 10"x12   LTP and MTP while holding good scapular and cervical posture   Black 3x12 Blue 3x12 Blue 3x12 each Black 3x12 [place] 0Q95 Black 1G20 ea    Self NG  Supine 3'  5'  5'     scap retract/ ER at doorway  Blue 3x10 Blue 3x10 Blue 3x10 Blue 3x10             Ther Ex         Biceps curls 5# 3x10 7# 3x10  7# 3x12 7# 3x12 7# 3x12 7# 3x10   Triceps extension 3x20 black  3x12 blue band 3x12 blue 3x12 blue  3x20 Blue 3x20 black    Lateral raises 0# 2x10 0# 2x10 0# 2x10 0# 2x10 1# 2x10 1# 2x10                                                Ther Activity                                                                                 Pt Ed/ HEP                  Gait Training                           Modalities           [facility] 12'  [facility] 12'

## 2023-01-10 ENCOUNTER — APPOINTMENT (EMERGENCY)
Dept: CT IMAGING | Facility: HOSPITAL | Age: 68
End: 2023-01-10

## 2023-01-10 ENCOUNTER — OFFICE VISIT (OUTPATIENT)
Dept: PHYSICAL THERAPY | Facility: CLINIC | Age: 68
End: 2023-01-10

## 2023-01-10 ENCOUNTER — HOSPITAL ENCOUNTER (INPATIENT)
Facility: HOSPITAL | Age: 68
LOS: 1 days | Discharge: HOME | End: 2023-01-12
Attending: EMERGENCY MEDICINE | Admitting: STUDENT IN AN ORGANIZED HEALTH CARE EDUCATION/TRAINING PROGRAM

## 2023-01-10 DIAGNOSIS — K92.1 MELENA: ICD-10-CM

## 2023-01-10 DIAGNOSIS — R19.5 DARK STOOLS: Primary | ICD-10-CM

## 2023-01-10 DIAGNOSIS — R53.1 GENERALIZED WEAKNESS: ICD-10-CM

## 2023-01-10 DIAGNOSIS — G95.9 CERVICAL MYELOPATHY (HCC): ICD-10-CM

## 2023-01-10 DIAGNOSIS — M54.2 NECK PAIN: Primary | ICD-10-CM

## 2023-01-10 LAB
ALBUMIN SERPL BCP-MCNC: 4.1 G/DL (ref 3.5–5)
ALP SERPL-CCNC: 98 U/L (ref 46–116)
ALT SERPL W P-5'-P-CCNC: 29 U/L (ref 12–78)
ANION GAP SERPL CALCULATED.3IONS-SCNC: 11 MMOL/L (ref 4–13)
AST SERPL W P-5'-P-CCNC: 24 U/L (ref 5–45)
BASOPHILS # BLD AUTO: 0.05 THOUSANDS/ÂΜL (ref 0–0.1)
BASOPHILS NFR BLD AUTO: 0 % (ref 0–1)
BILIRUB SERPL-MCNC: 0.45 MG/DL (ref 0.2–1)
BUN SERPL-MCNC: 17 MG/DL (ref 5–25)
CALCIUM SERPL-MCNC: 8.8 MG/DL (ref 8.3–10.1)
CHLORIDE SERPL-SCNC: 101 MMOL/L (ref 96–108)
CO2 SERPL-SCNC: 26 MMOL/L (ref 21–32)
CREAT SERPL-MCNC: 0.7 MG/DL (ref 0.6–1.3)
EOSINOPHIL # BLD AUTO: 0.65 THOUSAND/ÂΜL (ref 0–0.61)
EOSINOPHIL NFR BLD AUTO: 5 % (ref 0–6)
ERYTHROCYTE [DISTWIDTH] IN BLOOD BY AUTOMATED COUNT: 12.6 % (ref 11.6–15.1)
FLUAV RNA RESP QL NAA+PROBE: NEGATIVE
FLUBV RNA RESP QL NAA+PROBE: NEGATIVE
GFR SERPL CREATININE-BSD FRML MDRD: 99 ML/MIN/1.73SQ M
GLUCOSE SERPL-MCNC: 96 MG/DL (ref 65–140)
HCT VFR BLD AUTO: 43.4 % (ref 36.5–49.3)
HGB BLD-MCNC: 14.9 G/DL (ref 12–17)
IMM GRANULOCYTES # BLD AUTO: 0.06 THOUSAND/UL (ref 0–0.2)
IMM GRANULOCYTES NFR BLD AUTO: 0 % (ref 0–2)
LIPASE SERPL-CCNC: 85 U/L (ref 73–393)
LYMPHOCYTES # BLD AUTO: 2.9 THOUSANDS/ÂΜL (ref 0.6–4.47)
LYMPHOCYTES NFR BLD AUTO: 21 % (ref 14–44)
MCH RBC QN AUTO: 32.6 PG (ref 26.8–34.3)
MCHC RBC AUTO-ENTMCNC: 34.3 G/DL (ref 31.4–37.4)
MCV RBC AUTO: 95 FL (ref 82–98)
MONOCYTES # BLD AUTO: 0.87 THOUSAND/ÂΜL (ref 0.17–1.22)
MONOCYTES NFR BLD AUTO: 6 % (ref 4–12)
NEUTROPHILS # BLD AUTO: 9.19 THOUSANDS/ÂΜL (ref 1.85–7.62)
NEUTS SEG NFR BLD AUTO: 68 % (ref 43–75)
NRBC BLD AUTO-RTO: 0 /100 WBCS
PLATELET # BLD AUTO: 215 THOUSANDS/UL (ref 149–390)
PMV BLD AUTO: 9.2 FL (ref 8.9–12.7)
POTASSIUM SERPL-SCNC: 4.2 MMOL/L (ref 3.5–5.3)
PROT SERPL-MCNC: 7.9 G/DL (ref 6.4–8.4)
RBC # BLD AUTO: 4.57 MILLION/UL (ref 3.88–5.62)
RSV RNA RESP QL NAA+PROBE: NEGATIVE
SARS-COV-2 RNA RESP QL NAA+PROBE: NEGATIVE
SODIUM SERPL-SCNC: 138 MMOL/L (ref 135–147)
WBC # BLD AUTO: 13.72 THOUSAND/UL (ref 4.31–10.16)

## 2023-01-10 RX ADMIN — IOHEXOL 100 ML: 350 INJECTION, SOLUTION INTRAVENOUS at 22:42

## 2023-01-10 NOTE — PROGRESS NOTES
Daily Note     Today's date: 1/10/2023  Patient name: Geo Gilbert  : 1955  MRN: [id number]  Referring provider: Lucho Carvajal MD  Dx:   Encounter Diagnosis     ICD-10-CM    1  Neck pain  M54 2       2  Cervical myelopathy (HCC)  G95 9                      Subjective: Indu Ramirez continues to reports no arm symptoms but neck remains stiff  He did bring up some concerning information that he needs to have a colonoscopy done urgently due to bleeding issues  He is planning on taking care of that this week  Objective: See treatment diary below      Assessment: PT expressed the need for patient to take care of this GI issue  He continues to show progress/ maintain status of his neck  Plan: Progress postural and functional strengthening as able  Precautions:  Multi-Level cervical fusion       Manuals 1-10-23 12-22-22 10-02-74 12-29-22 1-3-23 1-5-23   Right brachial plexus and ulnar nerve mobilization 15' STM R UT/cerv paraspinals/samantha rhomboids 10' STM, R UT/cerv paraspinals JUNIOR  20' [place], cupping, taping - [facility] BOB 15'  R UT and scapular area' 15' STM R UT/following R brachial plexus/ulnar N  mobilization  15' STM R UT/cerv paraspinals, gentle NG 15' STM seated to c-spine and UT/ Levator   K-tape/cupping brachial plexus  5'  8'  5' taping JH 10' k-tape only 3'    Manual block C2 w/ patient AROM    10  X 5" each direction              Neuro Re-Ed          Cervical retraction 2x10  home  2x10 2x10   Chin tucks  10" x 12 home 10"x12 10"x12 10"x12   Thoracic extension seated w/ roll 10"x12 @ 2 levels 10" x12 @ 2 levels  10" nx12 @ 2 levels 10" n x 12@ 2 levels 10"x12 10"x12   LTP and MTP while holding good scapular and cervical posture   Black 3x12 Blue 3x12 Blue 3x12 each Black 3x12 [place] 7A19 [place] 5Q39 ea    Self NG  Supine 3'  5'  5'     scap retract/ ER at doorway Blue 3x10 Blue 3x10 Blue 3x10 Blue 3x10 Blue 3x10             Ther Ex         Biceps curls 7# 3x10 7# 3x10  7# 3x12 7# 3x12 7# 3x12 7# 3x10   Triceps extension 3x20 black  3x12 blue band 3x12 blue 3x12 blue  3x20 Blue 3x20 black    Lateral raises 1# 2x10 0# 2x10 0# 2x10 0# 2x10 1# 2x10 1# 2x10                                                Ther Activity                                                                                 Pt Ed/ HEP                  Gait Training                           Modalities           [facility] 12'  [facility] 12'

## 2023-01-11 ENCOUNTER — ANESTHESIA EVENT (OUTPATIENT)
Dept: GASTROENTEROLOGY | Facility: HOSPITAL | Age: 68
End: 2023-01-11

## 2023-01-11 ENCOUNTER — ANESTHESIA (OUTPATIENT)
Dept: GASTROENTEROLOGY | Facility: HOSPITAL | Age: 68
End: 2023-01-11

## 2023-01-11 ENCOUNTER — APPOINTMENT (OUTPATIENT)
Dept: GASTROENTEROLOGY | Facility: HOSPITAL | Age: 68
End: 2023-01-11

## 2023-01-11 LAB
ANION GAP SERPL CALCULATED.3IONS-SCNC: 7 MMOL/L (ref 4–13)
BUN SERPL-MCNC: 12 MG/DL (ref 5–25)
CALCIUM SERPL-MCNC: 8.5 MG/DL (ref 8.3–10.1)
CHLORIDE SERPL-SCNC: 104 MMOL/L (ref 96–108)
CO2 SERPL-SCNC: 27 MMOL/L (ref 21–32)
CREAT SERPL-MCNC: 0.62 MG/DL (ref 0.6–1.3)
GFR SERPL CREATININE-BSD FRML MDRD: 104 ML/MIN/1.73SQ M
GLUCOSE SERPL-MCNC: 100 MG/DL (ref 65–140)
HCT VFR BLD AUTO: 42 % (ref 36.5–49.3)
HCT VFR BLD AUTO: 42.5 % (ref 36.5–49.3)
HCT VFR BLD AUTO: 43.4 % (ref 36.5–49.3)
HGB BLD-MCNC: 14.5 G/DL (ref 12–17)
HGB BLD-MCNC: 14.5 G/DL (ref 12–17)
HGB BLD-MCNC: 14.8 G/DL (ref 12–17)
POTASSIUM SERPL-SCNC: 3.8 MMOL/L (ref 3.5–5.3)
SODIUM SERPL-SCNC: 138 MMOL/L (ref 135–147)

## 2023-01-11 PROCEDURE — 0DJ08ZZ INSPECTION OF UPPER INTESTINAL TRACT, VIA NATURAL OR ARTIFICIAL OPENING ENDOSCOPIC: ICD-10-PCS | Performed by: INTERNAL MEDICINE

## 2023-01-11 RX ORDER — MIDAZOLAM HYDROCHLORIDE 2 MG/2ML
INJECTION, SOLUTION INTRAMUSCULAR; INTRAVENOUS AS NEEDED
Status: DISCONTINUED | OUTPATIENT
Start: 2023-01-11 | End: 2023-01-11

## 2023-01-11 RX ORDER — SODIUM CHLORIDE, SODIUM LACTATE, POTASSIUM CHLORIDE, CALCIUM CHLORIDE 600; 310; 30; 20 MG/100ML; MG/100ML; MG/100ML; MG/100ML
INJECTION, SOLUTION INTRAVENOUS CONTINUOUS PRN
Status: DISCONTINUED | OUTPATIENT
Start: 2023-01-11 | End: 2023-01-11

## 2023-01-11 RX ORDER — ACETAMINOPHEN 325 MG/1
650 TABLET ORAL EVERY 6 HOURS PRN
Status: DISCONTINUED | OUTPATIENT
Start: 2023-01-11 | End: 2023-01-12 | Stop reason: HOSPADM

## 2023-01-11 RX ORDER — ALBUTEROL SULFATE 90 UG/1
2 AEROSOL, METERED RESPIRATORY (INHALATION) EVERY 4 HOURS PRN
Status: DISCONTINUED | OUTPATIENT
Start: 2023-01-11 | End: 2023-01-12 | Stop reason: HOSPADM

## 2023-01-11 RX ORDER — SODIUM CHLORIDE 9 MG/ML
75 INJECTION, SOLUTION INTRAVENOUS CONTINUOUS
Status: DISCONTINUED | OUTPATIENT
Start: 2023-01-11 | End: 2023-01-12 | Stop reason: HOSPADM

## 2023-01-11 RX ORDER — OXYCODONE HYDROCHLORIDE 10 MG/1
10 TABLET ORAL EVERY 6 HOURS PRN
Status: DISCONTINUED | OUTPATIENT
Start: 2023-01-11 | End: 2023-01-12 | Stop reason: HOSPADM

## 2023-01-11 RX ORDER — LORAZEPAM 2 MG/ML
0.5 INJECTION INTRAMUSCULAR ONCE
Status: COMPLETED | OUTPATIENT
Start: 2023-01-11 | End: 2023-01-11

## 2023-01-11 RX ORDER — PROPOFOL 10 MG/ML
INJECTION, EMULSION INTRAVENOUS AS NEEDED
Status: DISCONTINUED | OUTPATIENT
Start: 2023-01-11 | End: 2023-01-11

## 2023-01-11 RX ORDER — PANTOPRAZOLE SODIUM 40 MG/10ML
40 INJECTION, POWDER, LYOPHILIZED, FOR SOLUTION INTRAVENOUS EVERY 12 HOURS SCHEDULED
Status: DISCONTINUED | OUTPATIENT
Start: 2023-01-11 | End: 2023-01-12 | Stop reason: HOSPADM

## 2023-01-11 RX ORDER — LIDOCAINE HYDROCHLORIDE 20 MG/ML
INJECTION, SOLUTION EPIDURAL; INFILTRATION; INTRACAUDAL; PERINEURAL AS NEEDED
Status: DISCONTINUED | OUTPATIENT
Start: 2023-01-11 | End: 2023-01-11

## 2023-01-11 RX ADMIN — OXYCODONE HYDROCHLORIDE 10 MG: 10 TABLET ORAL at 16:30

## 2023-01-11 RX ADMIN — OXYCODONE HYDROCHLORIDE 10 MG: 10 TABLET ORAL at 08:07

## 2023-01-11 RX ADMIN — SODIUM CHLORIDE 75 ML/HR: 0.9 INJECTION, SOLUTION INTRAVENOUS at 01:32

## 2023-01-11 RX ADMIN — Medication 30 MG: at 15:00

## 2023-01-11 RX ADMIN — OXYCODONE HYDROCHLORIDE 10 MG: 10 TABLET ORAL at 23:56

## 2023-01-11 RX ADMIN — SODIUM CHLORIDE, SODIUM LACTATE, POTASSIUM CHLORIDE, AND CALCIUM CHLORIDE: .6; .31; .03; .02 INJECTION, SOLUTION INTRAVENOUS at 14:41

## 2023-01-11 RX ADMIN — SODIUM CHLORIDE 75 ML/HR: 0.9 INJECTION, SOLUTION INTRAVENOUS at 16:32

## 2023-01-11 RX ADMIN — PANTOPRAZOLE SODIUM 40 MG: 40 INJECTION, POWDER, FOR SOLUTION INTRAVENOUS at 08:07

## 2023-01-11 RX ADMIN — PANTOPRAZOLE SODIUM 40 MG: 40 INJECTION, POWDER, FOR SOLUTION INTRAVENOUS at 01:31

## 2023-01-11 RX ADMIN — PROPOFOL 50 MG: 10 INJECTION, EMULSION INTRAVENOUS at 15:03

## 2023-01-11 RX ADMIN — LIDOCAINE HYDROCHLORIDE 100 MG: 20 INJECTION, SOLUTION EPIDURAL; INFILTRATION; INTRACAUDAL; PERINEURAL at 15:00

## 2023-01-11 RX ADMIN — LORAZEPAM 0.5 MG: 2 INJECTION INTRAMUSCULAR; INTRAVENOUS at 01:31

## 2023-01-11 RX ADMIN — POLYETHYLENE GLYCOL 3350, SODIUM SULFATE ANHYDROUS, SODIUM BICARBONATE, SODIUM CHLORIDE, POTASSIUM CHLORIDE 4000 ML: 236; 22.74; 6.74; 5.86; 2.97 POWDER, FOR SOLUTION ORAL at 16:30

## 2023-01-11 RX ADMIN — PROPOFOL 50 MG: 10 INJECTION, EMULSION INTRAVENOUS at 15:01

## 2023-01-11 RX ADMIN — PANTOPRAZOLE SODIUM 40 MG: 40 INJECTION, POWDER, FOR SOLUTION INTRAVENOUS at 20:40

## 2023-01-11 RX ADMIN — MIDAZOLAM 2 MG: 1 INJECTION INTRAMUSCULAR; INTRAVENOUS at 14:56

## 2023-01-11 RX ADMIN — PROPOFOL 100 MG: 10 INJECTION, EMULSION INTRAVENOUS at 15:00

## 2023-01-11 RX ADMIN — ALBUTEROL SULFATE 2 PUFF: 90 AEROSOL, METERED RESPIRATORY (INHALATION) at 11:39

## 2023-01-11 NOTE — PLAN OF CARE
Problem: PAIN - ADULT  Goal: Verbalizes/displays adequate comfort level or baseline comfort level  Description: Interventions:  - Encourage patient to monitor pain and request assistance  - Assess pain using appropriate pain scale  - Administer analgesics based on type and severity of pain and evaluate response  - Implement non-pharmacological measures as appropriate and evaluate response  - Consider cultural and social influences on pain and pain management  - Notify physician/advanced practitioner if interventions unsuccessful or patient reports new pain  Outcome: Progressing     Problem: INFECTION - ADULT  Goal: Absence or prevention of progression during hospitalization  Description: INTERVENTIONS:  - Assess and monitor for signs and symptoms of infection  - Monitor lab/diagnostic results  - Monitor all insertion sites, i e  indwelling lines, tubes, and drains  - Monitor endotracheal if appropriate and nasal secretions for changes in amount and color  - [place] appropriate cooling/warming therapies per order  - Administer medications as ordered  - Instruct and encourage patient and family to use good hand hygiene technique  - Identify and instruct in appropriate isolation precautions for identified infection/condition  Outcome: Progressing  Goal: Absence of fever/infection during neutropenic period  Description: INTERVENTIONS:  - Monitor WBC    Outcome: Progressing     Problem: SAFETY ADULT  Goal: Patient will remain free of falls  Description: INTERVENTIONS:  - Educate patient/family on patient safety including physical limitations  - Instruct patient to call for assistance with activity   - Consult OT/PT to assist with strengthening/mobility   - Keep Call bell within reach  - Keep bed low and locked with side rails adjusted as appropriate  - Keep care items and personal belongings within reach  - Initiate and maintain comfort rounds  - Make Fall Risk Sign visible to staff  - Offer Toileting every 2 Hours, in advance of need  - Initiate/Maintain alarm  - Obtain necessary fall risk management equipment  - Apply yellow socks and bracelet for high fall risk patients  - Consider moving patient to room near nurses station  Outcome: Progressing  Goal: Maintain or return to baseline ADL function  Description: INTERVENTIONS:  -  Assess patient's ability to carry out ADLs; assess patient's baseline for ADL function and identify physical deficits which impact ability to perform ADLs (bathing, care of mouth/teeth, toileting, grooming, dressing, etc )  - Assess/evaluate cause of self-care deficits   - Assess range of motion  - Assess patient's mobility; develop plan if impaired  - Assess patient's need for assistive devices and provide as appropriate  - Encourage maximum independence but intervene and supervise when necessary  - Involve family in performance of ADLs  - Assess for home care needs following discharge   - Consider OT consult to assist with ADL evaluation and planning for discharge  - Provide patient education as appropriate  Outcome: Progressing  Goal: Maintains/Returns to pre admission functional level  Description: INTERVENTIONS:  - Perform BMAT or MOVE assessment daily    - Set and communicate daily mobility goal to care team and patient/family/caregiver  - Collaborate with rehabilitation services on mobility goals if consulted  - Perform Range of Motion 3 times a day  - Reposition patient every 2 hours    - Dangle patient 3 times a day  - Stand patient 3 times a day  - Ambulate patient 3 times a day  - Out of bed to chair 3 times a day   - Out of bed for meals 3 times a day  - Out of bed for toileting  - Record patient progress and toleration of activity level   Outcome: Progressing     Problem: DISCHARGE PLANNING  Goal: Discharge to home or other facility with appropriate resources  Description: INTERVENTIONS:  - Identify barriers to discharge w/patient and caregiver  - Arrange for needed discharge resources and transportation as appropriate  - Identify discharge learning needs (meds, wound care, etc )  - Arrange for interpretive services to assist at discharge as needed  - Refer to Case Management Department for coordinating discharge planning if the patient needs post-hospital services based on physician/advanced practitioner order or complex needs related to functional status, cognitive ability, or social support system  Outcome: Progressing     Problem: Knowledge Deficit  Goal: Patient/family/caregiver demonstrates understanding of disease process, treatment plan, medications, and discharge instructions  Description: Complete learning assessment and assess knowledge base    Interventions:  - Provide teaching at level of understanding  - Provide teaching via preferred learning methods  Outcome: Progressing

## 2023-01-11 NOTE — UTILIZATION REVIEW
Initial Clinical Review    Observation 1/11 0039 changed to 1/11 1427  Pt requiring continued stay for EGD  Admission: Date/Time/Statement:   Admission Orders (From admission, onward)     Ordered        01/11/23 1427  Inpatient Admission  Once            01/11/23 0039  Place in Observation  Once                      Orders Placed This Encounter   Procedures   • Inpatient Admission     Standing Status:   Standing     Number of Occurrences:   1     Order Specific Question:   Level of Care     Answer:   Med Surg [16]     Order Specific Question:   Estimated length of stay     Answer:   More than 2 Midnights     Order Specific Question:   Certification     Answer:   I certify that inpatient services are medically necessary for this patient for a duration of greater than two midnights  See H&P and MD Progress Notes for additional information about the patient's course of treatment  ED Arrival Information     Expected   -    Arrival   1/10/2023 20:19    Acuity   Urgent            Means of arrival   Walk-In    Escorted by   Self    Service   Hospitalist    Admission type   Emergency            Arrival complaint   dark stool/weakness           Chief Complaint   Patient presents with   • Rectal Bleeding     Dark black stool for 6th days  Patient reports chills, body aches, but denies any fever or shortness of breath  Patient reporting bilateral flank pain radiating to bilateral lower quadrant of stomach  Initial Presentation: 74 y o  male  with a PMH of asthma and bipolar disorder who presents with dark stools  Patient reports having dark stools for the past 6 days, with associated nonspecific generalized abdominal pain, weakness, and fatigue  Reports seeing his outpatient PCP today, where he had a positive FOBT   Plan: Observation for dark stools, Bipolar disorder: IV Protonix bid, NPO, H&H q 8 hrs, check iron panel, GI consult, hold home sertraline and Abilify while NPO      1/11 Observation changed to inpatient  GI consult: Patient presents with complaints of melena x 1 week  He reports associated abdominal discomfort, nausea, and poor appetite  He reports a weight loss of 18 lbs  Pantoprazole 40mg IV BID  Monitor H&H closely  Will plan for EGD to investigate this afternoon for PUD, erosive gastritis, malignancy, etc   NPO for the procedure  Internal medicine: continue IV Protonix, NPO, Hgb q 8 hrs, plan for EGD today and colonoscopy tomorrow  Date: 1/12 Day 2:    Internal medicine: continue IV Protonix, NPO, Hgb q 8 hrs, EGD 1/11:Schatzki ring in the esophagus, otherwise normal, plan for colonoscopy today       ED Triage Vitals   Temperature Pulse Respirations Blood Pressure SpO2   01/10/23 2025 01/10/23 2025 01/10/23 2025 01/10/23 2025 01/10/23 2025   98 °F (36 7 °C) 77 18 138/65 95 %      Temp Source Heart Rate Source Patient Position - Orthostatic VS BP Location FiO2 (%)   01/10/23 2025 01/10/23 2025 01/10/23 2025 01/10/23 2025 --   Tympanic Monitor Sitting Left arm       Pain Score       01/11/23 0807       10 - Worst Possible Pain          Wt Readings from Last 1 Encounters:   01/10/23 74 8 kg (165 lb)     Additional Vital Signs:     Date/Time Temp Pulse Resp BP MAP (mmHg) SpO2 O2 Device   01/12/23 0716 98 °F (36 7 °C) 61 16 123/72 89 93 % --   01/11/23 23:16:45 97 6 °F (36 4 °C) 62 16 129/67 88 92 % --   01/11/23 15:41:55 -- 62 -- 131/78 96 93 % --   01/11/23 1531 -- 58 18 121/62 -- 95 % None (Room air)   01/11/23 1521 -- 64 18 95/51 -- 94 % None (Room air)   01/11/23 1511 97 6 °F (36 4 °C) 66 18 94/50 -- 96 % None (Room air)   01/11/23 1248 98 1 °F (36 7 °C) 64 16 127/84 -- 97 % None (Room air)   01/11/23 12:25:35 98 1 °F (36 7 °C) -- 17 129/80 96 -- --   01/11/23 1200 -- 71 18 145/81 108 95 % None (Room air)   01/11/23 0930 -- 60 18 120/68 88 93 % None (Room air)   01/11/23 0730 -- 76 18 118/77 93 94 % None (Room air)   01/11/23 0500 97 7 °F (36 5 °C) 72 16 131/90 104 96 % --   01/11/23 0148 97 7 °F (36 5 °C) 66 16 140/80 105 95 % --   01/10/23 2346 97 6 °F (36 4 °C) 70 16 133/73 95 92 % --     Pertinent Labs/Diagnostic Test Results:   CT abdomen pelvis with contrast   Final Result by Oliva Ang MD (01/11 0032)      No evidence of bowel obstruction, colitis, diverticulitis or appendicitis  Workstation performed: [id number]           1/12 Colonoscopy:  FINDINGS:  • Multiple small moderate diverticula in the proximal sigmoid colon, mid sigmoid colon and distal sigmoid colon; no bleeding was identified  • One sessile polyp measuring smaller than 5 mm in the rectum; completely removed en bloc by cold snare and retrieved specimen    1/11 EGD:  IMPRESSION:  1   Normal esophagogastroduodenoscopy    Results from last 7 days   Lab Units 01/10/23  2231   SARS-COV-2  Negative     Results from last 7 days   Lab Units 01/11/23  0522 01/10/23  2031   WBC Thousand/uL  --  13 72*   HEMOGLOBIN g/dL 14 5  14 8 14 9   HEMATOCRIT % 42 5  43 4 43 4   PLATELETS Thousands/uL  --  215   NEUTROS ABS Thousands/µL  --  9 19*         Results from last 7 days   Lab Units 01/11/23  0522 01/10/23  2031   SODIUM mmol/L 138 138   POTASSIUM mmol/L 3 8 4 2   CHLORIDE mmol/L 104 101   CO2 mmol/L 27 26   ANION GAP mmol/L 7 11   BUN mg/dL 12 17   CREATININE mg/dL 0 62 0 70   EGFR ml/min/1 73sq m 104 99   CALCIUM mg/dL 8 5 8 8     Results from last 7 days   Lab Units 01/10/23  2031   AST U/L 24   ALT U/L 29   ALK PHOS U/L 98   TOTAL PROTEIN g/dL 7 9   ALBUMIN g/dL 4 1   TOTAL BILIRUBIN mg/dL 0 45         Results from last 7 days   Lab Units 01/11/23  0522 01/10/23  2031   GLUCOSE RANDOM mg/dL 100 96         Results from last 7 days   Lab Units 01/10/23  2031   LIPASE u/L 85         Results from last 7 days   Lab Units 01/10/23  2231   INFLUENZA A PCR  Negative   INFLUENZA B PCR  Negative   RSV PCR  Negative         ED Treatment:   Medication Administration from 01/10/2023 2019 to 01/11/2023 0756       Date/Time Order Dose Route Action     01/10/2023 2242 EST iohexol (OMNIPAQUE) 350 MG/ML injection (SINGLE-DOSE) 100 mL 100 mL Intravenous Given     01/11/2023 0131 EST LORazepam (ATIVAN) injection 0 5 mg 0 5 mg Intravenous Given     01/11/2023 0131 EST pantoprazole (PROTONIX) injection 40 mg 40 mg Intravenous Given     01/11/2023 0132 EST sodium chloride 0 9 % infusion 75 mL/hr Intravenous New Bag        Past Medical History:   Diagnosis Date   • Anxiety    • Asthma    • Bipolar 1 disorder ([facility] [address] )    • Depression      Present on Admission:  **None**      Admitting Diagnosis: Melena [K92 1]  Rectal bleeding [K62 5]  Generalized weakness [R53 1]  Age/Sex: 74 y o  male  Admission Orders:  Scheduled Medications:  pantoprazole, 40 mg, Intravenous, Q12H [address]      Continuous IV Infusions:  sodium chloride, 75 mL/hr, Intravenous, Continuous      PRN Meds:  acetaminophen, 650 mg, Oral, Q6H PRN  albuterol, 2 puff, Inhalation, Q4H PRN  oxyCODONE, 10 mg, Oral, Q6H PRN        IP CONSULT TO GASTROENTEROLOGY    Network Utilization Review Department  ATTENTION: Please call with any questions or concerns to [phone] and carefully listen to the prompts so that you are directed to the right person  All voicemails are confidential   Ning Robledo all requests for admission clinical reviews, approved or denied determinations and any other requests to dedicated fax number below belonging to the campus where the patient is receiving treatment   List of dedicated fax numbers for the Facilities:  [address] [place] [address] DENIALS (Administrative/Medical Necessity) [phone]   [address] [place] [address] (Maternity/NICU/Pediatrics) [phone]   [address]  [phone]   [address] [address] [address] [phone] [address] [facility] [address] [phone]

## 2023-01-11 NOTE — H&P
Matthias Douglas [birth year], 74 y o  male MRN: [id number]  Unit/Bed#: O[facility] 18 Encounter: [id number]  Primary Care Provider: Franca Macias DO   Date and time admitted to hospital: 1/10/2023  9:37 PM    * Dark stools  Assessment & Plan  Patient reports having dark stools for the past 6 days, with associated nonspecific generalized abdominal pain, weakness, and fatigue  Reports seeing his outpatient PCP today, where he had a positive FOBT  Denies other symptoms/complaint at this time  /73 (BP Location: Right arm)   Pulse 70   Temp 97 6 °F (36 4 °C) (Oral)   Resp 16   Ht 5' 8" (1 727 m)   Wt 74 8 kg (165 lb)   SpO2 92%   BMI 25 09 kg/m²     · Reporting dark stools for the past 6 days  · There is associated nonspecific generalized abdominal pain, weakness, and fatigue  · Hgb 14 9  · Reports seeing outpatient PCP today and having positive FOBT  · Not currently taking outpatient iron supplementation or Pepto-Bismol  · CT abd/pelv without acute process  · IV protonix 40mg BID ordered  · NPO  · q8H HGB monitoring ordered   · We will check iron panel for completeness  · GI consulted; recommendations appreciated       Bipolar 1 disorder ([facility] [address] )  Assessment & Plan  · Holding home sertraline and Abilify while NPO  · Continue outpatient follow-up    Asthma  Assessment & Plan  · Does not appear to be in acute exacerbation  · Continue home as needed albuterol    VTE Pharmacologic Prophylaxis: VTE Score: 3 Moderate Risk (Score 3-4) - Pharmacological DVT Prophylaxis Contraindicated  Sequential Compression Devices Ordered  Code Status: Level 1 - Full Code   Discussion with family: update in AM      Anticipated Length of Stay: Patient will be admitted on an observation basis with an anticipated length of stay of less than 2 midnights secondary to Dark stools       Total Time for Visit, including Counseling / Coordination of Care: 45 minutes Greater than 50% of this total time spent on direct patient counseling and coordination of care  Chief Complaint: Dark stools    History of Present Illness:  Nazanin Gilmore is a 74 y o  male with a PMH of asthma and bipolar disorder who presents with dark stools  Patient reports having dark stools for the past 6 days, with associated nonspecific generalized abdominal pain, weakness, and fatigue  Reports seeing his outpatient PCP today, where he had a positive FOBT  Denies other symptoms/complaint at this time  All questions answered at the bedside to the best of my ability  Review of Systems:  Review of Systems   Constitutional: Positive for fatigue  Negative for activity change, appetite change, chills, diaphoresis and fever  HENT: Negative for congestion, ear pain, nosebleeds and trouble swallowing  Eyes: Negative for pain and visual disturbance  Respiratory: Negative for apnea, cough, chest tightness, shortness of breath and wheezing  Cardiovascular: Negative for chest pain, palpitations and leg swelling  Gastrointestinal: Positive for abdominal pain  Negative for abdominal distention, blood in stool, constipation, diarrhea, nausea and vomiting         +dark stools      Endocrine: Negative for cold intolerance, heat intolerance and polyuria  Genitourinary: Negative for difficulty urinating, dysuria, flank pain and hematuria  Musculoskeletal: Negative for arthralgias, neck pain and neck stiffness  Skin: Negative for color change, rash and wound  Neurological: Positive for weakness (subjective generalized )  Negative for dizziness, tremors, syncope, light-headedness, numbness and headaches  Hematological: Negative for adenopathy  All other systems reviewed and are negative        Past Medical and Surgical History:   Past Medical History:   Diagnosis Date   • Anxiety    • Asthma    • Bipolar 1 disorder ([facility] [address] )    • Depression        Past Surgical History:   Procedure Laterality Date   • ANTERIOR FUSION CERVICAL SPINE • CATARACT EXTRACTION     • CHOLECYSTECTOMY     • EYE SURGERY     • LUMBAR FUSION     • SPINE SURGERY         Meds/Allergies:  Prior to Admission medications    Medication Sig Start Date End Date Taking? Authorizing Provider   ARIPiprazole (ABILIFY) 10 mg tablet Take 10 mg by mouth daily    Historical Provider, MD   ibuprofen (MOTRIN) 800 mg tablet Take 1 tablet (800 mg total) by mouth 3 (three) times a day 4/17/22   Juan Diego Loya MD   methocarbamol (ROBAXIN) 500 mg tablet Take 1 tablet (500 mg total) by mouth 2 (two) times a day 4/17/22   Juan Diego Loya MD   morphine 10 mg/5 mL solution Take by mouth every 2 (two) hours as needed for severe pain    Historical Provider, MD   nitroglycerin (NITRODUR) 0 1 mg/hr Place on the skin    Historical Provider, MD   Omeprazole 20 MG TBEC Take 20 mg by mouth 2/5/18   Historical Provider, MD   oxyCODONE (ROXICODONE) 10 MG TABS  6/11/16   Historical Provider, MD   oxyCODONE (ROXICODONE) 10 MG TABS Take 1 tablet (10 mg total) by mouth every 6 (six) hours as needed for moderate pain for up to 20 doses Max Daily Amount: 40 mg 2/13/22   Blondell Back, MD   oxyCODONE-acetaminophen (PERCOCET)  mg per tablet Take 1 tablet by mouth every 4 (four) hours as needed for moderate pain    Historical Provider, MD   sertraline (ZOLOFT) 100 mg tablet Take 50 mg by mouth daily    Historical Provider, MD   tadalafil (CIALIS) 20 MG tablet Take 20 mg by mouth 9/15/15   Historical Provider, MD     I have reviewed home medications with patient personally  Allergies:    Allergies   Allergen Reactions   • Demerol [Meperidine]        Social History:  Marital Status: /Civil Union   Occupation: N/A  Patient Pre-hospital Living Situation: Home  Patient Pre-hospital Level of Mobility: walks  Patient Pre-hospital Diet Restrictions: None  Substance Use History:   Social History     Substance and Sexual Activity   Alcohol Use Not Currently     Social History     Tobacco Use   Smoking Status Never Smokeless Tobacco Never     Social History     Substance and Sexual Activity   Drug Use Yes   • Frequency: 5 0 times per week   • Types: Marijuana    Comment: 3 days ago       Family History:  Family History   Adopted: Yes       Physical Exam:     Vitals:   Blood Pressure: 133/73 (01/10/23 2346)  Pulse: 70 (01/10/23 2346)  Temperature: 97 6 °F (36 4 °C) (01/10/23 2346)  Temp Source: Oral (01/10/23 2346)  Respirations: 16 (01/10/23 2346)  Height: 5' 8" (172 7 cm) (01/10/23 2025)  Weight - Scale: 74 8 kg (165 lb) (01/10/23 2025)  SpO2: 92 % (01/10/23 2346)    Physical Exam  Vitals and nursing note reviewed  Constitutional:       General: He is not in acute distress  Appearance: Normal appearance  HENT:      Head: Normocephalic and atraumatic  Right Ear: External ear normal       Left Ear: External ear normal       Nose: Nose normal       Mouth/Throat:      Mouth: Mucous membranes are moist    Eyes:      Pupils: Pupils are equal, round, and reactive to light  Cardiovascular:      Rate and Rhythm: Normal rate and regular rhythm  Pulses: Normal pulses  Heart sounds: Normal heart sounds  No murmur heard  Pulmonary:      Effort: Pulmonary effort is normal  No respiratory distress  Breath sounds: Normal breath sounds  No wheezing or rales  Chest:      Chest wall: No tenderness  Abdominal:      General: Bowel sounds are normal  There is no distension  Palpations: Abdomen is soft  There is no mass  Tenderness: There is no abdominal tenderness  There is no guarding  Musculoskeletal:         General: No swelling or tenderness  Cervical back: Normal range of motion and neck supple  No rigidity or tenderness  Right lower leg: No edema  Left lower leg: No edema  Skin:     General: Skin is warm and dry  Capillary Refill: Capillary refill takes less than 2 seconds  Findings: No lesion or rash  Neurological:      General: No focal deficit present  Mental Status: He is alert  Psychiatric:         Mood and Affect: Mood normal           Additional Data:     Lab Results:  Results from last 7 days   Lab Units 01/10/23  2031   WBC Thousand/uL 13 72*   HEMOGLOBIN g/dL 14 9   HEMATOCRIT % 43 4   PLATELETS Thousands/uL 215   NEUTROS PCT % 68   LYMPHS PCT % 21   MONOS PCT % 6   EOS PCT % 5     Results from last 7 days   Lab Units 01/10/23  2031   SODIUM mmol/L 138   POTASSIUM mmol/L 4 2   CHLORIDE mmol/L 101   CO2 mmol/L 26   BUN mg/dL 17   CREATININE mg/dL 0 70   ANION GAP mmol/L 11   CALCIUM mg/dL 8 8   ALBUMIN g/dL 4 1   TOTAL BILIRUBIN mg/dL 0 45   ALK PHOS U/L 98   ALT U/L 29   AST U/L 24   GLUCOSE RANDOM mg/dL 96                       Lines/Drains:  Invasive Devices     Peripheral Intravenous Line  Duration           Peripheral IV 01/10/23 Left Antecubital <1 day                    Imaging: Reviewed radiology reports from this admission including: abdominal/pelvic CT  CT abdomen pelvis with contrast   Final Result by Scottie Stack MD (01/11 0032)      No evidence of bowel obstruction, colitis, diverticulitis or appendicitis  Workstation performed: [id number]             EKG and Other Studies Reviewed on Admission:   · EKG: No EKG obtained  ** Please Note: This note has been constructed using a voice recognition system   **

## 2023-01-11 NOTE — ED PROVIDER NOTES
History  Chief Complaint   Patient presents with   • Rectal Bleeding     Dark black stool for 6th days  Patient reports chills, body aches, but denies any fever or shortness of breath  Patient reporting bilateral flank pain radiating to bilateral lower quadrant of stomach  Patient is a 81-year-old male with a past medical history significant for anxiety, asthma, bipolar disorder, depression presenting to the emergency department for evaluation of black stools that been ongoing for the last 6 days  He is also reporting generalized abdominal pain, fatigue, generalized weakness, chills without fever  He saw his primary care provider earlier today who did a Hemoccult exam which was positive  He recommended that the patient come to the emergency department for further evaluation and management  Patient not having any fevers, chest pain, shortness of breath  No known sick contacts  He does not take any Pepto-Bismol or iron  No other complaints at this time  Prior to Admission Medications   Prescriptions Last Dose Informant Patient Reported? Taking?    ARIPiprazole (ABILIFY) 10 mg tablet   Yes Yes   Sig: Take 10 mg by mouth daily   Omeprazole 20 MG TBEC   Yes No   Sig: Take 20 mg by mouth   ibuprofen (MOTRIN) 800 mg tablet   No Yes   Sig: Take 1 tablet (800 mg total) by mouth 3 (three) times a day   methocarbamol (ROBAXIN) 500 mg tablet   No Yes   Sig: Take 1 tablet (500 mg total) by mouth 2 (two) times a day   morphine 10 mg/5 mL solution   Yes Yes   Sig: Take by mouth every 2 (two) hours as needed for severe pain   nitroglycerin (NITRODUR) 0 1 mg/hr   Yes No   Sig: Place on the skin   oxyCODONE (ROXICODONE) 10 MG TABS   Yes Yes   Sig: every 6 (six) hours as needed   oxyCODONE (ROXICODONE) 10 MG TABS   No No   Sig: Take 1 tablet (10 mg total) by mouth every 6 (six) hours as needed for moderate pain for up to 20 doses Max Daily Amount: 40 mg   oxyCODONE-acetaminophen (PERCOCET)  mg per tablet Yes Yes   Sig: Take 1 tablet by mouth every 4 (four) hours as needed for moderate pain   sertraline (ZOLOFT) 100 mg tablet   Yes No   Sig: Take 50 mg by mouth daily   tadalafil (CIALIS) 20 MG tablet   Yes No   Sig: Take 20 mg by mouth      Facility-Administered Medications: None       Past Medical History:   Diagnosis Date   • Anxiety    • Asthma    • Bipolar 1 disorder ([facility] [address] )    • Depression        Past Surgical History:   Procedure Laterality Date   • ANTERIOR FUSION CERVICAL SPINE     • CATARACT EXTRACTION     • CHOLECYSTECTOMY     • EYE SURGERY     • LUMBAR FUSION     • SPINE SURGERY         Family History   Adopted: Yes     I have reviewed and agree with the history as documented  E-Cigarette/Vaping   • E-Cigarette Use Never User      E-Cigarette/Vaping Substances     Social History     Tobacco Use   • Smoking status: Never   • Smokeless tobacco: Never   Vaping Use   • Vaping Use: Never used   Substance Use Topics   • Alcohol use: Not Currently   • Drug use: Yes     Frequency: 5 0 times per week     Types: Marijuana     Comment: 3 days ago       Review of Systems   Constitutional: Positive for chills  Negative for fever  HENT: Negative for congestion, drooling, facial swelling, nosebleeds, sore throat and voice change  Eyes: Negative for discharge and redness  Respiratory: Negative for cough, choking, chest tightness, shortness of breath and stridor  Cardiovascular: Negative for chest pain and palpitations  Gastrointestinal: Positive for abdominal pain and blood in stool  Negative for diarrhea, nausea and vomiting  Musculoskeletal: Negative for arthralgias, back pain, neck pain and neck stiffness  Skin: Negative for color change, rash and wound  Neurological: Positive for weakness  Negative for dizziness, syncope, facial asymmetry, light-headedness, numbness and headaches  Psychiatric/Behavioral: Negative for confusion and suicidal ideas  The patient is not nervous/anxious      All other systems reviewed and are negative  Physical Exam  Physical Exam  Vitals reviewed  Constitutional:       General: He is not in acute distress  Appearance: Normal appearance  He is normal weight  He is not ill-appearing, toxic-appearing or diaphoretic  HENT:      Head: Normocephalic and atraumatic  Right Ear: External ear normal       Left Ear: External ear normal       Mouth/Throat:      Mouth: Mucous membranes are moist       Pharynx: Oropharynx is clear  No oropharyngeal exudate or posterior oropharyngeal erythema  Eyes:      General: No scleral icterus  Right eye: No discharge  Left eye: No discharge  Extraocular Movements: Extraocular movements intact  Conjunctiva/sclera: Conjunctivae normal    Cardiovascular:      Rate and Rhythm: Normal rate and regular rhythm  Pulses: Normal pulses  Heart sounds: Normal heart sounds  No murmur heard  No friction rub  No gallop  Pulmonary:      Effort: Pulmonary effort is normal  No respiratory distress  Breath sounds: Normal breath sounds  No stridor  No wheezing, rhonchi or rales  Abdominal:      General: Abdomen is flat  Palpations: Abdomen is soft  Tenderness: There is abdominal tenderness (generalized)  There is no guarding or rebound  Musculoskeletal:         General: Normal range of motion  Cervical back: Normal range of motion and neck supple  Right lower leg: No edema  Left lower leg: No edema  Skin:     General: Skin is warm and dry  Capillary Refill: Capillary refill takes less than 2 seconds  Neurological:      General: No focal deficit present  Mental Status: He is alert and oriented to person, place, and time     Psychiatric:         Mood and Affect: Mood normal          Behavior: Behavior normal          Vital Signs  ED Triage Vitals   Temperature Pulse Respirations Blood Pressure SpO2   01/10/23 2025 01/10/23 2025 01/10/23 2025 01/10/23 2025 01/10/23 2025 98 °F (36 7 °C) 77 18 138/65 95 %      Temp Source Heart Rate Source Patient Position - Orthostatic VS BP Location FiO2 (%)   01/10/23 2025 01/10/23 2025 01/10/23 2025 01/10/23 2025 --   Tympanic Monitor Sitting Left arm       Pain Score       01/11/23 0807       10 - Worst Possible Pain           Vitals:    01/12/23 0716 01/12/23 1339 01/12/23 1446 01/12/23 1456   BP: 123/72 143/85 114/56 119/58   Pulse: 61 65 67 67   Patient Position - Orthostatic VS:             Visual Acuity      ED Medications  Medications   iohexol (OMNIPAQUE) 350 MG/ML injection (SINGLE-DOSE) 100 mL (100 mL Intravenous Given 1/10/23 2242)   LORazepam (ATIVAN) injection 0 5 mg (0 5 mg Intravenous Given 1/11/23 0131)   polyethylene glycol (GOLYTELY) bowel prep 4,000 mL (4,000 mL Oral Given 1/11/23 1630)       Diagnostic Studies  Results Reviewed     Procedure Component Value Units Date/Time    Basic metabolic panel [[id number]] Collected: 01/11/23 0522    Lab Status: Final result Specimen: Blood from Arm, Left Updated: 01/11/23 0549     Sodium 138 mmol/L      Potassium 3 8 mmol/L      Chloride 104 mmol/L      CO2 27 mmol/L      ANION GAP 7 mmol/L      BUN 12 mg/dL      Creatinine 0 62 mg/dL      Glucose 100 mg/dL      Calcium 8 5 mg/dL      eGFR 104 ml/min/1 73sq m     Narrative:      [place] guidelines for Chronic Kidney Disease (CKD):   •  Stage 1 with normal or high GFR (GFR > 90 mL/min/1 73 square meters)  •  Stage 2 Mild CKD (GFR = 60-89 mL/min/1 73 square meters)  •  Stage 3A Moderate CKD (GFR = 45-59 mL/min/1 73 square meters)  •  Stage 3B Moderate CKD (GFR = 30-44 mL/min/1 73 square meters)  •  Stage 4 Severe CKD (GFR = 15-29 mL/min/1 73 square meters)  •  Stage 5 End Stage CKD (GFR <15 mL/min/1 73 square meters)  Note: GFR calculation is accurate only with a steady state creatinine    Hemoglobin and hematocrit, blood [[id number]]  (Normal) Collected: 01/11/23 0522    Lab Status: Final result Specimen: Blood from Arm, Left Updated: 01/11/23 0529     Hemoglobin 14 5 g/dL      Hematocrit 42 5 %     Hemoglobin and hematocrit, blood [[id number]]  (Normal) Collected: 01/11/23 0522    Lab Status: Final result Specimen: Blood from Arm, Left Updated: 01/11/23 0529     Hemoglobin 14 8 g/dL      Hematocrit 43 4 %     FLU/RSV/COVID - if FLU/RSV clinically relevant [[id number]]  (Normal) Collected: 01/10/23 2231    Lab Status: Final result Specimen: Nares from Nose Updated: 01/10/23 2317     SARS-CoV-2 Negative     INFLUENZA A PCR Negative     INFLUENZA B PCR Negative     RSV PCR Negative    Narrative:      FOR PEDIATRIC PATIENTS - copy/paste COVID Guidelines URL to browser: [email or web address]  [employer]    SARS-CoV-2 assay is a Nucleic Acid Amplification assay intended for the  qualitative detection of nucleic acid from SARS-CoV-2 in nasopharyngeal  swabs  Results are for the presumptive identification of SARS-CoV-2 RNA  Positive results are indicative of infection with SARS-CoV-2, the virus  causing COVID-19, but do not rule out bacterial infection or co-infection  with other viruses  Laboratories within the [place] and its  territories are required to report all positive results to the appropriate  public health authorities  Negative results do not preclude SARS-CoV-2  infection and should not be used as the sole basis for treatment or other  patient management decisions  Negative results must be combined with  clinical observations, patient history, and epidemiological information  This test has not been FDA cleared or approved  This test has been authorized by FDA under an Emergency Use Authorization  (EUA)   This test is only authorized for the duration of time the  declaration that circumstances exist justifying the authorization of the  emergency use of an in vitro diagnostic tests for detection of SARS-CoV-2  virus and/or diagnosis of COVID-19 infection under section 564(b)(1) of  the Act, 21 U  S C  612ISQ-5(J)(5), unless the authorization is terminated  or revoked sooner  The test has been validated but independent review by FDA  and CLIA is pending  Test performed using [employer] GeneXpert: This RT-PCR assay targets N2,  a region unique to SARS-CoV-2  A conserved region in the E-gene was chosen  for pan-Sarbecovirus detection which includes SARS-CoV-2  According to CMS-2020-01-R, this platform meets the definition of high-throughput technology      Lipase [[id number]]  (Normal) Collected: 01/10/23 2031    Lab Status: Final result Specimen: Blood from Arm, Right Updated: 01/10/23 2055     Lipase 85 u/L     Comprehensive metabolic panel [[id number]] Collected: 01/10/23 2031    Lab Status: Final result Specimen: Blood from Arm, Right Updated: 01/10/23 2054     Sodium 138 mmol/L      Potassium 4 2 mmol/L      Chloride 101 mmol/L      CO2 26 mmol/L      ANION GAP 11 mmol/L      BUN 17 mg/dL      Creatinine 0 70 mg/dL      Glucose 96 mg/dL      Calcium 8 8 mg/dL      AST 24 U/L      ALT 29 U/L      Alkaline Phosphatase 98 U/L      Total Protein 7 9 g/dL      Albumin 4 1 g/dL      Total Bilirubin 0 45 mg/dL      eGFR 99 ml/min/1 73sq m     Narrative:      [place] guidelines for Chronic Kidney Disease (CKD):   •  Stage 1 with normal or high GFR (GFR > 90 mL/min/1 73 square meters)  •  Stage 2 Mild CKD (GFR = 60-89 mL/min/1 73 square meters)  •  Stage 3A Moderate CKD (GFR = 45-59 mL/min/1 73 square meters)  •  Stage 3B Moderate CKD (GFR = 30-44 mL/min/1 73 square meters)  •  Stage 4 Severe CKD (GFR = 15-29 mL/min/1 73 square meters)  •  Stage 5 End Stage CKD (GFR <15 mL/min/1 73 square meters)  Note: GFR calculation is accurate only with a steady state creatinine    CBC and differential [[id number]]  (Abnormal) Collected: 01/10/23 2031    Lab Status: Final result Specimen: Blood from Arm, Right Updated: 01/10/23 2036     WBC 13 72 Thousand/uL      RBC 4 57 Million/uL      Hemoglobin 14 9 g/dL      Hematocrit 43 4 %      MCV 95 fL      MCH 32 6 pg      MCHC 34 3 g/dL      RDW 12 6 %      MPV 9 2 fL      Platelets 891 Thousands/uL      nRBC 0 /100 WBCs      Neutrophils Relative 68 %      Immat GRANS % 0 %      Lymphocytes Relative 21 %      Monocytes Relative 6 %      Eosinophils Relative 5 %      Basophils Relative 0 %      Neutrophils Absolute 9 19 Thousands/µL      Immature Grans Absolute 0 06 Thousand/uL      Lymphocytes Absolute 2 90 Thousands/µL      Monocytes Absolute 0 87 Thousand/µL      Eosinophils Absolute 0 65 Thousand/µL      Basophils Absolute 0 05 Thousands/µL                  CT abdomen pelvis with contrast   Final Result by Scottie Stack MD (01/11 0032)      No evidence of bowel obstruction, colitis, diverticulitis or appendicitis  Workstation performed: [id number]                    Procedures  Procedures         ED Course                               SBIRT 20yo+    Flowsheet Row Most Recent Value   SBIRT (23 yo +)    In order to provide better care to our patients, we are screening all of our patients for alcohol and drug use  Would it be okay to ask you these screening questions? No Filed at: 01/10/2023 2232                    Medical Decision Making  Patient presenting for evaluation of melena, generalized abdominal pain, fatigue, generalized malaise  Upon arrival, patient appears comfortable  He is not in any acute distress  Vital signs are unremarkable  On exam patient does have generalized abdominal tenderness without rebound or guarding  Had positive Hemoccult at his primary care provider's office today  Hemoglobin stable at 14 9  Remainder of labs unremarkable  CT of the abdomen pelvis not reveal any acute pathology  Patient was admitted to medicine for GI consult, possible endoscopy/colonoscopy for presumptive upper GI bleed  He is currently in stable condition      Generalized weakness: acute illness or injury  Melena: acute illness or injury  Amount and/or Complexity of Data Reviewed  Labs: ordered  Radiology: ordered  Risk  Prescription drug management  Decision regarding hospitalization  Disposition  Final diagnoses:   Melena   Generalized weakness     Time reflects when diagnosis was documented in both MDM as applicable and the Disposition within this note     Time User Action Codes Description Comment    1/11/2023 12:38 AM Jaja Snowball Add [K92 1] Melena     1/11/2023 12:38 AM Jaja Snowball Add [R53 1] Generalized weakness     1/11/2023  3:20 PM Javier Mantle Add [R19 5] Dark stools     1/12/2023  2:42 PM Sherrill Leos Modify [K92 1] Melena     1/12/2023  2:42 PM Elbert, [address] [R53 1] Generalized weakness     1/12/2023  2:42 PM Sherrill Leos Modify [R19 5] Dark stools       ED Disposition     ED Disposition   Admit    Condition   Stable    Date/Time   Wed Jan 11, 2023 12:38 AM    Comment   Case was discussed with MIRANDA and the patient's admission status was agreed to be Admission Status: observation status to the service of Dr Keith Mittal              Follow-up Information     Follow up With Specialties Details Why Contact Info Additional [address],  Family Medicine Follow up  [address] [facility] [address]  [facility] Follow up in 1 month(s)  [address] [facility] [place], [address] [facility], [address] [place], [place], [address] [facility]           Discharge Medication List as of 1/12/2023  5:53 PM      CONTINUE these medications which have NOT CHANGED    Details   ARIPiprazole (ABILIFY) 10 mg tablet Take 10 mg by mouth daily, Historical Med      methocarbamol (ROBAXIN) 500 mg tablet Take 1 tablet (500 mg total) by mouth 2 (two) times a day, Starting Sun 4/17/2022, Normal      morphine 10 mg/5 mL solution Take by mouth every 2 (two) hours as needed for severe pain, Historical Med      !! oxyCODONE (ROXICODONE) 10 MG TABS every 6 (six) hours as needed, Starting Sat 6/11/2016, Historical Med      oxyCODONE-acetaminophen (PERCOCET)  mg per tablet Take 1 tablet by mouth every 4 (four) hours as needed for moderate pain, Historical Med      nitroglycerin (NITRODUR) 0 1 mg/hr Place on the skin, Historical Med      Omeprazole 20 MG TBEC Take 20 mg by mouth, Starting Mon 2/5/2018, Historical Med      !! oxyCODONE (ROXICODONE) 10 MG TABS Take 1 tablet (10 mg total) by mouth every 6 (six) hours as needed for moderate pain for up to 20 doses Max Daily Amount: 40 mg, Starting Sun 2/13/2022, Normal      sertraline (ZOLOFT) 100 mg tablet Take 50 mg by mouth daily, Historical Med      tadalafil (CIALIS) 20 MG tablet Take 20 mg by mouth, Starting Tue 9/15/2015, Historical Med       !! - Potential duplicate medications found  Please discuss with provider  STOP taking these medications       ibuprofen (MOTRIN) 800 mg tablet Comments:   Reason for Stopping:               Outpatient Discharge Orders   CBC and Platelet   Standing Status: Future Standing Exp   Date: 01/12/24       PDMP Review     None          ED Provider  Electronically Signed by           Kristin Rainey PA-C  01/14/23 8438

## 2023-01-11 NOTE — ASSESSMENT & PLAN NOTE
Patient reports having dark stools for the past 6 days, with associated nonspecific generalized abdominal pain, weakness, and fatigue  Reports seeing his outpatient PCP today, where he had a positive FOBT  Denies other symptoms/complaint at this time      /65 (BP Location: Left arm)   Pulse 77   Temp 98 °F (36 7 °C) (Tympanic)   Resp 18   Ht 5' 8" (1 727 m)   Wt 74 8 kg (165 lb)   SpO2 95%   BMI 25 09 kg/m²     · Reporting dark stools for the past 6 days  · There is associated nonspecific generalized abdominal pain, weakness, and fatigue  · Hgb 14 9  · Reports seeing outpatient PCP today and having positive FOBT  · Not currently taking outpatient iron supplementation or Pepto-Bismol  · CT abd/pelv without acute process  · IV protonix 40mg BID ordered  · NPO  · q8H HGB monitoring ordered   · We will check iron panel for completeness  · GI consulted; recommendations appreciated

## 2023-01-11 NOTE — CONSULTS
Consultation - 126 [facility]  Nona Dominguez 74 y o  male MRN: [id number]  Unit/Bed#: ED 25 Encounter: [id number]      Inpatient consult to gastroenterology  Consult performed by: Monty Knight PA-C  Consult ordered by: Vladislav Garcia PA-C          Reason for Consult / Principal Problem: Melena    HPI: Nona Dominguez is a 74y o  year old male with a PMH of asthma, bipolar disorder, chronic pain who presented to the ED with complaints of black stool  Patient reports that he has been having black stools for one week now  He denies any rectal bleeding  He reports associated abdominal discomfort, poor appetite, and nausea  No hematemesis  He denies Pepto bismuth ingestion or iron ingestion  He reports a long history of GERD x years and takes Omeprazole daily  He denies frequent NSAIDs  He had an EGD in 12/2019 which was normal and biopsies of the stomach were negative for h pylori  He also had a colonoscopy in 12/2019 which showed diverticulosis and scattered erythema in the sigmoid colon consistent with a resolving diverticulitis  He reports he does not know his family history as he was adopted  He reports he saw his PCP and had a FOBT which was positive  REVIEW OF SYSTEMS:    CONSTITUTIONAL: Denies any fever, chills, or rigors  HEENT: No earache or tinnitus  Denies hearing loss or visual disturbances  CARDIOVASCULAR: No chest pain or palpitations  RESPIRATORY: Denies any cough, hemoptysis, shortness of breath or dyspnea on exertion  GASTROINTESTINAL: As noted in the History of Present Illness  GENITOURINARY: No problems with urination  Denies any hematuria or dysuria  NEUROLOGIC: No dizziness or vertigo, denies headaches  MUSCULOSKELETAL: +Chronic pain  SKIN: Denies skin rashes or itching  ENDOCRINE: Denies excessive thirst  Denies intolerance to heat or cold  PSYCHOSOCIAL: Denies depression or anxiety  Denies any recent memory loss       Historical Information Past Medical History:   Diagnosis Date   • Anxiety    • Asthma    • Bipolar 1 disorder ([facility] [address] )    • Depression      Past Surgical History:   Procedure Laterality Date   • ANTERIOR FUSION CERVICAL SPINE     • CATARACT EXTRACTION     • CHOLECYSTECTOMY     • EYE SURGERY     • LUMBAR FUSION     • SPINE SURGERY       Social History   Social History     Substance and Sexual Activity   Alcohol Use Not Currently     Social History     Substance and Sexual Activity   Drug Use Yes   • Frequency: 5 0 times per week   • Types: Marijuana    Comment: 3 days ago     Social History     Tobacco Use   Smoking Status Never   Smokeless Tobacco Never     Family History   Adopted: Yes       Meds/Allergies     (Not in a hospital admission)    Current Facility-Administered Medications   Medication Dose Route Frequency   • acetaminophen (TYLENOL) tablet 650 mg  650 mg Oral Q6H PRN   • albuterol (PROVENTIL HFA,VENTOLIN HFA) inhaler 2 puff  2 puff Inhalation Q4H PRN   • oxyCODONE (ROXICODONE) immediate release tablet 10 mg  10 mg Oral Q6H PRN   • pantoprazole (PROTONIX) injection 40 mg  40 mg Intravenous Q12H [address]   • sodium chloride 0 9 % infusion  75 mL/hr Intravenous Continuous       Allergies   Allergen Reactions   • Demerol [Meperidine]        Objective   Blood pressure 120/68, pulse 60, temperature 97 7 °F (36 5 °C), temperature source Oral, resp  rate 18, height 5' 8" (1 727 m), weight 74 8 kg (165 lb), SpO2 93 %  No intake or output data in the 24 hours ending 01/11/23 0944      PHYSICAL EXAM:      General Appearance:   Alert, cooperative, no distress, appears stated age    HEENT:   Normocephalic, atraumatic, anicteric    Neck:  Supple, symmetrical, trachea midline, no adenopathy;    thyroid: no enlargement/tenderness/nodules; no carotid  bruit or JVD    Lungs:   Clear to auscultation bilaterally; no rales, rhonchi or wheezing; respirations unlabored    Heart[de-identified]   S1 and S2 normal; regular rate and rhythm; no murmur, rub, or gallop  Abdomen:   Soft, non-tender, non-distended; normal bowel sounds; no masses, no organomegaly    Genitalia:   Deferred    Rectal:   Deferred    Extremities:  No cyanosis, clubbing or edema    Pulses:  2+ and symmetric all extremities    Skin:  Skin color, texture, turgor normal, no rashes or lesions    Lymph nodes:  No palpable cervical, axillary or inguinal lymphadenopathy        Lab Results:   Admission on 01/10/2023   Component Date Value   • WBC 01/10/2023 13 72 (H)    • RBC 01/10/2023 4 57    • Hemoglobin 01/10/2023 14 9    • Hematocrit 01/10/2023 43 4    • MCV 01/10/2023 95    • MCH 01/10/2023 32 6    • MCHC 01/10/2023 34 3    • RDW 01/10/2023 12 6    • MPV 01/10/2023 9 2    • Platelets 14/72/9178 215    • nRBC 01/10/2023 0    • Neutrophils Relative 01/10/2023 68    • Immat GRANS % 01/10/2023 0    • Lymphocytes Relative 01/10/2023 21    • Monocytes Relative 01/10/2023 6    • Eosinophils Relative 01/10/2023 5    • Basophils Relative 01/10/2023 0    • Neutrophils Absolute 01/10/2023 9 19 (H)    • Immature Grans Absolute 01/10/2023 0 06    • Lymphocytes Absolute 01/10/2023 2 90    • Monocytes Absolute 01/10/2023 0 87    • Eosinophils Absolute 01/10/2023 0 65 (H)    • Basophils Absolute 01/10/2023 0 05    • Sodium 01/10/2023 138    • Potassium 01/10/2023 4 2    • Chloride 01/10/2023 101    • CO2 01/10/2023 26    • ANION GAP 01/10/2023 11    • BUN 01/10/2023 17    • Creatinine 01/10/2023 0 70    • Glucose 01/10/2023 96    • Calcium 01/10/2023 8 8    • AST 01/10/2023 24    • ALT 01/10/2023 29    • Alkaline Phosphatase 01/10/2023 98    • Total Protein 01/10/2023 7 9    • Albumin 01/10/2023 4 1    • Total Bilirubin 01/10/2023 0 45    • eGFR 01/10/2023 99    • Lipase 01/10/2023 85    • SARS-CoV-2 01/10/2023 Negative    • INFLUENZA A PCR 01/10/2023 Negative    • INFLUENZA B PCR 01/10/2023 Negative    • RSV PCR 01/10/2023 Negative    • Hemoglobin 01/11/2023 14 5    • Hematocrit 01/11/2023 42 5    • Sodium 01/11/2023 138 • Potassium 01/11/2023 3 8    • Chloride 01/11/2023 104    • CO2 01/11/2023 27    • ANION GAP 01/11/2023 7    • BUN 01/11/2023 12    • Creatinine 01/11/2023 0 62    • Glucose 01/11/2023 100    • Calcium 01/11/2023 8 5    • eGFR 01/11/2023 104    • Hemoglobin 01/11/2023 14 8    • Hematocrit 01/11/2023 43 4        Imaging Studies: I have personally reviewed pertinent imaging studies  ASSESSMENT & PLAN:    Melena  Abdominal pain  Nausea  GERD  Weight loss    - Patient presents with complaints of melena x 1 week  He reports associated abdominal discomfort, nausea, and poor appetite  He reports a weight loss of 18 lbs  - CT Scan A/P with contrast on admission showed no acute abnormalities  - HGB is normal at 14 8   - Pantoprazole 40mg IV BID   - Monitor H&H closely  - Supportive care  - Will plan for EGD to investigate this afternoon for PUD, erosive gastritis, malignancy, etc   NPO for the procedure  Thank you for this consultation  The patient will be seen and evaluated by Dr Emily Berry PA-C  1/11/2023,9:44 AM

## 2023-01-11 NOTE — ANESTHESIA POSTPROCEDURE EVALUATION
Post-Op Assessment Note    CV Status:  Stable    Pain management: adequate     Mental Status:  Arousable and sleepy   Hydration Status:  Euvolemic   PONV Controlled:  Controlled   Airway Patency:  Patent      Post Op Vitals Reviewed: Yes      Staff: CRNA, Anesthesiologist         No notable events documented      BP   93/52   Temp      Pulse 67   Resp 14   SpO2 99%

## 2023-01-11 NOTE — ANESTHESIA PREPROCEDURE EVALUATION
Procedure:  NEVAEH Quinteros is a 74y o  year old male with a PMH of asthma, bipolar disorder, chronic pain who presented to the ED with complaints of black stool  Patient reports that he has been having black stools for one week now  Relevant Problems   ANESTHESIA (within normal limits)      GI/HEPATIC  NPO confirmed   (+) Gastroesophageal reflux disease      NEURO/PSYCH   (+) Anxiety      PULMONARY   (+) Asthma   (-) URI (upper respiratory infection)      Other   (+) Bipolar 1 disorder (HCC)   (+) Dark stools      Current Outpatient Medications   Medication Instructions   • ARIPiprazole (ABILIFY) 10 mg, Oral, Daily   • ibuprofen (MOTRIN) 800 mg, Oral, 3 times daily   • methocarbamol (ROBAXIN) 500 mg, Oral, 2 times daily   • morphine 10 mg/5 mL solution Oral, Every 2 hour PRN   • nitroglycerin (NITRODUR) 0 1 mg/hr Transdermal   • Omeprazole 20 mg, Oral   • oxyCODONE (ROXICODONE) 10 MG TABS Every 6 hours PRN   • oxyCODONE (ROXICODONE) 10 mg, Oral, Every 6 hours PRN   • oxyCODONE-acetaminophen (PERCOCET)  mg per tablet 1 tablet, Oral, Every 4 hours PRN   • sertraline (ZOLOFT) 50 mg, Oral, Daily   • tadalafil (CIALIS) 20 mg, Oral     Lab Results   Component Value Date    WBC 13 72 (H) 01/10/2023    HGB 14 5 01/11/2023    HGB 14 8 01/11/2023    HCT 42 5 01/11/2023    HCT 43 4 01/11/2023     01/10/2023    SODIUM 138 01/11/2023    K 3 8 01/11/2023     01/11/2023    CO2 27 01/11/2023    BUN 12 01/11/2023    CREATININE 0 62 01/11/2023    GLUC 100 01/11/2023    HGBA1C 5 2 05/14/2021     Lab Results   Component Value Date    AST 24 01/10/2023    ALT 29 01/10/2023    ALKPHOS 98 01/10/2023    TBILI 0 45 01/10/2023    ALB 4 1 01/10/2023         Physical Exam    Airway    Mallampati score:  I  TM Distance: >3 FB  Neck ROM: full     Dental   Comment: Denies loose teeth, No notable dental hx     Cardiovascular  Rhythm: regular, Rate: normal, Cardiovascular exam normal    Pulmonary  Pulmonary exam normal Breath sounds clear to auscultation,     Other Findings        Anesthesia Plan  ASA Score- 2     Anesthesia Type- IV sedation with anesthesia with ASA Monitors  Additional Monitors:   Airway Plan:           Plan Factors-Exercise tolerance (METS): >4 METS  Chart reviewed  EKG reviewed  Existing labs reviewed  Patient summary reviewed  Patient is not a current smoker  Induction- intravenous  Postoperative Plan-     Informed Consent- Anesthetic plan and risks discussed with patient

## 2023-01-11 NOTE — ASSESSMENT & PLAN NOTE
Patient reports having dark stools for the past 6 days, with associated nonspecific generalized abdominal pain, weakness, and fatigue  Reports seeing his outpatient PCP today, where he had a positive FOBT  Denies other symptoms/complaint at this time      /73 (BP Location: Right arm)   Pulse 70   Temp 97 6 °F (36 4 °C) (Oral)   Resp 16   Ht 5' 8" (1 727 m)   Wt 74 8 kg (165 lb)   SpO2 92%   BMI 25 09 kg/m²     · Reporting dark stools for the past 6 days  · There is associated nonspecific generalized abdominal pain, weakness, and fatigue  · Hgb 14 9  · Reports seeing outpatient PCP today and having positive FOBT  · Not currently taking outpatient iron supplementation or Pepto-Bismol  · CT abd/pelv without acute process  · IV protonix 40mg BID ordered  · NPO  · q8H HGB monitoring ordered   · We will check iron panel for completeness  · GI consulted; recommendations appreciated   · Plan for EGD today and colonoscopy tomorrow

## 2023-01-11 NOTE — PROGRESS NOTES
[address] [facility]  Progress Note Yesenia Done 1955, 74 y o  male MRN: [id number]  Unit/Bed#: -01 Encounter: [id number]  Primary Care Provider: Shiloh Nielsen DO   Date and time admitted to hospital: 1/10/2023  9:37 PM    Gastroesophageal reflux disease  Assessment & Plan  Continue w/ IV protonix    Bipolar 1 disorder ([address] )  Assessment & Plan  · Holding home sertraline and Abilify while NPO  · Continue outpatient follow-up    Asthma  Assessment & Plan  · Does not appear to be in acute exacerbation  · Continue home as needed albuterol    * Dark stools  Assessment & Plan  Patient reports having dark stools for the past 6 days, with associated nonspecific generalized abdominal pain, weakness, and fatigue  Reports seeing his outpatient PCP today, where he had a positive FOBT  Denies other symptoms/complaint at this time  /73 (BP Location: Right arm)   Pulse 70   Temp 97 6 °F (36 4 °C) (Oral)   Resp 16   Ht 5' 8" (1 727 m)   Wt 74 8 kg (165 lb)   SpO2 92%   BMI 25 09 kg/m²     · Reporting dark stools for the past 6 days  · There is associated nonspecific generalized abdominal pain, weakness, and fatigue  · Hgb 14 9  · Reports seeing outpatient PCP today and having positive FOBT  · Not currently taking outpatient iron supplementation or Pepto-Bismol  · CT abd/pelv without acute process  · IV protonix 40mg BID ordered  · NPO  · q8H HGB monitoring ordered   · We will check iron panel for completeness  · GI consulted; recommendations appreciated   · Plan for EGD today and colonoscopy tomorrow            VTE Pharmacologic Prophylaxis: VTE Score: 3 Moderate Risk (Score 3-4) - Pharmacological DVT Prophylaxis Ordered: heparin  Patient Centered Rounds: I performed bedside rounds with nursing staff today  Discussions with Specialists or Other Care Team Provider: CM    Education and Discussions with Family / Patient: PT      Time Spent for Care: 60 minutes   More than 50% of total time spent on counseling and coordination of care as described above  Current Length of Stay: 0 day(s)  Current Patient Status: Inpatient   Certification Statement: The patient will continue to require additional inpatient hospital stay due to colonscopy tomorrow  Discharge Plan: Anticipate discharge in 24-48 hrs to home  Code Status: Level 1 - Full Code    Subjective:   Pt continues to have dark stools  Last episode last night  Denies nausea, vomiting, diarrhea, constipation, fevers or chills  All other ROS negative at this time  Objective:     Vitals:   Temp (24hrs), Av 8 °F (36 6 °C), Min:97 6 °F (36 4 °C), Max:98 1 °F (36 7 °C)    Temp:  [97 6 °F (36 4 °C)-98 1 °F (36 7 °C)] 97 6 °F (36 4 °C)  HR:  [58-77] 62  Resp:  [16-18] 18  BP: ()/(50-90) 131/78  SpO2:  [92 %-97 %] 93 %  Body mass index is 25 09 kg/m²  Input and Output Summary (last 24 hours):   No intake or output data in the 24 hours ending 23 1725    Physical Exam:   Physical Exam  Vitals reviewed  Constitutional:       General: He is not in acute distress  Appearance: He is well-developed  He is not diaphoretic  HENT:      Head: Normocephalic and atraumatic  Mouth/Throat:      Pharynx: No oropharyngeal exudate  Eyes:      General: No scleral icterus  Extraocular Movements: Extraocular movements intact  Conjunctiva/sclera: Conjunctivae normal    Neck:      Vascular: No JVD  Trachea: No tracheal deviation  Cardiovascular:      Rate and Rhythm: Normal rate and regular rhythm  Heart sounds: No murmur heard  No friction rub  No gallop  Pulmonary:      Effort: Pulmonary effort is normal  No respiratory distress  Breath sounds: No stridor  No wheezing  Abdominal:      General: There is no distension  Palpations: Abdomen is soft  There is no mass  Tenderness: There is no abdominal tenderness  There is no right CVA tenderness or left CVA tenderness     Musculoskeletal: General: No tenderness  Normal range of motion  Right lower leg: No edema  Left lower leg: No edema  Skin:     General: Skin is warm and dry  Coloration: Skin is not pale  Findings: No erythema  Neurological:      Mental Status: He is alert and oriented to person, place, and time  Psychiatric:         Behavior: Behavior normal          Thought Content: Thought content normal           Additional Data:     Labs:  Results from last 7 days   Lab Units 01/11/23  0522 01/10/23  2031   WBC Thousand/uL  --  13 72*   HEMOGLOBIN g/dL 14 5  14 8 14 9   HEMATOCRIT % 42 5  43 4 43 4   PLATELETS Thousands/uL  --  215   NEUTROS PCT %  --  68   LYMPHS PCT %  --  21   MONOS PCT %  --  6   EOS PCT %  --  5     Results from last 7 days   Lab Units 01/11/23  0522 01/10/23  2031   SODIUM mmol/L 138 138   POTASSIUM mmol/L 3 8 4 2   CHLORIDE mmol/L 104 101   CO2 mmol/L 27 26   BUN mg/dL 12 17   CREATININE mg/dL 0 62 0 70   ANION GAP mmol/L 7 11   CALCIUM mg/dL 8 5 8 8   ALBUMIN g/dL  --  4 1   TOTAL BILIRUBIN mg/dL  --  0 45   ALK PHOS U/L  --  98   ALT U/L  --  29   AST U/L  --  24   GLUCOSE RANDOM mg/dL 100 96                       Lines/Drains:  Invasive Devices     Peripheral Intravenous Line  Duration           Peripheral IV 01/10/23 Left Antecubital <1 day                      Imaging: No pertinent imaging reviewed      Recent Cultures (last 7 days):         Last 24 Hours Medication List:   Current Facility-Administered Medications   Medication Dose Route Frequency Provider Last Rate   • acetaminophen  650 mg Oral Q6H PRN Medina Bae DO     • albuterol  2 puff Inhalation Q4H PRN Medina Bae DO     • oxyCODONE  10 mg Oral Q6H PRN Medina Daub, DO     • pantoprazole  40 mg Intravenous Q12H [facility] Medina Bae DO     • sodium chloride  75 mL/hr Intravenous Continuous Maciejrian Daub, DO 75 mL/hr (01/11/23 1632)        Today, Patient Was Seen By: Ml Avendano DO    **Please Note: This note may have been constructed using a voice recognition system  **

## 2023-01-12 ENCOUNTER — APPOINTMENT (INPATIENT)
Dept: GASTROENTEROLOGY | Facility: HOSPITAL | Age: 68
End: 2023-01-12
Attending: INTERNAL MEDICINE

## 2023-01-12 ENCOUNTER — APPOINTMENT (OUTPATIENT)
Dept: PHYSICAL THERAPY | Facility: CLINIC | Age: 68
End: 2023-01-12

## 2023-01-12 ENCOUNTER — ANESTHESIA EVENT (INPATIENT)
Dept: GASTROENTEROLOGY | Facility: HOSPITAL | Age: 68
End: 2023-01-12

## 2023-01-12 ENCOUNTER — ANESTHESIA (INPATIENT)
Dept: GASTROENTEROLOGY | Facility: HOSPITAL | Age: 68
End: 2023-01-12

## 2023-01-12 VITALS
OXYGEN SATURATION: 94 % | HEART RATE: 67 BPM | WEIGHT: 165 LBS | RESPIRATION RATE: 15 BRPM | TEMPERATURE: 97.8 F | BODY MASS INDEX: 25.01 KG/M2 | SYSTOLIC BLOOD PRESSURE: 119 MMHG | HEIGHT: 68 IN | DIASTOLIC BLOOD PRESSURE: 58 MMHG

## 2023-01-12 LAB
ANION GAP SERPL CALCULATED.3IONS-SCNC: 8 MMOL/L (ref 4–13)
BUN SERPL-MCNC: 9 MG/DL (ref 5–25)
CALCIUM SERPL-MCNC: 8.4 MG/DL (ref 8.3–10.1)
CHLORIDE SERPL-SCNC: 104 MMOL/L (ref 96–108)
CO2 SERPL-SCNC: 27 MMOL/L (ref 21–32)
CREAT SERPL-MCNC: 0.55 MG/DL (ref 0.6–1.3)
GFR SERPL CREATININE-BSD FRML MDRD: 109 ML/MIN/1.73SQ M
GLUCOSE SERPL-MCNC: 89 MG/DL (ref 65–140)
HCT VFR BLD AUTO: 40.7 % (ref 36.5–49.3)
HGB BLD-MCNC: 13.9 G/DL (ref 12–17)
POTASSIUM SERPL-SCNC: 3.6 MMOL/L (ref 3.5–5.3)
SODIUM SERPL-SCNC: 139 MMOL/L (ref 135–147)

## 2023-01-12 PROCEDURE — 0DBP8ZX EXCISION OF RECTUM, VIA NATURAL OR ARTIFICIAL OPENING ENDOSCOPIC, DIAGNOSTIC: ICD-10-PCS | Performed by: INTERNAL MEDICINE

## 2023-01-12 RX ORDER — PROPOFOL 10 MG/ML
INJECTION, EMULSION INTRAVENOUS AS NEEDED
Status: DISCONTINUED | OUTPATIENT
Start: 2023-01-12 | End: 2023-01-12

## 2023-01-12 RX ORDER — PROPOFOL 10 MG/ML
INJECTION, EMULSION INTRAVENOUS CONTINUOUS PRN
Status: DISCONTINUED | OUTPATIENT
Start: 2023-01-12 | End: 2023-01-12

## 2023-01-12 RX ORDER — SODIUM CHLORIDE, SODIUM LACTATE, POTASSIUM CHLORIDE, CALCIUM CHLORIDE 600; 310; 30; 20 MG/100ML; MG/100ML; MG/100ML; MG/100ML
INJECTION, SOLUTION INTRAVENOUS CONTINUOUS PRN
Status: DISCONTINUED | OUTPATIENT
Start: 2023-01-12 | End: 2023-01-12

## 2023-01-12 RX ORDER — LIDOCAINE HYDROCHLORIDE 20 MG/ML
INJECTION, SOLUTION EPIDURAL; INFILTRATION; INTRACAUDAL; PERINEURAL AS NEEDED
Status: DISCONTINUED | OUTPATIENT
Start: 2023-01-12 | End: 2023-01-12

## 2023-01-12 RX ADMIN — PROPOFOL 50 MG: 10 INJECTION, EMULSION INTRAVENOUS at 14:27

## 2023-01-12 RX ADMIN — PANTOPRAZOLE SODIUM 40 MG: 40 INJECTION, POWDER, FOR SOLUTION INTRAVENOUS at 08:25

## 2023-01-12 RX ADMIN — PROPOFOL 50 MG: 10 INJECTION, EMULSION INTRAVENOUS at 14:33

## 2023-01-12 RX ADMIN — PROPOFOL 30 MG: 10 INJECTION, EMULSION INTRAVENOUS at 14:30

## 2023-01-12 RX ADMIN — LIDOCAINE HYDROCHLORIDE 100 MG: 20 INJECTION, SOLUTION EPIDURAL; INFILTRATION; INTRACAUDAL at 14:26

## 2023-01-12 RX ADMIN — SODIUM CHLORIDE, SODIUM LACTATE, POTASSIUM CHLORIDE, AND CALCIUM CHLORIDE: .6; .31; .03; .02 INJECTION, SOLUTION INTRAVENOUS at 14:23

## 2023-01-12 RX ADMIN — PROPOFOL 50 MG: 10 INJECTION, EMULSION INTRAVENOUS at 14:37

## 2023-01-12 RX ADMIN — SODIUM CHLORIDE 75 ML/HR: 0.9 INJECTION, SOLUTION INTRAVENOUS at 15:38

## 2023-01-12 RX ADMIN — PROPOFOL 100 MG: 10 INJECTION, EMULSION INTRAVENOUS at 14:26

## 2023-01-12 RX ADMIN — OXYCODONE HYDROCHLORIDE 10 MG: 10 TABLET ORAL at 16:35

## 2023-01-12 RX ADMIN — PROPOFOL 20 MG: 10 INJECTION, EMULSION INTRAVENOUS at 14:28

## 2023-01-12 RX ADMIN — OXYCODONE HYDROCHLORIDE 10 MG: 10 TABLET ORAL at 08:25

## 2023-01-12 NOTE — PROGRESS NOTES
[address] [facility]  Progress Note Lyssa Phillips 1955, 74 y o  male MRN: [id number]  Unit/Bed#: -01 Encounter: [id number]  Primary Care Provider: Barry Magana DO   Date and time admitted to hospital: 1/10/2023  9:37 PM    Gastroesophageal reflux disease  Assessment & Plan  Continue w/ IV protonix    Bipolar 1 disorder ([address] )  Assessment & Plan  · Holding home sertraline and Abilify while NPO  · Continue outpatient follow-up    Asthma  Assessment & Plan  · Does not appear to be in acute exacerbation  · Continue home as needed albuterol    * Dark stools  Assessment & Plan  Patient reports having dark stools for the past 6 days, with associated nonspecific generalized abdominal pain, weakness, and fatigue  Reports seeing his outpatient PCP today, where he had a positive FOBT  Denies other symptoms/complaint at this time  /72   Pulse 61   Temp 98 °F (36 7 °C)   Resp 16   Ht 5' 8" (1 727 m)   Wt 74 8 kg (165 lb)   SpO2 93%   BMI 25 09 kg/m²     · Reporting dark stools for the past 6 days  · There is associated nonspecific generalized abdominal pain, weakness, and fatigue  · Hgb 14 9  · Reports seeing outpatient PCP today and having positive FOBT  · Not currently taking outpatient iron supplementation or Pepto-Bismol  · CT abd/pelv without acute process  · IV protonix 40mg BID ordered  · NPO  · q8H HGB monitoring ordered   · We will check iron panel for completeness  · GI consulted; recommendations appreciated   · EGD 1/11:Schatzki ring in the esophagus, otherwise normal  · Plan for colonoscopy 1/12          VTE Pharmacologic Prophylaxis: VTE Score: 3 Moderate Risk (Score 3-4) - Pharmacological DVT Prophylaxis Contraindicated  Sequential Compression Devices Ordered  Patient Centered Rounds: I performed bedside rounds with nursing staff today    Discussions with Specialists or Other Care Team Provider: CM    Education and Discussions with Family / Patient: Patient declined call to   Time Spent for Care: 60 minutes  More than 50% of total time spent on counseling and coordination of care as described above  Current Length of Stay: 1 day(s)  Current Patient Status: Inpatient   Certification Statement: The patient will continue to require additional inpatient hospital stay due to colonscopy today  Discharge Plan: Anticipate discharge later today or tomorrow to home  Code Status: Level 1 - Full Code    Subjective:   Pt had no acute events overnights  Denies nausea, vomiting, diarrhea,constipation, fevers or chills  All other ros negative at this time  Objective:     Vitals:   Temp (24hrs), Av 9 °F (36 6 °C), Min:97 6 °F (36 4 °C), Max:98 1 °F (36 7 °C)    Temp:  [97 6 °F (36 4 °C)-98 1 °F (36 7 °C)] 98 °F (36 7 °C)  HR:  [58-71] 61  Resp:  [16-18] 16  BP: ()/(50-84) 123/72  SpO2:  [92 %-97 %] 93 %  Body mass index is 25 09 kg/m²  Input and Output Summary (last 24 hours): Intake/Output Summary (Last 24 hours) at 2023 0859  Last data filed at 2023 0716  Gross per 24 hour   Intake 0 ml   Output --   Net 0 ml       Physical Exam:   Physical Exam  Vitals reviewed  Constitutional:       General: He is not in acute distress  Appearance: He is well-developed  He is not diaphoretic  HENT:      Head: Normocephalic and atraumatic  Mouth/Throat:      Pharynx: No oropharyngeal exudate  Eyes:      General: No scleral icterus  Extraocular Movements: Extraocular movements intact  Conjunctiva/sclera: Conjunctivae normal    Neck:      Vascular: No JVD  Trachea: No tracheal deviation  Cardiovascular:      Rate and Rhythm: Normal rate and regular rhythm  Heart sounds: No murmur heard  No friction rub  No gallop  Pulmonary:      Effort: Pulmonary effort is normal  No respiratory distress  Breath sounds: No stridor  No wheezing  Abdominal:      General: There is no distension        Palpations: Abdomen is soft  There is no mass  Tenderness: There is no abdominal tenderness  There is no right CVA tenderness or left CVA tenderness  Musculoskeletal:         General: No tenderness  Normal range of motion  Right lower leg: No edema  Left lower leg: No edema  Skin:     General: Skin is warm and dry  Coloration: Skin is not pale  Findings: No erythema  Neurological:      Mental Status: He is alert and oriented to person, place, and time  Psychiatric:         Behavior: Behavior normal          Thought Content: Thought content normal           Additional Data:     Labs:  Results from last 7 days   Lab Units 01/11/23  2058 01/11/23  0522 01/10/23  2031   WBC Thousand/uL  --   --  13 72*   HEMOGLOBIN g/dL 14 5   < > 14 9   HEMATOCRIT % 42 0   < > 43 4   PLATELETS Thousands/uL  --   --  215   NEUTROS PCT %  --   --  68   LYMPHS PCT %  --   --  21   MONOS PCT %  --   --  6   EOS PCT %  --   --  5    < > = values in this interval not displayed  Results from last 7 days   Lab Units 01/12/23  0445 01/11/23  0522 01/10/23  2031   SODIUM mmol/L 139   < > 138   POTASSIUM mmol/L 3 6   < > 4 2   CHLORIDE mmol/L 104   < > 101   CO2 mmol/L 27   < > 26   BUN mg/dL 9   < > 17   CREATININE mg/dL 0 55*   < > 0 70   ANION GAP mmol/L 8   < > 11   CALCIUM mg/dL 8 4   < > 8 8   ALBUMIN g/dL  --   --  4 1   TOTAL BILIRUBIN mg/dL  --   --  0 45   ALK PHOS U/L  --   --  98   ALT U/L  --   --  29   AST U/L  --   --  24   GLUCOSE RANDOM mg/dL 89   < > 96    < > = values in this interval not displayed  Lines/Drains:  Invasive Devices     Peripheral Intravenous Line  Duration           Peripheral IV 01/10/23 Left Antecubital 1 day                      Imaging: No pertinent imaging reviewed      Recent Cultures (last 7 days):         Last 24 Hours Medication List:   Current Facility-Administered Medications   Medication Dose Route Frequency Provider Last Rate   • acetaminophen  650 mg Oral Q6H PRN Sherrian Daub, DO     • albuterol  2 puff Inhalation Q4H PRN Sherrian Daub, DO     • oxyCODONE  10 mg Oral Q6H PRN Maciejrian Daub, DO     • pantoprazole  40 mg Intravenous Q12H [facility] Sherribasilio Daub, DO     • sodium chloride  75 mL/hr Intravenous Continuous Sherrian Daub, DO 75 mL/hr (01/11/23 7622)        Today, Patient Was Seen By: Ml Avendano DO    **Please Note: This note may have been constructed using a voice recognition system  **

## 2023-01-12 NOTE — PLAN OF CARE
Problem: PAIN - ADULT  Goal: Verbalizes/displays adequate comfort level or baseline comfort level  Description: Interventions:  - Encourage patient to monitor pain and request assistance  - Assess pain using appropriate pain scale  - Administer analgesics based on type and severity of pain and evaluate response  - Implement non-pharmacological measures as appropriate and evaluate response  - Consider cultural and social influences on pain and pain management  - Notify physician/advanced practitioner if interventions unsuccessful or patient reports new pain  Outcome: Progressing     Problem: INFECTION - ADULT  Goal: Absence or prevention of progression during hospitalization  Description: INTERVENTIONS:  - Assess and monitor for signs and symptoms of infection  - Monitor lab/diagnostic results  - Monitor all insertion sites, i e  indwelling lines, tubes, and drains  - Monitor endotracheal if appropriate and nasal secretions for changes in amount and color  - [place] appropriate cooling/warming therapies per order  - Administer medications as ordered  - Instruct and encourage patient and family to use good hand hygiene technique  - Identify and instruct in appropriate isolation precautions for identified infection/condition  Outcome: Progressing  Goal: Absence of fever/infection during neutropenic period  Description: INTERVENTIONS:  - Monitor WBC    Outcome: Progressing     Problem: DISCHARGE PLANNING  Goal: Discharge to home or other facility with appropriate resources  Description: INTERVENTIONS:  - Identify barriers to discharge w/patient and caregiver  - Arrange for needed discharge resources and transportation as appropriate  - Identify discharge learning needs (meds, wound care, etc )  - Arrange for interpretive services to assist at discharge as needed  - Refer to Case Management Department for coordinating discharge planning if the patient needs post-hospital services based on physician/advanced practitioner order or complex needs related to functional status, cognitive ability, or social support system  Outcome: Progressing     Problem: Knowledge Deficit  Goal: Patient/family/caregiver demonstrates understanding of disease process, treatment plan, medications, and discharge instructions  Description: Complete learning assessment and assess knowledge base    Interventions:  - Provide teaching at level of understanding  - Provide teaching via preferred learning methods  Outcome: Progressing

## 2023-01-12 NOTE — PLAN OF CARE
Problem: PAIN - ADULT  Goal: Verbalizes/displays adequate comfort level or baseline comfort level  Description: Interventions:  - Encourage patient to monitor pain and request assistance  - Assess pain using appropriate pain scale  - Administer analgesics based on type and severity of pain and evaluate response  - Implement non-pharmacological measures as appropriate and evaluate response  - Consider cultural and social influences on pain and pain management  - Notify physician/advanced practitioner if interventions unsuccessful or patient reports new pain  Outcome: Progressing     Problem: INFECTION - ADULT  Goal: Absence or prevention of progression during hospitalization  Description: INTERVENTIONS:  - Assess and monitor for signs and symptoms of infection  - Monitor lab/diagnostic results  - Monitor all insertion sites, i e  indwelling lines, tubes, and drains  - Monitor endotracheal if appropriate and nasal secretions for changes in amount and color  - [place] appropriate cooling/warming therapies per order  - Administer medications as ordered  - Instruct and encourage patient and family to use good hand hygiene technique  - Identify and instruct in appropriate isolation precautions for identified infection/condition  Outcome: Progressing  Goal: Absence of fever/infection during neutropenic period  Description: INTERVENTIONS:  - Monitor WBC    Outcome: Progressing     Problem: SAFETY ADULT  Goal: Patient will remain free of falls  Description: INTERVENTIONS:  - Educate patient/family on patient safety including physical limitations  - Instruct patient to call for assistance with activity   - Consult OT/PT to assist with strengthening/mobility   - Keep Call bell within reach  - Keep bed low and locked with side rails adjusted as appropriate  - Keep care items and personal belongings within reach  - Initiate and maintain comfort rounds  - Make Fall Risk Sign visible to staff  - Offer Toileting every 2 Hours, in advance of need  - Initiate/Maintain alarm  - Obtain necessary fall risk management equipment  - Apply yellow socks and bracelet for high fall risk patients  - Consider moving patient to room near nurses station  Outcome: Progressing  Goal: Maintain or return to baseline ADL function  Description: INTERVENTIONS:  -  Assess patient's ability to carry out ADLs; assess patient's baseline for ADL function and identify physical deficits which impact ability to perform ADLs (bathing, care of mouth/teeth, toileting, grooming, dressing, etc )  - Assess/evaluate cause of self-care deficits   - Assess range of motion  - Assess patient's mobility; develop plan if impaired  - Assess patient's need for assistive devices and provide as appropriate  - Encourage maximum independence but intervene and supervise when necessary  - Involve family in performance of ADLs  - Assess for home care needs following discharge   - Consider OT consult to assist with ADL evaluation and planning for discharge  - Provide patient education as appropriate  Outcome: Progressing  Goal: Maintains/Returns to pre admission functional level  Description: INTERVENTIONS:  - Perform BMAT or MOVE assessment daily    - Set and communicate daily mobility goal to care team and patient/family/caregiver  - Collaborate with rehabilitation services on mobility goals if consulted  - Perform Range of Motion 3 times a day  - Reposition patient every 2 hours    - Dangle patient 3 times a day  - Stand patient 3 times a day  - Ambulate patient 3 times a day  - Out of bed to chair 3 times a day   - Out of bed for meals 3 times a day  - Out of bed for toileting  - Record patient progress and toleration of activity level   Outcome: Progressing     Problem: DISCHARGE PLANNING  Goal: Discharge to home or other facility with appropriate resources  Description: INTERVENTIONS:  - Identify barriers to discharge w/patient and caregiver  - Arrange for needed discharge resources and transportation as appropriate  - Identify discharge learning needs (meds, wound care, etc )  - Arrange for interpretive services to assist at discharge as needed  - Refer to Case Management Department for coordinating discharge planning if the patient needs post-hospital services based on physician/advanced practitioner order or complex needs related to functional status, cognitive ability, or social support system  Outcome: Progressing     Problem: Knowledge Deficit  Goal: Patient/family/caregiver demonstrates understanding of disease process, treatment plan, medications, and discharge instructions  Description: Complete learning assessment and assess knowledge base  Interventions:  - Provide teaching at level of understanding  - Provide teaching via preferred learning methods  Outcome: Progressing     Problem: Nutrition/Hydration-ADULT  Goal: Nutrient/Hydration intake appropriate for improving, restoring or maintaining nutritional needs  Description: Monitor and assess patient's nutrition/hydration status for malnutrition  Collaborate with interdisciplinary team and initiate plan and interventions as ordered  Monitor patient's weight and dietary intake as ordered or per policy  Utilize nutrition screening tool and intervene as necessary  Determine patient's food preferences and provide high-protein, high-caloric foods as appropriate       INTERVENTIONS:  - Monitor oral intake, urinary output, labs, and treatment plans  - Assess nutrition and hydration status and recommend course of action  - Evaluate amount of meals eaten  - Assist patient with eating if necessary   - Allow adequate time for meals  - Recommend/ encourage appropriate diets, oral nutritional supplements, and vitamin/mineral supplements  - Order, calculate, and assess calorie counts as needed  - Recommend, monitor, and adjust tube feedings and TPN/PPN based on assessed needs  - Assess need for intravenous fluids  - Provide specific nutrition/hydration education as appropriate  - Include patient/family/caregiver in decisions related to nutrition  Outcome: Progressing

## 2023-01-12 NOTE — ANESTHESIA PREPROCEDURE EVALUATION
Procedure:  COLONOSCOPY    Relevant Problems   No relevant active problems     disorder (HCC)  Anxiety    Depression  Asthma           Physical Exam    Airway    Mallampati score: II  TM Distance: >3 FB  Neck ROM: full     Dental       Cardiovascular  Cardiovascular exam normal    Pulmonary  Pulmonary exam normal     Other Findings        Anesthesia Plan  ASA Score- 2     Anesthesia Type- IV sedation with anesthesia with ASA Monitors  Additional Monitors:   Airway Plan:           Plan Factors-Exercise tolerance (METS): >4 METS  Chart reviewed  EKG reviewed  Imaging results reviewed  Existing labs reviewed  Patient summary reviewed  Induction- intravenous  Postoperative Plan-     Informed Consent- Anesthetic plan and risks discussed with patient  I personally reviewed this patient with the CRNA  Discussed and agreed on the Anesthesia Plan with the CRNA  Jerry Hadley

## 2023-01-12 NOTE — DISCHARGE INSTRUCTIONS
GI will Contact the patient in 1 to 2 weeks with results of the polyp     High-fiber diet to include fruits, vegetables, and whole grain foods, daily  will plan on outpatient video capsule endoscopy

## 2023-01-12 NOTE — UTILIZATION REVIEW
NOTIFICATION OF INPATIENT ADMISSION   AUTHORIZATION REQUEST   SERVICING FACILITY:   [address] [place] [address]  Tax ID: [id number]  NPI: [id number] ATTENDING PROVIDER:  Attending Name and NPI#: Yasmin Knowles [[id number]]  Address: [address] [place] [address]  Phone: [phone]     ADMISSION INFORMATION:  Place of Service: [place] [address]  Place of Service Code: 21  Inpatient Admission Date/Time: 1/11/23  2:27 PM  Discharge Date/Time: No discharge date for patient encounter  Admitting Diagnosis Code/Description:  Melena [K92 1]  Rectal bleeding [K62 5]  Generalized weakness [R53 1]     UTILIZATION REVIEW CONTACT:  Teri Colon Utilization   Network Utilization Review Department  Phone: [phone]  Fax [phone]  Email: [email or web address]  Contact for approvals/pending authorizations, clinical reviews, and discharge  PHYSICIAN ADVISORY SERVICES:  Medical Necessity Denial & [place] Review  Phone: [phone]  Fax: [phone]  Email: [email or web address]

## 2023-01-12 NOTE — ASSESSMENT & PLAN NOTE
Patient reports having dark stools for the past 6 days, with associated nonspecific generalized abdominal pain, weakness, and fatigue  Reports seeing his outpatient PCP today, where he had a positive FOBT  Denies other symptoms/complaint at this time      /72   Pulse 61   Temp 98 °F (36 7 °C)   Resp 16   Ht 5' 8" (1 727 m)   Wt 74 8 kg (165 lb)   SpO2 93%   BMI 25 09 kg/m²     · Reporting dark stools for the past 6 days  · There is associated nonspecific generalized abdominal pain, weakness, and fatigue  · Hgb 14 9  · Reports seeing outpatient PCP today and having positive FOBT  · Not currently taking outpatient iron supplementation or Pepto-Bismol  · CT abd/pelv without acute process  · IV protonix 40mg BID ordered  · NPO  · q8H HGB monitoring ordered   · We will check iron panel for completeness  · GI consulted; recommendations appreciated   · EGD 1/11:Schatzki ring in the esophagus, otherwise normal  · Plan for colonoscopy 1/12

## 2023-01-12 NOTE — PLAN OF CARE
Problem: PAIN - ADULT  Goal: Verbalizes/displays adequate comfort level or baseline comfort level  Description: Interventions:  - Encourage patient to monitor pain and request assistance  - Assess pain using appropriate pain scale  - Administer analgesics based on type and severity of pain and evaluate response  - Implement non-pharmacological measures as appropriate and evaluate response  - Consider cultural and social influences on pain and pain management  - Notify physician/advanced practitioner if interventions unsuccessful or patient reports new pain  1/12/2023 1752 by Feli Carrion RN  Outcome: Adequate for Discharge  1/12/2023 1030 by Feli Carrion RN  Outcome: Progressing     Problem: INFECTION - ADULT  Goal: Absence or prevention of progression during hospitalization  Description: INTERVENTIONS:  - Assess and monitor for signs and symptoms of infection  - Monitor lab/diagnostic results  - Monitor all insertion sites, i e  indwelling lines, tubes, and drains  - Monitor endotracheal if appropriate and nasal secretions for changes in amount and color  - [place] appropriate cooling/warming therapies per order  - Administer medications as ordered  - Instruct and encourage patient and family to use good hand hygiene technique  - Identify and instruct in appropriate isolation precautions for identified infection/condition  1/12/2023 1752 by Feli Carrion RN  Outcome: Adequate for Discharge  1/12/2023 1030 by Feli Carrion RN  Outcome: Progressing  Goal: Absence of fever/infection during neutropenic period  Description: INTERVENTIONS:  - Monitor WBC    1/12/2023 1752 by Feli Carrion RN  Outcome: Adequate for Discharge  1/12/2023 1030 by Feli Carrion RN  Outcome: Progressing     Problem: SAFETY ADULT  Goal: Patient will remain free of falls  Description: INTERVENTIONS:  - Educate patient/family on patient safety including physical limitations  - Instruct patient to call for assistance with activity   - Consult OT/PT to assist with strengthening/mobility   - Keep Call bell within reach  - Keep bed low and locked with side rails adjusted as appropriate  - Keep care items and personal belongings within reach  - Initiate and maintain comfort rounds  - Make Fall Risk Sign visible to staff  - Offer Toileting every Hours, in advance of need  - Initiate/Maintain alarm  - Obtain necessary fall risk management equipment:   - Apply yellow socks and bracelet for high fall risk patients  - Consider moving patient to room near nurses station  1/12/2023 1752 by Sumaya Richard RN  Outcome: Adequate for Discharge  1/12/2023 1030 by Sumaya Richard RN  Outcome: Progressing  Goal: Maintain or return to baseline ADL function  Description: INTERVENTIONS:  -  Assess patient's ability to carry out ADLs; assess patient's baseline for ADL function and identify physical deficits which impact ability to perform ADLs (bathing, care of mouth/teeth, toileting, grooming, dressing, etc )  - Assess/evaluate cause of self-care deficits   - Assess range of motion  - Assess patient's mobility; develop plan if impaired  - Assess patient's need for assistive devices and provide as appropriate  - Encourage maximum independence but intervene and supervise when necessary  - Involve family in performance of ADLs  - Assess for home care needs following discharge   - Consider OT consult to assist with ADL evaluation and planning for discharge  - Provide patient education as appropriate  1/12/2023 1752 by Sumaya Richard RN  Outcome: Adequate for Discharge  1/12/2023 1030 by Sumaya Richard RN  Outcome: Progressing  Goal: Maintains/Returns to pre admission functional level  Description: INTERVENTIONS:  - Perform BMAT or MOVE assessment daily    - Set and communicate daily mobility goal to care team and patient/family/caregiver     - Collaborate with rehabilitation services on mobility goals if consulted  - Perform Range of Motion  times a day  - Reposition patient every  hours  - Dangle patient  times a day  - Stand patient  times a day  - Ambulate patient  times a day  - Out of bed to chair  times a day   - Out of bed for meals  times a day  - Out of bed for toileting  - Record patient progress and toleration of activity level   1/12/2023 1752 by Sumaya Richard RN  Outcome: Adequate for Discharge  1/12/2023 1030 by Sumaya Richard RN  Outcome: Progressing     Problem: DISCHARGE PLANNING  Goal: Discharge to home or other facility with appropriate resources  Description: INTERVENTIONS:  - Identify barriers to discharge w/patient and caregiver  - Arrange for needed discharge resources and transportation as appropriate  - Identify discharge learning needs (meds, wound care, etc )  - Arrange for interpretive services to assist at discharge as needed  - Refer to Case Management Department for coordinating discharge planning if the patient needs post-hospital services based on physician/advanced practitioner order or complex needs related to functional status, cognitive ability, or social support system  1/12/2023 1752 by Sumaya Richard RN  Outcome: Adequate for Discharge  1/12/2023 1030 by Sumaya Richard RN  Outcome: Progressing     Problem: Knowledge Deficit  Goal: Patient/family/caregiver demonstrates understanding of disease process, treatment plan, medications, and discharge instructions  Description: Complete learning assessment and assess knowledge base    Interventions:  - Provide teaching at level of understanding  - Provide teaching via preferred learning methods  1/12/2023 1752 by Sumaya Richard RN  Outcome: Adequate for Discharge  1/12/2023 1030 by Sumaya Richard RN  Outcome: Progressing     Problem: Nutrition/Hydration-ADULT  Goal: Nutrient/Hydration intake appropriate for improving, restoring or maintaining nutritional needs  Description: Monitor and assess patient's nutrition/hydration status for malnutrition  Collaborate with interdisciplinary team and initiate plan and interventions as ordered  Monitor patient's weight and dietary intake as ordered or per policy  Utilize nutrition screening tool and intervene as necessary  Determine patient's food preferences and provide high-protein, high-caloric foods as appropriate       INTERVENTIONS:  - Monitor oral intake, urinary output, labs, and treatment plans  - Assess nutrition and hydration status and recommend course of action  - Evaluate amount of meals eaten  - Assist patient with eating if necessary   - Allow adequate time for meals  - Recommend/ encourage appropriate diets, oral nutritional supplements, and vitamin/mineral supplements  - Order, calculate, and assess calorie counts as needed  - Recommend, monitor, and adjust tube feedings and TPN/PPN based on assessed needs  - Assess need for intravenous fluids  - Provide specific nutrition/hydration education as appropriate  - Include patient/family/caregiver in decisions related to nutrition  1/12/2023 1752 by Ken Alegria RN  Outcome: Adequate for Discharge  1/12/2023 1030 by Ken Alegria RN  Outcome: Progressing

## 2023-01-12 NOTE — ANESTHESIA POSTPROCEDURE EVALUATION
Post-Op Assessment Note    CV Status:  Stable  Pain Score: 0    Pain management: adequate     Mental Status:  Alert and awake   Hydration Status:  Euvolemic   PONV Controlled:  Controlled   Airway Patency:  Patent and adequate      Post Op Vitals Reviewed: Yes      Staff: CRNA         No notable events documented      BP   101/54   Temp      Pulse  64   Resp   20   SpO2 99

## 2023-01-12 NOTE — ANESTHESIA PREPROCEDURE EVALUATION
Procedure:  COLONOSCOPY    Relevant Problems   GI/HEPATIC   (+) Gastroesophageal reflux disease      NEURO/PSYCH   (+) Anxiety      PULMONARY   (+) Asthma        Physical Exam    Airway    Mallampati score: II  TM Distance: >3 FB  Neck ROM: full     Dental       Cardiovascular  Cardiovascular exam normal    Pulmonary  Pulmonary exam normal     Other Findings        Anesthesia Plan  ASA Score- 2     Anesthesia Type- IV sedation with anesthesia with ASA Monitors  Additional Monitors:   Airway Plan:           Plan Factors-Exercise tolerance (METS): >4 METS  Chart reviewed  EKG reviewed  Imaging results reviewed  Existing labs reviewed  Patient summary reviewed  Induction- intravenous  Postoperative Plan- Plan for postoperative opioid use  Planned trial extubation    Informed Consent- Anesthetic plan and risks discussed with patient  I personally reviewed this patient with the CRNA  Discussed and agreed on the Anesthesia Plan with the CRNA  Akshat Lee

## 2023-01-13 ENCOUNTER — TELEPHONE (OUTPATIENT)
Dept: GASTROENTEROLOGY | Facility: CLINIC | Age: 68
End: 2023-01-13

## 2023-01-13 NOTE — TELEPHONE ENCOUNTER
Patient came to office to [id number] [place] [address]  Reviewed prep Instructions and he took a copy with him    1/16/23

## 2023-01-13 NOTE — UTILIZATION REVIEW
NOTIFICATION OF ADMISSION DISCHARGE   This is a Notification of Discharge from [facility]  Please be advised that this patient has been discharge from our facility  Below you will find the admission and discharge date and time including the patient’s disposition  UTILIZATION REVIEW CONTACT:  Yanelis Connell  Utilization   Network Utilization Review Department  Phone: [phone] x carefully listen to the prompts  All voicemails are confidential   Email: [email or web address]     ADMISSION INFORMATION  PRESENTATION DATE: 1/10/2023  9:37 PM  OBERVATION ADMISSION DATE: 1/11/23  INPATIENT ADMISSION DATE: 1/11/23  2:27 PM   DISCHARGE DATE: 1/12/2023  7:45 PM   DISPOSITION:Home/Self Care    IMPORTANT INFORMATION:  Send all requests for admission clinical reviews, approved or denied determinations and any other requests to dedicated fax number below belonging to the campus where the patient is receiving treatment   List of dedicated fax numbers:  [address] DENIALS (Administrative/Medical Necessity) [phone]   [address] (Maternity/NICU/Pediatrics) [phone]   [facility] [phone]   [facility] 87 [phone]   [address] [phone]   220 [facility] [phone]   90 [facility] [phone]   [address] [place] [address] [phone]   [facility]  [phone]   [facility] [phone]   412 [facility] [address] [facility] [phone]

## 2023-01-13 NOTE — NURSING NOTE
This patient discharge home  Observed with all personal belongings  As per previous shift discharge instructions were given and aware of followups  Iv heplock was also removed  Pt left the floor in no distress

## 2023-01-13 NOTE — TELEPHONE ENCOUNTER
----- Message from Medina Bae DO sent at 1/12/2023  2:55 PM EST -----  Regarding: Schedule outpatient VCE  Schedule outpatient VCE

## 2023-01-16 ENCOUNTER — OFFICE VISIT (OUTPATIENT)
Dept: GASTROENTEROLOGY | Facility: CLINIC | Age: 68
End: 2023-01-16

## 2023-01-16 ENCOUNTER — TELEPHONE (OUTPATIENT)
Dept: OTHER | Facility: OTHER | Age: 68
End: 2023-01-16

## 2023-01-16 DIAGNOSIS — D50.9 IRON DEFICIENCY ANEMIA, UNSPECIFIED IRON DEFICIENCY ANEMIA TYPE: ICD-10-CM

## 2023-01-16 NOTE — TELEPHONE ENCOUNTER
Received call from [facility], patient's PCP office to find out the turnaround time for Pill Cam study  Not sure if office note says study was done today   Please follow up with Rissa

## 2023-01-17 ENCOUNTER — TELEPHONE (OUTPATIENT)
Dept: GASTROENTEROLOGY | Facility: CLINIC | Age: 68
End: 2023-01-17

## 2023-01-17 ENCOUNTER — OFFICE VISIT (OUTPATIENT)
Dept: PHYSICAL THERAPY | Facility: CLINIC | Age: 68
End: 2023-01-17

## 2023-01-17 ENCOUNTER — TELEPHONE (OUTPATIENT)
Dept: GASTROENTEROLOGY | Facility: AMBULARY SURGERY CENTER | Age: 68
End: 2023-01-17

## 2023-01-17 DIAGNOSIS — G95.9 CERVICAL MYELOPATHY (HCC): ICD-10-CM

## 2023-01-17 DIAGNOSIS — M54.2 NECK PAIN: Primary | ICD-10-CM

## 2023-01-17 NOTE — TELEPHONE ENCOUNTER
LMOM about results  Please schedule the patient with Dr Dedrick Akins for an EGD push enteroscopy for melena;  I explained to the patient in the message that this is not an emergency

## 2023-01-17 NOTE — TELEPHONE ENCOUNTER
----- Message from Livia Santiago DO sent at 1/16/2023  5:10 PM EST -----  Please call the patient with the biopsy result  The biopsy of the polyp in the colon showed a benign hyperplastic polyp  This patient is due for his next colonoscopy in 7 years

## 2023-01-17 NOTE — TELEPHONE ENCOUNTER
Pill cam was just done yesterday and has not been read yet as Dr Romy Uribe is away on vacation  Any suggestions  Please advise

## 2023-01-17 NOTE — TELEPHONE ENCOUNTER
Patient called  States that he is feeling lightheaded and ishaving black stool  Patient also requesting a callback with biopsy results  Phone numbers updated on pt's chart  Would like a callback from the office

## 2023-01-17 NOTE — PROGRESS NOTES
I was asked to read this pill camera by the staff while Dr Srinivasa Rivers is on vacation  The patient is a 59-year-old male who was admitted to the hospital for 1 week of melena  His hemoglobin in the hospital was normal   He had endoscopy with Dr Srinivasa Rivers on January 11, 2023 that was normal   He had a colonoscopy on the next day on January 12th 2023 that showed sigmoid diverticulosis and a small rectal polyp removed  The pathology showed hyperplastic polyp  The video capsule endoscopy went into the duodenal bulb and back into the stomach a number of times  1 small nonbleeding angiectasia was noted in the region of the duodenal bulb or the duodenal sweep or second portion  No other small bowel bleeding lesions were identified  I just called and left a message on the patient's machine on his cell phone which is detailed explaining all of this  I told him to have his blood count checked as directed when he left the hospital    We will set him up for an EGD with push enteroscopy and possible APC with Dr Srinivasa Rivers in the future    I explained in the message that this is not an emergency

## 2023-01-17 NOTE — TELEPHONE ENCOUNTER
Patients GI provider:  Dr Tse Large    Number to return call: (  [phone]    Reason for call: Pt is having black stool and abdomen pain, had recent procedure, please assist    Scheduled procedure/appointment date if applicable:  N/A

## 2023-01-17 NOTE — TELEPHONE ENCOUNTER
----- Message from Idalia Cardozo DO sent at 1/16/2023  5:10 PM EST -----  Please call the patient with the biopsy result  The biopsy of the polyp in the colon showed a benign hyperplastic polyp  This patient is due for his next colonoscopy in 7 years

## 2023-01-17 NOTE — TELEPHONE ENCOUNTER
Advised Shayan Chiang that pt is calling the office as well as his PCP wanting results and is now calling symptoms  Was advised to ask Dr Kari Sidhu to read  Advised Dr Kari Sidhu who will read the pill cam today    Routing to Dr Kari Sidhu and Gianna Mcnair  Thank you

## 2023-01-17 NOTE — PROGRESS NOTES
Daily Note     Today's date: 2023  Patient name: Neri Tomlinson  : 1955  MRN: [id number]  Referring provider: Ruy Cardoso MD  Dx:   Encounter Diagnosis     ICD-10-CM    1  Neck pain  M54 2       2  Cervical myelopathy (HCC)  G95 9                      Subjective: Pt reports significant increase in neck pain, especially when turning right  Objective: See treatment diary below      Assessment: Radicular symptoms are still improved but neck pain remains substantial     Plan: continue to mobilize soft tissue as well as mobilizing right cervical rotation; along with progressive postural and strengthening activities  Precautions:  Multi-Level cervical fusion       Manuals 1-10-23 1-16-23 37--02 12-29-22 1-3-23 1-5-23   Right brachial plexus and ulnar nerve mobilization 15' STM R UT/cerv paraspinals/samantha rhomboids 15' STM, R UT/cerv paraspinals JUNIOR  Graston, cupping, taping - [facility] BOB 15'  R UT and scapular area' 15' STM R UT/following R brachial plexus/ulnar N  mobilization  15' STM R UT/cerv paraspinals, gentle NG 15' STM seated to c-spine and UT/ Levator   K-tape/cupping brachial plexus  5'  8'  5' taping JH 10' k-tape only 3'    Manual block C2 w/ patient AROM  Right rotation 3'  10  X 5" each direction              Neuro Re-Ed          Cervical retraction 2x10  home  2x10 2x10   Chin tucks  10" x 12 home 10"x12 10"x12 10"x12   Thoracic extension seated w/ roll 10"x12 @ 2 levels 10" x12 @ 2 levels  10" nx12 @ 2 levels 10" n x 12@ 2 levels 10"x12 10"x12   LTP and MTP while holding good scapular and cervical posture   Black 3x12 Blue 3x12 Blue 3x12 each Black 3x12 Steve 2D52 Black 8G10 ea    Self NG  Supine 3'  5'  5'     scap retract/ ER at doorway Blue 3x10 Black 3x10 Blue 3x10 Blue 3x10 Blue 3x10             Ther Ex         Biceps curls 7# 3x10 7# 3x10  7# 3x12 7# 3x12 7# 3x12 7# 3x10   Triceps extension 3x20 black  3x12 black band 3x12 blue 3x12 blue  3x20 Blue 3x20 black    Lateral raises 1# 2x10 0# 2x10 0# 2x10 0# 2x10 1# 2x10 1# 2x10                                                Ther Activity                                                                                 Pt Ed/ HEP                  Gait Training                           Modalities           [facility] 12'  [facility] 12'

## 2023-01-18 ENCOUNTER — TELEPHONE (OUTPATIENT)
Dept: GASTROENTEROLOGY | Facility: CLINIC | Age: 68
End: 2023-01-18

## 2023-01-18 ENCOUNTER — PREP FOR PROCEDURE (OUTPATIENT)
Dept: GASTROENTEROLOGY | Facility: CLINIC | Age: 68
End: 2023-01-18

## 2023-01-18 DIAGNOSIS — K92.1 MELENA: Primary | ICD-10-CM

## 2023-01-18 NOTE — TELEPHONE ENCOUNTER
Called and schled EGD w/push  with Dr Jermain Bueno and mailedd prep Instructions and put on our cancellation list    4/6/23

## 2023-01-18 NOTE — TELEPHONE ENCOUNTER
Pt returning Dr Feliberto Balderas call (please look at previous encounter which is orders only so I could not document) I advised him of recommendations but pt states he wants Dr Antonio Austin to preform EGD with enteroscopy on him  He States he has heard great things about Dr Antonio Austin and would like to schedule this procedure with him   Please advise

## 2023-01-18 NOTE — TELEPHONE ENCOUNTER
Spoke with patient at length  Patient will remain with Patrick Luo since this is his primary GI  Patient would like to be placed on a wait list for a sooner EGD with APC  Reiterated to follow-up with blood count  Thank you

## 2023-01-18 NOTE — TELEPHONE ENCOUNTER
In my message I told him we would schedule him with Dr Caryle Haymaker  I also told the patient that this is not an emergency  And that we should see what his blood level says    Yesterday I sent a task to [facility]

## 2023-01-19 ENCOUNTER — APPOINTMENT (OUTPATIENT)
Dept: PHYSICAL THERAPY | Facility: CLINIC | Age: 68
End: 2023-01-19

## 2023-01-19 ENCOUNTER — APPOINTMENT (OUTPATIENT)
Dept: LAB | Facility: CLINIC | Age: 68
End: 2023-01-19

## 2023-01-19 DIAGNOSIS — R19.5 DARK STOOLS: ICD-10-CM

## 2023-01-19 LAB
ERYTHROCYTE [DISTWIDTH] IN BLOOD BY AUTOMATED COUNT: 12.7 % (ref 11.6–15.1)
HCT VFR BLD AUTO: 44.3 % (ref 36.5–49.3)
HGB BLD-MCNC: 14.8 G/DL (ref 12–17)
MCH RBC QN AUTO: 32.7 PG (ref 26.8–34.3)
MCHC RBC AUTO-ENTMCNC: 33.4 G/DL (ref 31.4–37.4)
MCV RBC AUTO: 98 FL (ref 82–98)
PLATELET # BLD AUTO: 206 THOUSANDS/UL (ref 149–390)
PMV BLD AUTO: 10.5 FL (ref 8.9–12.7)
RBC # BLD AUTO: 4.52 MILLION/UL (ref 3.88–5.62)
WBC # BLD AUTO: 9.54 THOUSAND/UL (ref 4.31–10.16)

## 2023-01-24 ENCOUNTER — OFFICE VISIT (OUTPATIENT)
Dept: PHYSICAL THERAPY | Facility: CLINIC | Age: 68
End: 2023-01-24

## 2023-01-24 DIAGNOSIS — G95.9 CERVICAL MYELOPATHY (HCC): ICD-10-CM

## 2023-01-24 DIAGNOSIS — M54.2 NECK PAIN: Primary | ICD-10-CM

## 2023-01-24 DIAGNOSIS — M96.1 POSTLAMINECTOMY SYNDROME, THORACIC: ICD-10-CM

## 2023-01-24 DIAGNOSIS — G89.29 CHRONIC LOW BACK PAIN, UNSPECIFIED BACK PAIN LATERALITY, UNSPECIFIED WHETHER SCIATICA PRESENT: ICD-10-CM

## 2023-01-24 DIAGNOSIS — M54.50 CHRONIC LOW BACK PAIN, UNSPECIFIED BACK PAIN LATERALITY, UNSPECIFIED WHETHER SCIATICA PRESENT: ICD-10-CM

## 2023-01-24 NOTE — PROGRESS NOTES
Daily Note     Today's date: 2023  Patient name: Jj Taylor  : 1955  MRN: [id number]  Referring provider: Khadar Dillard MD  Dx:   Encounter Diagnosis     ICD-10-CM    1  Neck pain  M54 2       2  Cervical myelopathy (HCC)  G95 9       3  Postlaminectomy syndrome, thoracic  M96 1       4  Chronic low back pain, unspecified back pain laterality, unspecified whether sciatica present  M54 50     G89 29                      Subjective: Pt reports he is doing a little better today than last week  He states a  may accompany him to a few of his appointments  Objective: See treatment diary below      Assessment:  Tolerated exercises well with good form  FWD head and rounded shoulders are a main limiting factor to being able get into a good posture and leads to a constant strain on cervical musculature  Plan:  Continue to progress postural improvements and functional strengthening while improving mobility  Precautions:  Multi-Level cervical fusion       Manuals 1-10-23 1-16---69 12-29-22 1-3-23 1-5-23   Right brachial plexus and ulnar nerve mobilization 15' STM R UT/cerv paraspinals/samantha rhomboids 15' STM, R UT/cerv paraspinals JUNIOR  Graston, cupping, taping - [facility] BOB 15'  R UT and scapular area' 15' STM R UT/following R brachial plexus/ulnar N  mobilization  15' STM R UT/cerv paraspinals, gentle NG 15' STM seated to c-spine and UT/ Levator   K-tape/cupping brachial plexus  5'    5' taping JH 10' k-tape only 3'    Manual block C2 w/ patient AROM  Right rotation 3'  10  X 5" each direction              Neuro Re-Ed          Cervical retraction 2x10  home  2x10 2x10   Chin tucks  10" x 12 home 10"x12 10"x12 10"x12   Thoracic extension seated w/ roll 10"x12 @ 2 levels 10" x12 @ 2 levels  10" nx12 @ 2 levels 10" n x 12@ 2 levels 10"x12 10"x12   LTP and MTP while holding good scapular and cervical posture   Black 3x12 Blue 3x12 Blue 3x12 each Black 3x12 ea  Blue 3x12 Black 5V13 ea Self NG  Supine 3'  5'  5'     scap retract/ ER at doorway Blue 3x10 Black 3x10 Blue 3x10 Blue 3x10 Blue 3x10             Ther Ex         Biceps curls 7# 3x10 7# 3x10  7# 3x12 7# 3x12 7# 3x12 7# 3x10   Triceps extension 3x20 black  3x12 black band 3x12 blue 3x12 blue  3x20 Blue 3x20 black    Lateral raises 1# 2x10 0# 2x10 0# 2x10 0# 2x10 1# 2x10 1# 2x10                                                Ther Activity                                                                                 Pt Ed/ HEP                  Gait Training                           Modalities           [facility] 12'  [facility] 12'

## 2023-01-26 ENCOUNTER — OFFICE VISIT (OUTPATIENT)
Dept: PHYSICAL THERAPY | Facility: CLINIC | Age: 68
End: 2023-01-26

## 2023-01-26 DIAGNOSIS — M54.2 NECK PAIN: Primary | ICD-10-CM

## 2023-01-26 DIAGNOSIS — G95.9 CERVICAL MYELOPATHY (HCC): ICD-10-CM

## 2023-01-26 NOTE — PROGRESS NOTES
Daily Note     Today's date: 2023  Patient name: David Cruz  : 1955  MRN: [id number]  Referring provider: Baylee Reagan MD  Dx:   Encounter Diagnosis     ICD-10-CM    1  Neck pain  M54 2       2  Cervical myelopathy (HCC)  G95 9                      Subjective: Pt reports feeling a little bit better after last session  Objective: See treatment diary below      Assessment: Tolerated treatment well  Patient would benefit from continued PT      Plan: Progress to adding more functional strengthening as able  Continue to progress postural improvements and functional strengthening while improving mobility  Precautions:  Multi-Level cervical fusion       Manuals 1-10-23 1-16-23 4-04-29 1-26-23 1-3-23 1-5-23   Right brachial plexus and ulnar nerve mobilization 15' STM R UT/cerv paraspinals/samantha rhomboids 15' STM, R UT/cerv paraspinals JUNIOR  Graston, cupping, taping - [facility] BOB 15'  R UT and scapular area' 15' STM R UT/following R brachial plexus/ulnar N  mobilization  15' STM R UT/cerv paraspinals, gentle NG 15' STM seated to c-spine and UT/ Levator   K-tape/cupping brachial plexus  5'    10' k-tape only 3'    Manual block C2 w/ patient AROM  Right rotation 3'                Neuro Re-Ed          Cervical retraction 2x10  home  2x10 2x10   Chin tucks  10" x 12 home 10"x12 10"x12 10"x12   Thoracic extension seated w/ roll 10"x12 @ 2 levels 10" x12 @ 2 levels  10" nx12 @ 2 levels 10" n x 12@ 2 levels 10"x12 10"x12   LTP and MTP while holding good scapular and cervical posture   Black 3x12 Blue 3x12 Blue 3x12 each Black 3x12 Steve 7G56 Black 2U20 ea    Self NG  Supine 3'  5'  5'     scap retract/ ER at doorway Blue 3x10 Black 3x10 Blue 3x10 Blue 3x10 Blue 3x10             Ther Ex         Biceps curls 7# 3x10 7# 3x10  7# 3x12 7# 3x12 7# 3x12 7# 3x10   Triceps extension 3x20 black  3x12 black band 3x12 blue 3x12 black  3x20 Blue 3x20 black    Lateral raises 1# 2x10 0# 2x10 0# 2x10 0# 2x10 1# 2x10 1# 2x10                                                Ther Activity                                                                                 Pt Ed/ HEP                  Gait Training                           Modalities           [facility] 12'  [facility] 12'

## 2023-01-31 ENCOUNTER — OFFICE VISIT (OUTPATIENT)
Dept: PHYSICAL THERAPY | Facility: CLINIC | Age: 68
End: 2023-01-31

## 2023-01-31 DIAGNOSIS — G95.9 CERVICAL MYELOPATHY (HCC): ICD-10-CM

## 2023-01-31 DIAGNOSIS — M54.2 NECK PAIN: Primary | ICD-10-CM

## 2023-01-31 NOTE — PROGRESS NOTES
Daily Note     Today's date: 2023  Patient name: Luis Alberto Guerrero  : 1955  MRN: [id number]  Referring provider: Emanuel Miles MD  Dx:   Encounter Diagnosis     ICD-10-CM    1  Neck pain  M54 2       2  Cervical myelopathy (HCC)  G95 9                      Subjective: Pt reports he is feeling a little better today  Intensity of neck pain is much better with new treatment at home  Objective: See treatment diary below      Assessment: Tolerated treatment well  Patient would benefit from continued PT      Plan: Progress treatment as tolerated  Continue to progress postural improvements and functional strengthening while improving mobility  Precautions:  Multi-Level cervical fusion       Manuals 1-10-23 1-16-23 5-46-08 23   Right brachial plexus and ulnar nerve mobilization 15' STM R UT/cerv paraspinals/samantha rhomboids 15' STM, R UT/cerv paraspinals JUNIOR  Graston, cupping, taping - [facility] BOB 15'  R UT and scapular area' 15' STM R UT/following R brachial plexus/ulnar N  mobilization  15' STM R UT/cerv paraspinals, gentle NG 15' STM seated to c-spine and UT/ Levator   K-tape/cupping brachial plexus  5'     k-tape only 3'    Manual block C2 w/ patient AROM  Right rotation 3'                Neuro Re-Ed          Cervical retraction 2x10  home  2x10 2x10   Chin tucks  10" x 12 home 10"x12 10"x12 10"x12   Thoracic extension seated w/ roll 10"x12 @ 2 levels 10" x12 @ 2 levels  10" nx12 @ 2 levels 10" n x 12@ 2 levels 10"x12 10"x12   LTP and MTP while holding good scapular and cervical posture   Black 3x12 Blue 3x12 Blue 3x12 each Black 3x12 Steve 0K32 Black 1M54 ea    Self NG  Supine 3'  5'  5'     scap retract/ ER at doorway Blue 3x10 Black 3x10 Blue 3x10 Blue 3x10 Blue 3x10             Ther Ex         Biceps curls 7# 3x10 7# 3x10  7# 3x12 7# 3x12 7# 3x12 7# 3x10   Triceps extension 3x20 black  3x12 black band 3x12 blue 3x12 black  3x20 Blue 3x20 black    Lateral raises 1# 2x10 0# 2x10 0# 2x10 0# 2x10 1# 2x10 1# 2x10                                                Ther Activity                                                                                 Pt Ed/ HEP                  Gait Training                           Modalities           [facility] 12'  [facility] 12'

## 2023-02-02 ENCOUNTER — OFFICE VISIT (OUTPATIENT)
Dept: PHYSICAL THERAPY | Facility: CLINIC | Age: 68
End: 2023-02-02

## 2023-02-02 DIAGNOSIS — G95.9 CERVICAL MYELOPATHY (HCC): ICD-10-CM

## 2023-02-02 DIAGNOSIS — M54.2 NECK PAIN: Primary | ICD-10-CM

## 2023-02-02 NOTE — PROGRESS NOTES
Daily Note     Today's date: 2023  Patient name: Denisha Pozo  : 1955  MRN: [id number]  Referring provider: Luis Daniel Nogueira MD  Dx:   Encounter Diagnosis     ICD-10-CM    1  Neck pain  M54 2       2  Cervical myelopathy (HCC)  G95 9                      Subjective: Pt reports having a very sleepless and painful night last night  Most of the pain is in his right upper trap  Objective: See treatment diary below      Assessment: More time was spent on manual therapy for neck, including cupping, scraping and taping  Plan: Progress as able     Continue to progress postural improvements and functional strengthening while improving mobility  Precautions:  Multi-Level cervical fusion       Manuals 1-10-23 1-16-23 5-79-46 23   Right brachial plexus and ulnar nerve mobilization 15' STM R UT/cerv paraspinals/samantha rhomboids 15' STM, R UT/cerv paraspinals JUNIOR  Graston, cupping, taping - [facility] 15'  R UT and scapular area' 15' STM R UT/following R brachial plexus/ulnar N  mobilization  15' STM R UT/cerv paraspinals, gentle NG 15' STM seated to c-spine and UT/ Levator   K-tape/cupping brachial plexus  5'     Cupping, scraping, k-taping  12'    Manual block C2 w/ patient AROM  Right rotation 3'                Neuro Re-Ed          Cervical retraction 2x10  home  2x10 2x10   Chin tucks  10" x 12 home 10"x12 10"x12 10"x12   Thoracic extension seated w/ roll 10"x12 @ 2 levels 10" x12 @ 2 levels  10" nx12 @ 2 levels 10" n x 12@ 2 levels 10"x12 10"x12   LTP and MTP while holding good scapular and cervical posture   Black 3x12 Blue 3x12 Blue 3x12 each Black 3x12 [place] 8F11 Black 4J07 ea    Self NG  Supine 3'  5'  5'     scap retract/ ER at doorway Blue 3x10 Black 3x10 Blue 3x10 Blue 3x10 Blue 3x10 Blue 3x10            Ther Ex         Biceps curls 7# 3x10 7# 3x10  7# 3x12 7# 3x12 7# 3x12 5# 3x10 (dec to sx exac)   Triceps extension 3x20 black  3x12 black band 3x12 blue 3x12 black  3x20 Blue 3x20 black    Lateral raises 1# 2x10 0# 2x10 0# 2x10 0# 2x10 1# 2x10 1# 2x10                                                Ther Activity                                                                                 Pt Ed/ HEP                  Gait Training                           Modalities           [facility] 12'  [facility] 12'

## 2023-02-07 ENCOUNTER — OFFICE VISIT (OUTPATIENT)
Dept: PHYSICAL THERAPY | Facility: CLINIC | Age: 68
End: 2023-02-07

## 2023-02-07 DIAGNOSIS — M54.2 NECK PAIN: Primary | ICD-10-CM

## 2023-02-07 DIAGNOSIS — G95.9 CERVICAL MYELOPATHY (HCC): ICD-10-CM

## 2023-02-07 NOTE — PROGRESS NOTES
Daily Note     Today's date: 2023  Patient name: Dipika Hernandez  : 1955  MRN: [id number]  Referring provider: Antoine Swan MD  Dx:   Encounter Diagnosis     ICD-10-CM    1  Neck pain  M54 2       2  Cervical myelopathy (HCC)  G95 9                      Subjective: Pt reports his neck feels a little better than last week  Objective: See treatment diary below      Assessment: Tolerated treatment well  Patient would benefit from continued PT      Plan: Progress treatment as tolerated  Continue to progress postural improvements and functional strengthening while improving mobility  Precautions:  Multi-Level cervical fusion       Manuals 23 8-07 23   Right brachial plexus and ulnar nerve mobilization 15' STM R UT/cerv paraspinals/samantha rhomboids 15' STM, R UT/cerv paraspinals JUNIOR  Graston, cupping, taping - [facility] BOB 15'  R UT and scapular area' 15' STM R UT/following R brachial plexus/ulnar N  mobilization  15' STM R UT/cerv paraspinals, gentle NG 15' STM seated to c-spine and UT/ Levator   K-tape/cupping brachial plexus       Cupping, scraping, k-taping  12'    Manual block C2 w/ patient AROM  Right rotation 3'                Neuro Re-Ed          Cervical retraction home  home  2x10 2x10   Chin tucks home 10" x 12 home 10"x12 10"x12 10"x12   Thoracic extension seated w/ roll 10"x12 @ 2 levels 10" x12 @ 2 levels  10" nx12 @ 2 levels 10" n x 12@ 2 levels 10"x12 10"x12   LTP and MTP while holding good scapular and cervical posture   Black 3x12 Blue 3x12 Blue 3x12 each Black 3x12 Steve 3O45 Black 6P03 ea    Self NG  Supine 3'  5'  5'     scap retract/ ER at doorway Blue 3x10 Black 3x10 Blue 3x10 Blue 3x10 Blue 3x10 Blue 3x10            Ther Ex         Biceps curls 7# 3x10 7# 3x10  7# 3x12 7# 3x12 7# 3x12 5# 3x10 (dec to sx exac)   Triceps extension 3x20 black  3x12 black band 3x12 blue 3x12 black  3x20 Blue 3x20 black    Lateral raises 1# 2x10 0# 2x10 0# 2x10 0# 2x10 1# 2x10 1# 2x10                                                Ther Activity                                                                                 Pt Ed/ HEP                  Gait Training                           Modalities           P 12'  [facility] 12'

## 2023-02-09 ENCOUNTER — OFFICE VISIT (OUTPATIENT)
Dept: PHYSICAL THERAPY | Facility: CLINIC | Age: 68
End: 2023-02-09

## 2023-02-09 DIAGNOSIS — G95.9 CERVICAL MYELOPATHY (HCC): ICD-10-CM

## 2023-02-09 DIAGNOSIS — M54.2 NECK PAIN: Primary | ICD-10-CM

## 2023-02-09 NOTE — PROGRESS NOTES
Daily Note     Today's date: 2023  Patient name: Alanis Michael  : 1955  MRN: [id number]  Referring provider: Raymond Siegel MD  Dx:   Encounter Diagnosis     ICD-10-CM    1  Neck pain  M54 2       2  Cervical myelopathy (HCC)  G95 9                      Subjective: Pt reports neck is a little less painful  He reports seeing new neck doctor (PA) yesterday and states he is being set up for injections  Objective: See treatment diary below  Modest decrease in upper right cervical trigger point  Assessment: Function still struggling due to increased pain levels with attempts at heavier activities  Hopefully injections will help patient tolerate progressive functional strengthening better than he is at this point  Plan: Continue per plan of care  Progress treatment as tolerated  Continue to progress postural improvements and functional strengthening while improving mobility  Precautions:  Multi-Level cervical fusion       Manuals 23   Right brachial plexus and ulnar nerve mobilization 15' STM R UT/cerv paraspinals/samantha rhomboids 15' STM, R UT/cerv paraspinals JUNIOR  Graston, cupping, taping - [facility] 15'  R UT and scapular area' 15' STM R UT/following R brachial plexus/ulnar N  mobilization  15' STM R UT/cerv paraspinals, gentle NG 15' STM seated to c-spine and UT/ Levator   K-tape/cupping brachial plexus       Cupping, scraping, k-taping  12'    Manual block C2 w/ patient AROM  Right rotation 3'                Neuro Re-Ed          Cervical retraction home  home  2x10 2x10   Chin tucks home 10" x 12 home 10"x12 10"x12 10"x12   Thoracic extension seated w/ roll 10"x12 @ 2 levels 10" x12 @ 2 levels  10" nx12 @ 2 levels 10" n x 12@ 2 levels 10"x12 10"x12   LTP and MTP while holding good scapular and cervical posture   Black [place] Blue 3x12 Blue 3x12 each [place] 3x12 [place] 9N89 [place] 3H00 ea    Self NG  Supine 3'  5'  5'     scap retract/ ER at doorway Blue 3x10 Black 3x10 Blue 3x10 Blue 3x10 Blue 3x10 Blue 3x10            Ther Ex         Biceps curls 7# 3x10 7# 3x10  7# 3x12 7# 3x12 7# 3x12 5# 3x10 (dec to sx exac)   Triceps extension 3x20 black  3x12 black band 3x12 blue 3x12 black  3x20 Blue 3x20 black    Lateral raises 1# 2x10 0# 2x10 0# 2x10 0# 2x10 1# 2x10 1# 2x10                                                Ther Activity                                                                                 Pt Ed/ HEP                  Gait Training                           Modalities           [facility] 12'  [facility] 12'

## 2023-02-14 ENCOUNTER — OFFICE VISIT (OUTPATIENT)
Dept: PHYSICAL THERAPY | Facility: CLINIC | Age: 68
End: 2023-02-14

## 2023-02-14 DIAGNOSIS — G95.9 CERVICAL MYELOPATHY (HCC): ICD-10-CM

## 2023-02-14 DIAGNOSIS — M54.2 NECK PAIN: Primary | ICD-10-CM

## 2023-02-14 NOTE — PROGRESS NOTES
Daily Note     Today's date: 2023  Patient name: Thony Jarquin  : 1955  MRN: [id number]  Referring provider: Charisma Bobo MD  Dx:   Encounter Diagnosis     ICD-10-CM    1  Neck pain  M54 2       2  Cervical myelopathy (HCC)  G95 9                      Subjective: Pt reports tweaking his neck while brushing his teeth this morning  Objective: See treatment diary below      Assessment:  Spasm slightly more intense than last week  Plan: Progress treatment as tolerated  Continue to progress postural improvements and functional strengthening while improving mobility  Precautions:  Multi-Level cervical fusion       Manuals 23   Right brachial plexus and ulnar nerve mobilization 15' STM R UT/cerv paraspinals/samantha rhomboids 15' STM, R UT/cerv paraspinals JUNIOR  Graston, cupping, taping - [facility] BOB 15'  R UT and scapular area' 15' STM R UT/following R brachial plexus/ulnar N  mobilization  15' STM R UT/cerv paraspinals, gentle NG 15' STM seated to c-spine and UT/ Levator   K-tape/cupping brachial plexus       Cupping, scraping, k-taping  12'    Manual block C2 w/ patient AROM  Right rotation 3'                Neuro Re-Ed          Cervical retraction home  home  2x10 2x10   Chin tucks home 10" x 12 home 10"x12 10"x12 10"x12   Thoracic extension seated w/ roll 10"x12 @ 2 levels 10" x12 @ 2 levels  10" nx12 @ 2 levels 10" n x 12@ 2 levels 10"x12 10"x12   LTP and MTP while holding good scapular and cervical posture   Black 3x12 Blue 3x12 Blue 3x12 each Black 3x12 Steve 5N20 Black 0K65 ea    Self NG  Supine 3'  5'  5'     scap retract/ ER at doorway Blue 3x10 Black 3x10 Blue 3x10 Blue 3x10 Blue 3x10 Blue 3x10            Ther Ex         Biceps curls 7# 3x10 7# 3x10  7# 3x12 7# 3x12 7# 3x12 5# 3x10 (dec to sx exac)   Triceps extension 3x20 black  3x12 black band 3x12 blue 3x12 black  3x20 Blue 3x20 black    Lateral raises 1# 2x10 0# 2x10 1# 2x10 0# 2x10 1# 2x10 1# 2x10                                                Ther Activity                                                                                 Pt Ed/ HEP                  Gait Training                           Modalities           [facility] 12'  [facility] 12'

## 2023-02-16 ENCOUNTER — APPOINTMENT (OUTPATIENT)
Dept: PHYSICAL THERAPY | Facility: CLINIC | Age: 68
End: 2023-02-16

## 2023-02-17 ENCOUNTER — OFFICE VISIT (OUTPATIENT)
Dept: PHYSICAL THERAPY | Facility: CLINIC | Age: 68
End: 2023-02-17

## 2023-02-17 DIAGNOSIS — M54.50 CHRONIC LOW BACK PAIN, UNSPECIFIED BACK PAIN LATERALITY, UNSPECIFIED WHETHER SCIATICA PRESENT: ICD-10-CM

## 2023-02-17 DIAGNOSIS — G89.29 CHRONIC LOW BACK PAIN, UNSPECIFIED BACK PAIN LATERALITY, UNSPECIFIED WHETHER SCIATICA PRESENT: ICD-10-CM

## 2023-02-17 DIAGNOSIS — M54.2 NECK PAIN: Primary | ICD-10-CM

## 2023-02-17 DIAGNOSIS — G95.9 CERVICAL MYELOPATHY (HCC): ICD-10-CM

## 2023-02-17 DIAGNOSIS — M96.1 POSTLAMINECTOMY SYNDROME, THORACIC: ICD-10-CM

## 2023-02-17 NOTE — PROGRESS NOTES
Daily Note     Today's date: 2023  Patient name: Rebecca Naidu  : 1955  MRN: [id number]  Referring provider: Nupur Jones MD  Dx:   Encounter Diagnosis     ICD-10-CM    1  Neck pain  M54 2       2  Cervical myelopathy (HCC)  G95 9       3  Postlaminectomy syndrome, thoracic  M96 1       4  Chronic low back pain, unspecified back pain laterality, unspecified whether sciatica present  M54 50     G89 29                      Subjective: Pt reports having trigger point injections yesterday  He feels a little sore but no other changes  Objective: See treatment diary below      Assessment: Tolerated treatment well  Patient would benefit from continued PT      Plan: Progress treatment as tolerated  Continue to progress postural improvements and functional strengthening while improving mobility  Precautions:  Multi-Level cervical fusion       Manuals 23   Right brachial plexus and ulnar nerve mobilization 15' STM R UT/cerv paraspinals/samantha rhomboids 15' STM, R UT/cerv paraspinals JUNIOR  Graston, cupping, taping - [facility] BOB 15'  R UT and scapular area' Hold due to trigger point injections 15' STM R UT/cerv paraspinals, gentle NG 15' STM seated to c-spine and UT/ Levator   K-tape/cupping brachial plexus       Cupping, scraping, k-taping  12'    Manual block C2 w/ patient AROM  Right rotation 3'                Neuro Re-Ed          Cervical retraction home  home  2x10 2x10   Chin tucks home 10" x 12 home 10"x12 10"x12 10"x12   Thoracic extension seated w/ roll 10"x12 @ 2 levels 10" x12 @ 2 levels  10" nx12 @ 2 levels 10" n x 12@ 2 levels 10"x12 10"x12   LTP and MTP while holding good scapular and cervical posture   Black 3x12 Blue 3x12 Blue 3x12 each Black 3x12 [place] 6K06 [place] 1W30 ea    Self NG  Supine 3'  5'  5'     scap retract/ ER at doorway Blue 3x10 Black 3x10 Blue 3x10 Blue 3x10 Blue 3x10 Blue 3x10            Ther Ex         Biceps curls 7# 3x10 7# 3x10  7# 3x12 7# 3x12 7# 3x12 5# 3x10 (dec to sx exac)   Triceps extension 3x20 black  3x12 black band 3x12 blue 3x12 black  3x20 Blue 3x20 black    Lateral raises 1# 2x10 0# 2x10 1# 2x10 0# 2x10 1# 2x10 1# 2x10                                                Ther Activity                                                                                 Pt Ed/ HEP                  Gait Training                           Modalities           [facility] 12'  [facility] 12'

## 2023-02-21 ENCOUNTER — EVALUATION (OUTPATIENT)
Dept: PHYSICAL THERAPY | Facility: CLINIC | Age: 68
End: 2023-02-21

## 2023-02-21 DIAGNOSIS — M96.1 POSTLAMINECTOMY SYNDROME, THORACIC: ICD-10-CM

## 2023-02-21 DIAGNOSIS — M54.2 NECK PAIN: Primary | ICD-10-CM

## 2023-02-21 DIAGNOSIS — G95.9 CERVICAL MYELOPATHY (HCC): ICD-10-CM

## 2023-02-21 NOTE — PROGRESS NOTES
PT Re-Evaluation    Collection of subjective/objective data performed by Emilee Hussein PTA; Assessment, POC, and Goal attainment performed by Adelfo Neal DPT      Today's date: 2023  Patient name: Yeni Witt  : 1955  MRN: [id number]  Referring provider: Maria Antonia South MD  Dx:   Encounter Diagnosis     ICD-10-CM    1  Neck pain  M54 2       2  Cervical myelopathy (HCC)  G95 9       3  Postlaminectomy syndrome, thoracic  M96 1             Assessment  Assessment details: Pt presents with signs and symptoms of myelopathy in the right UE  Symptoms mostly increased with tension and palpation of right brachial plexus and ulnar nerve bed  Pt's significantly poor posture appears to be a large contributor to symptoms  22:  -No changes in patient's significant postural abnormalities, no changes in strength, no changes in neck pain or radicular symptoms  Pt originally presented with significant chronic postural abnormalities and mobility deficiencies; which may indicate that changing these may take some more time  Impairments: abnormal or restricted ROM, impaired physical strength, lacks appropriate home exercise program, pain with function and poor posture     22:  Pt having some slight reduction in right arm symptoms but neck remains very painful and overall function has not improved  23: In the big picture, pt is sleeping better most nights and is not having any more radicular symptoms  Mobility, posture and progressions of strengthening are still a difficulty progressing  Symptom irritability: moderateUnderstanding of Dx/Px/POC: good   Prognosis: fair    Goals  ST) Pt  Will be able to sleep through the night without being awoken with pain and with minimal to no pain upon waking in 6 weeks  -NO PROGRESS SINCE LAST ASSESSMENT   2) Pt  Will have not headaches while at work or with computer work at home    -NO PROGRESS 3)  Pt 's radicular symptoms will be centralized to neck in 6 weeks  -MET  LT) Pt  Will be able to complete all chores at home without pain or limitations in 12 weeks  -SLIGHT PROGRESS  2)  Pt  Will be able to to complete all physical tasks at work without restriction or limitations in 12 weeks  -NO PROGRESS  ST) Pt  Will be able to sleep through the night without being awoken with pain and with minimal to no pain upon waking in 6 weeks  -SLIGHT PROGRESS 2) Pt  Will have not headaches while at work or with computer work at home  SLIGHT PROGRESS  3)  Pt 's radicular symptoms will be centralized to neck in 6 weeks  -MET  LT) Pt  Will be able to complete all chores at home without pain or limitations in 12 weeks  -SOME PROGRESS 2)  Pt  Will be able to to complete all physical tasks at work without restriction or limitations in 12 weeks  -NO PROGRESS    Plan  Patient would benefit from: skilled physical therapy  Referral necessary: Yes  Planned modality interventions: cryotherapy, thermotherapy: hydrocollator packs and unattended electrical stimulation  Planned therapy interventions: manual therapy, neuromuscular re-education, patient education, self care, therapeutic exercise, therapeutic activities and home exercise program  Other planned therapy interventions: cupping, graston and K-taping of right plexus and ulnar nerve beds  Frequency: 2x week  Duration in weeks: 6  Treatment plan discussed with: patient        Subjective Evaluation    History of Present Illness  Date of onset: 2022  Mechanism of injury: Hit by a large roof tress  Has history of multiple cervical fusions in 2019 and 22:  -Pt reports no significant changes in neck discomfort or numbness in his right arm     23:  Pt reports 90% improvement in R arm numbness and tingling but continues to have weakness and lack of fine motor coordination  Today, he presents with a lot of pain following injections and is guarded in neck motions  He reports always having pain in neck since his accident  He would like to end the pain in his neck and improve ability to do ADLs  Not a recurrent problem   Quality of life: fair    Pain  Current pain ratin  At best pain ratin  At worst pain ratin  Location: upper cervical pain  Quality: burning, radiating, discomfort, dull ache and cramping  Relieving factors: change in position and medications  Progression: no change      Diagnostic Tests    FCE comments: Not working at this time due to injury  Was working as a  but was tasked with helping to build a [employer], where he hurt himself  Treatments  No previous or current treatments  Current treatment: physical therapy  Patient Goals  Patient goal: wants to get his neck fixed and return to work  Objective     Static Posture     Head  Forward  Shoulders  Asymmetric shoulders  Postural Observations  Seated posture: poor  Standing posture: poor  Correction of posture: has no consistent effect    Additional Postural Observation Details  Significant forward head and rounded shoulders along with bilateral scapular protraction  Palpation     Additional Palpation Details  Palpation along right brachial plexus and ulnar nerve beds yields discomfort and reproduction of ulnar nerve symptoms      Neurological Testing     Reflexes   Left   Biceps (C5/C6): trace (1+)  Brachioradialis (C6): trace (1+)  Triceps (C7): trace (1+)    Right   Biceps (C5/C6): trace (1+)  Brachioradialis (C6): trace (1+)  Triceps (C7): trace (1+)    Active Range of Motion   Cervical/Thoracic Spine       Cervical  Subcranial protraction:   Restriction level: minimal  Subcranial retraction:  with pain   Restriction level: moderate  Flexion:  Restriction level: minimal  Extension:  with pain Restriction level: maximal  Left lateral flexion:  Restriction level: moderate  Right lateral flexion:  with pain Restriction level maximal  Left rotation:  with pain Restriction level: moderate  Right rotation:  with pain Restriction level: maximal    Strength/Myotome Testing   Cervical Spine     Left   Interossei strength (t1): 3+    Right   Interossei strength (t1): 2+    Left Shoulder     Planes of Motion   Abduction: 4   External rotation at 0°: 4     Right Shoulder     Planes of Motion   Abduction: 2+   External rotation at 0°: 3+     Left Elbow   Flexion: 4  Extension: 4    Right Elbow   Flexion: 2+  Extension: 2+    Left Wrist/Hand   Wrist extension: 4  Wrist flexion: 4  Thumb extension: 4    Right Wrist/Hand   Wrist extension: 3  Wrist flexion: 3  Thumb extension: 3                 Precautions:  Multi-Level cervical fusion       Manuals 2-7-23 2-9-23 2-14-23 2-17-23 2/21/23 2-2-23   Right brachial plexus and ulnar nerve mobilization 15' STM R UT/cerv paraspinals/samantha rhomboids 15' STM, R UT/cerv paraspinals JUNIOR  Graston, cupping, taping - [facility] BOB 15'  R UT and scapular area' Hold due to trigger point injections 30' samantha UT and thoracic paraspinals/rhomboids 15' STM seated to c-spine and UT/ Levator   K-tape/cupping brachial plexus       Cupping, scraping, k-taping  12'    Manual block C2 w/ patient AROM  Right rotation 3'                Neuro Re-Ed          Cervical retraction home  home   2x10   Chin tucks home 10" x 12 home 10"x12  10"x12   Thoracic extension seated w/ roll 10"x12 @ 2 levels 10" x12 @ 2 levels  10" nx12 @ 2 levels 10" n x 12@ 2 levels  10"x12   LTP and MTP while holding good scapular and cervical posture   Black 3x12 Blue 3x12 Blue 3x12 each Black 0Y25 ea   [place] 5A84 ea    Self NG  Supine 3'  5'      scap retract/ ER at doorway Blue 3x10 Black 3x10 Blue 3x10 Blue 3x10  Blue 3x10            Ther Ex         Biceps curls 7# 3x10 7# 3x10  7# 3x12 7# 3x12  5# 3x10 (dec to sx exac)   Triceps extension 3x20 black  3x12 black band 3x12 blue 3x12 black   3x20 black    Lateral raises 1# 2x10 0# 2x10 1# 2x10 0# 2x10  1# 2x10   Objective measures      TB 8' Ther Activity                                                                                 Pt Ed/ HEP                  Gait Training                           Modalities           MHP 12'  MHP 15' MHP 13'

## 2023-02-21 NOTE — LETTER
2023    MD Matheus Gamino [id number]    Patient: Jason Carlson   YOB: 1955   Date of Visit: 2023     Encounter Diagnosis     ICD-10-CM    1  Neck pain  M54 2       2  Cervical myelopathy (HCC)  G95 9       3  Postlaminectomy syndrome, thoracic  M96 1           Dear Dr Pete Officer: Thank you for your recent referral of Jason Carlson  Please review the attached evaluation summary from Scottie's recent visit  Please verify that you agree with the plan of care by signing the attached order  If you have any questions or concerns, please do not hesitate to call  I sincerely appreciate the opportunity to share in the care of one of your patients and hope to have another opportunity to work with you in the near future  Sincerely,    Emilee Hussein      Referring Provider:      I certify that I have read the below Plan of Care and certify the need for these services furnished under this plan of treatment while under my care  MD Matheus Gamino [id number]  Via Fax: [phone]          PT Re-Evaluation    Collection of subjective/objective data performed by Emilee Hussein PTA; Assessment, POC, and Goal attainment performed by Esther Snell DPT      Today's date: 2023  Patient name: Jason Carlson  : 1955  MRN: [id number]  Referring provider: Mark Morin MD  Dx:   Encounter Diagnosis     ICD-10-CM    1  Neck pain  M54 2       2  Cervical myelopathy (HCC)  G95 9       3  Postlaminectomy syndrome, thoracic  M96 1             Assessment  Assessment details: Pt presents with signs and symptoms of myelopathy in the right UE  Symptoms mostly increased with tension and palpation of right brachial plexus and ulnar nerve bed  Pt's significantly poor posture appears to be a large contributor to symptoms      22:  -No changes in patient's significant postural abnormalities, no changes in strength, no changes in neck pain or radicular symptoms  Pt originally presented with significant chronic postural abnormalities and mobility deficiencies; which may indicate that changing these may take some more time  Impairments: abnormal or restricted ROM, impaired physical strength, lacks appropriate home exercise program, pain with function and poor posture     22:  Pt having some slight reduction in right arm symptoms but neck remains very painful and overall function has not improved  23: In the big picture, pt is sleeping better most nights and is not having any more radicular symptoms  Mobility, posture and progressions of strengthening are still a difficulty progressing  Symptom irritability: moderateUnderstanding of Dx/Px/POC: good   Prognosis: fair    Goals  ST) Pt  Will be able to sleep through the night without being awoken with pain and with minimal to no pain upon waking in 6 weeks  -NO PROGRESS SINCE LAST ASSESSMENT   2) Pt  Will have not headaches while at work or with computer work at home  -NO PROGRESS 3)  Pt 's radicular symptoms will be centralized to neck in 6 weeks  -MET  LT) Pt  Will be able to complete all chores at home without pain or limitations in 12 weeks  -SLIGHT PROGRESS  2)  Pt  Will be able to to complete all physical tasks at work without restriction or limitations in 12 weeks  -NO PROGRESS  ST) Pt  Will be able to sleep through the night without being awoken with pain and with minimal to no pain upon waking in 6 weeks  -SLIGHT PROGRESS 2) Pt  Will have not headaches while at work or with computer work at home  SLIGHT PROGRESS  3)  Pt 's radicular symptoms will be centralized to neck in 6 weeks  -MET  LT) Pt  Will be able to complete all chores at home without pain or limitations in 12 weeks  -SOME PROGRESS 2)  Pt  Will be able to to complete all physical tasks at work without restriction or limitations in 12 weeks    -NO PROGRESS    Plan  Patient would benefit from: skilled physical therapy  Referral necessary: Yes  Planned modality interventions: cryotherapy, thermotherapy: hydrocollator packs and unattended electrical stimulation  Planned therapy interventions: manual therapy, neuromuscular re-education, patient education, self care, therapeutic exercise, therapeutic activities and home exercise program  Other planned therapy interventions: cupping, graston and K-taping of right plexus and ulnar nerve beds  Frequency: 2x week  Duration in weeks: 6  Treatment plan discussed with: patient        Subjective Evaluation    History of Present Illness  Date of onset: 2022  Mechanism of injury: Hit by a large roof tress  Has history of multiple cervical fusions in 2019 and 22:  -Pt reports no significant changes in neck discomfort or numbness in his right arm     23:  Pt reports 90% improvement in R arm numbness and tingling but continues to have weakness and lack of fine motor coordination  Today, he presents with a lot of pain following injections and is guarded in neck motions  He reports always having pain in neck since his accident  He would like to end the pain in his neck and improve ability to do ADLs  Not a recurrent problem   Quality of life: fair    Pain  Current pain ratin  At best pain ratin  At worst pain ratin  Location: upper cervical pain  Quality: burning, radiating, discomfort, dull ache and cramping  Relieving factors: change in position and medications  Progression: no change      Diagnostic Tests    FCE comments: Not working at this time due to injury  Was working as a  but was tasked with helping to build a [employer], where he hurt himself  Treatments  No previous or current treatments  Current treatment: physical therapy  Patient Goals  Patient goal: wants to get his neck fixed and return to work          Objective     Static Posture     Head  Forward  Shoulders  Asymmetric shoulders  Postural Observations  Seated posture: poor  Standing posture: poor  Correction of posture: has no consistent effect    Additional Postural Observation Details  Significant forward head and rounded shoulders along with bilateral scapular protraction  Palpation     Additional Palpation Details  Palpation along right brachial plexus and ulnar nerve beds yields discomfort and reproduction of ulnar nerve symptoms      Neurological Testing     Reflexes   Left   Biceps (C5/C6): trace (1+)  Brachioradialis (C6): trace (1+)  Triceps (C7): trace (1+)    Right   Biceps (C5/C6): trace (1+)  Brachioradialis (C6): trace (1+)  Triceps (C7): trace (1+)    Active Range of Motion   Cervical/Thoracic Spine       Cervical  Subcranial protraction:   Restriction level: minimal  Subcranial retraction:  with pain   Restriction level: moderate  Flexion:  Restriction level: minimal  Extension:  with pain Restriction level: maximal  Left lateral flexion:  Restriction level: moderate  Right lateral flexion:  with pain Restriction level maximal  Left rotation:  with pain Restriction level: moderate  Right rotation:  with pain Restriction level: maximal    Strength/Myotome Testing   Cervical Spine     Left   Interossei strength (t1): 3+    Right   Interossei strength (t1): 2+    Left Shoulder     Planes of Motion   Abduction: 4   External rotation at 0°: 4     Right Shoulder     Planes of Motion   Abduction: 2+   External rotation at 0°: 3+     Left Elbow   Flexion: 4  Extension: 4    Right Elbow   Flexion: 2+  Extension: 2+    Left Wrist/Hand   Wrist extension: 4  Wrist flexion: 4  Thumb extension: 4    Right Wrist/Hand   Wrist extension: 3  Wrist flexion: 3  Thumb extension: 3                Precautions:  Multi-Level cervical fusion       Manuals 2-7-23 2-9-23 2-14-23 2-17-23 2/21/23 2-2-23   Right brachial plexus and ulnar nerve mobilization 15' STM R UT/cerv paraspinals/samantha rhomboids 15' STM, R UT/cerv paraspinals JUNIOR  Graston, cupping, taping - [facility] BOB 15'  R UT and scapular area' Hold due to trigger point injections 30' samantha UT and thoracic paraspinals/rhomboids 15' STM seated to c-spine and UT/ Levator   K-tape/cupping brachial plexus       Cupping, scraping, k-taping  12'    Manual block C2 w/ patient AROM  Right rotation 3'                Neuro Re-Ed          Cervical retraction home  home   2x10   Chin tucks home 10" x 12 home 10"x12  10"x12   Thoracic extension seated w/ roll 10"x12 @ 2 levels 10" x12 @ 2 levels  10" nx12 @ 2 levels 10" n x 12@ 2 levels  10"x12   LTP and MTP while holding good scapular and cervical posture   Black 3x12 Blue 3x12 Blue 3x12 each Black 9T13 ea   [place] 3W98 ea    Self NG  Supine 3'  5'      scap retract/ ER at doorway Blue 3x10 Black 3x10 Blue 3x10 Blue 3x10  Blue 3x10            Ther Ex         Biceps curls 7# 3x10 7# 3x10  7# 3x12 7# 3x12  5# 3x10 (dec to sx exac)   Triceps extension 3x20 black  3x12 black band 3x12 blue 3x12 black   3x20 black    Lateral raises 1# 2x10 0# 2x10 1# 2x10 0# 2x10  1# 2x10   Objective measures      TB 8'                                         Ther Activity                                                                                 Pt Ed/ HEP                  Gait Training                           Modalities           MHP 12'  MHP 12' MHP 15'

## 2023-02-23 ENCOUNTER — OFFICE VISIT (OUTPATIENT)
Dept: PHYSICAL THERAPY | Facility: CLINIC | Age: 68
End: 2023-02-23

## 2023-02-23 DIAGNOSIS — G89.29 CHRONIC LOW BACK PAIN, UNSPECIFIED BACK PAIN LATERALITY, UNSPECIFIED WHETHER SCIATICA PRESENT: ICD-10-CM

## 2023-02-23 DIAGNOSIS — M54.50 CHRONIC LOW BACK PAIN, UNSPECIFIED BACK PAIN LATERALITY, UNSPECIFIED WHETHER SCIATICA PRESENT: ICD-10-CM

## 2023-02-23 DIAGNOSIS — M54.2 NECK PAIN: Primary | ICD-10-CM

## 2023-02-23 DIAGNOSIS — G95.9 CERVICAL MYELOPATHY (HCC): ICD-10-CM

## 2023-02-23 DIAGNOSIS — M96.1 POSTLAMINECTOMY SYNDROME, THORACIC: ICD-10-CM

## 2023-02-23 NOTE — PROGRESS NOTES
Daily Note     Today's date: 2023  Patient name: Harvinder Flores  : 1955  MRN: [id number]  Referring provider: Leoncio Tse MD  Dx:   Encounter Diagnosis     ICD-10-CM    1  Neck pain  M54 2       2  Cervical myelopathy (HCC)  G95 9       3  Postlaminectomy syndrome, thoracic  M96 1       4  Chronic low back pain, unspecified back pain laterality, unspecified whether sciatica present  M54 50     G89 29                      Subjective: Pt reports his neck remains incredibly painful from his injections last week  Objective: See treatment diary below      Assessment: Tolerated treatment well  Patient would benefit from continued PT      Plan: Progress treatment as tolerated  Precautions:  Multi-Level cervical fusion       Manuals 23   Right brachial plexus and ulnar nerve mobilization 15' STM R UT/cerv paraspinals/samantha rhomboids 15' STM, R UT/cerv paraspinals JUNIOR  Graston, cupping, taping - [facility] BOB 15'  R UT and scapular area' Hold due to trigger point injections 30' samantha UT and thoracic paraspinals/rhomboids 15' STM seated to c-spine and UT/ Levator   K-tape/cupping brachial plexus          Manual block C2 w/ patient AROM  Right rotation 3'                Neuro Re-Ed          Cervical retraction home  home   2x10   Chin tucks home 10" x 12 home 10"x12  10"x12   Thoracic extension seated w/ roll 10"x12 @ 2 levels 10" x12 @ 2 levels  10" nx12 @ 2 levels 10" n x 12@ 2 levels  10"x12   LTP and MTP while holding good scapular and cervical posture   Black 3x12 Blue 3x12 Blue 3x12 each Black 7C76 ea   [place] 8Z56 ea    Self NG  Supine 3'  5'      scap retract/ ER at doorway Blue 3x10 Black 3x10 Blue 3x10 Blue 3x10  Blue 3x10            Ther Ex         Biceps curls 7# 3x10 7# 3x10  7# 3x12 7# 3x12  6# 3x10 (dec to sx exac)   Triceps extension 3x20 black  3x12 black band 3x12 blue 3x12 black   3x20 black    Lateral raises 1# 2x10 0# 2x10 1# 2x10 0# 2x10  0# 2x10 Objective measures      TB 8'                                         Ther Activity                                                                                 Pt Ed/ HEP                  Gait Training                           Modalities           MHP 12'  MHP 15' MHP 13'

## 2023-02-28 ENCOUNTER — APPOINTMENT (OUTPATIENT)
Dept: PHYSICAL THERAPY | Facility: CLINIC | Age: 68
End: 2023-02-28

## 2023-03-02 ENCOUNTER — OFFICE VISIT (OUTPATIENT)
Dept: PHYSICAL THERAPY | Facility: CLINIC | Age: 68
End: 2023-03-02

## 2023-03-02 DIAGNOSIS — M96.1 POSTLAMINECTOMY SYNDROME, THORACIC: ICD-10-CM

## 2023-03-02 DIAGNOSIS — G95.9 CERVICAL MYELOPATHY (HCC): ICD-10-CM

## 2023-03-02 DIAGNOSIS — M54.2 NECK PAIN: Primary | ICD-10-CM

## 2023-03-07 ENCOUNTER — APPOINTMENT (OUTPATIENT)
Dept: PHYSICAL THERAPY | Facility: CLINIC | Age: 68
End: 2023-03-07

## 2023-03-09 ENCOUNTER — OFFICE VISIT (OUTPATIENT)
Dept: PHYSICAL THERAPY | Facility: CLINIC | Age: 68
End: 2023-03-09

## 2023-03-09 DIAGNOSIS — M96.1 POSTLAMINECTOMY SYNDROME, THORACIC: ICD-10-CM

## 2023-03-09 DIAGNOSIS — G95.9 CERVICAL MYELOPATHY (HCC): ICD-10-CM

## 2023-03-09 DIAGNOSIS — M54.2 NECK PAIN: Primary | ICD-10-CM

## 2023-03-09 NOTE — PROGRESS NOTES
Daily Note     Today's date: 3/9/2023  Patient name: Denisha Pozo  : 1955  MRN: [id number]  Referring provider: Luis Daniel Nogueira MD  Dx:   Encounter Diagnosis     ICD-10-CM    1  Neck pain  M54 2       2  Cervical myelopathy (HCC)  G95 9       3  Postlaminectomy syndrome, thoracic  M96 1                      Subjective: Pako Jung reports feeling less/pain tightness in neck following last therapy session two days after but the tightness pain then returns  Objective: See treatment diary below      Assessment: Improvement in soft tissue quality and tone following STM to samantha UT/cerv paraspinals/suboccipitals  Tactile and VC to avoid UT compensation during strengthening exercises resulted in less neck pain and good technique  Able to complete full program this visit  Pt would continue to benefit from skilled PT  Plan: Continue with current POC to address pt deficits  Precautions:  Multi-Level cervical fusion       Manuals 3/2/23 3/9/23 2-14-23 2-17-23 2/21/23 2-23-23   Right brachial plexus and ulnar nerve mobilization 15' STM supine to cerv psp, UT/levator 15' STM supine to cerv psp, UT/levator 15'  R UT and scapular area' Hold due to trigger point injections 30' samantha UT and thoracic paraspinals/rhomboids 15' STM seated to c-spine and UT/ Levator   K-tape/cupping brachial plexus          Manual block C2 w/ patient AROM                  Neuro Re-Ed          Cervical retraction 2x10 2x10 home   2x10   Chin tucks 10"x12 10"x12 home 10"x12  10"x12   Thoracic extension seated w/ roll 10"x12 10"x12 10" nx12 @ 2 levels 10" n x 12@ 2 levels  10"x12   LTP and MTP while holding good scapular and cervical posture   Black 3x12 ea  Black 3x12 ea Blue 3x12 each Black 3x12 ea   Black 3x12 ea    Self NG   5'      scap retract/ ER at doorway Blue 1x22 Blue 2x10 Blue 3x10 Blue 3x10  Blue 3x10            Ther Ex         Biceps curls Resume NV 6# 3x10 7# 3x12 7# 3x12  6# 3x10 (dec to sx exac)   Triceps extension 3x10 black  3x10 black  3x12 blue 3x12 black   3x20 black    Lateral raises Resume NV  0# 2x10 w/tactile cues  1# 2x10 0# 2x10  0# 2x10   Objective measures      TB 8'                                         Ther Activity                                                                                 Pt Ed/ HEP                  Gait Training                           Modalities          Declined  Declines   MHP 12' MHP 13'

## 2023-03-13 ENCOUNTER — OFFICE VISIT (OUTPATIENT)
Dept: PHYSICAL THERAPY | Facility: CLINIC | Age: 68
End: 2023-03-13

## 2023-03-13 DIAGNOSIS — G95.9 CERVICAL MYELOPATHY (HCC): ICD-10-CM

## 2023-03-13 DIAGNOSIS — M96.1 POSTLAMINECTOMY SYNDROME, THORACIC: ICD-10-CM

## 2023-03-13 DIAGNOSIS — M54.2 NECK PAIN: Primary | ICD-10-CM

## 2023-03-13 NOTE — PROGRESS NOTES
Daily Note     Today's date: 3/13/2023  Patient name: Farooq Nielsen  : 1955  MRN: [id number]  Referring provider: Raine Humphries MD  Dx:   Encounter Diagnosis     ICD-10-CM    1  Neck pain  M54 2       2  Cervical myelopathy (HCC)  G95 9       3  Postlaminectomy syndrome, thoracic  M96 1                      Subjective: Annita Kang reports feeling pretty good today in reference to c-spine as he was able to take a bath and let jets hit his muscles and took medication prior to coming to therapy  Objective: See treatment diary below      Assessment: Improvement in soft tissue quality/tone following STM to cerv paraspinals, UT/levator, and scap retractors  Pt fatigued following therapy session therefore unable to perform bicep curls/lat raises; resume NV if able  Updated pt HEP to include rows/lat pulldown, scap retract with ER and demonstrated correct exercise technique  Plan: Continue with current POC to address pt deficits  Precautions:  Multi-Level cervical fusion       Manuals 3/2/23 3/9/23 3/13/23 2-17-23 2/21/23 2-23-23   Right brachial plexus and ulnar nerve mobilization 15' STM supine to cerv psp, UT/levator 15' STM supine to cerv psp, UT/levator 15' STM supine to cerv psp, UT/levator  Hold due to trigger point injections 30' samantha UT and thoracic paraspinals/rhomboids 15' STM seated to c-spine and UT/ Levator   K-tape/cupping brachial plexus          Manual block C2 w/ patient AROM                  Neuro Re-Ed          Cervical retraction 2x10 2x10 2x10   2x10   Chin tucks 10"x12 10"x12 10"x12 10"x12  10"x12   Thoracic extension seated w/ roll 10"x12 10"x12 10"x12 10" n x 12@ 2 levels  10"x12   LTP and MTP while holding good scapular and cervical posture   Black 3x12 ea  Black 3x12 ea Black 3x12 ea Black 3x12 ea   Black 3x12 ea    Self NG         scap retract/ ER at doorway Blue 1x22 Blue 2x10 Blue 2x10 Blue 3x10  Blue 3x10            Ther Ex         Biceps curls Resume NV 6# 3x10 Held-resume NV 7# 3x12  6# 3x10 (dec to sx exac)   Triceps extension 3x10 black  3x10 black  3x10 black  3x12 black   3x20 black    Lateral raises Resume NV  0# 2x10 w/tactile cues  Held-resume NV 0# 2x10  0# 2x10   Objective measures      TB 8'     Updated HEP    TB 5'                                  Ther Activity                                                                                 Pt Ed/ HEP                  Gait Training                           Modalities          Declined  Declines  Declined  MHP 12' MHP 13'

## 2023-03-14 ENCOUNTER — APPOINTMENT (OUTPATIENT)
Dept: PHYSICAL THERAPY | Facility: CLINIC | Age: 68
End: 2023-03-14

## 2023-03-16 ENCOUNTER — OFFICE VISIT (OUTPATIENT)
Dept: PHYSICAL THERAPY | Facility: CLINIC | Age: 68
End: 2023-03-16

## 2023-03-16 DIAGNOSIS — M54.2 NECK PAIN: Primary | ICD-10-CM

## 2023-03-16 DIAGNOSIS — G95.9 CERVICAL MYELOPATHY (HCC): ICD-10-CM

## 2023-03-16 NOTE — PROGRESS NOTES
Daily Note     Today's date: 3/16/2023  Patient name: Maryuri Garcia  : 1955  MRN: [id number]  Referring provider: Jeevan Matos MD  Dx:   Encounter Diagnosis     ICD-10-CM    1  Neck pain  M54 2       2  Cervical myelopathy (HCC)  G95 9                      Subjective: Pt reports neck felt a little better yesterday and is looking forward to getting his facet injections  Objective: See treatment diary below      Assessment: Tolerated treatment well  Patient would benefit from continued PT      Plan: Progress treatment as tolerated  Precautions:  Multi-Level cervical fusion       Manuals 3/2/23 3/9/23 3/13/23 3-16-23 2/21/23 2-23-23   Right brachial plexus and ulnar nerve mobilization 15' STM supine to cerv psp, UT/levator 15' STM supine to cerv psp, UT/levator 15' STM supine to cerv psp, UT/levator  15' STM 30' samantha UT and thoracic paraspinals/rhomboids 15' STM seated to c-spine and UT/ Levator   K-tape/cupping brachial plexus          Manual block C2 w/ patient AROM                  Neuro Re-Ed          Cervical retraction 2x10 2x10 2x10 2x10  2x10   Chin tucks 10"x12 10"x12 10"x12 10"x12  10"x12   Thoracic extension seated w/ roll 10"x12 10"x12 10"x12 10" n x 12@ 2 levels  10"x12   LTP and MTP while holding good scapular and cervical posture   Black 3x12 ea  Black 3x12 ea Black 3x12 ea Black 3x12 ea   Black 3x12 ea    Self NG         scap retract/ ER at doorway Blue 1x22 Blue 2x10 Blue 2x10 Blue 3x10  Blue 3x10            Ther Ex         Biceps curls Resume NV 6# 3x10 Held-resume NV 7# 3x12  6# 3x10 (dec to sx exac)   Triceps extension 3x10 black  3x10 black  3x10 black  3x12 black   3x20 black    Lateral raises Resume NV  0# 2x10 w/tactile cues  Held-resume NV 0# 2x10  0# 2x10   Objective measures      TB 8'     Updated HEP    TB 5'                                  Ther Activity                                                                                 Pt Ed/ HEP                  Gait Training                           Modalities          Declined  Declines  Declined   P 15'

## 2023-03-21 ENCOUNTER — OFFICE VISIT (OUTPATIENT)
Dept: PHYSICAL THERAPY | Facility: CLINIC | Age: 68
End: 2023-03-21

## 2023-03-21 DIAGNOSIS — G95.9 CERVICAL MYELOPATHY (HCC): ICD-10-CM

## 2023-03-21 DIAGNOSIS — M54.2 NECK PAIN: Primary | ICD-10-CM

## 2023-03-21 NOTE — PROGRESS NOTES
Daily Note     Today's date: 3/21/2023  Patient name: Nav Beck  : 1955  MRN: [id number]  Referring provider: Lauri Moran MD  Dx:   Encounter Diagnosis     ICD-10-CM    1  Neck pain  M54 2       2  Cervical myelopathy (HCC)  G95 9                      Subjective: Chandu Mercado reports feeling tight in shoulders which affects his neck pain  He reports difficulty sleeping last night  Objective: See treatment diary below      Assessment: Visual and verbal cueing for correct exercise technique to avoid UT compensation  Improvement in soft tissue quality/tone following STM to cerv paraspinals and samantha UT/rhomboids  MHP during exercises to keep UT calm and avoid compensation  Pt would continue to benefit from skilled PT  Plan: Continue with current POC to address pt deficits  Precautions:  Multi-Level cervical fusion       Manuals 3/2/23 3/9/23 3/13/23 3-16-23 3/21/23 2-23-23   Right brachial plexus and ulnar nerve mobilization 15' STM supine to cerv psp, UT/levator 15' STM supine to cerv psp, UT/levator 15' STM supine to cerv psp, UT/levator  15' STM 15' STM to cerv parapinals and samantha UT/rhomboids  15' STM seated to c-spine and UT/ Levator   K-tape/cupping brachial plexus          Manual block C2 w/ patient AROM                  Neuro Re-Ed          Cervical retraction 2x10 2x10 2x10 2x10 2x10 2x10   Chin tucks 10"x12 10"x12 10"x12 10"x12 10"x12 10"x12   Thoracic extension seated w/ roll 10"x12 10"x12 10"x12 10" n x 12@ 2 levels 10"x12 @ 2 lvls 10"x12   LTP and MTP while holding good scapular and cervical posture   Black 3x12 ea  Black 3x12 ea Black 3x12 ea Black 3x12 ea  Black 3x12 ea  Black 3x12 ea    Self NG         scap retract/ ER at doorway Blue 1x22 Blue 2x10 Blue 2x10 Blue 3x10 Blue 3x10 Blue 3x10   paloff press      Black 2x10 samantha     Ther Ex         Biceps curls Resume NV 6# 3x10 Held-resume NV 7# 3x12 6# 3x10 6# 3x10 (dec to sx exac)   Triceps extension 3x10 black  3x10 black 3x10 black  3x12 black  3x12 black  3x20 black    Lateral raises Resume NV  0# 2x10 w/tactile cues  Held-resume NV 0# 2x10 0# 2x10 0# 2x10   Objective measures          Updated HEP    TB 5'                                  Ther Activity                                                                                 Pt Ed/ HEP                  Gait Training                           Modalities          Declined  Declines  Declined   MH during exercises

## 2023-03-22 NOTE — PROGRESS NOTES
"Daily Note     Today's date: 3/22/2023  Patient name: Navdeep Ross  : 1955  MRN: [id number]  Referring provider: Laila Connolly MD  Dx: No diagnosis found  Subjective: Perry Araujo reports       Objective: See treatment diary below      Assessment:       Plan: Continue with current POC to address pt deficits  Precautions:  Multi-Level cervical fusion       Manuals 3/2/23 3/9/23 3/13/23 3-16-23 3/21/23 3/23/23   Right brachial plexus and ulnar nerve mobilization 15' STM supine to cerv psp, UT/levator 15' STM supine to cerv psp, UT/levator 15' STM supine to cerv psp, UT/levator  15' STM 15' STM to cerv parapinals and samantha UT/rhomboids  K-tape/cupping brachial plexus          Manual block C2 w/ patient AROM                  Neuro Re-Ed          Cervical retraction 2x10 2x10 2x10 2x10 2x10    Chin tucks 10\"x12 10\"x12 10\"x12 10\"x12 10\"x12    Thoracic extension seated w/ roll 10\"x12 10\"x12 10\"x12 10\" n x 12@ 2 levels 10\"x12 @ 2 lvls    LTP and MTP while holding good scapular and cervical posture   Black 3x12 ea  Black 3x12 ea Black 3x12 ea Black 3x12 ea  Black 3x12 ea     Self NG         scap retract/ ER at doorway Blue 1x22 Blue 2x10 Blue 2x10 Blue 3x10 Blue 3x10    paloff press      Black 2x10 samantha     Ther Ex         Biceps curls Resume NV 6# 3x10 Held-resume NV 7# 3x12 6# 3x10    Triceps extension 3x10 black  3x10 black  3x10 black  3x12 black  3x12 black     Lateral raises Resume NV  0# 2x10 w/tactile cues  Held-resume NV 0# 2x10 0# 2x10    Objective measures          Updated HEP    TB 5'                                  Ther Activity                                                                                 Pt Ed/ HEP                  Gait Training                           Modalities          Declined  Declines  Declined   MH during exercises                             "

## 2023-03-23 ENCOUNTER — APPOINTMENT (OUTPATIENT)
Dept: PHYSICAL THERAPY | Facility: CLINIC | Age: 68
End: 2023-03-23

## 2023-03-28 ENCOUNTER — OFFICE VISIT (OUTPATIENT)
Dept: PHYSICAL THERAPY | Facility: CLINIC | Age: 68
End: 2023-03-28

## 2023-03-28 DIAGNOSIS — M54.2 NECK PAIN: Primary | ICD-10-CM

## 2023-03-28 DIAGNOSIS — G95.9 CERVICAL MYELOPATHY (HCC): ICD-10-CM

## 2023-03-28 NOTE — PROGRESS NOTES
"Daily Note     Today's date: 3/28/2023  Patient name: Vern Roberts  : 1955  MRN: [id number]  Referring provider: Susie Mcfarland MD  Dx:   Encounter Diagnosis     ICD-10-CM    1  Neck pain  M54 2       2  Cervical myelopathy (HCC)  G95 9                      Subjective: pt reports feeling the facet injections he received last week are helping      Objective: See treatment diary below      Assessment: Tolerated treatment well  Patient would benefit from continued PT      Plan: Progress treatment as tolerated  Precautions:  Multi-Level cervical fusion       Manuals 3/2/23 3/9/23 3/13/23 3-16-23 3/21/23 3-28-23   Right brachial plexus and ulnar nerve mobilization 15' STM supine to cerv psp, UT/levator 15' STM supine to cerv psp, UT/levator 15' STM supine to cerv psp, UT/levator  15' STM 15' STM to cerv parapinals and samantha UT/rhomboids  15' STM seated to c-spine and UT/ Levator   K-tape/cupping brachial plexus          Manual block C2 w/ patient AROM                  Neuro Re-Ed          Cervical retraction 2x10 2x10 2x10 2x10 2x10 2x10   Chin tucks 10\"x12 10\"x12 10\"x12 10\"x12 10\"x12 10\"x12   Thoracic extension seated w/ roll 10\"x12 10\"x12 10\"x12 10\" n x 12@ 2 levels 10\"x12 @ 2 lvls 10\"x12   LTP and MTP while holding good scapular and cervical posture   Black 3x12 ea  Black 3x12 ea Black 3x12 ea Black 3x12 ea  Black 3x12 ea  Black 3x12 ea    Self NG         scap retract/ ER at doorway Blue 1x22 Blue 2x10 Blue 2x10 Blue 3x10 Blue 3x10 Blue 3x10   paloff press      Black 2x10 samantha     Ther Ex         Biceps curls Resume NV 6# 3x10 Held-resume NV 7# 3x12 6# 3x10 6# 3x10 (dec to sx exac)   Triceps extension 3x10 black  3x10 black  3x10 black  3x12 black  3x12 black  3x20 black    Lateral raises Resume NV  0# 2x10 w/tactile cues  Held-resume NV 0# 2x10 0# 2x10 0# 2x10   Objective measures          Updated HEP    TB 5'                                  Ther Activity                                          " Pt Ed/ HEP                  Gait Training                           Modalities          Declined  Declines  Declined   MH during exercises

## 2023-03-30 ENCOUNTER — OFFICE VISIT (OUTPATIENT)
Dept: PHYSICAL THERAPY | Facility: CLINIC | Age: 68
End: 2023-03-30

## 2023-03-30 DIAGNOSIS — M54.2 NECK PAIN: Primary | ICD-10-CM

## 2023-03-30 DIAGNOSIS — G95.9 CERVICAL MYELOPATHY (HCC): ICD-10-CM

## 2023-03-30 DIAGNOSIS — M96.1 POSTLAMINECTOMY SYNDROME, THORACIC: ICD-10-CM

## 2023-03-30 NOTE — PROGRESS NOTES
"Daily Note     Today's date: 3/30/2023  Patient name: Vern Roberts  : 1955  MRN: [id number]  Referring provider: Susie Mcfarland MD  Dx:   Encounter Diagnosis     ICD-10-CM    1  Neck pain  M54 2       2  Cervical myelopathy (HCC)  G95 9       3  Postlaminectomy syndrome, thoracic  M96 1           Start Time: 0738          Subjective: Malathi Gilmore reports less pain in neck after having facet injections last week  He feels his neck motion has improved  Objective: See treatment diary below      Assessment: Improvement in soft tissue quality/tone to samantha UT and cerv paraspinals following STM/gentle TPR  Visual and VC to ensure correct exercise technique  Able to perform lat raises with 1# DB with good technique and even arm height compared to previous visits  Fatigue following therapy session  Pt would continue to benefit from skilled PT  Plan: Continue with current POC to address pt deficits  Precautions:  Multi-Level cervical fusion       Manuals 3/30/23 3/9/23 3/13/23 3-16-23 3/21/23 3-28-23   Right brachial plexus and ulnar nerve mobilization 15' STM seated and supine to c-spine and UT/levator  15' STM supine to cerv psp, UT/levator 15' STM supine to cerv psp, UT/levator  15' STM 15' STM to cerv parapinals and samantha UT/rhomboids  15' STM seated to c-spine and UT/ Levator   K-tape/cupping brachial plexus          Manual block C2 w/ patient AROM                  Neuro Re-Ed          Cervical retraction 2x10 2x10 2x10 2x10 2x10 2x10   Chin tucks 10\"x12 10\"x12 10\"x12 10\"x12 10\"x12 10\"x12   Thoracic extension seated w/ roll 10\"x12 10\"x12 10\"x12 10\" n x 12@ 2 levels 10\"x12 @ 2 lvls 10\"x12   LTP and MTP while holding good scapular and cervical posture   Black 3x12 ea  Black 3x12 ea Black 3x12 ea Black 3x12 ea  Black 3x12 ea  Black 3x12 ea    Self NG         scap retract/ ER at doorway Blue 3x10 Blue 2x10 Blue 2x10 Blue 3x10 Blue 3x10 Blue 3x10   paloff press  Black 2x10 samantha    Black 2x10 samantha     Ther " Ex         Biceps curls 6# 2x10 6# 3x10 Held-resume NV 7# 3x12 6# 3x10 6# 3x10 (dec to sx exac)   Triceps extension 3x20 black  3x10 black  3x10 black  3x12 black  3x12 black  3x20 black    Lateral raises 1# 2x10 0# 2x10 w/tactile cues  Held-resume NV 0# 2x10 0# 2x10 0# 2x10   Objective measures          Updated HEP    TB 5'                                  Ther Activity                                                                                 Pt Ed/ HEP                  Gait Training                           Modalities           Declines  Declined   MH during exercises

## 2023-04-04 ENCOUNTER — OFFICE VISIT (OUTPATIENT)
Dept: PHYSICAL THERAPY | Facility: CLINIC | Age: 68
End: 2023-04-04

## 2023-04-04 DIAGNOSIS — M96.1 POSTLAMINECTOMY SYNDROME, THORACIC: ICD-10-CM

## 2023-04-04 DIAGNOSIS — M54.2 NECK PAIN: Primary | ICD-10-CM

## 2023-04-04 DIAGNOSIS — G95.9 CERVICAL MYELOPATHY (HCC): ICD-10-CM

## 2023-04-04 NOTE — PROGRESS NOTES
"Daily Note     Today's date: 2023  Patient name: Jonas Fernandez  : 1955  MRN: [id number]  Referring provider: Daneil Severin, MD  Dx:   Encounter Diagnosis     ICD-10-CM    1  Neck pain  M54 2       2  Cervical myelopathy (HCC)  G95 9       3  Postlaminectomy syndrome, thoracic  M96 1                      Subjective: Teagan Babb reports he is doing more at home  He reports samantha UT are painful today and thinks it woke him up  Objective: See treatment diary below      Assessment: Improvement in soft tissue quality/tone following STM/TPR to samantha UT/levator and cerv paraspinals  Progressed pt program to include machine weights to further progress UE strengthening; tactile cues to avoid UT compensation and ensure correct technique  Fatigue following new exercises; unable to complete full program because of this  Denied any increases in pain  Pt would continue to benefit from skilled PT  Plan: Continue with current POC to address pt deficits  Precautions:  Multi-Level cervical fusion       Manuals 3/30/23 4/4/23 3/13/23 3-16-23 3/21/23 3-28-23   Right brachial plexus and ulnar nerve mobilization 15' STM seated and supine to c-spine and UT/levator  15' STM to samantha UT, cerv psp, levator samantha  15' STM supine to cerv psp, UT/levator  15' STM 15' STM to cerv parapinals and samantha UT/rhomboids  15' STM seated to c-spine and UT/ Levator   K-tape/cupping brachial plexus          Manual block C2 w/ patient AROM                  Neuro Re-Ed          Cervical retraction 2x10 trialed-unable to maintain good tech 2x10 2x10 2x10 2x10   Chin tucks 10\"x12 10\"x12 10\"x12 10\"x12 10\"x12 10\"x12   Thoracic extension seated w/ roll 10\"x12 10\"x12 10\"x12 10\" n x 12@ 2 levels 10\"x12 @ 2 lvls 10\"x12   LTP and MTP while holding good scapular and cervical posture   Black 3x12 ea   Black 3x12 ea Black 3x12 ea  Black 3x12 ea  Black 3x12 ea    Self NG         scap retract/ ER at doorway Blue 3x10  Blue 2x10 Blue 3x10 Blue 3x10 Blue " 3x10   paloff press  Black 2x10 samantha NV   Black 2x10 samantha     Ther Ex         Retro UBE   NV?        Biceps curls 6# 2x10 6# 3x10 Held-resume NV 7# 3x12 6# 3x10 6# 3x10 (dec to sx exac)   Triceps extension 3x20 black  3x20 bl  3x10 black  3x12 black  3x12 black  3x20 black    Lateral raises 1# 2x10  Held-resume NV 0# 2x10 0# 2x10 0# 2x10   Objective measures          Updated HEP    TB 5'       Chest press   10# 2x10       Lat pulldown  NV       Row   10# 25x       DB shoulder press   2x10 2#        Ther Activity                                                                                 Pt Ed/ HEP                  Gait Training                           Modalities            Declined   MH during exercises

## 2023-04-06 ENCOUNTER — APPOINTMENT (OUTPATIENT)
Dept: PHYSICAL THERAPY | Facility: CLINIC | Age: 68
End: 2023-04-06

## 2023-04-06 ENCOUNTER — HOSPITAL ENCOUNTER (OUTPATIENT)
Dept: GASTROENTEROLOGY | Facility: HOSPITAL | Age: 68
Setting detail: OUTPATIENT SURGERY
End: 2023-04-06
Attending: INTERNAL MEDICINE

## 2023-04-06 ENCOUNTER — ANESTHESIA (OUTPATIENT)
Dept: GASTROENTEROLOGY | Facility: HOSPITAL | Age: 68
End: 2023-04-06

## 2023-04-06 ENCOUNTER — ANESTHESIA EVENT (OUTPATIENT)
Dept: GASTROENTEROLOGY | Facility: HOSPITAL | Age: 68
End: 2023-04-06

## 2023-04-06 VITALS
BODY MASS INDEX: 24.79 KG/M2 | HEART RATE: 55 BPM | DIASTOLIC BLOOD PRESSURE: 67 MMHG | TEMPERATURE: 98.1 F | HEIGHT: 68 IN | RESPIRATION RATE: 15 BRPM | OXYGEN SATURATION: 100 % | WEIGHT: 163.58 LBS | SYSTOLIC BLOOD PRESSURE: 136 MMHG

## 2023-04-06 DIAGNOSIS — K92.1 MELENA: ICD-10-CM

## 2023-04-06 RX ORDER — LIDOCAINE HYDROCHLORIDE 20 MG/ML
INJECTION, SOLUTION EPIDURAL; INFILTRATION; INTRACAUDAL; PERINEURAL AS NEEDED
Status: DISCONTINUED | OUTPATIENT
Start: 2023-04-06 | End: 2023-04-06

## 2023-04-06 RX ORDER — PROPOFOL 10 MG/ML
INJECTION, EMULSION INTRAVENOUS AS NEEDED
Status: DISCONTINUED | OUTPATIENT
Start: 2023-04-06 | End: 2023-04-06

## 2023-04-06 RX ORDER — SODIUM CHLORIDE, SODIUM LACTATE, POTASSIUM CHLORIDE, CALCIUM CHLORIDE 600; 310; 30; 20 MG/100ML; MG/100ML; MG/100ML; MG/100ML
INJECTION, SOLUTION INTRAVENOUS CONTINUOUS PRN
Status: DISCONTINUED | OUTPATIENT
Start: 2023-04-06 | End: 2023-04-06

## 2023-04-06 RX ADMIN — PROPOFOL 50 MG: 10 INJECTION, EMULSION INTRAVENOUS at 08:07

## 2023-04-06 RX ADMIN — LIDOCAINE HYDROCHLORIDE 100 MG: 20 INJECTION, SOLUTION EPIDURAL; INFILTRATION; INTRACAUDAL; PERINEURAL at 07:54

## 2023-04-06 RX ADMIN — PROPOFOL 100 MG: 10 INJECTION, EMULSION INTRAVENOUS at 07:59

## 2023-04-06 RX ADMIN — PROPOFOL 50 MG: 10 INJECTION, EMULSION INTRAVENOUS at 08:02

## 2023-04-06 RX ADMIN — SODIUM CHLORIDE, SODIUM LACTATE, POTASSIUM CHLORIDE, CALCIUM CHLORIDE: 600; 310; 30; 20 INJECTION, SOLUTION INTRAVENOUS at 07:40

## 2023-04-06 RX ADMIN — PROPOFOL 150 MG: 10 INJECTION, EMULSION INTRAVENOUS at 07:54

## 2023-04-06 RX ADMIN — PROPOFOL 50 MG: 10 INJECTION, EMULSION INTRAVENOUS at 07:55

## 2023-04-06 NOTE — ANESTHESIA PREPROCEDURE EVALUATION
Procedure:  EGD WITH PUSH ENTEROSCOPY    Relevant Problems   GI/HEPATIC   (+) Gastroesophageal reflux disease      NEURO/PSYCH   (+) Anxiety      PULMONARY   (+) Asthma      Other   (+) Bipolar 1 disorder (HCC)        Physical Exam    Airway    Mallampati score: II  TM Distance: >3 FB  Neck ROM: limited     Dental   No notable dental hx     Cardiovascular      Pulmonary      Other Findings        Anesthesia Plan  ASA Score- 2     Anesthesia Type- IV sedation with anesthesia with ASA Monitors  Additional Monitors:   Airway Plan:           Plan Factors-    Chart reviewed  Patient summary reviewed  Induction- intravenous  Postoperative Plan-     Informed Consent- Anesthetic plan and risks discussed with patient  I personally reviewed this patient with the CRNA  Discussed and agreed on the Anesthesia Plan with the CRNA  Khoi Jo

## 2023-04-06 NOTE — ANESTHESIA POSTPROCEDURE EVALUATION
Post-Op Assessment Note    CV Status:  Stable    Pain management: adequate     Mental Status:  Sleepy and arousable   Hydration Status:  Euvolemic   PONV Controlled:  Controlled   Airway Patency:  Patent      Post Op Vitals Reviewed: Yes      Staff: Anesthesiologist, CRNA         There were no known notable events for this encounter      BP   114/60   Temp      Pulse  61   Resp 16   SpO2 95

## 2023-04-06 NOTE — H&P
"History and Physical -  [facility]  Hanna Yousif 74 y o  male MRN: [id number]      HPI: Hanna Yousif is a 74y o  year old male who presents for melena and an abnormal video capsule endoscopy      REVIEW OF SYSTEMS: Per the HPI, and otherwise unremarkable  Historical Information   Past Medical History:   Diagnosis Date   • Anxiety    • Asthma    • Bipolar 1 disorder ([address] )    • Depression      Past Surgical History:   Procedure Laterality Date   • ANTERIOR FUSION CERVICAL SPINE     • CATARACT EXTRACTION     • CHOLECYSTECTOMY     • EYE SURGERY     • LUMBAR FUSION     • SPINE SURGERY       Social History   Social History     Substance and Sexual Activity   Alcohol Use Not Currently     Social History     Substance and Sexual Activity   Drug Use Yes   • Frequency: 5 0 times per week   • Types: Marijuana    Comment: 3 days ago     Social History     Tobacco Use   Smoking Status Never   Smokeless Tobacco Never     Family History   Adopted: Yes       Meds/Allergies     (Not in a hospital admission)      Allergies   Allergen Reactions   • Demerol [Meperidine]        Objective     Blood pressure 127/74, pulse 65, temperature 98 4 °F (36 9 °C), temperature source Temporal, resp  rate 16, height 5' 8\" (1 727 m), weight 74 2 kg (163 lb 9 3 oz), SpO2 95 %  PHYSICAL EXAM    Gen: NAD  CV: RRR  CHEST: Clear  ABD: soft, NT/ND  EXT: no edema      ASSESSMENT/PLAN:  This is a 74y o  year old male here for push enteroscopy, and he is stable and optimized for his procedure          "

## 2023-04-07 ENCOUNTER — OFFICE VISIT (OUTPATIENT)
Dept: PHYSICAL THERAPY | Facility: CLINIC | Age: 68
End: 2023-04-07

## 2023-04-07 DIAGNOSIS — G95.9 CERVICAL MYELOPATHY (HCC): ICD-10-CM

## 2023-04-07 DIAGNOSIS — M54.2 NECK PAIN: Primary | ICD-10-CM

## 2023-04-07 DIAGNOSIS — M96.1 POSTLAMINECTOMY SYNDROME, THORACIC: ICD-10-CM

## 2023-04-07 NOTE — PROGRESS NOTES
"Daily Note     Today's date: 2023  Patient name: John Jackson  : 1955  MRN: [id number]  Referring provider: Bradley Murphy MD  Dx:   Encounter Diagnosis     ICD-10-CM    1  Neck pain  M54 2       2  Cervical myelopathy (HCC)  G95 9       3  Postlaminectomy syndrome, thoracic  M96 1                      Subjective: Carlos Rodriguez reports that he is doing pretty good again today with minimal discomfort  Notes that he has been compliant with his HEP  Objective: See treatment diary below      Assessment: Pt was able to begin on the UBE this session able to tolerate 5' to promote muscular and cardiovascular endurance  Held a few weighted exercises per pt request due to muscular fatigue and weakness  Pt would continue to benefit from skilled PT  Plan: Continue with current POC to address pt deficits  Precautions:  Multi-Level cervical fusion       Manuals 3/30/23 4/4/23 4/7  3/21/23 3-28-23   Right brachial plexus and ulnar nerve mobilization 15' STM seated and supine to c-spine and UT/levator  15' STM to samantha UT, cerv psp, levator samantha  15' STM to samantha UT, cerv psp, levator samantha   15' STM to cerv parapinals and samantha UT/rhomboids  15' STM seated to c-spine and UT/ Levator   K-tape/cupping brachial plexus          Manual block C2 w/ patient AROM                  Neuro Re-Ed          Cervical retraction 2x10 trialed-unable to maintain good tech trialed-unable to maintain good tech  2x10 2x10   Chin tucks 10\"x12 10\"x12 10\"x12  10\"x12 10\"x12   Thoracic extension seated w/ roll 10\"x12 10\"x12 10\"x12  10\"x12 @ 2 lvls 10\"x12   LTP and MTP while holding good scapular and cervical posture   Black 3x12 ea     Black 3x12 ea  Black 3x12 ea    Self NG         scap retract/ ER at doorway Blue 3x10    Blue 3x10 Blue 3x10   paloff press  Black 2x10 samantha NV Black 2x10 samantha  Black 2x10 samantha     Ther Ex         Retro UBE   NV? 5'      Biceps curls 6# 2x10 6# 3x10 6# 3x10  6# 3x10 6# 3x10 (dec to sx exac)   Triceps " extension 3x20 black  3x20 bl  3x20 black  3x12 black  3x20 black    Lateral raises 1# 2x10    0# 2x10 0# 2x10   Objective measures          Updated HEP          Chest press   10# 2x10 Fatigue NV      Lat pulldown  NV NV      Row   10# 25x Fatigue nV      DB shoulder press   2x10 2#  2x10 2#      Ther Activity                                                                                 Pt Ed/ HEP                  Gait Training                           Modalities              MH during exercises

## 2023-04-25 ENCOUNTER — OFFICE VISIT (OUTPATIENT)
Dept: PHYSICAL THERAPY | Facility: CLINIC | Age: 68
End: 2023-04-25

## 2023-04-25 DIAGNOSIS — G95.9 CERVICAL MYELOPATHY (HCC): ICD-10-CM

## 2023-04-25 DIAGNOSIS — M54.2 NECK PAIN: Primary | ICD-10-CM

## 2023-04-25 DIAGNOSIS — M96.1 POSTLAMINECTOMY SYNDROME, THORACIC: ICD-10-CM

## 2023-04-25 NOTE — PROGRESS NOTES
"Daily Note     Today's date: 2023  Patient name: Sita Ramos  : 1955  MRN: [id number]  Referring provider: Mick Gowers, MD  Dx:   Encounter Diagnosis     ICD-10-CM    1  Neck pain  M54 2       2  Cervical myelopathy (HCC)  G95 9       3  Postlaminectomy syndrome, thoracic  M96 1           Start Time: 0732          Subjective: Jacqueline Connie reports feeling sore/tired today  He reported doing well following last visit; but did have increased soreness the following day  Objective: See treatment diary below      Assessment: Some pain with chest press today in samantha shoulders but was able to complete 25 reps  Increased resistance for lat pulldown with no adverse reactions as pt was no longer challenged  Pt unable to perform chin tucks/cerv retractions as pt has minimal motion  Improvement in soft tissue quality and tone following STM to samantha UT/rhomboids/cerv paraspinals  Progress as able  Plan: Continue with current POC to address to deficits  Precautions:  Multi-Level cervical fusion       Manuals 23 4/23   Right brachial plexus and ulnar nerve mobilization 12' STM to cerv paraspinals, and samantha UT/rhomboids 15' STM to samantha UT, cerv psp, levator samantha 15' STM to samantha UT, cerv psp, levator samantha  15' STM to samantha UT, cerv psp, levator samantha  15' STM to cerv parapinals and samantha UT/rhomboids  15' STM to cerv paraspinals and samantha UT/rhomboids   K-tape/cupping brachial plexus          Manual block C2 w/ patient AROM                  Neuro Re-Ed          Cervical retraction   trialed-unable to maintain good tech  2x10    Chin tucks 10\"x12 Held motion unable  10\"x12 10\"x12 10\"x12 10\"x12   Thoracic extension seated w/ roll 10\"x12 @2  lvls 10\"x12 @ 2 lvls 10\"x12 10\"x12 10\"x12 @ 2 lvls 10\"x12 @ 2 lvls   LTP and MTP while holding good scapular and cervical posture   Black 3x12 ea Black 3x12 ea   Black 3x12 ea  Black 3x12 ea    Self NG         scap retract/ ER at doorway     Blue " 3x10 Fatigue-held    paloff press  Black 2x10 ea  Black 2x10 samantha  Black 2x10 samantha  Fatigue-held   Ther Ex         Retro UBE  5'  5'  5' NV  5'    Biceps curls 5# 2x10 5# 2x10 6# 3x10 5# 2x10 6# 3x10 5# 2x10   Triceps extension 3x10 black  3x10 black  3x20 black 3x10 bl 3x12 black  3x10 black    Lateral raises NV    1# 2x10 1# 2x10   Objective measures          Updated HEP          Chest press  15# 3x10 15# 25x Fatigue NV 10# 2x10 10# 2x10 15# 2x10   Lat pulldown 15# 3x10 20# 3x10 NV 10# 2x10 10# 2x10 15# 2x10   Row  15# 3x10 15# 3x10 Fatigue nV 10# 2x10 10# 2x10 15# 2x10   DB shoulder press  2# 2x10 2# 2x10 2x10 2# 2# 2x10 2# 2x10 2# 2x10   Ther Activity                                                                                 Pt Ed/ HEP                  Gait Training                           Modalities           P 10'

## 2023-04-27 ENCOUNTER — OFFICE VISIT (OUTPATIENT)
Dept: PHYSICAL THERAPY | Facility: CLINIC | Age: 68
End: 2023-04-27

## 2023-04-27 DIAGNOSIS — M96.1 POSTLAMINECTOMY SYNDROME, THORACIC: ICD-10-CM

## 2023-04-27 DIAGNOSIS — M54.2 NECK PAIN: Primary | ICD-10-CM

## 2023-04-27 DIAGNOSIS — G95.9 CERVICAL MYELOPATHY (HCC): ICD-10-CM

## 2023-04-27 NOTE — PROGRESS NOTES
"Daily Note     Today's date: 2023  Patient name: Miriam Mota  : 1955  MRN: [id number]  Referring provider: Shelley Montanez MD  Dx:   Encounter Diagnosis     ICD-10-CM    1  Neck pain  M54 2       2  Cervical myelopathy (HCC)  G95 9       3  Postlaminectomy syndrome, thoracic  M96 1                      Subjective: Nathan Mckeon reports having difficulty sleeping last night  He reports tightness to R aspect of cerv paraspinals  Objective: See treatment diary below      Assessment: Improvement in soft tissue quality/tone following STM to samantha UT/levator/rhomboids/cerv paraspinals  Increased endurance this visit needing less time to recover between sets  Tactile cues to avoid UT compensation during tband exercises  Pt would continue to benefit from skilled PT  Plan: Continue with current POC to address pt deficits  Precautions:  Multi-Level cervical fusion       Manuals 23   Right brachial plexus and ulnar nerve mobilization 12' STM to cerv paraspinals, and samantha UT/rhomboids 15' STM to samantha UT, cerv psp, levator samantha 15' STM to samantha UT, cerv psp, levator samantha 15' STM to samantha UT, cerv psp, levator samantha  15' STM to cerv parapinals and samantha UT/rhomboids  15' STM to cerv paraspinals and samantha UT/rhomboids   K-tape/cupping brachial plexus          Manual block C2 w/ patient AROM                  Neuro Re-Ed          Cervical retraction     2x10    Chin tucks 10\"x12 Held motion unable   10\"x12 10\"x12 10\"x12   Thoracic extension seated w/ roll 10\"x12 @2  lvls 10\"x12 @ 2 lvls 10\"x12 @ 2 lvls 10\"x12 10\"x12 @ 2 lvls 10\"x12 @ 2 lvls   LTP and MTP while holding good scapular and cervical posture  Black 3x12 ea Black 3x12 ea Black 3x12 ea   Black 3x12 ea  Black 3x12 ea    Self NG         scap retract/ ER at doorway     Blue 3x10 Fatigue-held    paloff press  Black 2x10 ea  NV!   Black 2x10 samantha  Fatigue-held   Ther Ex         Retro UBE  5'  5'  5'  NV  5'    Biceps " curls 5# 2x10 5# 2x10 5# 2x10 5# 2x10 6# 3x10 5# 2x10   Triceps extension 3x10 black  3x10 black  3x10 black  3x10 bl 3x12 black  3x10 black    Lateral raises NV    1# 2x10 1# 2x10   Objective measures          Updated HEP          Chest press  15# 3x10 15# 25x 10# 30x 10# 2x10 10# 2x10 15# 2x10   Lat pulldown 15# 3x10 20# 3x10 20# 3x10 10# 2x10 10# 2x10 15# 2x10   Row  15# 3x10 15# 3x10 15# 3x10 10# 2x10 10# 2x10 15# 2x10   DB shoulder press  2# 2x10 2# 2x10 2# 2x10 2# 2x10 2# 2x10 2# 2x10   Ther Activity                                                                                 Pt Ed/ HEP                  Gait Training                           Modalities           P 10'

## 2023-05-02 ENCOUNTER — OFFICE VISIT (OUTPATIENT)
Dept: PHYSICAL THERAPY | Facility: CLINIC | Age: 68
End: 2023-05-02

## 2023-05-02 DIAGNOSIS — M96.1 POSTLAMINECTOMY SYNDROME, THORACIC: ICD-10-CM

## 2023-05-02 DIAGNOSIS — M54.2 NECK PAIN: Primary | ICD-10-CM

## 2023-05-02 DIAGNOSIS — G95.9 CERVICAL MYELOPATHY (HCC): ICD-10-CM

## 2023-05-02 NOTE — PROGRESS NOTES
"Daily Note     Today's date: 2023  Patient name: Shireen Blankenship  : 1955  MRN: [id number]  Referring provider: Grisel Celestin MD  Dx:   Encounter Diagnosis     ICD-10-CM    1  Neck pain  M54 2       2  Cervical myelopathy (HCC)  G95 9       3  Postlaminectomy syndrome, thoracic  M96 1                      Subjective: Pt reports overall, everything is improving but he did have a very uncomfortable night last night with right sided neck pain  Objective: See treatment diary below      Assessment: Symptoms reduced post manual   Able to perform all strengthening activities very well  Plan: Continue to progress      Precautions:  Multi-Level cervical fusion       Manuals 23   Right brachial plexus and ulnar nerve mobilization 12' STM to cerv paraspinals, and samantha UT/rhomboids 15' STM to samantha UT, cerv psp, levator samnatha 15' STM to samantha UT, cerv psp, levator samantha 15' STM to samantha UT, cerv psp, levator samantha  15' STM to cerv parapinals and samantha UT/rhomboids  15' STM to cerv paraspinals and samantha UT/rhomboids   K-tape/cupping brachial plexus          Manual block C2 w/ patient AROM                  Neuro Re-Ed          Cervical retraction     2x10    Chin tucks 10\"x12 Held motion unable   10\"x12 10\"x12 10\"x12   Thoracic extension seated w/ roll 10\"x12 @2  lvls 10\"x12 @ 2 lvls 10\"x12 @ 2 lvls 10\"x12 each 10\"x12 @ 2 lvls 10\"x12 @ 2 lvls   LTP and MTP while holding good scapular and cervical posture  Black 3x12 ea Black 3x12 ea Black 3x12 ea  Black 3x12 each Black 3x12 ea  Black 3x12 ea    Self NG         scap retract/ ER at doorway     Blue 3x10 Fatigue-held    paloff press  Black 2x10 ea  NV!  Black 2x10 samantha Black 2x10 samantha  Fatigue-held   Ther Ex         Retro UBE  5'  5'  5'  NV  5'    Biceps curls 5# 2x10 5# 2x10 5# 2x10 6# 3x10 6# 3x10 5# 2x10   Triceps extension 3x10 black  3x10 black  3x10 black  3x12 bl 3x12 black  3x10 black    Lateral raises NV    1# 2x10 1# " 2x10   Objective measures          Updated HEP          Chest press  15# 3x10 15# 25x 10# 30x 15# 2x10 10# 2x10 15# 2x10   Lat pulldown 15# 3x10 20# 3x10 20# 3x10 20# 2x10 10# 2x10 15# 2x10   Row  15# 3x10 15# 3x10 15# 3x10 15# 2x10 10# 2x10 15# 2x10   DB shoulder press  2# 2x10 2# 2x10 2# 2x10 2# 2x10 2# 2x10 2# 2x10   Ther Activity                                                                                 Pt Ed/ HEP                  Gait Training                           Modalities           MHP 10'

## 2023-05-04 ENCOUNTER — OFFICE VISIT (OUTPATIENT)
Dept: PHYSICAL THERAPY | Facility: CLINIC | Age: 68
End: 2023-05-04

## 2023-05-04 DIAGNOSIS — M96.1 POSTLAMINECTOMY SYNDROME, THORACIC: ICD-10-CM

## 2023-05-04 DIAGNOSIS — G95.9 CERVICAL MYELOPATHY (HCC): ICD-10-CM

## 2023-05-04 DIAGNOSIS — M54.2 NECK PAIN: Primary | ICD-10-CM

## 2023-05-04 NOTE — PROGRESS NOTES
"Daily Note     Today's date: 2023  Patient name: Shira Valladares  : 1955  MRN: [id number]  Referring provider: Kaela Gabriel MD  Dx:   Encounter Diagnosis     ICD-10-CM    1  Neck pain  M54 2       2  Cervical myelopathy (HCC)  G95 9       3  Postlaminectomy syndrome, thoracic  M96 1           Start Time: 0737          Subjective: Richard Alvarado reports new symptoms in L UE; radicular symptoms that radiate primarily to L elbow but sometimes radiates into L wrist  He reports these symptoms only when supine; standing does not cause symptoms  Objective: See treatment diary below      Assessment: Improvement in soft tissue quality/tone following STM to samantha UT/levator/ cerv paraspinals  Visual and VC to ensure correct exercise technique  Added radial NG to improve gliding on radial nerve; denied any increases in radicular symptoms t/o duration of visit  Intermittent tactile cues to avoid UT elevation  Pt would continue to benefit from skilled PT  Plan: Continue with current POC to address pt deficits  Precautions:  Multi-Level cervical fusion       Manuals 23 5--23   Right brachial plexus and ulnar nerve mobilization 12' STM to cerv paraspinals, and samantha UT/rhomboids 15' STM to samantha UT, cerv psp, levator samantha 15' STM to samantha UT, cerv psp, levator samantha 15' STM to samantha UT, cerv psp, levator samantha  15' STM to samantha UT, cerv psp, levator samantha  15' STM to cerv paraspinals and samantha UT/rhomboids   K-tape/cupping brachial plexus          Manual block C2 w/ patient AROM                  Neuro Re-Ed          Cervical retraction         Radial NG      2x10    Chin tucks 10\"x12 Held motion unable   10\"x12  10\"x12   Thoracic extension seated w/ roll 10\"x12 @2  lvls 10\"x12 @ 2 lvls 10\"x12 @ 2 lvls 10\"x12 each 10\"x12  10\"x12 @ 2 lvls   LTP and MTP while holding good scapular and cervical posture   Black 3x12 ea Black 3x12 ea Black 3x12 ea  Black 3x12 each Black 3x12 ea Black 3A38 ea  " Self NG         scap retract/ ER at doorway      Fatigue-held    paloff press  Black 2x10 ea  NV!  Black 2x10 samantha Black 2x10 samantha  Fatigue-held   Ther Ex         Retro UBE  5'  5'  5'  NV 5'  5'    Biceps curls 5# 2x10 5# 2x10 5# 2x10 6# 3x10 5# 2x10 alt  5# 2x10   Triceps extension 3x10 black  3x10 black  3x10 black  3x12 bl 3x10 black  3x10 black    Lateral raises NV     1# 2x10   Objective measures          Updated HEP          Chest press  15# 3x10 15# 25x 10# 30x 15# 2x10 10# 2x10 15# 2x10   Lat pulldown 15# 3x10 20# 3x10 20# 3x10 20# 2x10 20# 2x10 15# 2x10   Row  15# 3x10 15# 3x10 15# 3x10 15# 2x10 15# 2x10 15# 2x10   DB shoulder press  2# 2x10 2# 2x10 2# 2x10 2# 2x10 2# 2x10 2# 2x10   Ther Activity                                                                                 Pt Ed/ HEP                  Gait Training                           Modalities           MHP 10'

## 2023-05-08 NOTE — PROGRESS NOTES
"Daily Note     Today's date: 2023  Patient name: Kellen Anderson  : 1955  MRN: [id number]  Referring provider: Alfonzo Triana MD  Dx:   Encounter Diagnosis     ICD-10-CM    1  Neck pain  M54 2       2  Cervical myelopathy (HCC)  G95 9       3  Postlaminectomy syndrome, thoracic  M96 1           Start Time: 0730          Subjective: Laurel Corbett reports that he was able to work a little in the yard yesterday  He continues to have difficulty sleeping due to pain to R suboccipital region  Overall he notices improvement in activity capacity  He denies any radicular pain in L elbow  Objective: See treatment diary below      Assessment: Improvement in soft tissue quality/tone to samantha UT/cerv psp/subocciptials following STM  Visual and VC to ensure correct exercise technique  Decreased tband resistance to be able complete this visit due to fatigue  Progress as able  Plan: Continue with current POC to address pt deficits  Precautions:  Multi-Level cervical fusion       Manuals 23   Right brachial plexus and ulnar nerve mobilization 12' STM to cerv paraspinals, and samantha UT/rhomboids 15' STM to samantha UT, cerv psp, levator samantha 15' STM to samantha UT, cerv psp, levator samantha 15' STM to samantha UT, cerv psp, levator samantha  15' STM to samantha UT, cerv psp, levator samantha  15' STM to samantha UT, cerv psp, levator samantha, suboccitials   K-tape/cupping brachial plexus          Manual block C2 w/ patient AROM                  Neuro Re-Ed          Cervical retraction         Radial NG      2x10 2x10   Chin tucks 10\"x12 Held motion unable   10\"x12     Thoracic extension seated w/ roll 10\"x12 @2  lvls 10\"x12 @ 2 lvls 10\"x12 @ 2 lvls 10\"x12 each 10\"x12  10\"x12    LTP and MTP while holding good scapular and cervical posture  Black 3x12 ea Black 3x12 ea Black 3x12 ea  Black 3x12 each Black 3x12 ea grn 3x10 ea   Self NG         scap retract/ ER at doorway         paloff press  Black 2x10 ea  NV!  Black " 2x10 samantha Black 2x10 samantha  grn 2x10 samantha    Ther Ex         Retro UBE  5'  5'  5'  NV 5'  5'    Biceps curls 5# 2x10 5# 2x10 5# 2x10 6# 3x10 5# 2x10 alt  5# 2x10 alt    Triceps extension 3x10 black  3x10 black  3x10 black  3x12 bl 3x10 black  3x10 grn    Lateral raises NV        Objective measures          Updated HEP          Chest press  15# 3x10 15# 25x 10# 30x 15# 2x10 10# 2x10 10# 3x10   Lat pulldown 15# 3x10 20# 3x10 20# 3x10 20# 2x10 20# 2x10 20# 3x10   Row  15# 3x10 15# 3x10 15# 3x10 15# 2x10 15# 2x10 15# 3x10   DB shoulder press  2# 2x10 2# 2x10 2# 2x10 2# 2x10 2# 2x10 2# 2x10   Ther Activity                                                                                 Pt Ed/ HEP                  Gait Training                           Modalities           P 10'

## 2023-05-09 ENCOUNTER — OFFICE VISIT (OUTPATIENT)
Dept: PHYSICAL THERAPY | Facility: CLINIC | Age: 68
End: 2023-05-09

## 2023-05-09 DIAGNOSIS — G95.9 CERVICAL MYELOPATHY (HCC): ICD-10-CM

## 2023-05-09 DIAGNOSIS — M96.1 POSTLAMINECTOMY SYNDROME, THORACIC: ICD-10-CM

## 2023-05-09 DIAGNOSIS — M54.2 NECK PAIN: Primary | ICD-10-CM

## 2023-05-11 ENCOUNTER — APPOINTMENT (OUTPATIENT)
Dept: PHYSICAL THERAPY | Facility: CLINIC | Age: 68
End: 2023-05-11
Payer: OTHER MISCELLANEOUS

## 2023-05-12 ENCOUNTER — OFFICE VISIT (OUTPATIENT)
Dept: PHYSICAL THERAPY | Facility: CLINIC | Age: 68
End: 2023-05-12

## 2023-05-12 DIAGNOSIS — G95.9 CERVICAL MYELOPATHY (HCC): ICD-10-CM

## 2023-05-12 DIAGNOSIS — M96.1 POSTLAMINECTOMY SYNDROME, THORACIC: ICD-10-CM

## 2023-05-12 DIAGNOSIS — M54.2 NECK PAIN: Primary | ICD-10-CM

## 2023-05-12 NOTE — PROGRESS NOTES
"Daily Note     Today's date: 2023  Patient name: Remington Gonzalez  : 1955  MRN: [id number]  Referring provider: Samia Lorenzo MD  Dx:   Encounter Diagnosis     ICD-10-CM    1  Neck pain  M54 2       2  Cervical myelopathy (HCC)  G95 9       3  Postlaminectomy syndrome, thoracic  M96 1                      Subjective: Eloise Ren reports not being able to sleep well last night; continues to have most discomfort to R suboccipital region  Objective: See treatment diary below      Assessment: Good exercise tolerance today  Added lat raises this visit with only 1x tactile cue to avoid UT compensation  Improvement in soft tissue quality/tone following STM to samantha UT/cerv parapsinals  Progress as able  Plan: Continue with current POC to address pt deficits  Precautions:  Multi-Level cervical fusion       Manuals 23   Right brachial plexus and ulnar nerve mobilization 12' STM to samantha UT, cerv psp, levator samantha, suboccipitals  15' STM to samantha UT, cerv psp, levator samantha 15' STM to samantha UT, cerv psp, levator samantha 15' STM to samantha UT, cerv psp, levator samantha  15' STM to samantha UT, cerv psp, levator samantha  15' STM to samantha UT, cerv psp, levator samantha, suboccitials   K-tape/cupping brachial plexus          Manual block C2 w/ patient AROM                  Neuro Re-Ed          Cervical retraction         Radial NG      2x10 2x10   Chin tucks  Held motion unable   10\"x12     Thoracic extension seated w/ roll 10\"x12 @ 2 lvls 10\"x12 @ 2 lvls 10\"x12 @ 2 lvls 10\"x12 each 10\"x12  10\"x12    LTP and MTP while holding good scapular and cervical posture  Blue 3x10 ea Black 3x12 ea Black 3x12 ea  Black 3x12 each Black 3x12 ea grn 3x10 ea   Self NG         scap retract/ ER at doorway         paloff press  Blue 2x10 samantha   NV!  Black 2x10 samantha Black 2x10 samantha  grn 2x10 samantha    Ther Ex         Retro UBE  5'  5'  5'  NV 5'  5'    Biceps curls 5# 2x10 alt 5# 2x10 5# 2x10 6# 3x10 5# 2x10 alt  5# 2x10 " alt    Triceps extension 2x10 blue  3x10 black  3x10 black  3x12 bl 3x10 black  3x10 grn    Lateral raises 1# 2x10        Objective measures          Updated HEP          Chest press  10# 3x10 15# 25x 10# 30x 15# 2x10 10# 2x10 10# 3x10   Lat pulldown 20# 3x10 20# 3x10 20# 3x10 20# 2x10 20# 2x10 20# 3x10   Row  15# 3x10 15# 3x10 15# 3x10 15# 2x10 15# 2x10 15# 3x10   DB shoulder press  2# 2x10 2# 2x10 2# 2x10 2# 2x10 2# 2x10 2# 2x10   Ther Activity                                                                                 Pt Ed/ HEP                  Gait Training                           Modalities          MHP 5'  MHP 10'

## 2023-05-16 ENCOUNTER — APPOINTMENT (OUTPATIENT)
Dept: PHYSICAL THERAPY | Facility: CLINIC | Age: 68
End: 2023-05-16
Payer: OTHER MISCELLANEOUS

## 2023-05-18 ENCOUNTER — OFFICE VISIT (OUTPATIENT)
Dept: PHYSICAL THERAPY | Facility: CLINIC | Age: 68
End: 2023-05-18

## 2023-05-18 DIAGNOSIS — M54.2 NECK PAIN: Primary | ICD-10-CM

## 2023-05-18 DIAGNOSIS — M96.1 POSTLAMINECTOMY SYNDROME, THORACIC: ICD-10-CM

## 2023-05-18 DIAGNOSIS — G95.9 CERVICAL MYELOPATHY (HCC): ICD-10-CM

## 2023-05-18 NOTE — PROGRESS NOTES
"Daily Note     Today's date: 2023  Patient name: Tiffanie Machuca  : 1955  MRN: [id number]  Referring provider: Linda Dobbs MD  Dx:   Encounter Diagnosis     ICD-10-CM    1  Neck pain  M54 2       2  Cervical myelopathy (HCC)  G95 9       3  Postlaminectomy syndrome, thoracic  M96 1                      Subjective:  Reports doing things around his house, moving things in shed with difficulty due to weakness  Reports bilateral shoulder soreness from this  Objective: See treatment diary below      Assessment: Tolerated treatment well  Reports continued weakness bilateral shoulders/UE's  Continued symptoms noted c-spine region  Reports increased sensitivity in sub-occipital region  Patient would benefit from continued PT      Plan: Continue per plan of care  Precautions:  Multi-Level cervical fusion       Manuals 23   Right brachial plexus and ulnar nerve mobilization 12' STM to samantha UT, cerv psp, levator samantha, suboccipitals  15' STM to samantha UT, cerv psp, levator samantha 15' STM to samantha UT, cerv psp, levator samantha 15' STM to samantha UT, cerv psp, levator samantha  15' STM to samantha UT, cerv psp, levator samantha  15' STM to samantha UT, cerv psp, levator samantha, suboccitials   K-tape/cupping brachial plexus          Manual block C2 w/ patient AROM                  Neuro Re-Ed          Cervical retraction         Radial NG      2x10 2x10   Chin tucks    10\"x12     Thoracic extension seated w/ roll 10\"x12 @ 2 lvls 10\"x12 @ 2 lvls 10\"x12 @ 2 lvls 10\"x12 each 10\"x12  10\"x12    LTP and MTP while holding good scapular and cervical posture  Blue 3x10 ea Black 3x10 ea Black 3x12 ea  Black 3x12 each Black 3x12 ea grn 3x10 ea   Self NG         scap retract/ ER at doorway         paloff press  Blue 2x10 samantha  Black 2x10 samantha NV!  Black 2x10 samantha Black 2x10 samantha  grn 2x10 samantha    Ther Ex         Retro UBE  5'  5'  5'  NV 5'  5'    Biceps curls 5# 2x10 alt 5# 2x10 5# 2x10 6# 3x10 5# 2x10 alt  " 5# 2x10 alt    Triceps extension 2x10 blue  3x10 black  3x10 black  3x12 bl 3x10 black  3x10 grn    Lateral raises 1# 2x10        Objective measures          Updated HEP          Chest press  10# 3x10 10#  3x10 10# 30x 15# 2x10 10# 2x10 10# 3x10   Lat pulldown 20# 3x10 20# 3x10 20# 3x10 20# 2x10 20# 2x10 20# 3x10   Row  15# 3x10 25# 3x10 15# 3x10 15# 2x10 15# 2x10 15# 3x10   DB shoulder press  2# 2x10 2# 2x10 2# 2x10 2# 2x10 2# 2x10 2# 2x10   Ther Activity                                                                                 Pt Ed/ HEP                  Gait Training                           Modalities          P 5'

## 2023-05-23 ENCOUNTER — APPOINTMENT (OUTPATIENT)
Dept: PHYSICAL THERAPY | Facility: CLINIC | Age: 68
End: 2023-05-23
Payer: OTHER MISCELLANEOUS

## 2023-05-25 ENCOUNTER — APPOINTMENT (OUTPATIENT)
Dept: PHYSICAL THERAPY | Facility: CLINIC | Age: 68
End: 2023-05-25
Payer: OTHER MISCELLANEOUS

## 2023-05-26 ENCOUNTER — OFFICE VISIT (OUTPATIENT)
Dept: PHYSICAL THERAPY | Facility: CLINIC | Age: 68
End: 2023-05-26

## 2023-05-26 DIAGNOSIS — M54.2 NECK PAIN: Primary | ICD-10-CM

## 2023-05-26 DIAGNOSIS — G95.9 CERVICAL MYELOPATHY (HCC): ICD-10-CM

## 2023-05-26 DIAGNOSIS — M96.1 POSTLAMINECTOMY SYNDROME, THORACIC: ICD-10-CM

## 2023-05-26 NOTE — PROGRESS NOTES
"Daily Note     Today's date: 2023  Patient name: Fran Castillo  : 1955  MRN: [id number]  Referring provider: Allyssa Galloway MD  Dx:   Encounter Diagnosis     ICD-10-CM    1  Neck pain  M54 2       2  Cervical myelopathy (HCC)  G95 9       3  Postlaminectomy syndrome, thoracic  M96 1                      Subjective:  Reports that he is feeling tired today due to lack of sleep  Notes that his neck is feeling sore  Objective: See treatment diary below      Assessment: Tolerated treatment well  Continues to demonstrate increased stiffness in his cervical region  He demonstrated a good understanding of his exercises with good functional form  Patient would benefit from continued PT      Plan: Continue per plan of care  Precautions:  Multi-Level cervical fusion       Manuals 23   Right brachial plexus and ulnar nerve mobilization 12' STM to samantha UT, cerv psp, levator samantha, suboccipitals  15' STM to samantha UT, cerv psp, levator samantha 15' STM to samantha UT, cerv psp, levator samantha  15' STM to samantha UT, cerv psp, levator samantha  15' STM to samantha UT, cerv psp, levator samantha, suboccitials   K-tape/cupping brachial plexus          Manual block C2 w/ patient AROM                  Neuro Re-Ed          Cervical retraction         Radial NG      2x10 2x10   Chin tucks         Thoracic extension seated w/ roll 10\"x12 @ 2 lvls 10\"x12 @ 2 lvls 10\"x12 @ 2 lvls  10\"x12  10\"x12    LTP and MTP while holding good scapular and cervical posture   Blue 3x10 ea Black 3x10 ea Black 3x10 ea  Black 3x12 ea grn 3x10 ea   Self NG         scap retract/ ER at doorway         paloff press  Blue 2x10 samantha  Black 2x10 samantha Black 2x10 samantha  Black 2x10 samantha  grn 2x10 samantha    Ther Ex         Retro UBE  5'  5'  5'  5'  5'    Biceps curls 5# 2x10 alt 5# 2x10 5# 2x10  5# 2x10 alt  5# 2x10 alt    Triceps extension 2x10 blue  3x10 black  3x10 black  3x10 black  3x10 grn    Lateral raises 1# 2x10        Objective measures    " Updated HEP          Chest press  10# 3x10 10#  3x10 10# 3x10  10# 2x10 10# 3x10   Lat pulldown 20# 3x10 20# 3x10 20# 3x10  20# 2x10 20# 3x10   Row  15# 3x10 25# 3x10 25# 3x10  15# 2x10 15# 3x10   DB shoulder press  2# 2x10 2# 2x10 2# 2x10  2# 2x10 2# 2x10   Ther Activity                                                                                 Pt Ed/ HEP                  Gait Training                           Modalities          MHP 5'

## 2023-05-30 ENCOUNTER — OFFICE VISIT (OUTPATIENT)
Dept: PHYSICAL THERAPY | Facility: CLINIC | Age: 68
End: 2023-05-30

## 2023-05-30 DIAGNOSIS — G95.9 CERVICAL MYELOPATHY (HCC): ICD-10-CM

## 2023-05-30 DIAGNOSIS — M54.2 NECK PAIN: Primary | ICD-10-CM

## 2023-05-30 NOTE — PROGRESS NOTES
"Daily Note     Today's date: 2023  Patient name: Venancio Jones  : 1955  MRN: [id number]  Referring provider: She Limon MD  Dx:   Encounter Diagnosis     ICD-10-CM    1  Neck pain  M54 2       2  Cervical myelopathy (HCC)  G95 9                      Subjective: pt reports pain still holding steady and strength gradually improving  Objective: See treatment diary below      Assessment: Pt's palpable trigger points continue to reduce as strength is gradually improving  Plan: Continue to attempt progression of functional strengthening  Precautions:  Multi-Level cervical fusion       Manuals 23   Right brachial plexus and ulnar nerve mobilization 12' STM to samantha UT, cerv psp, levator samantha, suboccipitals  15' STM to samantha UT, cerv psp, levator samantha 15' STM to samantha UT, cerv psp, levator samantha 15' STM to samantha UT, cerv psp, levator samantha 15' STM to samantha UT, cerv psp, levator samantha  15' STM to samantha UT, cerv psp, levator samantha, suboccitials   K-tape/cupping brachial plexus          Manual block C2 w/ patient AROM                  Neuro Re-Ed          Cervical retraction         Radial NG      2x10 2x10   Chin tucks         Thoracic extension seated w/ roll 10\"x12 @ 2 lvls 10\"x12 @ 2 lvls 10\"x12 @ 2 lvls 10\" x12@ 2 levels 10\"x12  10\"x12    LTP and MTP while holding good scapular and cervical posture   Blue 3x10 ea Black 3x10 ea Black 3x10 ea Black 3x12 each Black 3x12 ea grn 3x10 ea   Self NG         scap retract/ ER at doorway         paloff press  Blue 2x10 samantha  Black 2x10 samantha Black 2x10 samantha Black 2x12 samantha Black 2x10 samantha  grn 2x10 samantha    Ther Ex         Retro UBE  5'  5'  5' 6' 5'  5'    Biceps curls 5# 2x10 alt 5# 2x10 5# 2x10 5# 2x10 5# 2x10 alt  5# 2x10 alt    Triceps extension 2x10 blue  3x10 black  3x10 black 3x12 black 3x10 black  3x10 grn    Lateral raises 1# 2x10   1# 2x10     Objective measures          Updated HEP          Chest press  10# 3x10 10#  3x10 " 10# 3x10 10# 3x10 10# 2x10 10# 3x10   Lat pulldown 20# 3x10 20# 3x10 20# 3x10 20# 3x10 20# 2x10 20# 3x10   Row  15# 3x10 25# 3x10 25# 3x10 25# 3x10 15# 2x10 15# 3x10   DB shoulder press  2# 2x10 2# 2x10 2# 2x10   2# 3x10 2# 2x10 2# 2x10   Ther Activity                                                                                 Pt Ed/ HEP                  Gait Training                           Modalities          MHP 5'

## 2023-06-01 ENCOUNTER — OFFICE VISIT (OUTPATIENT)
Dept: PHYSICAL THERAPY | Facility: CLINIC | Age: 68
End: 2023-06-01

## 2023-06-01 DIAGNOSIS — G95.9 CERVICAL MYELOPATHY (HCC): ICD-10-CM

## 2023-06-01 DIAGNOSIS — M96.1 POSTLAMINECTOMY SYNDROME, THORACIC: ICD-10-CM

## 2023-06-01 DIAGNOSIS — M54.2 NECK PAIN: Primary | ICD-10-CM

## 2023-06-01 NOTE — PROGRESS NOTES
"Daily Note     Today's date: 2023  Patient name: Yahaira Dos Santos  : 1955  MRN: [id number]  Referring provider: Jerilynn Dakin, MD  Dx:   Encounter Diagnosis     ICD-10-CM    1  Neck pain  M54 2       2  Cervical myelopathy (HCC)  G95 9       3  Postlaminectomy syndrome, thoracic  M96 1                      Subjective: Pt reports waking up a little stiff and sore in his right cervical spine  Objective: See treatment diary below      Assessment: Pt is progressing in strengthening, although slow  Plan: Continue with pushing progressive strengthening  Precautions:  Multi-Level cervical fusion       Manuals 23   Right brachial plexus and ulnar nerve mobilization 12' STM to samantha UT, cerv psp, levator samantha, suboccipitals  15' STM to samantha UT, cerv psp, levator samantah 15' STM to samantha UT, cerv psp, levator samantha 15' STM to samantha UT, cerv psp, levator samantha 15' STM to samantha UT, cerv psp, levator samantha  15' STM to samantha UT, cerv psp, levator samantha, suboccitials   K-tape/cupping brachial plexus          Manual block C2 w/ patient AROM                  Neuro Re-Ed          Cervical retraction         Radial NG      2x10 2x10   Chin tucks         Thoracic extension seated w/ roll 10\"x12 @ 2 lvls 10\"x12 @ 2 lvls 10\"x12 @ 2 lvls 10\" x12@ 2 levels 10\"x12  10\"x12    LTP and MTP while holding good scapular and cervical posture   Blue 3x10 ea Black 3x10 ea Black 3x10 ea Black 3x12 each Black 3x12 ea grn 3x10 ea   Self NG         scap retract/ ER at doorway         paloff press  Blue 2x10 samantha  Black 2x10 samantha Black 2x10 samantha Black 2x12 samantha Black 2x10 samantha  grn 2x10 samantha    Ther Ex         Retro UBE  5'  5'  5' 6' 5'  5'    Biceps curls 5# 2x10 alt 5# 2x10 5# 2x10 5# 2x10 5# 2x10 alt  5# 2x10 alt    Triceps extension 2x10 blue  3x10 black  3x10 black 3x12 black 3x10 black  3x10 grn    Lateral raises 1# 2x10   1# 2x10 1# 2x10    Objective measures          Updated HEP          Chest press  10# " 3x10 10#  3x10 10# 3x10 10# 3x10 15# 2x10 10# 3x10   Lat pulldown 20# 3x10 20# 3x10 20# 3x10 20# 3x10 20# 2x10 20# 3x10   Row  15# 3x10 25# 3x10 25# 3x10 25# 3x10 15# 2x10 15# 3x10   DB shoulder press  2# 2x10 2# 2x10 2# 2x10   2# 3x10 2# 2x10 2# 2x10   Ther Activity                                                                                 Pt Ed/ HEP                  Gait Training                           Modalities          MHP 5'

## 2023-06-06 ENCOUNTER — OFFICE VISIT (OUTPATIENT)
Dept: PHYSICAL THERAPY | Facility: CLINIC | Age: 68
End: 2023-06-06
Payer: OTHER MISCELLANEOUS

## 2023-06-06 DIAGNOSIS — G95.9 CERVICAL MYELOPATHY (HCC): ICD-10-CM

## 2023-06-06 DIAGNOSIS — M54.2 NECK PAIN: Primary | ICD-10-CM

## 2023-06-06 PROCEDURE — 97110 THERAPEUTIC EXERCISES: CPT

## 2023-06-06 PROCEDURE — 97140 MANUAL THERAPY 1/> REGIONS: CPT

## 2023-06-06 NOTE — PROGRESS NOTES
"Daily Note     Today's date: 2023  Patient name: Milagro Adrian  : 1955  MRN: [id number]  Referring provider: Radha Ruiz MD  Dx:   Encounter Diagnosis     ICD-10-CM    1  Neck pain  M54 2       2  Cervical myelopathy (HCC)  G95 9                      Subjective: Pt reports his whole body has been sore since the start of the past weekend  Objective: See treatment diary below      Assessment: Tolerated treatment well  Patient would benefit from continued PT      Plan: Progress treatment as tolerated  Precautions:  Multi-Level cervical fusion       Manuals 23   Right brachial plexus and ulnar nerve mobilization 12' STM to samantha UT, cerv psp, levator samantha, suboccipitals  15' STM to samantha UT, cerv psp, levator samantha 15' STM to samantha UT, cerv psp, levator samantha 15' STM to samantha UT, cerv psp, levator samantha 15' STM to samantha UT, cerv psp, levator samantha  15' STM to samantha UT, cerv psp, levator samantha, suboccitials   K-tape/cupping brachial plexus          Manual block C2 w/ patient AROM                  Neuro Re-Ed          Cervical retraction         Radial NG      2x10 2x10   Chin tucks         Thoracic extension seated w/ roll 10\"x12 @ 2 lvls 10\"x12 @ 2 lvls 10\"x12 @ 2 lvls 10\" x12@ 2 levels 10\"x12  10\"x12    LTP and MTP while holding good scapular and cervical posture   Blue 3x10 ea Black 3x10 ea Black 3x10 ea Black 3x12 each Black 3x12 ea Black 3x10 ea   Self NG         scap retract/ ER at doorway         paloff press  Blue 2x10 samantha  Black 2x10 samantha Black 2x10 samantha Black 2x12 samantha Black 2x10 samantha  BLK 2x10 samantha    Ther Ex         Retro UBE  5'  5'  5' 6' 5'  6'    Biceps curls 5# 2x10 alt 5# 2x10 5# 2x10 5# 2x10 5# 2x10 alt  5# 2x10 alt    Triceps extension 2x10 blue  3x10 black  3x10 black 3x12 black 3x10 black  3x10 grn    Lateral raises 1# 2x10   1# 2x10 1# 2x10 1# 2x10   Objective measures          Updated HEP          Chest press  10# 3x10 10#  3x10 10# 3x10 10# 3x10 15# " 2x10 15# 3x10   Lat pulldown 20# 3x10 20# 3x10 20# 3x10 20# 3x10 20# 2x10 25# 3x10   Row  15# 3x10 25# 3x10 25# 3x10 25# 3x10 15# 2x10 15# 3x10   DB shoulder press  2# 2x10 2# 2x10 2# 2x10   2# 3x10 2# 2x10 2# 2x10   Ther Activity                                                                                 Pt Ed/ HEP                  Gait Training                           Modalities          MHP 5'

## 2023-06-08 ENCOUNTER — APPOINTMENT (OUTPATIENT)
Dept: PHYSICAL THERAPY | Facility: CLINIC | Age: 68
End: 2023-06-08
Payer: OTHER MISCELLANEOUS

## 2023-06-12 NOTE — PROGRESS NOTES
"Daily Note     Today's date: 2023  Patient name: Colletta Mages  : 1955  MRN: [id number]  Referring provider: Tristan Richardson MD  Dx:   Encounter Diagnosis     ICD-10-CM    1  Neck pain  M54 2       2  Cervical myelopathy (HCC)  G95 9       3  Postlaminectomy syndrome, thoracic  M96 1                      Subjective: The patient states that the last week has been \"rough  \"  Has been having increased pain with certain motions  He will be going for an GARY today after PT  Objective: See treatment diary below      Assessment: Tolerated treatment well  Despite reports of increased pain he was able to complete all TE as outlined below  STM not completed today per patient request as patient had to leave to get to [facility] for GARY  Patient would benefit from continued PT      Plan: Continue per plan of care  Precautions:  Multi-Level cervical fusion       Manuals 23   Right brachial plexus and ulnar nerve mobilization NP today - patient needed to leave for another appt 15' STM to samantha UT, cerv psp, levator samantha 15' STM to samantha UT, cerv psp, levator samantha 15' STM to samantha UT, cerv psp, levator samantha 15' STM to samantha UT, cerv psp, levator samantha  15' STM to samantha UT, cerv psp, levator samantha, suboccitials   K-tape/cupping brachial plexus          Manual block C2 w/ patient AROM                  Neuro Re-Ed          Cervical retraction         Radial NG  2x10    2x10 2x10   Chin tucks         Thoracic extension seated w/ roll 10\"x12 10\"x12 @ 2 lvls 10\"x12 @ 2 lvls 10\" x12@ 2 levels 10\"x12  10\"x12    LTP and MTP while holding good scapular and cervical posture   Black 3x10 ea Black 3x10 ea Black 3x10 ea Black 3x12 each Black 3x12 ea Black 3x10 ea   Self NG         scap retract/ ER at doorway         paloff press  Black 2x10 samantha  Black 2x10 samantha Black 2x10 samantha Black 2x12 samantha Black 2x10 samantha  BLK 2x10 samantha    Ther Ex         Retro UBE  5' 5'  5' 6' 5'  6'    Biceps curls 5# 2x10 " alt 5# 2x10 5# 2x10 5# 2x10 5# 2x10 alt  5# 2x10 alt    Triceps extension 3x10 Black 3x10 black  3x10 black 3x12 black 3x10 black  3x10 grn    Lateral raises 1# 2x10   1# 2x10 1# 2x10 1# 2x10   Objective measures          Updated HEP          Chest press  15# 3x10 10#  3x10 10# 3x10 10# 3x10 15# 2x10 15# 3x10   Lat pulldown 25# 3x10 20# 3x10 20# 3x10 20# 3x10 20# 2x10 25# 3x10   Row  15# 3x10 25# 3x10 25# 3x10 25# 3x10 15# 2x10 15# 3x10   DB shoulder press  2# 2x10 2# 2x10 2# 2x10   2# 3x10 2# 2x10 2# 2x10   Ther Activity                                                                                 Pt Ed/ HEP                  Gait Training                           Modalities

## 2023-06-13 ENCOUNTER — OFFICE VISIT (OUTPATIENT)
Dept: PHYSICAL THERAPY | Facility: CLINIC | Age: 68
End: 2023-06-13
Payer: OTHER MISCELLANEOUS

## 2023-06-13 DIAGNOSIS — G95.9 CERVICAL MYELOPATHY (HCC): ICD-10-CM

## 2023-06-13 DIAGNOSIS — M96.1 POSTLAMINECTOMY SYNDROME, THORACIC: ICD-10-CM

## 2023-06-13 DIAGNOSIS — M54.2 NECK PAIN: Primary | ICD-10-CM

## 2023-06-13 PROCEDURE — 97112 NEUROMUSCULAR REEDUCATION: CPT | Performed by: PHYSICAL THERAPIST

## 2023-06-13 PROCEDURE — 97110 THERAPEUTIC EXERCISES: CPT | Performed by: PHYSICAL THERAPIST

## 2023-06-14 ENCOUNTER — APPOINTMENT (OUTPATIENT)
Dept: PHYSICAL THERAPY | Facility: CLINIC | Age: 68
End: 2023-06-14
Payer: OTHER MISCELLANEOUS

## 2023-06-15 ENCOUNTER — OFFICE VISIT (OUTPATIENT)
Dept: PHYSICAL THERAPY | Facility: CLINIC | Age: 68
End: 2023-06-15
Payer: OTHER MISCELLANEOUS

## 2023-06-15 DIAGNOSIS — G95.9 CERVICAL MYELOPATHY (HCC): ICD-10-CM

## 2023-06-15 DIAGNOSIS — M96.1 POSTLAMINECTOMY SYNDROME, THORACIC: ICD-10-CM

## 2023-06-15 DIAGNOSIS — M54.2 NECK PAIN: Primary | ICD-10-CM

## 2023-06-15 PROCEDURE — 97110 THERAPEUTIC EXERCISES: CPT

## 2023-06-15 PROCEDURE — 97140 MANUAL THERAPY 1/> REGIONS: CPT

## 2023-06-15 PROCEDURE — 97112 NEUROMUSCULAR REEDUCATION: CPT

## 2023-06-15 NOTE — PROGRESS NOTES
"Daily Note     Today's date: 6/15/2023  Patient name: Nico Blancas  : 1955  MRN: [id number]  Referring provider: Argelia Guzman MD  Dx:   Encounter Diagnosis     ICD-10-CM    1  Neck pain  M54 2       2  Cervical myelopathy (HCC)  G95 9       3  Postlaminectomy syndrome, thoracic  M96 1                      Subjective: Grupo Richards reports having had his GARY  He notes that he has now received 3rd MRI results and sees post operative changes within the text  He feels he has plateaued but does not want to get worse in regards to his neck pain  He reports having difficulty opening up a bottle of water due to lack of strength  Objective: See treatment diary below      Assessment: Improvement in soft tissue quality/tone following STM to samantha UT/cerv paraspinals/subocciptials/levator scap  Less UT compensation noted during DB/tband exercises compared to previous visits  Fatigue following therapy session  Progress as able  Plan: Continue with current POC to address pt deficits  Precautions:  Multi-Level cervical fusion       Manuals 6/13 6/15/23 5/26 5-30-23 6-1-23 6-6-23   Right brachial plexus and ulnar nerve mobilization NP today - patient needed to leave for another appt 15' STM to samantha UT, cerv psp, levator samantha, suboccitpitals 15' STM to samantha UT, cerv psp, levator samnatha 15' STM to samantha UT, cerv psp, levator samantha 15' STM to samantha UT, cerv psp, levator samantha  15' STM to samantha UT, cerv psp, levator samantha, suboccitials   K-tape/cupping brachial plexus          Manual block C2 w/ patient AROM                  Neuro Re-Ed          Cervical retraction         Radial NG  2x10    2x10 2x10   Chin tucks         Thoracic extension seated w/ roll 10\"x12 10\"x12 10\"x12 @ 2 lvls 10\" x12@ 2 levels 10\"x12  10\"x12    LTP and MTP while holding good scapular and cervical posture   Black 3x10 ea Black 3x10 ea Black 3x10 ea Black 3x12 each Black 3x12 ea Black 3x10 ea   Self NG         scap retract/ ER at doorway         [facility] " press  Black 2x10 samantha  Black 2x10 samantha  Black 2x10 samantha Black 2x12 samantha Black 2x10 samantha  BLK 2x10 samantha    Ther Ex         Retro UBE  5' 5'  5' 6' 5'  6'    Biceps curls 5# 2x10 alt 5# 2x10 alt  5# 2x10 5# 2x10 5# 2x10 alt  5# 2x10 alt    Triceps extension 3x10 Black 3x10 black  3x10 black 3x12 black 3x10 black  3x10 grn    Lateral raises 1# 2x10 1# 2x10  1# 2x10 1# 2x10 1# 2x10   Objective measures          Updated HEP          Chest press  15# 3x10 10# 3x10 10# 3x10 10# 3x10 15# 2x10 15# 3x10   Lat pulldown 25# 3x10 25# 3x10 20# 3x10 20# 3x10 20# 2x10 25# 3x10   Row  15# 3x10 15# 3x10 25# 3x10 25# 3x10 15# 2x10 15# 3x10   DB shoulder press  2# 2x10 2# 2x10 2# 2x10   2# 3x10 2# 2x10 2# 2x10   Ther Activity                                                                                 Pt Ed/ HEP                  Gait Training                           Modalities

## 2023-09-18 ENCOUNTER — HOSPITAL ENCOUNTER (EMERGENCY)
Facility: HOSPITAL | Age: 68
End: 2023-09-19
Attending: EMERGENCY MEDICINE
Payer: COMMERCIAL

## 2023-09-18 DIAGNOSIS — F31.9 BIPOLAR DISORDER (HCC): ICD-10-CM

## 2023-09-18 DIAGNOSIS — J45.909 UNCOMPLICATED ASTHMA, UNSPECIFIED ASTHMA SEVERITY, UNSPECIFIED WHETHER PERSISTENT: ICD-10-CM

## 2023-09-18 DIAGNOSIS — K70.30 ALCOHOLIC CIRRHOSIS (HCC): ICD-10-CM

## 2023-09-18 DIAGNOSIS — L40.9 PSORIASIS AND SIMILAR DISORDERS: ICD-10-CM

## 2023-09-18 DIAGNOSIS — R18.8 ASCITES: ICD-10-CM

## 2023-09-18 DIAGNOSIS — Z00.8 ENCOUNTER FOR PSYCHOLOGICAL EVALUATION: Primary | ICD-10-CM

## 2023-09-18 LAB
AMPHETAMINES SERPL QL SCN: NEGATIVE
BARBITURATES UR QL: NEGATIVE
BENZODIAZ UR QL: NEGATIVE
COCAINE UR QL: NEGATIVE
ETHANOL EXG-MCNC: 0 MG/DL
METHADONE UR QL: NEGATIVE
OPIATES UR QL SCN: NEGATIVE
OXYCODONE+OXYMORPHONE UR QL SCN: NEGATIVE
PCP UR QL: NEGATIVE
THC UR QL: POSITIVE

## 2023-09-18 PROCEDURE — 82075 ASSAY OF BREATH ETHANOL: CPT | Performed by: EMERGENCY MEDICINE

## 2023-09-18 PROCEDURE — 99285 EMERGENCY DEPT VISIT HI MDM: CPT | Performed by: EMERGENCY MEDICINE

## 2023-09-18 PROCEDURE — 99285 EMERGENCY DEPT VISIT HI MDM: CPT

## 2023-09-18 PROCEDURE — 81001 URINALYSIS AUTO W/SCOPE: CPT | Performed by: EMERGENCY MEDICINE

## 2023-09-18 PROCEDURE — 80307 DRUG TEST PRSMV CHEM ANLYZR: CPT | Performed by: EMERGENCY MEDICINE

## 2023-09-18 NOTE — LETTER
[address] [facility] [address]  Dept: [phone]      EMTALA TRANSFER CONSENT    NAME Barbara Ramos [facility]                1955                              MRN [id number]    I have been informed of my rights regarding examination, treatment, and transfer   by Dr. Kaylen Cueva MD    Benefits: Specialized equipment and/or services available at the receiving facility (Include comment)________________________    Risks: Potential for delay in receiving treatment      Consent for Transfer:  I acknowledge that my medical condition has been evaluated and explained to me by the emergency department physician or other qualified medical person and/or my attending physician, who has recommended that I be transferred to the service of  Accepting Physician: Dr. Ramya Church at [address] Name, [address] : [facility] [address] [place] [address], [address] [facility] [address] [facility] [address] [facility]. The above potential benefits of such transfer, the potential risks associated with such transfer, and the probable risks of not being transferred have been explained to me, and I fully understand them. The doctor has explained that, in my case, the benefits of transfer outweigh the risks. I agree to be transferred. I authorize the performance of emergency medical procedures and treatments upon me in both transit and upon arrival at the receiving facility. Additionally, I authorize the release of any and all medical records to the receiving facility and request they be transported with me, if possible. I understand that the safest mode of transportation during a medical emergency is an ambulance and that the Hospital advocates the use of this mode of transport. Risks of traveling to the receiving facility by car, including absence of medical control, life sustaining equipment, such as oxygen, and medical personnel has been explained to me and I fully understand them.     (KRISTEN CORRECT BOX BELOW)  [X]  I consent to the stated transfer and to be transported by ambulance/helicopter. [  ]  I consent to the stated transfer, but refuse transportation by ambulance and accept full responsibility for my transportation by car. I understand the risks of non-ambulance transfers and I exonerate the Hospital and its staff from any deterioration in my condition that results from this refusal.    X___________________________________________    DATE  23  TIME________  Signature of patient or legally responsible individual signing on patient behalf           RELATIONSHIP TO PATIENT_________________________                  Provider Certification    NAME Lucy Avila                      1955                              MRN [id number]    A medical screening exam was performed on the above named patient. Based on the examination:    Condition Necessitating Transfer The primary encounter diagnosis was Encounter for psychological evaluation. Diagnoses of Ascites, Psoriasis and similar disorders, Alcoholic cirrhosis ([address]), and Uncomplicated asthma, unspecified asthma severity, unspecified whether persistent were also pertinent to this visit.     Patient Condition: The patient has been stabilized such that within reasonable medical probability, no material deterioration of the patient condition or the condition of the unborn child(karina) is likely to result from the transfer    Reason for Transfer: Level of Care needed not available at this facility    Transfer Requirements: Facility [address], [address] [place] [address]   Space available and qualified personnel available for treatment as acknowledged by Christiano Bell, [address] [facility] [phone]  Agreed to accept transfer and to provide appropriate medical treatment as acknowledged by       Dr. Cecilia Abarca  Appropriate medical records of the examination and treatment of the patient are provided at the time of transfer   [address] COMPLETED _______  Transfer will be performed by qualified personnel from [address]  and appropriate transfer equipment as required, including the use of necessary and appropriate life support measures. Provider Certification: I have examined the patient and explained the following risks and benefits of being transferred/refusing transfer to the patient/family:         Based on these reasonable risks and benefits to the patient and/or the unborn child(karina), and based upon the information available at the time of the patient’s examination, I certify that the medical benefits reasonably to be expected from the provision of appropriate medical treatments at another medical facility outweigh the increasing risks, if any, to the individual’s medical condition, and in the case of labor to the unborn child, from effecting the transfer.     X____________________________________________ DATE 09/19/23        TIME_______      ORIGINAL - SEND TO MEDICAL RECORDS   COPY - SEND WITH PATIENT DURING TRANSFER

## 2023-09-19 ENCOUNTER — HOSPITAL ENCOUNTER (INPATIENT)
Facility: HOSPITAL | Age: 68
LOS: 8 days | Discharge: HOME | DRG: 885 | End: 2023-09-27
Attending: PSYCHIATRY & NEUROLOGY | Admitting: PSYCHIATRY & NEUROLOGY
Payer: COMMERCIAL

## 2023-09-19 VITALS
RESPIRATION RATE: 18 BRPM | DIASTOLIC BLOOD PRESSURE: 81 MMHG | SYSTOLIC BLOOD PRESSURE: 162 MMHG | OXYGEN SATURATION: 96 % | HEART RATE: 71 BPM | TEMPERATURE: 98.9 F

## 2023-09-19 DIAGNOSIS — L40.9 PSORIASIS AND SIMILAR DISORDERS: ICD-10-CM

## 2023-09-19 DIAGNOSIS — M54.9 BACK PAIN: ICD-10-CM

## 2023-09-19 DIAGNOSIS — S91.209A TOENAIL AVULSION: ICD-10-CM

## 2023-09-19 DIAGNOSIS — R18.8 ASCITES: ICD-10-CM

## 2023-09-19 DIAGNOSIS — F51.01 PRIMARY INSOMNIA: ICD-10-CM

## 2023-09-19 DIAGNOSIS — K21.9 GASTROESOPHAGEAL REFLUX DISEASE WITHOUT ESOPHAGITIS: ICD-10-CM

## 2023-09-19 DIAGNOSIS — F41.1 GENERALIZED ANXIETY DISORDER: ICD-10-CM

## 2023-09-19 DIAGNOSIS — J45.909 UNCOMPLICATED ASTHMA, UNSPECIFIED ASTHMA SEVERITY, UNSPECIFIED WHETHER PERSISTENT: ICD-10-CM

## 2023-09-19 DIAGNOSIS — F31.12 BIPOLAR DISORDER, CURRENT EPISODE MANIC WITHOUT PSYCHOTIC FEATURES, MODERATE (HCC): Primary | ICD-10-CM

## 2023-09-19 DIAGNOSIS — K70.30 ALCOHOLIC CIRRHOSIS (HCC): ICD-10-CM

## 2023-09-19 DIAGNOSIS — M25.511 RIGHT SHOULDER PAIN: ICD-10-CM

## 2023-09-19 PROBLEM — G47.00 INSOMNIA: Status: ACTIVE | Noted: 2019-12-14

## 2023-09-19 PROBLEM — F31.31 BIPOLAR AFFECTIVE DISORDER, CURRENTLY DEPRESSED, MILD (HCC): Status: ACTIVE | Noted: 2017-11-06

## 2023-09-19 PROBLEM — M46.86 OTHER SPECIFIED INFLAMMATORY SPONDYLOPATHIES, LUMBAR REGION (HCC): Status: ACTIVE | Noted: 2019-04-22

## 2023-09-19 PROBLEM — M47.12 CERVICAL SPONDYLOSIS WITH MYELOPATHY: Status: ACTIVE | Noted: 2020-11-15

## 2023-09-19 PROBLEM — J45.20 MILD INTERMITTENT ASTHMA WITHOUT COMPLICATION: Status: ACTIVE | Noted: 2022-07-28

## 2023-09-19 PROBLEM — Z98.890 S/P SPINAL SURGERY: Status: ACTIVE | Noted: 2021-06-17

## 2023-09-19 PROBLEM — G89.4 CHRONIC PAIN DISORDER: Status: ACTIVE | Noted: 2019-12-14

## 2023-09-19 PROBLEM — S22.081A T12 BURST FRACTURE (HCC): Status: ACTIVE | Noted: 2021-02-11

## 2023-09-19 PROBLEM — Z91.148 HISTORY OF MEDICATION NONCOMPLIANCE: Status: ACTIVE | Noted: 2019-12-14

## 2023-09-19 LAB
ALBUMIN SERPL BCP-MCNC: 4.2 G/DL (ref 3.5–5)
ALP SERPL-CCNC: 70 U/L (ref 34–104)
ALT SERPL W P-5'-P-CCNC: 31 U/L (ref 7–52)
ANION GAP SERPL CALCULATED.3IONS-SCNC: 5 MMOL/L
AST SERPL W P-5'-P-CCNC: 27 U/L (ref 13–39)
BACTERIA UR QL AUTO: NORMAL /HPF
BASOPHILS # BLD AUTO: 0.02 THOUSANDS/ÂΜL (ref 0–0.1)
BASOPHILS NFR BLD AUTO: 0 % (ref 0–1)
BILIRUB SERPL-MCNC: 0.84 MG/DL (ref 0.2–1)
BILIRUB UR QL STRIP: NEGATIVE
BUN SERPL-MCNC: 12 MG/DL (ref 5–25)
CALCIUM SERPL-MCNC: 9.4 MG/DL (ref 8.4–10.2)
CHLORIDE SERPL-SCNC: 105 MMOL/L (ref 96–108)
CLARITY UR: CLEAR
CO2 SERPL-SCNC: 26 MMOL/L (ref 21–32)
COLOR UR: YELLOW
CREAT SERPL-MCNC: 0.6 MG/DL (ref 0.6–1.3)
EOSINOPHIL # BLD AUTO: 0.15 THOUSAND/ÂΜL (ref 0–0.61)
EOSINOPHIL NFR BLD AUTO: 2 % (ref 0–6)
ERYTHROCYTE [DISTWIDTH] IN BLOOD BY AUTOMATED COUNT: 12.1 % (ref 11.6–15.1)
GFR SERPL CREATININE-BSD FRML MDRD: 105 ML/MIN/1.73SQ M
GLUCOSE SERPL-MCNC: 188 MG/DL (ref 65–140)
GLUCOSE UR STRIP-MCNC: NEGATIVE MG/DL
HCT VFR BLD AUTO: 41.2 % (ref 36.5–49.3)
HGB BLD-MCNC: 14.4 G/DL (ref 12–17)
HGB UR QL STRIP.AUTO: NEGATIVE
IMM GRANULOCYTES # BLD AUTO: 0.04 THOUSAND/UL (ref 0–0.2)
IMM GRANULOCYTES NFR BLD AUTO: 0 % (ref 0–2)
KETONES UR STRIP-MCNC: NEGATIVE MG/DL
LEUKOCYTE ESTERASE UR QL STRIP: NEGATIVE
LYMPHOCYTES # BLD AUTO: 1.19 THOUSANDS/ÂΜL (ref 0.6–4.47)
LYMPHOCYTES NFR BLD AUTO: 13 % (ref 14–44)
MCH RBC QN AUTO: 32.4 PG (ref 26.8–34.3)
MCHC RBC AUTO-ENTMCNC: 35 G/DL (ref 31.4–37.4)
MCV RBC AUTO: 93 FL (ref 82–98)
MONOCYTES # BLD AUTO: 0.56 THOUSAND/ÂΜL (ref 0.17–1.22)
MONOCYTES NFR BLD AUTO: 6 % (ref 4–12)
NEUTROPHILS # BLD AUTO: 7.15 THOUSANDS/ÂΜL (ref 1.85–7.62)
NEUTS SEG NFR BLD AUTO: 79 % (ref 43–75)
NITRITE UR QL STRIP: NEGATIVE
NON-SQ EPI CELLS URNS QL MICRO: NORMAL /HPF
NRBC BLD AUTO-RTO: 0 /100 WBCS
PH UR STRIP.AUTO: 6.5 [PH]
PLATELET # BLD AUTO: 203 THOUSANDS/UL (ref 149–390)
PMV BLD AUTO: 9.2 FL (ref 8.9–12.7)
POTASSIUM SERPL-SCNC: 3.3 MMOL/L (ref 3.5–5.3)
PROT SERPL-MCNC: 6.8 G/DL (ref 6.4–8.4)
PROT UR STRIP-MCNC: ABNORMAL MG/DL
RBC # BLD AUTO: 4.44 MILLION/UL (ref 3.88–5.62)
RBC #/AREA URNS AUTO: NORMAL /HPF
SODIUM SERPL-SCNC: 136 MMOL/L (ref 135–147)
SP GR UR STRIP.AUTO: 1.03 (ref 1–1.03)
TSH SERPL DL<=0.05 MIU/L-ACNC: 1.27 UIU/ML (ref 0.45–4.5)
UROBILINOGEN UR STRIP-ACNC: <2 MG/DL
WBC # BLD AUTO: 9.11 THOUSAND/UL (ref 4.31–10.16)
WBC #/AREA URNS AUTO: NORMAL /HPF

## 2023-09-19 PROCEDURE — 85025 COMPLETE CBC W/AUTO DIFF WBC: CPT | Performed by: EMERGENCY MEDICINE

## 2023-09-19 PROCEDURE — 84443 ASSAY THYROID STIM HORMONE: CPT | Performed by: EMERGENCY MEDICINE

## 2023-09-19 PROCEDURE — GZ59ZZZ INDIVIDUAL PSYCHOTHERAPY, PSYCHOPHYSIOLOGICAL: ICD-10-PCS | Performed by: PSYCHIATRY & NEUROLOGY

## 2023-09-19 PROCEDURE — 36415 COLL VENOUS BLD VENIPUNCTURE: CPT | Performed by: EMERGENCY MEDICINE

## 2023-09-19 PROCEDURE — 80053 COMPREHEN METABOLIC PANEL: CPT | Performed by: EMERGENCY MEDICINE

## 2023-09-19 PROCEDURE — GZHZZZZ GROUP PSYCHOTHERAPY: ICD-10-PCS | Performed by: PSYCHIATRY & NEUROLOGY

## 2023-09-19 RX ORDER — LORAZEPAM 0.5 MG/1
0.5 TABLET ORAL
Status: DISCONTINUED | OUTPATIENT
Start: 2023-09-19 | End: 2023-09-27 | Stop reason: HOSPADM

## 2023-09-19 RX ORDER — ACETAMINOPHEN 325 MG/1
650 TABLET ORAL EVERY 4 HOURS PRN
Status: DISCONTINUED | OUTPATIENT
Start: 2023-09-19 | End: 2023-09-27 | Stop reason: HOSPADM

## 2023-09-19 RX ORDER — LORAZEPAM 1 MG/1
1 TABLET ORAL
Status: CANCELLED | OUTPATIENT
Start: 2023-09-19

## 2023-09-19 RX ORDER — DIAZEPAM 5 MG/1
5 TABLET ORAL ONCE
Status: COMPLETED | OUTPATIENT
Start: 2023-09-19 | End: 2023-09-19

## 2023-09-19 RX ORDER — LORAZEPAM 2 MG/ML
1 INJECTION INTRAMUSCULAR
Status: CANCELLED | OUTPATIENT
Start: 2023-09-19

## 2023-09-19 RX ORDER — ACETAMINOPHEN 325 MG/1
975 TABLET ORAL EVERY 6 HOURS PRN
Status: CANCELLED | OUTPATIENT
Start: 2023-09-19

## 2023-09-19 RX ORDER — OLANZAPINE 2.5 MG/1
2.5 TABLET ORAL
Status: DISCONTINUED | OUTPATIENT
Start: 2023-09-19 | End: 2023-09-27 | Stop reason: HOSPADM

## 2023-09-19 RX ORDER — DIAZEPAM 5 MG/1
10 TABLET ORAL ONCE
Status: DISCONTINUED | OUTPATIENT
Start: 2023-09-19 | End: 2023-09-19

## 2023-09-19 RX ORDER — LORAZEPAM 0.5 MG/1
0.5 TABLET ORAL
Status: CANCELLED | OUTPATIENT
Start: 2023-09-19

## 2023-09-19 RX ORDER — LORAZEPAM 2 MG/ML
1 INJECTION INTRAMUSCULAR
Status: DISCONTINUED | OUTPATIENT
Start: 2023-09-19 | End: 2023-09-27 | Stop reason: HOSPADM

## 2023-09-19 RX ORDER — OLANZAPINE 2.5 MG/1
5 TABLET ORAL
Status: CANCELLED | OUTPATIENT
Start: 2023-09-19

## 2023-09-19 RX ORDER — LORAZEPAM 1 MG/1
1 TABLET ORAL ONCE
Status: DISCONTINUED | OUTPATIENT
Start: 2023-09-19 | End: 2023-09-19

## 2023-09-19 RX ORDER — TRAZODONE HYDROCHLORIDE 50 MG/1
50 TABLET ORAL
Status: DISCONTINUED | OUTPATIENT
Start: 2023-09-19 | End: 2023-09-20

## 2023-09-19 RX ORDER — ACETAMINOPHEN 325 MG/1
650 TABLET ORAL EVERY 4 HOURS PRN
Status: CANCELLED | OUTPATIENT
Start: 2023-09-19

## 2023-09-19 RX ORDER — PANTOPRAZOLE SODIUM 20 MG/1
20 TABLET, DELAYED RELEASE ORAL
Status: DISCONTINUED | OUTPATIENT
Start: 2023-09-20 | End: 2023-09-27 | Stop reason: HOSPADM

## 2023-09-19 RX ORDER — BENZTROPINE MESYLATE 1 MG/1
0.5 TABLET ORAL
Status: CANCELLED | OUTPATIENT
Start: 2023-09-19

## 2023-09-19 RX ORDER — PANTOPRAZOLE SODIUM 20 MG/1
20 TABLET, DELAYED RELEASE ORAL
Status: CANCELLED | OUTPATIENT
Start: 2023-09-20

## 2023-09-19 RX ORDER — BENZTROPINE MESYLATE 0.5 MG/1
0.5 TABLET ORAL
Status: DISCONTINUED | OUTPATIENT
Start: 2023-09-19 | End: 2023-09-27 | Stop reason: HOSPADM

## 2023-09-19 RX ORDER — OLANZAPINE 10 MG/1
5 INJECTION, POWDER, LYOPHILIZED, FOR SOLUTION INTRAMUSCULAR
Status: CANCELLED | OUTPATIENT
Start: 2023-09-19

## 2023-09-19 RX ORDER — HYDROXYZINE HYDROCHLORIDE 25 MG/1
25 TABLET, FILM COATED ORAL
Status: CANCELLED | OUTPATIENT
Start: 2023-09-19

## 2023-09-19 RX ORDER — OLANZAPINE 5 MG/1
5 TABLET ORAL
Status: DISCONTINUED | OUTPATIENT
Start: 2023-09-19 | End: 2023-09-27 | Stop reason: HOSPADM

## 2023-09-19 RX ORDER — HYDROXYZINE HYDROCHLORIDE 25 MG/1
25 TABLET, FILM COATED ORAL
Status: DISCONTINUED | OUTPATIENT
Start: 2023-09-19 | End: 2023-09-20

## 2023-09-19 RX ORDER — ACETAMINOPHEN 325 MG/1
975 TABLET ORAL EVERY 6 HOURS PRN
Status: DISCONTINUED | OUTPATIENT
Start: 2023-09-19 | End: 2023-09-20

## 2023-09-19 RX ORDER — LORAZEPAM 1 MG/1
1 TABLET ORAL
Status: DISCONTINUED | OUTPATIENT
Start: 2023-09-19 | End: 2023-09-27 | Stop reason: HOSPADM

## 2023-09-19 RX ORDER — PANTOPRAZOLE SODIUM 20 MG/1
20 TABLET, DELAYED RELEASE ORAL
Status: DISCONTINUED | OUTPATIENT
Start: 2023-09-19 | End: 2023-09-19 | Stop reason: HOSPADM

## 2023-09-19 RX ORDER — HYDROXYZINE HYDROCHLORIDE 25 MG/1
25 TABLET, FILM COATED ORAL EVERY 6 HOURS PRN
COMMUNITY
End: 2023-09-27

## 2023-09-19 RX ORDER — OLANZAPINE 10 MG/1
5 INJECTION, POWDER, LYOPHILIZED, FOR SOLUTION INTRAMUSCULAR
Status: DISCONTINUED | OUTPATIENT
Start: 2023-09-19 | End: 2023-09-27 | Stop reason: HOSPADM

## 2023-09-19 RX ORDER — OLANZAPINE 2.5 MG/1
2.5 TABLET ORAL
Status: CANCELLED | OUTPATIENT
Start: 2023-09-19

## 2023-09-19 RX ORDER — TRAZODONE HYDROCHLORIDE 50 MG/1
50 TABLET ORAL
Status: CANCELLED | OUTPATIENT
Start: 2023-09-19

## 2023-09-19 RX ORDER — POTASSIUM CHLORIDE 20 MEQ/1
40 TABLET, EXTENDED RELEASE ORAL ONCE
Status: COMPLETED | OUTPATIENT
Start: 2023-09-19 | End: 2023-09-19

## 2023-09-19 RX ADMIN — POTASSIUM CHLORIDE 40 MEQ: 1500 TABLET, EXTENDED RELEASE ORAL at 11:35

## 2023-09-19 RX ADMIN — DIAZEPAM 5 MG: 5 TABLET ORAL at 02:19

## 2023-09-19 RX ADMIN — PANTOPRAZOLE SODIUM 20 MG: 20 TABLET, DELAYED RELEASE ORAL at 11:35

## 2023-09-19 RX ADMIN — LORAZEPAM 0.5 MG: 0.5 TABLET ORAL at 21:11

## 2023-09-19 NOTE — ED NOTES
SAVANNAH sent converted 302 documents to [facility] [place] county office via email.     TDS, CW

## 2023-09-19 NOTE — ED NOTES
Insurance Authorization for admission:   Phone call placed to [facility]. Phone number: [phone]. Spoke to [facility].     3 days approved. Level of care: 201. Review on 9/22/23. Authorization # [id number]. Eligibility Verification System checked - ([phone]). Online system / automated system indicates: [place] ineligible. Insurance Authorization for Transportation:      None needed for CTS.     George Rdz, BA, CIS ll  09/19/23 18:50

## 2023-09-19 NOTE — ED NOTES
Patient ambulating in hallway with 1:1 Aldean Flies, cooperative and steady on feet at this time.       Jason Dle Valle RN  09/19/23 5998

## 2023-09-19 NOTE — ED NOTES
Pt requesting that this nurse look up a list of phone numbers for "auto body shops, mechanics, and a  in [address] [place] [address]"     Informed patient that he is welcome to contact his family members for those phone numbers. Pt provided with daughter's number and wife's number. Pt agitated at this time.       Jeremy Akbar RN  09/19/23 9915

## 2023-09-19 NOTE — PLAN OF CARE
Problem: Ineffective Coping  Goal: Cooperates with admission process  Description: Interventions:   - Complete admission process  9/19/2023 1738 by Angela Mabry RN  Outcome: Progressing  9/19/2023 1738 by Angela Mabry, RN  Outcome: Progressing

## 2023-09-19 NOTE — ED NOTES
Per patient's daughter, pt is not to call her or her mother, Tai Leung. Pt's mother Tai Leung has filed a PFA against the patient and there is to be no communication between them. Pt had access to phone earlier and left a series of threatening voice mails for wife prior to her disconnecting her phone.       Luis Nayak RN  09/19/23 1197

## 2023-09-19 NOTE — ED NOTES
Pt cooperative with this writer, was able to provide urine sample. Ambulated to bathroom with steady gait.      Lenny Yoo RN  09/18/23 3222
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
## 2023-09-19 NOTE — NURSING NOTE
[facility], [address] [place] [address] from [facility]  ER with a valid 201. He is cooperative but manic type behavior. Denies SI/HI/AH/VH. He is loud to talk but non-violent. Physical assessment completed, no wounds or weapons noted. Refused shower, no signs of infestation noted. Bill of Rights and HIPPA regulations reviewed and signed. Admission medication and diet ordered. Oriented to unit. Started on Q 7 minute safety checks. Fluids and food given. Appetite fair. Personal property recorded. Affect flat. Appears depressed.

## 2023-09-19 NOTE — ED NOTES
This RN contacted pts daughter in regards to the whereabouts of patient's dogs. Per 302 it was reported patient's dogs were taken by daughter to be cared for, this RN wanted to verify with daughter as patient became very upset when told approximately how long he would be in the hospital for. Daughter Estrella Camarillo verified the dogs were in good hands and she was taking care of them to calm patient down. Daughter also requested that her father does not try to contact her at this time but is ok with receiving phone calls in regards to updates about his care.       Neelam Doty RN  09/19/23 6518

## 2023-09-19 NOTE — ED CARE HANDOFF
Emergency Department Sign Out Note        Sign out and transfer of care from [facility]. See Separate Emergency Department note. The patient, Nori Eldridge, was evaluated by the previous provider for [address] [facility] eval.    Workup Completed:      ED Course / Workup Pending (followup): Medically cleared and stable for [address] [facility] evaluation                                  ED Course as of 09/19/23 1313   Tue Sep 19, 2023   1121 Laboratory results revealed a   WBC   Normal  HH   Normal  PLT   Normal  Kidney Function  Normal  Electrolytes  Normal     Mild hypokalemia - treated  Patient medically cleared for  evaluation and treatement     Procedures  MDM        Disposition  Final diagnoses:   Encounter for psychological evaluation     Time reflects when diagnosis was documented in both MDM as applicable and the Disposition within this note     Time User Action Codes Description Comment    9/18/2023 11:02 PM Jase Dutta Add [Z00.8] Encounter for psychological evaluation     9/19/2023  1:11 PM Camilo Duke Add [R18.8] Ascites     9/19/2023  1:11 PM Luciano Wong Add [L40.9] Psoriasis and similar disorders     9/19/2023  1:12 PM Luciano Wong Add [A78.00] Alcoholic cirrhosis ([address])     9/19/2023  1:12 PM Luciano Wong Add [Z91.465] Uncomplicated asthma, unspecified asthma severity, unspecified whether persistent       ED Disposition     None      Follow-up Information    None       Patient's Medications   Discharge Prescriptions    No medications on file     No discharge procedures on file.        ED Provider  Electronically Signed by     Jake Carrasquillo MD  09/19/23 0805

## 2023-09-19 NOTE — PROGRESS NOTES
PATIENT BELONGINGS AS FOLLOWS:    ROOM:  Underwear  Blue jeans  Gray polo shirt  Breast cancer arm band  Black flip flops    CONTRABAND # 7:  Set of keys  Receipt     SECURITY SAFE BAG:  Black wallet  Cash $90  [place] drivers license  Medical marijuana card  Medicare card  Medical cards X 5  Credit cards X 4  Passport card  Assorted store discount cards  SS card
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
## 2023-09-19 NOTE — ED NOTES
CW received TT from [facility]    Pt has been accepted to [facility]  Under the care of Dr. Herminia Sampson: [phone]    RENÉ PORTILLO    TDS, CW
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
## 2023-09-19 NOTE — ED NOTES
CW met w/pt. CW and pt discussed 302 vs 201. Pt is receptive to signing a 201 at this time. CW notes, pt signed [phone]. CW read and reviewed 201 rights. Dr. Isiah Lyon has denied the 36 and signed completed 61 51 81 commitment. CW sent clinicals to [facility].     CW requested Dr. Isiah Lyon place orders for CBC, CMP, TSH, and UA.    TDS, CW

## 2023-09-20 PROBLEM — S06.9XAA TBI (TRAUMATIC BRAIN INJURY) (HCC): Status: ACTIVE | Noted: 2023-09-20

## 2023-09-20 PROBLEM — F12.10 CANNABIS ABUSE: Status: ACTIVE | Noted: 2023-09-20

## 2023-09-20 PROBLEM — F31.12 BIPOLAR DISORDER, CURRENT EPISODE MANIC WITHOUT PSYCHOTIC FEATURES, MODERATE (HCC): Status: ACTIVE | Noted: 2023-09-20

## 2023-09-20 PROBLEM — E44.0 MODERATE PROTEIN-CALORIE MALNUTRITION (HCC): Status: ACTIVE | Noted: 2023-09-20

## 2023-09-20 LAB
ATRIAL RATE: 97 BPM
P AXIS: 68 DEGREES
PR INTERVAL: 164 MS
QRS AXIS: 76 DEGREES
QRSD INTERVAL: 90 MS
QT INTERVAL: 424 MS
QTC INTERVAL: 433 MS
T WAVE AXIS: 72 DEGREES
VENTRICULAR RATE: 63 BPM

## 2023-09-20 PROCEDURE — 97166 OT EVAL MOD COMPLEX 45 MIN: CPT

## 2023-09-20 PROCEDURE — 99223 1ST HOSP IP/OBS HIGH 75: CPT | Performed by: PSYCHIATRY & NEUROLOGY

## 2023-09-20 PROCEDURE — 93010 ELECTROCARDIOGRAM REPORT: CPT | Performed by: INTERNAL MEDICINE

## 2023-09-20 PROCEDURE — 93005 ELECTROCARDIOGRAM TRACING: CPT

## 2023-09-20 PROCEDURE — 97163 PT EVAL HIGH COMPLEX 45 MIN: CPT

## 2023-09-20 RX ORDER — ALBUTEROL SULFATE 90 UG/1
2 AEROSOL, METERED RESPIRATORY (INHALATION) EVERY 4 HOURS PRN
Status: DISCONTINUED | OUTPATIENT
Start: 2023-09-20 | End: 2023-09-27 | Stop reason: HOSPADM

## 2023-09-20 RX ORDER — HYDROXYZINE 50 MG/1
50 TABLET, FILM COATED ORAL
Status: DISCONTINUED | OUTPATIENT
Start: 2023-09-20 | End: 2023-09-27 | Stop reason: HOSPADM

## 2023-09-20 RX ORDER — TRAZODONE HYDROCHLORIDE 100 MG/1
100 TABLET ORAL
Status: DISCONTINUED | OUTPATIENT
Start: 2023-09-20 | End: 2023-09-23

## 2023-09-20 RX ORDER — DIVALPROEX SODIUM 500 MG/1
500 TABLET, EXTENDED RELEASE ORAL
Status: DISCONTINUED | OUTPATIENT
Start: 2023-09-20 | End: 2023-09-20

## 2023-09-20 RX ORDER — OLANZAPINE 5 MG/1
5 TABLET ORAL
Status: CANCELLED | OUTPATIENT
Start: 2023-09-20

## 2023-09-20 RX ORDER — METHOCARBAMOL 500 MG/1
500 TABLET, FILM COATED ORAL EVERY 6 HOURS PRN
Status: DISCONTINUED | OUTPATIENT
Start: 2023-09-20 | End: 2023-09-27 | Stop reason: HOSPADM

## 2023-09-20 RX ORDER — OLANZAPINE 5 MG/1
5 TABLET, ORALLY DISINTEGRATING ORAL
Status: DISCONTINUED | OUTPATIENT
Start: 2023-09-20 | End: 2023-09-21

## 2023-09-20 RX ORDER — NAPROXEN 500 MG/1
500 TABLET ORAL 2 TIMES DAILY PRN
Status: DISCONTINUED | OUTPATIENT
Start: 2023-09-20 | End: 2023-09-20

## 2023-09-20 RX ORDER — NAPROXEN 500 MG/1
500 TABLET ORAL 2 TIMES DAILY PRN
Status: DISCONTINUED | OUTPATIENT
Start: 2023-09-20 | End: 2023-09-27 | Stop reason: HOSPADM

## 2023-09-20 RX ORDER — GABAPENTIN 300 MG/1
300 CAPSULE ORAL
Status: DISCONTINUED | OUTPATIENT
Start: 2023-09-20 | End: 2023-09-22

## 2023-09-20 RX ORDER — LIDOCAINE 50 MG/G
1 PATCH TOPICAL DAILY
Status: DISCONTINUED | OUTPATIENT
Start: 2023-09-20 | End: 2023-09-27 | Stop reason: HOSPADM

## 2023-09-20 RX ORDER — TRAMADOL HYDROCHLORIDE 50 MG/1
50 TABLET ORAL EVERY 8 HOURS PRN
Status: DISCONTINUED | OUTPATIENT
Start: 2023-09-20 | End: 2023-09-27 | Stop reason: HOSPADM

## 2023-09-20 RX ORDER — DIVALPROEX SODIUM 500 MG/1
500 TABLET, EXTENDED RELEASE ORAL 2 TIMES DAILY
Status: CANCELLED | OUTPATIENT
Start: 2023-09-20

## 2023-09-20 RX ADMIN — GABAPENTIN 300 MG: 300 CAPSULE ORAL at 21:26

## 2023-09-20 RX ADMIN — NAPROXEN 500 MG: 500 TABLET ORAL at 23:20

## 2023-09-20 RX ADMIN — TRAMADOL HYDROCHLORIDE 50 MG: 50 TABLET, COATED ORAL at 10:18

## 2023-09-20 RX ADMIN — TRAMADOL HYDROCHLORIDE 50 MG: 50 TABLET, COATED ORAL at 19:42

## 2023-09-20 RX ADMIN — TRAZODONE HYDROCHLORIDE 100 MG: 100 TABLET ORAL at 23:20

## 2023-09-20 RX ADMIN — PANTOPRAZOLE SODIUM 20 MG: 20 TABLET, DELAYED RELEASE ORAL at 05:29

## 2023-09-20 RX ADMIN — OLANZAPINE 5 MG: 5 TABLET, ORALLY DISINTEGRATING ORAL at 21:26

## 2023-09-20 RX ADMIN — ACETAMINOPHEN 975 MG: 325 TABLET ORAL at 05:29

## 2023-09-20 RX ADMIN — LIDOCAINE 5% 1 PATCH: 700 PATCH TOPICAL at 10:19
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
## 2023-09-20 NOTE — PLAN OF CARE
Problem: Ineffective Coping  Goal: Cooperates with admission process  Description: Interventions:   - Complete admission process  Outcome: Progressing     Problem: Depression  Goal: Verbalize thoughts and feelings  Description: Interventions:  - Assess and re-assess patient's level of risk   - Engage patient in 1:1 interactions, daily, for a minimum of 15 minutes   - Encourage patient to express feelings, fears, frustrations, hopes   Outcome: Progressing

## 2023-09-20 NOTE — OCCUPATIONAL THERAPY NOTE
Occupational Therapy Evaluation     Patient Name: Mariah Ohara  RQHDO'S Date: 9/20/2023  Problem List  Principal Problem:    Bipolar disorder, current episode manic without psychotic features, moderate ([address])  Active Problems:    Anxiety    Gastroesophageal reflux disease    Alcoholic cirrhosis (HCC)    Chronic pain disorder    Generalized anxiety disorder    History of medication noncompliance    Insomnia    TBI (traumatic brain injury) ([address])    Cannabis abuse    Past Medical History  Past Medical History:   Diagnosis Date    Anxiety     Asthma     Bipolar 1 disorder ([address])     Depression      Past Surgical History  Past Surgical History:   Procedure Laterality Date    ANTERIOR FUSION CERVICAL SPINE      CATARACT EXTRACTION      CHOLECYSTECTOMY      EYE SURGERY      LUMBAR FUSION      SPINE SURGERY          09/20/23 1116   OT Last Visit   OT Visit Date 09/20/23   Note Type   Note type Evaluation   Pain Assessment   Pain Assessment Tool 0-10   Pain Score 8   Pain Location/Orientation Location: Back  ("whole back from tip to bottom")   Pain Onset/Description Onset: Ongoing;Frequency: Constant/Continuous   Effect of Pain on Daily Activities mobility   Patient's Stated Pain Goal No pain   Hospital Pain Intervention(s) Medication (See MAR)   Multiple Pain Sites No   Restrictions/Precautions   Weight Bearing Precautions Per Order No   Braces or Orthoses   (none reported)   Other Precautions Fall Risk;Pain   Home Living   Type of [address] [place] [address] Two level;Stairs to enter with rails;Bed/bath upstairs  (3 YANIV w/ HR)   Bathroom Shower/Tub Tub/shower unit   Bathroom Toilet Standard   Bathroom Equipment Grab bars in shower   [address]  (No AD used at baseline; pt reports having SPC at home in which utilizes occasionally)   Prior Function   Level of [place] Independent with ADLs; Independent with functional mobility; Independent with IADLS   Lives With Spouse Receives Help From Family   IADLs Independent with driving; Independent with meal prep; Independent with medication management   Falls in the last 6 months 1 to 4  ("3-4 falls")   Vocational On disability   Subjective   Subjective "I stumble over my grandson's things on the ground"   ADL   Where Assessed Chair   UB Bathing Assistance 6  Modified Independent   LB Bathing Assistance 5  Supervision/Setup   UB Dressing Assistance 6  Modified independent   LB Dressing Assistance 5  Supervision/Setup   LB Dressing Deficit Don/doff R sock; Don/doff L sock  (cross over leg technique)   Bed Mobility   Additional Comments DNT; pt seated in chair upon arrival and conclusion of session   Transfers   Sit to Stand 6  Modified independent   Additional items Assist x 1; Armrests   Stand to Sit 6  Modified independent   Additional items Assist x 1; Increased time required   Additional Comments no device used   Functional Mobility   Functional Mobility 6  Modified independent   Additional Comments Pt completed short distance ADL mobility at mod (I) level w/out device. No significant LOB observed, mild instability   Balance   Static Sitting Normal   Dynamic Sitting Normal   Static Standing Good   Dynamic Standing Good   Ambulatory Good   Activity Tolerance   Activity Tolerance Patient limited by pain   Nurse Made Aware Yes, nursing staff made aware of session   RUE Assessment   RUE Assessment WFL   RUE Strength   RUE Overall Strength   (4/5)   LUE Assessment   LUE Assessment WFL   LUE Strength   LUE Overall Strength   (4/5)   Cognition   Overall Cognitive Status WFL   Arousal/Participation Alert; Responsive   Attention Within functional limits   Orientation Level Oriented X4   Memory Decreased recall of precautions   Following Commands Follows one step commands without difficulty   Comments Pt agreeable to OT evaluation with tangential conversations present   Assessment   Limitation Decreased endurance   Prognosis Good   Assessment Pt is a 74 y.o. male seen for OT evaluation s/p admit to [facility]/19/2023 w/ Bipolar disorder, current episode manic without psychotic features, moderate ([address]). Comorbidities affecting pt's functional performance at time of assessment include: anxiety, GERD, chronic pain disorder, OSMAR, insomnia, TBI. Personal factors affecting pt at time of IE include:limited home support, difficulty performing IADLS , decreased initiation and engagement  and health management . Prior to admission, pt was (I) with ADLs and IADLs. Upon evaluation: pt is completing ADLs and functional mobility at mod (I) level. Pt is currently functioning at baseline and does not warrant further inpatient OT services at this time. From OT standpoint, recommendation at time of d/c would be no needs. Goals   Patient Goals to go home   Plan   OT Frequency Eval only   Recommendation   OT Discharge Recommendation No rehabilitation needs   Additional Comments  The patient's raw score on the AM-PAC Daily Activity inpatient short form is 24, standardized score is 57.54, greater than 39.4. Patients at this level are likely to benefit from discharge to home. Please refer to the recommendation of the Occupational Therapist for safe discharge planning.    AM-PAC Daily Activity Inpatient   Lower Body Dressing 4   Bathing 4   Toileting 4   Upper Body Dressing 4   Grooming 4   Eating 4   Daily Activity Raw Score 24   Daily Activity Standardized Score (Calc for Raw Score >=11) 57.54   AM-PAC Applied Cognition Inpatient   Following a Speech/Presentation 3   Understanding Ordinary Conversation 4   Taking Medications 3   Remembering Where Things Are Placed or Put Away 3   Remembering List of 4-5 Errands 3   Taking Care of Complicated Tasks 3   Applied Cognition Raw Score 19   Applied Cognition Standardized Score 39.77     Kendrick Owens MS, OTR/L

## 2023-09-20 NOTE — TREATMENT PLAN
TREATMENT PLAN REVIEW - [facility] 74 y.o. 1955 male MRN: [id number]    [address] [address] Room / Bed: Vadim Ferrari/OABHU 127-41 Encounter: [id number]          Admit Date/Time:  9/19/2023  5:15 PM    Treatment Team: Attending Provider: Jillian Monroe MD; Consulting Physician: Blayne Sauceda MD; Patient Care Assistant: Sadaf Briceño; Certified Nursing Assistant: Jorge Slaughter; Recreational Therapist: Tiffani Couch; Patient Care Assistant: Tiffanie Vila; Certified Nursing Assistant: Javier Darden; Patient Care Assistant: Savanna Velasquez; Care Manager: Karen Sawant RN; : Melba Alvarado; Certified Nursing Assistant: Vic Lorenzo; Dietitian: Angelia Echeverria RD    Diagnosis: Principal Problem:    Bipolar disorder, current episode manic without psychotic features, moderate ([address])  Active Problems:    Anxiety    Gastroesophageal reflux disease    Alcoholic cirrhosis ([address])    Chronic pain disorder    Generalized anxiety disorder    History of medication noncompliance    Insomnia    TBI (traumatic brain injury) ([address])    Cannabis abuse      Patient Strengths/Assets: negotiates basic needs, patient is on a voluntary commitment    Patient Barriers/Limitations: difficulty adapting, marital/family conflict, poor insight, self-care deficit, substance abuse    Short Term Goals: decrease in anxiety symptoms, decrease in level of agitation, ability to stay safe on the unit, ability to stay free of restraints, improvement in insight, improvement in reasoning ability, improvement in self care, sleep improvement, improvement in appetite, mood stabilization, increase in group attendance, increase in socialization with peers on the unit, acceptance of need for psychiatric treatment, acceptance of psychiatric medications    Long Term Goals: resolution of manic symptoms, stabilization of mood, improvement in reasoning ability, improved insight, able to express basic needs, acceptance of need for psychiatric medications, acceptance of need for psychiatric treatment, acceptance of psychiatric diagnosis, adequate self care, adequate sleep, adequate appetite, adequate oral intake, appropriate interaction with peers, stable living arrangements upon discharge, establishment of family support upon discharge    Progress Towards Goals: starting psychiatric medications as prescribed    Recommended Treatment: medication management, patient medication education, group therapy, milieu therapy, continued Behavioral Health psychiatric evaluation/assessment process, medical follow up with medical team    Treatment Frequency: daily medication monitoring, group and milieu therapy daily, monitoring through interdisciplinary rounds, monitoring through weekly patient care conferences    Expected Discharge Date: 10 midnights     Discharge Plan: will be determined    Treatment Plan Created/Updated By: Lluvia Robertson MD

## 2023-09-20 NOTE — MALNUTRITION/BMI
This medical record reflects one or more clinical indicators suggestive of malnutrition and/or morbid obesity. Malnutrition Findings:   Adult Malnutrition type: Chronic illness  Adult Degree of Malnutrition: Malnutrition of moderate degree  Malnutrition Characteristics: Inadequate energy, Weight loss              360 Statement: Malnutrition related to inadequate energy intake as evidenced by 18#(11%) weight loss from 5/23/# to 9/19/# and <75% energy intake compared to estimated needs >1 month. Regular diet thin liquids with ensure plus high protein BID. BMI Findings: Body mass index is 23.27 kg/m². See Nutrition note dated 9/20/2023  for additional details. Completed nutrition assessment is viewable in the nutrition documentation.

## 2023-09-20 NOTE — H&P
Bárbara Rodriguez#  [id number] Karissa Castellano  VUT:[id number]    TEI:[id number]  Adm Date: 9/19/2023 1715  5:15 PM   ATT PHY: Justa Hennessy, [facility]         Chief Complaint: unstable mood      History of Presenting Illness: Nori Eldridge is a(n) 74y.o. year old male who is admitted to [address] [place] [address] on voluntary 201 commitment basis. Patient originally presented to [address] [place] [address] ED on 9/18/2023 with 302 petitioned by patient's wife. Patient examined at bedside. Patient reports history of chronic back and neck pain for the past several years. He used to be on oxy but got his medical marijuana card few years ago and only utilizes this for his pain at home with relief. He denies any chest pain, shortness of breath, abdominal pain, nausea, vomiting, diarrhea, constipation, headache, dizziness, urinary symptoms.     Allergies   Allergen Reactions   • Demerol [Meperidine] Anxiety     Current Facility-Administered Medications on File Prior to Encounter   Medication Dose Route Frequency Provider Last Rate Last Admin   • [COMPLETED] diazepam (VALIUM) tablet 5 mg  5 mg Oral Once Manda Monteiro MD   5 mg at 09/19/23 0219   • [COMPLETED] potassium chloride (K-DUR,KLOR-CON) CR tablet 40 mEq  40 mEq Oral Once Andrew Chavez MD   40 mEq at 09/19/23 1135   • [DISCONTINUED] diazepam (VALIUM) tablet 10 mg  10 mg Oral Once Manda Monteiro MD       • [DISCONTINUED] LORazepam (ATIVAN) tablet 1 mg  1 mg Oral Once Manda Monteiro MD       • [DISCONTINUED] pantoprazole (PROTONIX) EC tablet 20 mg  20 mg Oral Early Morning Andrew Chavez MD   20 mg at 09/19/23 1135     Current Outpatient Medications on File Prior to Encounter   Medication Sig Dispense Refill   • hydrOXYzine HCL (ATARAX) 25 mg tablet Take 25 mg by mouth every 6 (six) hours as needed for anxiety     • Omeprazole 20 MG TBEC Take 20 mg by mouth     • ARIPiprazole (ABILIFY) 10 mg tablet Take 10 mg by mouth daily (Patient not taking: Reported on 9/19/2023)     • methocarbamol (ROBAXIN) 500 mg tablet Take 1 tablet (500 mg total) by mouth 2 (two) times a day (Patient not taking: Reported on 9/19/2023) 20 tablet 0   • morphine 10 mg/5 mL solution Take by mouth every 2 (two) hours as needed for severe pain (Patient not taking: Reported on 9/19/2023)     • nitroglycerin (NITRODUR) 0.1 mg/hr Place on the skin (Patient not taking: Reported on 9/19/2023)     • oxyCODONE (ROXICODONE) 10 MG TABS every 6 (six) hours as needed (Patient not taking: Reported on 9/19/2023)     • oxyCODONE (ROXICODONE) 10 MG TABS Take 1 tablet (10 mg total) by mouth every 6 (six) hours as needed for moderate pain for up to 20 doses Max Daily Amount: 40 mg (Patient not taking: Reported on 9/19/2023) 20 tablet 0   • oxyCODONE-acetaminophen (PERCOCET)  mg per tablet Take 1 tablet by mouth every 4 (four) hours as needed for moderate pain (Patient not taking: Reported on 9/19/2023)     • sertraline (ZOLOFT) 100 mg tablet Take 50 mg by mouth daily (Patient not taking: Reported on 9/19/2023)     • tadalafil (CIALIS) 20 MG tablet Take 20 mg by mouth (Patient not taking: Reported on 9/19/2023)       Active Ambulatory Problems     Diagnosis Date Noted   • Dark stools 01/10/2023   • Asthma    • Bipolar 1 disorder (HCC)    • Anxiety    • Gastroesophageal reflux disease    • Alcoholic cirrhosis ([address]) 46/05/5588   • Ascites 12/14/2019   • Bipolar affective disorder, currently depressed, mild ([address]) 11/06/2017   • Cervical spondylosis with myelopathy 11/15/2020   • Chronic pain disorder 12/14/2019   • Generalized anxiety disorder 04/29/2023   • History of medication noncompliance 12/14/2019   • Insomnia 12/14/2019   • Mild intermittent asthma without complication 48/89/7608   • Other specified inflammatory spondylopathies, 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[address]) 04/22/2019   • Psoriasis and similar disorders 12/14/2019   • S/P spinal surgery 06/17/2021   • T12 burst fracture ([address]) 02/11/2021     Resolved Ambulatory Problems     Diagnosis Date Noted   • No Resolved Ambulatory Problems     Past Medical History:   Diagnosis Date   • Depression      Past Surgical History:   Procedure Laterality Date   • ANTERIOR FUSION CERVICAL SPINE     • CATARACT EXTRACTION     • CHOLECYSTECTOMY     • EYE SURGERY     • LUMBAR FUSION     • SPINE SURGERY       Social History:   Social History     Socioeconomic History   • Marital status: /Civil Union     Spouse name: None   • Number of children: None   • Years of education: None   • Highest education level: None   Occupational History   • None   Tobacco Use   • Smoking status: Never   • Smokeless tobacco: Never   Vaping Use   • Vaping Use: Never used   Substance and Sexual Activity   • Alcohol use: Not Currently   • Drug use: Yes     Frequency: 5.0 times per week     Types: Marijuana     Comment: 3 days ago   • Sexual activity: None   Other Topics Concern   • None   Social History Narrative   • None     Social Determinants of Health     Financial Resource Strain: Not on file   Food Insecurity: Not on file   Transportation Needs: Not on file   Physical Activity: Not on file   Stress: Not on file   Social Connections: Not on file   Intimate Partner Violence: Not on file   Housing Stability: Not on file     Family History:   Family History   Adopted: Yes     Review of Systems   Constitutional: Negative for chills and fever. HENT: Negative for ear pain and sore throat. Eyes: Negative for pain and visual disturbance. Respiratory: Negative for cough and shortness of breath. Cardiovascular: Negative for chest pain and palpitations. Gastrointestinal: Negative for abdominal pain and vomiting. Genitourinary: Negative for dysuria and hematuria. Musculoskeletal: Positive for arthralgias, back pain, gait problem and myalgias. Skin: Negative for color change and rash.    Neurological: Negative for dizziness, seizures, syncope and headaches. Psychiatric/Behavioral: Positive for agitation. The patient is nervous/anxious. All other systems reviewed and are negative. Physical Exam   Vitals: Blood pressure 144/76, pulse 64, temperature 98.7 °F (37.1 °C), temperature source Temporal, resp. rate 18, height 5' 7" (1.702 m), weight 67.4 kg (148 lb 9.6 oz), SpO2 97 %. ,Body mass index is 23.27 kg/m². Constitutional: Awake, alert, in no acute distress. Head: Normocephalic and atraumatic. Mouth/Throat: Oropharynx is clear and moist.    Eyes: Conjunctivae and EOM are normal.   Neck: Neck supple. No thyromegaly present. Cardiovascular: Normal rate, regular rhythm. .    Pulmonary/Chest: Effort normal and breath sounds normal.   Abdominal: Soft. Bowel sounds are normal.  There is no tenderness. Neurological: Alert, oriented to person, place, and time. Skin: Skin is warm and dry. Assessment     Rita Dykes is a(n) 74 y.o. male with bipolar disorder. 1.  GERD. Patient is on Protonix 40 mg daily (Prilosec nonformulary). 2.  Asthma. Stable. Albuterol inhaler as needed. 3.  Alcohol abuse. Last drink over 4 years ago per pt. Puja. 4.  DJD/OA/chronic back and neck pain. Patient utilizes medical marijuana at home. Tylenol for mild/mod pain, tramadol for severe pain, naproxen breakthrough pain. Lidocaine patch daily, Robaxin as needed. 5.  Gait abnormality. PT OT consulted. 6.  Hypokalemia. Level 3.3 mmol/L on 9/19/2023. Potassium chloride 40 meq x1 dose given in ED. Repeat BMP. 7.  Psych with bipolar disorder. This is being managed by the psych team.      Prognosis: Fair. Discharge Plan: In progress. Advanced Directives: I have discussed in detail with the patient the advanced directives. The patient does not have an appointed POA and does not have a living will. Patient's emergency contact is his wife, Emanuel Miranda, whose number is [phone].   When discussing cardiac and pulmonary resuscitation efforts with the patient, the patient wishes to be full code. I have spent more than 50 minutes gathering data, doing physical examination, and discussing the advanced directives, which was witnessed by caring staff. The patient was discussed with Dr. Vane Palencia and he is in agreement with the above note.

## 2023-09-20 NOTE — NURSING NOTE
Patient noted to have broken sleep throughout the night. Pt stated it's related to pain. Non labored breathing noted while asleep. Q 7 min safety checks maintained.
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
## 2023-09-20 NOTE — NURSING NOTE
Patient presents to the nurses station requesting PRN pain analgesia for c/o 8/10 generalized pain, however he states the pain is primarily in his neck and back. Administered PRN Ultram as per order for severe pain. Will monitor for medication effectiveness.

## 2023-09-20 NOTE — PROGRESS NOTES
09/20/23    Team Meeting   Meeting Type Daily Rounds   Team Members Present   Team Members Present Physician;Nurse;   Physician Team Member Dr. Winnie Sheikh MD; Jayson Sever, CRNP   Nursing Team Member Ellis Ulrich RN   Care Management Team Member MS [place], [facility] [place], [facility] [place]; [place], [place]   Patient/Family Present   Patient Present No   Patient's Family Present No   New admission. Originally a 302, then signed 201, increased agitation, demar, uds positive, hx of tbi, non compliant with medications, weight loss 30lbs in last 10 months, sleeping 2-3 hours a night, loud, tangential, paranoid pressured speech, tearful at times, prn. Limitations in place on phone.

## 2023-09-20 NOTE — PHYSICAL THERAPY NOTE
Physical Therapy Evaluation   Time in: 1115  Time out: 1133  Total evaluation time: 18 minutes    Patient's Name: Randee Gusman    Admitting Diagnosis  Major depressive disorder, single episode, unspecified [F32.9]  Bipolar 1 disorder ([address]) [F31.9]    Problem List  Patient Active Problem List   Diagnosis    Dark stools    Asthma    Bipolar 1 disorder (HCC)    Anxiety    Gastroesophageal reflux disease    Alcoholic cirrhosis (HCC)    Ascites    Bipolar affective disorder, currently depressed, mild (HCC)    Cervical spondylosis with myelopathy    Chronic pain disorder    Generalized anxiety disorder    History of medication noncompliance    Insomnia    Mild intermittent asthma without complication    Other specified inflammatory spondylopathies, 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[address])    Psoriasis and similar disorders    S/P spinal surgery    T12 burst fracture (HCC)    TBI (traumatic brain injury) ([address])    Cannabis abuse    Bipolar disorder, current episode manic without psychotic features, moderate ([address])       Past Medical History  Past Medical History:   Diagnosis Date    Anxiety     Asthma     Bipolar 1 disorder ([address])     Depression        Past Surgical History  Past Surgical History:   Procedure Laterality Date    ANTERIOR FUSION CERVICAL SPINE      CATARACT EXTRACTION      CHOLECYSTECTOMY      EYE SURGERY      LUMBAR FUSION      SPINE SURGERY         PT performed at least 2 patient identifiers during session: Name and wristband. 09/20/23 1133   PT Last Visit   PT Visit Date 09/20/23   Note Type   Note type Evaluation   Pain Assessment   Pain Assessment Tool 0-10   Pain Score 8   Pain Location/Orientation Orientation: Upper;Orientation: Mid;Orientation: Lower; Location: Back   Pain Onset/Description Onset: Ongoing;Frequency: Constant/Continuous   Patient's Stated Pain Goal No pain   Hospital Pain Intervention(s) Repositioned; Emotional support   Restrictions/Precautions   Weight Bearing Precautions Per Order No   Other Precautions Cognitive; Impulsive   Home Living   Type of [facility]  Two level;Performs ADLs on one level; Able to live on main level with bedroom/bathroom;Stairs to enter with rails  (3 YANIV c HR; 1st floor setup)   Bathroom Shower/Tub Tub/shower unit   [employer] Grab bars in shower; Shower chair   [address]   (no AD or DME @ baseline)   Prior Function   Level of [place] Independent with ADLs; Independent with functional mobility; Independent with IADLS   Lives With Alone;Spouse  (pt lives alone on 1st floor, wife lives upstairs he reports)   [address] Help From Family   IADLs Independent with driving; Independent with meal prep; Independent with medication management   Falls in the last 6 months 1 to 4  (pt admits to 3-4 mechanical falls)   Vocational Workers comp   General   Family/Caregiver Present No   Cognition   Arousal/Participation Alert   Orientation Level Oriented X4   Memory Decreased recall of precautions   Following Commands Follows one step commands without difficulty   Comments pt rambles throughout discussion with tangential conversations present, pt re-directable   Subjective   Subjective "I was petitioned to be here"   RLE Assessment   RLE Assessment WFL  (at least 3+/5 observed c functional activity)   LLE Assessment   LLE Assessment WFL  (at least 3+/5 observed c functional activity)   Coordination   Movements are Fluid and Coordinated 1   Sensation WFL   Bed Mobility   Supine to Sit Unable to assess  (pt received OOB in dining room)   Transfers   Sit to Stand 6  Modified independent   Additional items Increased time required   Stand to Sit 6  Modified independent   Additional items Increased time required   Ambulation/Elevation   Gait pattern Improper Weight shift;Decreased foot clearance; Antalgic   Gait Assistance 6  Modified independent   Additional items Assist x 1   Assistive Device None   Distance 30 ft x 2   Stair Management Assistance Not tested   Balance   Static Sitting Normal   Dynamic Sitting Normal   Static Standing Good   Dynamic Standing Good   Ambulatory Good   Endurance Deficit   Endurance Deficit No   Activity Tolerance   Activity Tolerance Patient limited by pain   Medical Staff Made Aware coordination of  care provided with OT [facility] [address] Yes, RN made aware   Assessment   Prognosis Good   Problem List Pain   Assessment Pt is 74 y.o. male seen for high-complexity PT evaluation s/p admit to [address] [place] [address] on 9/19/2023 w/ voluntary 201. PT consulted to assess pt's functional mobility and d/c needs. Order placed for PT eval and tx, w/ up and OOB as tolerated order. Comorbidities affecting pt's physical performance at time of assessment include: GERD, asthma, alcohol abuse, DJD/OA/chronic back and neck pain, gait abnormality, hypokalemia, bipolar disorder. PTA, pt was independent w/ all functional mobility w/ no AD or DME, ambulates unrestricted distances and all terrain, has 3 YANIV and lives w/ wife in 2 level home c 1st floor setup. Personal factors affecting pt at time of IE include: positive fall history, impulsivity and limited insight into impairments. Please find objective findings from PT assessment regarding body systems outlined above. The following objective measures performed on IE: AM-PAC 6-Clicks: 14/29. Pt's clinical presentation is currently unstable/unpredictable seen in pt's presentation of psychiatric treatment pending, intermittent agitation, tangential conversations, poor safety awareness. From PT/mobility standpoint, pt appears to be functioning close to or at mobility baseline, therefore no further immediate skilled PT needs are warranted at this time. Pt currently performing all phases of functional mobility at independent level without need for AD. Recommend pt continue to mobilize ad willam while here.  Recommend pt return to previous living environment once medically cleared. Will sign off, D/C PT. Please reconsult if further immediate skilled PT needs are warranted. Barriers to Discharge None   Goals   Patient Goals "to get back home"   PT Treatment Day 0   Plan   Treatment/Interventions Spoke to nursing   PT Frequency   (D/C PT)   Recommendation   PT Discharge Recommendation No rehabilitation needs   Equipment Recommended   (none at this time)   Additional Comments Pt's raw score on the AM-PAC Basic Mobility inpatient short form is 24, standardized score is 57.68. Patients at this level are likely to benefit from DC to home with no services, however, please refer to therapist recommendation for safe DC planning.    AM-PAC Basic Mobility Inpatient   Turning in Flat Bed Without Bedrails 4   Lying on Back to Sitting on Edge of Flat Bed Without Bedrails 4   Moving Bed to Chair 4   Standing Up From Chair Using Arms 4   Walk in Room 4   Climb 3-5 Stairs With Railing 4   Basic Mobility Inpatient Raw Score 24   Basic Mobility Standardized Score 57.68   Highest Level Of Mobility   JH-HLM Goal 8: Walk 250 feet or more   JH-HLM Achieved 7: Walk 25 feet or more   End of Consult   Patient Position at End of Consult All needs within reach  (pt returned to dining room for group session)       Anthony Jordan, PT, DPT

## 2023-09-20 NOTE — NURSING NOTE
Patient visible in milieu, pleasant and cooperative in interaction. Social with staff and peers. Mood and affect labile, tearful at times. Speech rapid, rambling. Thought process tangential, circumstantial, blaming others. Patient feels his wife is "setting him up". Appears disheveled with body odor. Stressors include "dying dog", marital discord, chronic pain, support provided. Patient endorses anxiety/depression, denies SI/HI, hallucinations. Remains on 7" checks for safety and behaviors.

## 2023-09-20 NOTE — NURSING NOTE
Pt claimed he anxious. Pacing at the hallway. Advised to use his coping skills and to do deep breathing. Ativan 0.5mg offered and taken. To continue Q7 min safety checks and monitor.

## 2023-09-21 LAB
25(OH)D3 SERPL-MCNC: 41.7 NG/ML (ref 30–100)
ANION GAP SERPL CALCULATED.3IONS-SCNC: 6 MMOL/L
BUN SERPL-MCNC: 15 MG/DL (ref 5–25)
CALCIUM SERPL-MCNC: 9.2 MG/DL (ref 8.4–10.2)
CHLORIDE SERPL-SCNC: 104 MMOL/L (ref 96–108)
CO2 SERPL-SCNC: 27 MMOL/L (ref 21–32)
CREAT SERPL-MCNC: 0.59 MG/DL (ref 0.6–1.3)
FOLATE SERPL-MCNC: 17.7 NG/ML
GFR SERPL CREATININE-BSD FRML MDRD: 106 ML/MIN/1.73SQ M
GLUCOSE P FAST SERPL-MCNC: 91 MG/DL (ref 65–99)
GLUCOSE SERPL-MCNC: 91 MG/DL (ref 65–140)
POTASSIUM SERPL-SCNC: 4 MMOL/L (ref 3.5–5.3)
SODIUM SERPL-SCNC: 137 MMOL/L (ref 135–147)
VIT B12 SERPL-MCNC: 633 PG/ML (ref 180–914)

## 2023-09-21 PROCEDURE — 82607 VITAMIN B-12: CPT

## 2023-09-21 PROCEDURE — 82746 ASSAY OF FOLIC ACID SERUM: CPT

## 2023-09-21 PROCEDURE — 99232 SBSQ HOSP IP/OBS MODERATE 35: CPT | Performed by: PSYCHIATRY & NEUROLOGY

## 2023-09-21 PROCEDURE — 80048 BASIC METABOLIC PNL TOTAL CA: CPT

## 2023-09-21 PROCEDURE — 82306 VITAMIN D 25 HYDROXY: CPT

## 2023-09-21 RX ORDER — OLANZAPINE 10 MG/1
10 TABLET, ORALLY DISINTEGRATING ORAL
Status: DISCONTINUED | OUTPATIENT
Start: 2023-09-21 | End: 2023-09-25

## 2023-09-21 RX ADMIN — TRAMADOL HYDROCHLORIDE 50 MG: 50 TABLET, COATED ORAL at 13:21

## 2023-09-21 RX ADMIN — GABAPENTIN 300 MG: 300 CAPSULE ORAL at 21:06

## 2023-09-21 RX ADMIN — OLANZAPINE 10 MG: 10 TABLET, ORALLY DISINTEGRATING ORAL at 21:06

## 2023-09-21 RX ADMIN — PANTOPRAZOLE SODIUM 20 MG: 20 TABLET, DELAYED RELEASE ORAL at 05:40

## 2023-09-21 RX ADMIN — ALBUTEROL SULFATE 2 PUFF: 108 INHALANT RESPIRATORY (INHALATION) at 03:50

## 2023-09-21 RX ADMIN — TRAZODONE HYDROCHLORIDE 100 MG: 100 TABLET ORAL at 23:25

## 2023-09-21 RX ADMIN — TRAMADOL HYDROCHLORIDE 50 MG: 50 TABLET, COATED ORAL at 22:04

## 2023-09-21 RX ADMIN — LIDOCAINE 5% 1 PATCH: 700 PATCH TOPICAL at 08:02

## 2023-09-21 NOTE — PLAN OF CARE
Problem: Ineffective Coping  Goal: Demonstrates healthy coping skills  Outcome: Progressing  Goal: Participates in unit activities  Description: Interventions:  - Provide therapeutic environment   - Provide required programming   - Redirect inappropriate behaviors   Outcome: Progressing  Goal: Patient/Family participate in treatment and DC plans  Description: Interventions:  - Provide therapeutic environment  Outcome: Progressing  Goal: Patient/Family verbalizes awareness of resources  Outcome: Progressing  Goal: Understands least restrictive measures  Description: Interventions:  - Utilize least restrictive behavior  Outcome: Progressing  Goal: Free from restraint events  Description: - Utilize least restrictive measures   - Provide behavioral interventions   - Redirect inappropriate behaviors   Outcome: Progressing     Problem: Depression  Goal: Verbalize thoughts and feelings  Description: Interventions:  - Assess and re-assess patient's level of risk   - Engage patient in 1:1 interactions, daily, for a minimum of 15 minutes   - Encourage patient to express feelings, fears, frustrations, hopes   Outcome: Progressing  Goal: Refrain from harming self  Description: Interventions:  - Monitor patient closely, per order   - Supervise medication ingestion, monitor effects and side effects   Outcome: Progressing  Goal: Refrain from isolation  Description: Interventions:  - Develop a trusting relationship   - Encourage socialization   Outcome: Progressing  Goal: Refrain from self-neglect  Outcome: Progressing  Goal: Attend and participate in unit activities, including therapeutic, recreational, and educational groups  Description: Interventions:  - Provide therapeutic and educational activities daily, encourage attendance and participation, and document same in the medical record   Outcome: Progressing  Goal: Complete daily ADLs, including personal hygiene independently, as able  Description: Interventions:  - Observe, teach, and assist patient with ADLS  -  Monitor and promote a balance of rest/activity, with adequate nutrition and elimination   Outcome: Progressing     Problem: Anxiety  Goal: Anxiety is at manageable level  Description: Interventions:  - Assess and monitor patient's anxiety level. - Monitor for signs and symptoms (heart palpitations, chest pain, shortness of breath, headaches, nausea, feeling jumpy, restlessness, irritable, apprehensive). - Collaborate with interdisciplinary team and initiate plan and interventions as ordered. - Sharon patient to unit/surroundings  - Explain treatment plan  - Encourage participation in care  - Encourage verbalization of concerns/fears  - Identify coping mechanisms  - Assist in developing anxiety-reducing skills  - Administer/offer alternative therapies  - Limit or eliminate stimulants  Outcome: Progressing     Problem: Nutrition/Hydration-ADULT  Goal: Nutrient/Hydration intake appropriate for improving, restoring or maintaining nutritional needs  Description: Monitor and assess patient's nutrition/hydration status for malnutrition. Collaborate with interdisciplinary team and initiate plan and interventions as ordered. Monitor patient's weight and dietary intake as ordered or per policy. Utilize nutrition screening tool and intervene as necessary. Determine patient's food preferences and provide high-protein, high-caloric foods as appropriate.      INTERVENTIONS:  - Monitor oral intake, urinary output, labs, and treatment plans  - Assess nutrition and hydration status and recommend course of action  - Evaluate amount of meals eaten  - Assist patient with eating if necessary   - Allow adequate time for meals  - Recommend/ encourage appropriate diets, oral nutritional supplements, and vitamin/mineral supplements  - Order, calculate, and assess calorie counts as needed  - Recommend, monitor, and adjust tube feedings and TPN/PPN based on assessed needs  - Assess need for intravenous fluids  - Provide specific nutrition/hydration education as appropriate  - Include patient/family/caregiver in decisions related to nutrition  Outcome: Progressing

## 2023-09-21 NOTE — SOCIAL WORK
DELPHINE placed call to pt's daughter, Tejas Paniagua, at [phone] to discuss pt's admission. DELPHINE left vm with SW's contact number requesting callback.
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
## 2023-09-21 NOTE — NURSING NOTE
Patient was observed to be visible in the community this evening, also observed to be ambulating about the unit hallways. He is calm, however presents with mood lability and is tearful at times. Altaf Chavez was medication compliant at . He received PRN Ultram this evening which was effective in reducing generalized pain. Denies SI, HI and hallucinations. Patient did shower this evening and was observed tearful during showering. He did express concern about his dog at home and informs he does all the care for his dog. This writer offered to call his home to follow up on the care of his dog, however patient declined. Continuous safety rounding in progress.

## 2023-09-21 NOTE — NURSING NOTE
Patient remains awake at this time. He c/o inability to sleep and 8/10 chronic neck pain. Administered PRN Trazodone 100 mg as per order for insomnia and PRN Naprosyn for breakthrough pain. Will monitor for medication effectiveness.

## 2023-09-21 NOTE — NURSING NOTE
No further c/o neck pain voiced by patient. He appears to be comfortable and in no acute distress. PRN Naprosyn seemingly effective for breakthrough pain.

## 2023-09-21 NOTE — NURSING NOTE
Ordered blood work collected by [employer] without difficulty. Patient tolerated well. Specimen to be sent to the lab momentarily for processing.

## 2023-09-21 NOTE — NURSING NOTE
Patient visible in milieu, pleasant and cooperative in interaction. Social with staff and peers. Patient endorses anxiety/depression, misses dog, support provided. Tangential at times. Denies SI/HI, hallucinations. Remains medication compliant and on 7" checks for safety and behaviors.

## 2023-09-21 NOTE — PLAN OF CARE
Problem: Ineffective Coping  Goal: Cooperates with admission process  Description: Interventions:   - Complete admission process  Outcome: Completed  Goal: Demonstrates healthy coping skills  Outcome: Progressing  Goal: Participates in unit activities  Description: Interventions:  - Provide therapeutic environment   - Provide required programming   - Redirect inappropriate behaviors   Outcome: Progressing  Goal: Understands least restrictive measures  Description: Interventions:  - Utilize least restrictive behavior  Outcome: Progressing  Goal: Free from restraint events  Description: - Utilize least restrictive measures   - Provide behavioral interventions   - Redirect inappropriate behaviors   Outcome: Progressing     Problem: Depression  Goal: Verbalize thoughts and feelings  Description: Interventions:  - Assess and re-assess patient's level of risk   - Engage patient in 1:1 interactions, daily, for a minimum of 15 minutes   - Encourage patient to express feelings, fears, frustrations, hopes   Outcome: Progressing  Goal: Refrain from harming self  Description: Interventions:  - Monitor patient closely, per order   - Supervise medication ingestion, monitor effects and side effects   Outcome: Progressing  Goal: Refrain from isolation  Description: Interventions:  - Develop a trusting relationship   - Encourage socialization   Outcome: Progressing  Goal: Refrain from self-neglect  Outcome: Progressing  Goal: Complete daily ADLs, including personal hygiene independently, as able  Description: Interventions:  - Observe, teach, and assist patient with ADLS  -  Monitor and promote a balance of rest/activity, with adequate nutrition and elimination   Outcome: Progressing     Problem: Anxiety  Goal: Anxiety is at manageable level  Description: Interventions:  - Assess and monitor patient's anxiety level.    - Monitor for signs and symptoms (heart palpitations, chest pain, shortness of breath, headaches, nausea, feeling jumpy, restlessness, irritable, apprehensive). - Collaborate with interdisciplinary team and initiate plan and interventions as ordered. - [place] patient to unit/surroundings  - Explain treatment plan  - Encourage participation in care  - Encourage verbalization of concerns/fears  - Identify coping mechanisms  - Assist in developing anxiety-reducing skills  - Administer/offer alternative therapies  - Limit or eliminate stimulants  Outcome: Progressing     Problem: Nutrition/Hydration-ADULT  Goal: Nutrient/Hydration intake appropriate for improving, restoring or maintaining nutritional needs  Description: Monitor and assess patient's nutrition/hydration status for malnutrition. Collaborate with interdisciplinary team and initiate plan and interventions as ordered. Monitor patient's weight and dietary intake as ordered or per policy. Utilize nutrition screening tool and intervene as necessary. Determine patient's food preferences and provide high-protein, high-caloric foods as appropriate.      INTERVENTIONS:  - Monitor oral intake, urinary output, labs, and treatment plans  - Assess nutrition and hydration status and recommend course of action  - Evaluate amount of meals eaten  - Assist patient with eating if necessary   - Allow adequate time for meals  - Recommend/ encourage appropriate diets, oral nutritional supplements, and vitamin/mineral supplements  - Order, calculate, and assess calorie counts as needed  - Recommend, monitor, and adjust tube feedings and TPN/PPN based on assessed needs  - Assess need for intravenous fluids  - Provide specific nutrition/hydration education as appropriate  - Include patient/family/caregiver in decisions related to nutrition  Outcome: Progressing

## 2023-09-21 NOTE — NURSING NOTE
No further complaints voiced by patient. He is currently in bed, appears to be sleeping. Terrance Solis appears to have had broken sleep throughout the overnight hours.

## 2023-09-21 NOTE — SOCIAL WORK
SW met with pt in [facility].  Pt was agreeable to sign FIDELIAN:    Sonya Kawasaki (daughter/contact)  Ph: [phone]

## 2023-09-21 NOTE — PROGRESS NOTES
Progress Note - Roby Prater 74 y.o. male MRN: [id number]    Unit/Bed#Vadim Cornell 203-01 Encounter: [id number]        Subjective:   Patient seen and examined at bedside after reviewing the chart and discussing the case with the caring staff. Patient examined at bedside. Patient has no acute symptoms. Vitamin D, B12, folate pending. Physical Exam   Vitals: Blood pressure 136/67, pulse 58, temperature 97.7 °F (36.5 °C), temperature source Temporal, resp. rate 18, height 5' 7" (1.702 m), weight 67.4 kg (148 lb 9.6 oz), SpO2 98 %. ,Body mass index is 23.27 kg/m². Constitutional: Patient in no acute distress. HEENT: PERR, EOMI, MMM. Cardiovascular: Normal rate and regular rhythm. Pulmonary/Chest: Effort normal and breath sounds normal.   Abdomen: Soft, + BS, NT. Assessment/Plan:  Roby Prater is a(n) 74 y.o. male with bipolar disorder.     1. GERD. Patient is on Protonix 40 mg daily (Prilosec nonformulary). 2.  Asthma. Stable. Albuterol inhaler as needed. 3.  Alcohol abuse. Last drink over 4 years ago per pt. Stable. 4.  DJD/OA/chronic back and neck pain. Patient utilizes medical marijuana at home. Tylenol for mild/mod pain, tramadol for severe pain, naproxen breakthrough pain. Lidocaine patch daily, Robaxin as needed. 5.  Gait abnormality. PT OT consulted. 6.  Hypokalemia. Resolved based on BMP on 9/21/2023. The patient was discussed with Dr. Santosh Chaudhari and he is in agreement with the above note.

## 2023-09-21 NOTE — SOCIAL WORK
SW placed call to pt's pcp, Dr. Jillian Tran, at [phone] to inform of pt admission. Office closed. SW to attempt tomorrow.

## 2023-09-21 NOTE — NURSING NOTE
On reassessment of PRN pain medication effectiveness, patient informs his generalized pain was reduced from 8/10 to 6/10. He states the medication works for him. PRN Ultram effective.

## 2023-09-21 NOTE — PROGRESS NOTES
09/21/23    Team Meeting   Meeting Type Daily Rounds   Team Members Present   Team Members Present Physician;Nurse;;Occupational Therapist   Physician Team Member Dr. Ally Phan MD; LIZETTE Dillon   Nursing Team Member Katina Ralph RN   Care Management Team Member MS Jermaine, [facility]; Júnior Horn, [place]   OT Team Member Brittney Estrella [place]   Patient/Family Present   Patient Present No   Patient's Family Present No   Increased agitation and demar at time of admission, speech is slowing, tearful, prn, broken sleep, took hs meds without difficulty. Pleasant, cooperative and social with peers. From home. Hx of tbi.

## 2023-09-21 NOTE — NURSING NOTE
Patient presents to the nurses station to c/o wheezing. He requested and received PRN Albuterol inhaler as per order.

## 2023-09-21 NOTE — PROGRESS NOTES
Progress Note - [facility] 74 y.o. male MRN: [id number]   Unit/Bed#: Sharon Xie 203-01 Encounter: [id number]    Behavior over the last 24 hours: minimal improvement. Jeanine Knox was seen for continuing care. He remains hyper-verbal with fast speech but able to respond and follow verbal redirection. Continues focused on his marital relationship issues. and is trying to work with  to find a stable place. Patient did take his medication last night continue with limited insight into his mood lability and possibly behavior changes secondary to TBI. Patient has been interacting with selective peer appropriately today. Sleep: slept better  Appetite: fair  Medication side effects: denies  ROS: no complaints, all other systems are negative    Mental Status Evaluation:    Appearance:  age appropriate   Behavior:  less restless   Speech:  increased rate, fast at times   Mood:  irritable   Affect:  labile   Thought Process:  circumstantial, increased rate of thoughts   Associations: circumstantial associations   Thought Content:  no overt delusions   Perceptual Disturbances: denies auditory hallucinations when asked   Risk Potential: Suicidal ideation - None at present  Homicidal ideation - None   Sensorium:  oriented to person, place and time/date   Memory:  recent and remote memory grossly intact   Consciousness:  alert and awake   Attention: attention span and concentration appear shorter than expected for age   Insight:  limited   Judgment: fair   Gait/Station: normal gait/station   Motor Activity: no abnormal movements     Vital signs in last 24 hours:    Temp:  [97.7 °F (36.5 °C)-98.4 °F (36.9 °C)] 97.7 °F (36.5 °C)  HR:  [56-58] 58  Resp:  [17-18] 18  BP: (128-151)/(67-80) 136/67    Laboratory results:   I have personally reviewed all pertinent laboratory/tests results.   Most Recent Labs:   Lab Results   Component Value Date    WBC 9.11 09/19/2023    RBC 4.44 09/19/2023    HGB 14.4 09/19/2023    HCT 41.2 09/19/2023     09/19/2023    RDW 12.1 09/19/2023    NEUTROABS 7.15 09/19/2023    SODIUM 137 09/21/2023    K 4.0 09/21/2023     09/21/2023    CO2 27 09/21/2023    BUN 15 09/21/2023    CREATININE 0.59 (L) 09/21/2023    GLUC 91 09/21/2023    GLUF 91 09/21/2023    CALCIUM 9.2 09/21/2023    AST 27 09/19/2023    ALT 31 09/19/2023    ALKPHOS 70 09/19/2023    TP 6.8 09/19/2023    ALB 4.2 09/19/2023    TBILI 0.84 09/19/2023    OZQ0OTMLSTKI 1.270 09/19/2023    HGBA1C 5.2 05/14/2021     05/14/2021       Assessment/Plan   Principal Problem:    Bipolar disorder, current episode manic without psychotic features, moderate (HCC)  Active Problems:    Anxiety    Gastroesophageal reflux disease    Alcoholic cirrhosis (HCC)    Chronic pain disorder    Generalized anxiety disorder    History of medication noncompliance    Insomnia    TBI (traumatic brain injury) ([address])    Cannabis abuse    Moderate protein-calorie malnutrition ([address])    Recommended Treatment:     Planned medication and treatment changes:     All current active medications have been reviewed  Encourage group therapy, milieu therapy and occupational therapy  Behavioral Health checks every 7 minutes   Increase Zydis to 10 mg po hs    Current Facility-Administered Medications   Medication Dose Route Frequency Provider Last Rate   • acetaminophen  650 mg Oral Q4H PRN LIZETTE Glynn     • acetaminophen  650 mg Oral Q4H PRN LIZETTE Glynn     • albuterol  2 puff Inhalation Q4H PRN Jojo Elliott PA-C     • benztropine  0.5 mg Oral Q4H PRN Max 6/day LIZETTE Glynn     • gabapentin  300 mg Oral HS Harshad Bernstein MD     • hydrOXYzine HCL  50 mg Oral Q6H PRN Max 4/day Harshad Bernstein MD     • lidocaine  1 patch Topical Daily Jojo Elliott PA-C     • LORazepam  1 mg Intramuscular Q6H PRN Max 3/day LIZETTE Glynn     • LORazepam  0.5 mg Oral Q6H PRN Max 4/day LIZETTE Glynn     • RAJIVazepam 1 mg Oral Q6H PRN Max 3/day LIZETTE Hernandez     • methocarbamol  500 mg Oral Q6H PRN Jojo Elliott PA-C     • naproxen  500 mg Oral BID PRN Jojo Elliott PA-C     • OLANZapine  10 mg Oral HS Francia Rivero MD     • OLANZapine  5 mg Intramuscular Q3H PRN Max 3/day Favio Ricks, CRNP     • OLANZapine  2.5 mg Oral Q4H PRN Max 6/day Favio Ricks CRNP     • OLANZapine  5 mg Oral Q4H PRN Max 3/day Favio Ricks CRNP     • OLANZapine  5 mg Oral Q3H PRN Max 3/day MALINDA HernandezNP     • pantoprazole  20 mg Oral Early Morning LIZETTE Hernandez     • traMADol  50 mg Oral Q8H PRN Jojo Elliott PA-C     • traZODone  100 mg Oral HS PRN Francai Rivero MD         Risks / Benefits of Treatment:    Risks, benefits, and possible side effects of medications explained to patient. Patient has limited understanding of risks and benefits of treatment at this time, but agrees to take medications as prescribed. Counseling / Coordination of Care:    Patient's progress discussed with staff in treatment team meeting. Medications, treatment progress and treatment plan reviewed with patient. Supportive therapy provided to patient. Group attendance encouraged.     Stephanie Massey MD 09/21/23
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
## 2023-09-22 PROCEDURE — 99232 SBSQ HOSP IP/OBS MODERATE 35: CPT | Performed by: PSYCHIATRY & NEUROLOGY

## 2023-09-22 RX ORDER — GABAPENTIN 300 MG/1
300 CAPSULE ORAL 3 TIMES DAILY
Status: DISCONTINUED | OUTPATIENT
Start: 2023-09-22 | End: 2023-09-27 | Stop reason: HOSPADM

## 2023-09-22 RX ADMIN — GABAPENTIN 300 MG: 300 CAPSULE ORAL at 21:31

## 2023-09-22 RX ADMIN — TRAZODONE HYDROCHLORIDE 100 MG: 100 TABLET ORAL at 22:33

## 2023-09-22 RX ADMIN — TRAMADOL HYDROCHLORIDE 50 MG: 50 TABLET, COATED ORAL at 07:18

## 2023-09-22 RX ADMIN — TRAMADOL HYDROCHLORIDE 50 MG: 50 TABLET, COATED ORAL at 17:16

## 2023-09-22 RX ADMIN — OLANZAPINE 10 MG: 10 TABLET, ORALLY DISINTEGRATING ORAL at 21:29

## 2023-09-22 RX ADMIN — PANTOPRAZOLE SODIUM 20 MG: 20 TABLET, DELAYED RELEASE ORAL at 05:01

## 2023-09-22 RX ADMIN — LIDOCAINE 5% 1 PATCH: 700 PATCH TOPICAL at 08:19

## 2023-09-22 NOTE — PROGRESS NOTES
09/22/23    Team Meeting   Meeting Type Daily Rounds   Team Members Present   Team Members Present Physician;Nurse;;Occupational Therapist   Physician Team Member Dr. Harini Contreras MD; [place]   Nursing Team Member Anel Bangura RN   Care Management Team Member MS Jermaine, [facility], [facility]; [place], [place]   OT Team Member Angelina Martinez [place]   Patient/Family Present   Patient Present No   Patient's Family Present No   Pleasant, cooperative, pressured at times. Prn. Slept. Reviewed family collateral.  SW reported that per PT daughter PFA will be filed today. PT plans to go to hotel room at time of discharge.

## 2023-09-22 NOTE — SOCIAL WORK
DELPHINE placed call to pt's pcp, Dr. Gino Rocha, at [phone] to inform of pt admission. DELPHINE spoke with Millie Torres, who took callback number. Millie Torres stated that Dr. Gino Rocha does not prescribe pts psych meds. Pt has appt 10/12/23. Fax: [phone].

## 2023-09-22 NOTE — PROGRESS NOTES
Progress Note - [facility] 74 y.o. male MRN: [id number]   Unit/Bed#: [facility] 203-01 Encounter: [id number]    Behavior over the last 24 hours: unchanged. Pedro Martinez was seen for continuing care. He remains talkative with fast speech but able to  verbal redirection. He is frustrated because his wife filled PFA  and he can't return to his house. He misses his dog. Pt has been taking medications more consistently and denies any current side effects. Patient has been interacting with selective peer and has been participating in groups  today. Sleep: slept better  Appetite: fair  Medication side effects: denies  ROS: no complaints, all other systems are negative    Mental Status Evaluation:    Appearance:  age appropriate   Behavior:  less restless   Speech:  increased rate, fast at times   Mood:  dysphoric   Affect:  reactive   Thought Process:  increased rate of thoughts   Associations: circumstantial associations   Thought Content:  no overt delusions   Perceptual Disturbances: denies auditory hallucinations when asked   Risk Potential: Suicidal ideation - None at present  Homicidal ideation - None   Sensorium:  oriented to person, place and time/date   Memory:  recent and remote memory grossly intact   Consciousness:  alert and awake   Attention: attention span and concentration appear shorter than expected for age   Insight:  limited   Judgment: fair   Gait/Station: normal gait/station   Motor Activity: no abnormal movements     Vital signs in last 24 hours:    Temp:  [97.7 °F (36.5 °C)-99 °F (37.2 °C)] 97.7 °F (36.5 °C)  HR:  [55-66] 55  Resp:  [18] 18  BP: (121-144)/(58-72) 133/64    Laboratory results:   I have personally reviewed all pertinent laboratory/tests results.   Most Recent Labs:   Lab Results   Component Value Date    WBC 9.11 09/19/2023    RBC 4.44 09/19/2023    HGB 14.4 09/19/2023    HCT 41.2 09/19/2023     09/19/2023    RDW 12.1 09/19/2023    NEUTROABS 7.15 09/19/2023    SODIUM 137 09/21/2023    K 4.0 09/21/2023     09/21/2023    CO2 27 09/21/2023    BUN 15 09/21/2023    CREATININE 0.59 (L) 09/21/2023    GLUC 91 09/21/2023    GLUF 91 09/21/2023    CALCIUM 9.2 09/21/2023    AST 27 09/19/2023    ALT 31 09/19/2023    ALKPHOS 70 09/19/2023    TP 6.8 09/19/2023    ALB 4.2 09/19/2023    TBILI 0.84 09/19/2023    ZFT0NMBWMHJR 1.270 09/19/2023    HGBA1C 5.2 05/14/2021     05/14/2021       Assessment/Plan   Principal Problem:    Bipolar disorder, current episode manic without psychotic features, moderate (HCC)  Active Problems:    Anxiety    Gastroesophageal reflux disease    Alcoholic cirrhosis (HCC)    Chronic pain disorder    Generalized anxiety disorder    History of medication noncompliance    Insomnia    TBI (traumatic brain injury) ([address])    Cannabis abuse    Moderate protein-calorie malnutrition ([address])    Recommended Treatment:     Planned medication and treatment changes:     All current active medications have been reviewed  Encourage group therapy, milieu therapy and occupational therapy  Behavioral Health checks every 7 minutes   Increase Neurontin to 300 tid    Current Facility-Administered Medications   Medication Dose Route Frequency Provider Last Rate   • acetaminophen  650 mg Oral Q4H PRN Jeanie Listen, CRNP     • acetaminophen  650 mg Oral Q4H PRN Jeanie Listen, CRNP     • albuterol  2 puff Inhalation Q4H PRN Jojo Elliott PA-C     • benztropine  0.5 mg Oral Q4H PRN Max 6/day Jeanie Listen, CRNP     • gabapentin  300 mg Oral HS Pradeep Barboza MD     • hydrOXYzine HCL  50 mg Oral Q6H PRN Max 4/day Pradeep Barboza MD     • lidocaine  1 patch Topical Daily Jojo Elliott PA-C     • LORazepam  1 mg Intramuscular Q6H PRN Max 3/day Jeanie Listen, CRNP     • LORazepam  0.5 mg Oral Q6H PRN Max 4/day Jeanie Listen, CRNP     • LORazepam  1 mg Oral Q6H PRN Max 3/day Jeanie Listen, CRNP     • methocarbamol  500 mg Oral Q6H PRN Jojo Elliott PA-C     • naproxen  500 mg Oral BID PRN Jojo Elliott PA-C     • OLANZapine  10 mg Oral HS Carlos Leung MD     • OLANZapine  5 mg Intramuscular Q3H PRN Max 3/day [place], CRNP     • OLANZapine  2.5 mg Oral Q4H PRN Max 6/day [place], CRNP     • OLANZapine  5 mg Oral Q4H PRN Max 3/day [place], CRNP     • OLANZapine  5 mg Oral Q3H PRN Max 3/day [place], CRNP     • pantoprazole  20 mg Oral Early Morning [place], CRNP     • traMADol  50 mg Oral Q8H PRN Jojo Elliott PA-C     • traZODone  100 mg Oral HS PRN Carlos Leung MD         Risks / Benefits of Treatment:    Risks, benefits, and possible side effects of medications explained to patient. Patient has limited understanding of risks and benefits of treatment at this time, but agrees to take medications as prescribed. Counseling / Coordination of Care:    Patient's progress discussed with staff in treatment team meeting. Medications, treatment progress and treatment plan reviewed with patient. Supportive therapy provided to patient. Group attendance encouraged.     Catalino Valetne MD 09/22/23

## 2023-09-22 NOTE — NURSING NOTE
No further c/o pain voiced by patient. He is resting in bed at this time. No obvious signs of distress or discomfort noted. PRN Ultram seemingly effective for this patient.

## 2023-09-22 NOTE — NURSING NOTE
Patient was observed to be visible in the community tonight. Positive for snack and fluids tonight. He has been pleasant and cooperative during staff interactions. Speech is rambling. Silvia Jorge endorses ongoing anxiety and depression, denies SI, HI and hallucinations. He was medication compliant at HS. Lidoderm patch was removed today when patient showered as per patient. Continuous safety rounding in progress.

## 2023-09-22 NOTE — PLAN OF CARE
Problem: Ineffective Coping  Goal: Demonstrates healthy coping skills  Outcome: Progressing  Goal: Participates in unit activities  Description: Interventions:  - Provide therapeutic environment   - Provide required programming   - Redirect inappropriate behaviors   Outcome: Progressing  Goal: Patient/Family participate in treatment and DC plans  Description: Interventions:  - Provide therapeutic environment  Outcome: Progressing  Goal: Patient/Family verbalizes awareness of resources  Outcome: Progressing  Goal: Understands least restrictive measures  Description: Interventions:  - Utilize least restrictive behavior  Outcome: Progressing  Goal: Free from restraint events  Description: - Utilize least restrictive measures   - Provide behavioral interventions   - Redirect inappropriate behaviors   Outcome: Progressing     Problem: Depression  Goal: Treatment Goal: Demonstrate behavioral control of depressive symptoms, verbalize feelings of improved mood/affect, and adopt new coping skills prior to discharge  Outcome: Progressing  Goal: Verbalize thoughts and feelings  Description: Interventions:  - Assess and re-assess patient's level of risk   - Engage patient in 1:1 interactions, daily, for a minimum of 15 minutes   - Encourage patient to express feelings, fears, frustrations, hopes   Outcome: Progressing  Goal: Refrain from harming self  Description: Interventions:  - Monitor patient closely, per order   - Supervise medication ingestion, monitor effects and side effects   Outcome: Progressing  Goal: Refrain from isolation  Description: Interventions:  - Develop a trusting relationship   - Encourage socialization   Outcome: Progressing  Goal: Refrain from self-neglect  Outcome: Progressing  Goal: Attend and participate in unit activities, including therapeutic, recreational, and educational groups  Description: Interventions:  - Provide therapeutic and educational activities daily, encourage attendance and participation, and document same in the medical record   Outcome: Progressing  Goal: Complete daily ADLs, including personal hygiene independently, as able  Description: Interventions:  - Observe, teach, and assist patient with ADLS  -  Monitor and promote a balance of rest/activity, with adequate nutrition and elimination   Outcome: Progressing     Problem: Anxiety  Goal: Anxiety is at manageable level  Description: Interventions:  - Assess and monitor patient's anxiety level. - Monitor for signs and symptoms (heart palpitations, chest pain, shortness of breath, headaches, nausea, feeling jumpy, restlessness, irritable, apprehensive). - Collaborate with interdisciplinary team and initiate plan and interventions as ordered. - [facility] patient to unit/surroundings  - Explain treatment plan  - Encourage participation in care  - Encourage verbalization of concerns/fears  - Identify coping mechanisms  - Assist in developing anxiety-reducing skills  - Administer/offer alternative therapies  - Limit or eliminate stimulants  Outcome: Progressing     Problem: Nutrition/Hydration-ADULT  Goal: Nutrient/Hydration intake appropriate for improving, restoring or maintaining nutritional needs  Description: Monitor and assess patient's nutrition/hydration status for malnutrition. Collaborate with interdisciplinary team and initiate plan and interventions as ordered. Monitor patient's weight and dietary intake as ordered or per policy. Utilize nutrition screening tool and intervene as necessary. Determine patient's food preferences and provide high-protein, high-caloric foods as appropriate.      INTERVENTIONS:  - Monitor oral intake, urinary output, labs, and treatment plans  - Assess nutrition and hydration status and recommend course of action  - Evaluate amount of meals eaten  - Assist patient with eating if necessary   - Allow adequate time for meals  - Recommend/ encourage appropriate diets, oral nutritional supplements, and vitamin/mineral supplements  - Order, calculate, and assess calorie counts as needed  - Recommend, monitor, and adjust tube feedings and TPN/PPN based on assessed needs  - Assess need for intravenous fluids  - Provide specific nutrition/hydration education as appropriate  - Include patient/family/caregiver in decisions related to nutrition  Outcome: Progressing

## 2023-09-22 NOTE — NURSING NOTE
Patient visible in milieu, pleasant and cooperative in interaction. Social with staff and peers. Speech rambling, rapid. Patient endorses anxiety as he stated he misses his dog, support provided. Denies depression, SI/HI, hallucinations. Remains medication compliant and on 7" checks for safety and behaviors.

## 2023-09-22 NOTE — NURSING NOTE
Patient was able to sleep through the majority of the overnight hours after receiving PRN Trazodone at 2325; he is awake at this time. Informs of mild upset stomach for which he received a cup of ginger ale. Administered early AM medication, Protonix. Continuous safety rounding in progress.

## 2023-09-22 NOTE — NURSING NOTE
Patient requested and received PRN Ultram for c/o 7/10 back and neck pain. Will monitor for medication effectiveness.

## 2023-09-22 NOTE — PROGRESS NOTES
Progress Note - Isatu Mesa 74 y.o. male MRN: [id number]    Unit/Bed#[facility] 203-01 Encounter: [id number]        Subjective:   Patient seen and examined at bedside after reviewing the chart and discussing the case with the caring staff. Patient examined at bedside. Patient has no acute symptoms. On review of patient's labs it was found that patient's vitamin D level was 41.7 and B12 level is 633. Physical Exam   Vitals: Blood pressure 133/64, pulse 55, temperature 97.7 °F (36.5 °C), temperature source Temporal, resp. rate 18, height 5' 7" (1.702 m), weight 67.4 kg (148 lb 9.6 oz), SpO2 97 %. ,Body mass index is 23.27 kg/m². Constitutional: Patient in no acute distress. HEENT: PERR, EOMI, MMM. Cardiovascular: Normal rate and regular rhythm. Pulmonary/Chest: Effort normal and breath sounds normal.   Abdomen: Soft, + BS, NT. Assessment/Plan:  Isatu Mesa is a(n) 74 y.o. male with bipolar disorder.     1. GERD. Patient is on Protonix 40 mg daily (Prilosec nonformulary). 2.  Asthma. Stable. Albuterol inhaler as needed. 3.  Alcohol abuse. Last drink over 4 years ago per pt. Stable. 4.  DJD/OA/chronic back and neck pain. Patient utilizes medical marijuana at home. Tylenol for mild/mod pain, tramadol for severe pain, naproxen breakthrough pain. Lidocaine patch daily, Robaxin as needed. 5.  Gait abnormality. PT OT consulted. 6.  Hypokalemia. Resolved based on BMP on 9/21/2023.

## 2023-09-22 NOTE — NURSING NOTE
Patient is awake at this time; verbal support offered as patient is missing his dog, talking about family/ spouse issues. He was agreeable to PRN Trazodone to help him sleep. Monitoring ongoing.

## 2023-09-23 PROCEDURE — 99232 SBSQ HOSP IP/OBS MODERATE 35: CPT

## 2023-09-23 RX ORDER — TRAZODONE HYDROCHLORIDE 100 MG/1
100 TABLET ORAL
Status: DISCONTINUED | OUTPATIENT
Start: 2023-09-23 | End: 2023-09-27 | Stop reason: HOSPADM

## 2023-09-23 RX ADMIN — GABAPENTIN 300 MG: 300 CAPSULE ORAL at 17:11

## 2023-09-23 RX ADMIN — OLANZAPINE 10 MG: 10 TABLET, ORALLY DISINTEGRATING ORAL at 21:25

## 2023-09-23 RX ADMIN — GABAPENTIN 300 MG: 300 CAPSULE ORAL at 21:25

## 2023-09-23 RX ADMIN — LIDOCAINE 5% 1 PATCH: 700 PATCH TOPICAL at 08:39

## 2023-09-23 RX ADMIN — PANTOPRAZOLE SODIUM 20 MG: 20 TABLET, DELAYED RELEASE ORAL at 05:59

## 2023-09-23 RX ADMIN — GABAPENTIN 300 MG: 300 CAPSULE ORAL at 08:39

## 2023-09-23 RX ADMIN — TRAZODONE HYDROCHLORIDE 100 MG: 100 TABLET ORAL at 21:25

## 2023-09-23 RX ADMIN — TRAMADOL HYDROCHLORIDE 50 MG: 50 TABLET, COATED ORAL at 08:39

## 2023-09-23 NOTE — NURSING NOTE
Patient was observed to be visible in the community this evening. He has been pleasant and social with both staff and peers. Blake Najera informs he is feeling better today and has been staying visible in the community and talking with his peers. Denies feeling anxious and/ or depressed at time of assessment. Denies SI, HI and hallucinations. Patient was medication compliant at HS. Positive for snack and fluids tonight. Continuous safety rounding in progress.

## 2023-09-23 NOTE — NURSING NOTE
Patient requested medication for pain relief of #7-8 neck pain at 0839. Tramadol 50 mg given for same with moderate effect following 1 hour post pain assessment.

## 2023-09-23 NOTE — PROGRESS NOTES
Progress Note - [address] [facility] 74 y.o. male MRN: [id number]  Unit/Bed#: [facility] 203-01 Encounter: [id number]    Assessment/Plan   Principal Problem:    Bipolar disorder, current episode manic without psychotic features, moderate (HCC)  Active Problems:    Anxiety    Gastroesophageal reflux disease    Alcoholic cirrhosis (HCC)    Chronic pain disorder    Generalized anxiety disorder    History of medication noncompliance    Insomnia    TBI (traumatic brain injury) ([address])    Cannabis abuse    Moderate protein-calorie malnutrition ([address])      Subjective:Patient was seen today for continuation of care, records reviewed and  patient was discussed with the morning case review team. No behavioral issues on unit, compliant in milieu. He remains mildly hyper-talkative with increased rate of speech but able to redirect easily. States he slept better with prn trazodone last evening. Mood appears to be improving with hs Zyprexa 10mg. Hopeful for d/c next week as he misses his dog. Patient denies endorsing any suicidal or homicidal ideation. Remains medication compliance. Denies any side effects from medications.      Psychiatric Review of Systems:    Sleep: slept better  Appetite: fair  Medication side effects: No   ROS: all other systems are negative    Vitals:  Vitals:    09/23/23 0750   BP: 122/55   Pulse: 55   Resp: 18   Temp: 97.5 °F (36.4 °C)   SpO2: 97%       Mental Status Evaluation:    Appearance:  age appropriate   Behavior:  cooperative, calm   Speech:  increased rate   Mood:  anxious   Affect:  increased in intensity   Thought Process:  racing of thoughts   Associations: circumstantial associations   Thought Content:  no overt delusions   Perceptual Disturbances: no auditory hallucinations, no visual hallucinations   Risk Potential: Suicidal ideation - None at present  Homicidal ideation - None at present  Potential for aggression - No   Sensorium:  oriented to person, place and time/date Memory:  recent and remote memory grossly intact   Consciousness:  alert and awake   Attention: attention span and concentration appear shorter than expected for age   Insight:  limited   Judgment: limited   Gait/Station: normal gait/station   Motor Activity: no abnormal movements     Laboratory results:    I have personally reviewed all pertinent laboratory/tests results.   Most Recent Labs:   Lab Results   Component Value Date    WBC 9.11 09/19/2023    RBC 4.44 09/19/2023    HGB 14.4 09/19/2023    HCT 41.2 09/19/2023     09/19/2023    RDW 12.1 09/19/2023    NEUTROABS 7.15 09/19/2023    SODIUM 137 09/21/2023    K 4.0 09/21/2023     09/21/2023    CO2 27 09/21/2023    BUN 15 09/21/2023    CREATININE 0.59 (L) 09/21/2023    GLUC 91 09/21/2023    GLUF 91 09/21/2023    CALCIUM 9.2 09/21/2023    AST 27 09/19/2023    ALT 31 09/19/2023    ALKPHOS 70 09/19/2023    TP 6.8 09/19/2023    ALB 4.2 09/19/2023    TBILI 0.84 09/19/2023    [id number] 1.270 09/19/2023    HGBA1C 5.2 05/14/2021     05/14/2021         Recommended Treatment:     -Continue Zyprexa 10mg hs      All current active medications have been reviewed  Encourage group therapy, milieu therapy and occupational therapy  Continue treatment with group therapy, milieu therapy and occupational therapy  Behavioral Health checks every 7 minutes  Continue current medications:  Current Facility-Administered Medications   Medication Dose Route Frequency Provider Last Rate   • acetaminophen  650 mg Oral Q4H PRN LIZETTE Bowman     • acetaminophen  650 mg Oral Q4H PRN LIZETTE Bowman     • albuterol  2 puff Inhalation Q4H PRN Jojo Elliott PA-C     • benztropine  0.5 mg Oral Q4H PRN Max 6/day LIZETTE Bowman     • gabapentin  300 mg Oral TID Seb Trinh MD     • hydrOXYzine HCL  50 mg Oral Q6H PRN Max 4/day Seb Trinh MD     • lidocaine  1 patch Topical Daily Jojo Elliott PA-C     • LORazepam  1 mg Intramuscular Q6H PRN Max 3/day LIZETTE Michel     • LORazepam  0.5 mg Oral Q6H PRN Max 4/day LIZETTE Michel     • LORazepam  1 mg Oral Q6H PRN Max 3/day LIZETTE Michel     • methocarbamol  500 mg Oral Q6H PRN Jojo Elliott PA-C     • naproxen  500 mg Oral BID PRN Jojo Elliott PA-C     • OLANZapine  10 mg Oral HS Kajal Beverly MD     • OLANZapine  5 mg Intramuscular Q3H PRN Max 3/day LIZETTE Michel     • OLANZapine  2.5 mg Oral Q4H PRN Max 6/day LIZETTE Michel     • OLANZapine  5 mg Oral Q4H PRN Max 3/day LIZETTE Michel     • OLANZapine  5 mg Oral Q3H PRN Max 3/day LIZETTE Michel     • pantoprazole  20 mg Oral Early Morning LIZETTE Michel     • traMADol  50 mg Oral Q8H PRN Jojo Elliott PA-C     • traZODone  100 mg Oral HS PRN Kajal Beverly MD         Risks / Benefits of Treatment:     Risks, benefits, and possible side effects of medications explained to patient. Patient has limited understanding of risks and benefits of treatment at this time, but agrees to take medications as prescribed. Counseling / Coordination of Care:     Patient's progress reviewed with nursing staff. Medications, treatment progress and treatment plan reviewed with patient. Supportive counseling provided to the patient.           LIZETTE Hayes

## 2023-09-23 NOTE — NURSING NOTE
Patient approaches this writer and informs he cannot sleep. He requested and received PRN Trazodone. Will monitor for medication effectiveness.

## 2023-09-23 NOTE — PROGRESS NOTES
Progress Note - Amanda Parrish 74 y.o. male MRN: [id number]    Unit/Bed#[facility] 203-01 Encounter: [id number]        Subjective:   Patient seen and examined at bedside after reviewing the chart and discussing the case with the caring staff. Patient examined at bedside. Patient has no acute symptoms. Patient was noticed to have right big toe nail off the nail bed. Patient denies any acute pain. Physical Exam   Vitals: Blood pressure 117/65, pulse 66, temperature 99.1 °F (37.3 °C), temperature source Temporal, resp. rate 18, height 5' 7" (1.702 m), weight 67.4 kg (148 lb 9.6 oz), SpO2 96 %. ,Body mass index is 23.27 kg/m². Constitutional: Patient in no acute distress. HEENT: PERR, EOMI, MMM. Cardiovascular: Normal rate and regular rhythm. Pulmonary/Chest: Effort normal and breath sounds normal.   Abdomen: Soft, + BS, NT. Assessment/Plan:  Amanda Parrish is a(n) 74 y.o. male with bipolar disorder.     1. GERD. Patient is on Protonix 40 mg daily (Prilosec nonformulary). 2.  Asthma. Stable. Albuterol inhaler as needed. 3.  Alcohol abuse. Last drink over 4 years ago per pt. Stable. 4.  DJD/OA/chronic back and neck pain. Patient utilizes medical marijuana at home. Tylenol for mild/mod pain, tramadol for severe pain, naproxen breakthrough pain. Lidocaine patch daily, Robaxin as needed. 5.  Gait abnormality. PT OT consulted. 6.  Hypokalemia. Resolved based on BMP on 9/21/2023.

## 2023-09-23 NOTE — NURSING NOTE
Patient out for meals, group, and socializing with other peers. Patient is calm, cooperative, pleasant, and social with staff and peers. Med compliant. Denies SI/HI and AH/VH. He endorses mild anxiety and depression. Patient reports moderate to high pain located in his neck. Patient declines PRN pain medication at this time. He has a lidocaine patch on the back of his neck is in place. Currently patient is socializing with peers. No other complaints or concerns voiced at this time. Q7min checks are in progress. Care is ongoing.

## 2023-09-23 NOTE — NURSING NOTE
Patient appears to have slept through the overnight hours without issue after receiving PRN Trazodone 100 mg at 2233. No complaints voiced during the overnight. No acute behaviors observed. Patient remains in bed sleeping at this time. Continuous safety rounding in progress.

## 2023-09-24 PROCEDURE — 99232 SBSQ HOSP IP/OBS MODERATE 35: CPT

## 2023-09-24 RX ADMIN — GABAPENTIN 300 MG: 300 CAPSULE ORAL at 21:10

## 2023-09-24 RX ADMIN — GABAPENTIN 300 MG: 300 CAPSULE ORAL at 08:06

## 2023-09-24 RX ADMIN — TRAMADOL HYDROCHLORIDE 50 MG: 50 TABLET, COATED ORAL at 17:28

## 2023-09-24 RX ADMIN — OLANZAPINE 10 MG: 10 TABLET, ORALLY DISINTEGRATING ORAL at 21:10

## 2023-09-24 RX ADMIN — TRAMADOL HYDROCHLORIDE 50 MG: 50 TABLET, COATED ORAL at 08:30

## 2023-09-24 RX ADMIN — GABAPENTIN 300 MG: 300 CAPSULE ORAL at 17:13

## 2023-09-24 RX ADMIN — TRAZODONE HYDROCHLORIDE 100 MG: 100 TABLET ORAL at 21:10

## 2023-09-24 RX ADMIN — LIDOCAINE 5% 1 PATCH: 700 PATCH TOPICAL at 08:05

## 2023-09-24 RX ADMIN — PANTOPRAZOLE SODIUM 20 MG: 20 TABLET, DELAYED RELEASE ORAL at 06:12

## 2023-09-24 NOTE — NURSING NOTE
During PM med pass patient asked for PRN Pain medication. PRN Ultram given at 1728 for 8/10 neck pain. Medication was effective per patient. Currently patient is in the small TV room watching football. No other complaints or concerns voiced at this time. Q7min checks are in progress. Care is ongoing.

## 2023-09-24 NOTE — PROGRESS NOTES
Progress Note - Behavioral Health   Lillie White 74 y.o. male MRN: [id number]  Unit/Bed#: [facility] 203-01 Encounter: [id number]    Assessment/Plan   Principal Problem:    Bipolar disorder, current episode manic without psychotic features, moderate (HCC)  Active Problems:    Anxiety    Gastroesophageal reflux disease    Alcoholic cirrhosis (HCC)    Chronic pain disorder    Generalized anxiety disorder    History of medication noncompliance    Insomnia    TBI (traumatic brain injury) ([address])    Cannabis abuse    Moderate protein-calorie malnutrition ([address])      Subjective:Patient was seen today for continuation of care, records reviewed and  patient was discussed with the morning case review team.  No acute changes over the past 24 hours, remains calm and compliant in the milieu. Continues to endorse racing thoughts, periods of hyper-talkativeness which appears to be slowly improving with Zyprexa 10 mg daily. Denies anxiety, depression, hallucinations. Reports adequate sleep and appetite. Remains medication compliance. Denies any side effects from medications.      Psychiatric Review of Systems:    Sleep: slept better  Appetite: fair  Medication side effects: No   ROS: all other systems are negative    Vitals:  Vitals:    09/24/23 0734   BP: 156/68   Pulse: 55   Resp: 18   Temp: 97.6 °F (36.4 °C)   SpO2: 98%       Mental Status Evaluation:    Appearance:  age appropriate   Behavior:  pleasant, cooperative, calm   Speech:  increased rate   Mood:  anxious   Affect:  increased in intensity   Thought Process:  racing of thoughts   Associations: circumstantial associations   Thought Content:  no overt delusions   Perceptual Disturbances: no auditory hallucinations, no visual hallucinations   Risk Potential: Suicidal ideation - None at present  Homicidal ideation - None at present  Potential for aggression - No   Sensorium:  oriented to person, place and time/date   Memory:  recent and remote memory grossly intact Consciousness:  alert and awake   Attention: attention span and concentration appear shorter than expected for age   Insight:  limited   Judgment: limited   Gait/Station: normal gait/station   Motor Activity: no abnormal movements     Laboratory results:    I have personally reviewed all pertinent laboratory/tests results.   Most Recent Labs:   Lab Results   Component Value Date    WBC 9.11 09/19/2023    RBC 4.44 09/19/2023    HGB 14.4 09/19/2023    HCT 41.2 09/19/2023     09/19/2023    RDW 12.1 09/19/2023    NEUTROABS 7.15 09/19/2023    SODIUM 137 09/21/2023    K 4.0 09/21/2023     09/21/2023    CO2 27 09/21/2023    BUN 15 09/21/2023    CREATININE 0.59 (L) 09/21/2023    GLUC 91 09/21/2023    GLUF 91 09/21/2023    CALCIUM 9.2 09/21/2023    AST 27 09/19/2023    ALT 31 09/19/2023    ALKPHOS 70 09/19/2023    TP 6.8 09/19/2023    ALB 4.2 09/19/2023    TBILI 0.84 09/19/2023    [id number] 1.270 09/19/2023    HGBA1C 5.2 05/14/2021     05/14/2021         Recommended Treatment:     -Continue Zyprexa 10mg hs      All current active medications have been reviewed  Encourage group therapy, milieu therapy and occupational therapy  Continue treatment with group therapy, milieu therapy and occupational therapy  Behavioral Health checks every 7 minutes  Continue current medications:  Current Facility-Administered Medications   Medication Dose Route Frequency Provider Last Rate   • acetaminophen  650 mg Oral Q4H PRN MALINDA MottNP     • acetaminophen  650 mg Oral Q4H PRN LIZETTE Mott     • albuterol  2 puff Inhalation Q4H PRN Jojo Elliott PA-C     • benztropine  0.5 mg Oral Q4H PRN Max 6/day MALINDA MottNP     • gabapentin  300 mg Oral TID Beti Gonzalez MD     • hydrOXYzine HCL  50 mg Oral Q6H PRN Max 4/day Beti Gonzalez MD     • lidocaine  1 patch Topical Daily Jojo Elliott PA-C     • LORazepam  1 mg Intramuscular Q6H PRN Max 3/day LIZETTE Mott     • LORazepam  0.5 mg Oral Q6H PRN Max 4/day Fonnie Seip, CRNP     • LORazepam  1 mg Oral Q6H PRN Max 3/day Fonnie Seip, CRNP     • methocarbamol  500 mg Oral Q6H PRN Jojo Elliott PA-C     • naproxen  500 mg Oral BID PRN Jojo Elliott PA-C     • OLANZapine  10 mg Oral HS Salbador Gonzalez MD     • OLANZapine  5 mg Intramuscular Q3H PRN Max 3/day Fonnie Seip, CRNP     • OLANZapine  2.5 mg Oral Q4H PRN Max 6/day Fonnie Seip, CRNP     • OLANZapine  5 mg Oral Q4H PRN Max 3/day Fonnie Seip, CRNP     • OLANZapine  5 mg Oral Q3H PRN Max 3/day Fonnie Seip, CRNP     • pantoprazole  20 mg Oral Early Morning Fonnie Seip, CRNP     • traMADol  50 mg Oral Q8H PRN Jojo Elliott PA-C     • traZODone  100 mg Oral HS LIZETTE Gotti         Risks / Benefits of Treatment:     Risks, benefits, and possible side effects of medications explained to patient. Patient has limited understanding of risks and benefits of treatment at this time, but agrees to take medications as prescribed. Counseling / Coordination of Care:     Patient's progress reviewed with nursing staff. Medications, treatment progress and treatment plan reviewed with patient. Supportive counseling provided to the patient.           LIZETTE Bajwa

## 2023-09-24 NOTE — PLAN OF CARE
Problem: Ineffective Coping  Goal: Identifies ineffective coping skills  Outcome: Progressing  Goal: Demonstrates healthy coping skills  Outcome: Progressing  Goal: Patient/Family verbalizes awareness of resources  Outcome: Progressing  Goal: Understands least restrictive measures  Description: Interventions:  - Utilize least restrictive behavior  Outcome: Progressing  Goal: Free from restraint events  Description: - Utilize least restrictive measures   - Provide behavioral interventions   - Redirect inappropriate behaviors   Outcome: Progressing     Problem: Depression  Goal: Verbalize thoughts and feelings  Description: Interventions:  - Assess and re-assess patient's level of risk   - Engage patient in 1:1 interactions, daily, for a minimum of 15 minutes   - Encourage patient to express feelings, fears, frustrations, hopes   Outcome: Progressing  Goal: Refrain from harming self  Description: Interventions:  - Monitor patient closely, per order   - Supervise medication ingestion, monitor effects and side effects   Outcome: Progressing  Goal: Refrain from isolation  Description: Interventions:  - Develop a trusting relationship   - Encourage socialization   Outcome: Progressing  Goal: Refrain from self-neglect  Outcome: Progressing     Problem: Anxiety  Goal: Anxiety is at manageable level  Description: Interventions:  - Assess and monitor patient's anxiety level. - Monitor for signs and symptoms (heart palpitations, chest pain, shortness of breath, headaches, nausea, feeling jumpy, restlessness, irritable, apprehensive). - Collaborate with interdisciplinary team and initiate plan and interventions as ordered.   - [facility] patient to unit/surroundings  - Explain treatment plan  - Encourage participation in care  - Encourage verbalization of concerns/fears  - Identify coping mechanisms  - Assist in developing anxiety-reducing skills  - Administer/offer alternative therapies  - Limit or eliminate stimulants  Outcome: Progressing     Problem: Nutrition/Hydration-ADULT  Goal: Nutrient/Hydration intake appropriate for improving, restoring or maintaining nutritional needs  Description: Monitor and assess patient's nutrition/hydration status for malnutrition. Collaborate with interdisciplinary team and initiate plan and interventions as ordered. Monitor patient's weight and dietary intake as ordered or per policy. Utilize nutrition screening tool and intervene as necessary. Determine patient's food preferences and provide high-protein, high-caloric foods as appropriate.      INTERVENTIONS:  - Monitor oral intake, urinary output, labs, and treatment plans  - Assess nutrition and hydration status and recommend course of action  - Evaluate amount of meals eaten  - Assist patient with eating if necessary   - Allow adequate time for meals  - Recommend/ encourage appropriate diets, oral nutritional supplements, and vitamin/mineral supplements  - Order, calculate, and assess calorie counts as needed  - Recommend, monitor, and adjust tube feedings and TPN/PPN based on assessed needs  - Assess need for intravenous fluids  - Provide specific nutrition/hydration education as appropriate  - Include patient/family/caregiver in decisions related to nutrition  Outcome: Progressing

## 2023-09-24 NOTE — NURSING NOTE
Patient out for meals, group, and socializing with other peers. Patient is calm, cooperative, pleasant, and social with staff and peers. Med compliant. Denies SI/HI and AH/VH. He endorses mild anxiety and depression. Patient reports moderate to high pain located in his neck. Patient received PRN Ultram at 1728. He has a lidocaine patch on the back of his neck is in place. Currently patient is in the small TV room watching football and socializing with peers. No other complaints or concerns voiced at this time. Q7min checks are in progress.  Care is ongoing

## 2023-09-24 NOTE — PROGRESS NOTES
Progress Note - Ashlyn Limon 74 y.o. male MRN: [id number]    Unit/Bed#[facility] 203-01 Encounter: [id number]        Subjective:   Patient seen and examined at bedside after reviewing the chart and discussing the case with the caring staff. Patient examined at bedside. Patient has no acute symptoms. Physical Exam   Vitals: Blood pressure 133/66, pulse 61, temperature 98 °F (36.7 °C), temperature source Temporal, resp. rate 18, height 5' 7" (1.702 m), weight 67.4 kg (148 lb 9.6 oz), SpO2 98 %. ,Body mass index is 23.27 kg/m². Constitutional: Patient in no acute distress. HEENT: PERR, EOMI, MMM. Cardiovascular: Normal rate and regular rhythm. Pulmonary/Chest: Effort normal and breath sounds normal.   Abdomen: Soft, + BS, NT. Assessment/Plan:  Ashlyn Limon is a(n) 74 y.o. male with bipolar disorder.     1. GERD. Patient is on Protonix 40 mg daily (Prilosec nonformulary). 2.  Asthma. Stable. Albuterol inhaler as needed. 3.  Alcohol abuse. Last drink over 4 years ago per pt. Stable. 4.  DJD/OA/chronic back and neck pain. Patient utilizes medical marijuana at home. Tylenol for mild/mod pain, tramadol for severe pain, naproxen breakthrough pain. Lidocaine patch daily, Robaxin as needed. 5.  Gait abnormality. PT OT consulted. 6.  Hypokalemia. Resolved based on BMP on 9/21/2023.

## 2023-09-25 PROCEDURE — 99232 SBSQ HOSP IP/OBS MODERATE 35: CPT

## 2023-09-25 RX ADMIN — TRAMADOL HYDROCHLORIDE 50 MG: 50 TABLET, COATED ORAL at 09:14

## 2023-09-25 RX ADMIN — PANTOPRAZOLE SODIUM 20 MG: 20 TABLET, DELAYED RELEASE ORAL at 05:38

## 2023-09-25 RX ADMIN — GABAPENTIN 300 MG: 300 CAPSULE ORAL at 08:09

## 2023-09-25 RX ADMIN — GABAPENTIN 300 MG: 300 CAPSULE ORAL at 15:40

## 2023-09-25 RX ADMIN — GABAPENTIN 300 MG: 300 CAPSULE ORAL at 21:19

## 2023-09-25 RX ADMIN — LIDOCAINE 5% 1 PATCH: 700 PATCH TOPICAL at 08:09

## 2023-09-25 RX ADMIN — LORAZEPAM 1 MG: 1 TABLET ORAL at 18:35

## 2023-09-25 RX ADMIN — TRAZODONE HYDROCHLORIDE 100 MG: 100 TABLET ORAL at 21:19

## 2023-09-25 RX ADMIN — TRAMADOL HYDROCHLORIDE 50 MG: 50 TABLET, COATED ORAL at 17:22

## 2023-09-25 RX ADMIN — OLANZAPINE 15 MG: 5 TABLET, ORALLY DISINTEGRATING ORAL at 21:19

## 2023-09-25 NOTE — PLAN OF CARE
Problem: Depression  Goal: Verbalize thoughts and feelings  Description: Interventions:  - Assess and re-assess patient's level of risk   - Engage patient in 1:1 interactions, daily, for a minimum of 15 minutes   - Encourage patient to express feelings, fears, frustrations, hopes   Outcome: Progressing  Goal: Refrain from harming self  Description: Interventions:  - Monitor patient closely, per order   - Supervise medication ingestion, monitor effects and side effects   Outcome: Progressing

## 2023-09-25 NOTE — PROGRESS NOTES
Progress Note - Adina Harris 74 y.o. male MRN: [id number]    Unit/Bed#[facility] 203-01 Encounter: [id number]        Subjective:   Patient seen and examined at bedside after reviewing the chart and discussing the case with the caring staff. Patient examined at bedside. Patient has no acute symptoms. Physical Exam   Vitals: Blood pressure 143/73, pulse 82, temperature 98.4 °F (36.9 °C), temperature source Temporal, resp. rate 16, height 5' 7" (1.702 m), weight 67.4 kg (148 lb 9.6 oz), SpO2 93 %. ,Body mass index is 23.27 kg/m². Constitutional: Patient in no acute distress. HEENT: PERR, EOMI, MMM. Cardiovascular: Normal rate and regular rhythm. Pulmonary/Chest: Effort normal and breath sounds normal.   Abdomen: Soft, + BS, NT. Assessment/Plan:  Adina Harris is a(n) 74 y.o. male with bipolar disorder.     1. GERD. Patient is on Protonix 40 mg daily (Prilosec nonformulary). 2.  Asthma. Stable. Albuterol inhaler as needed. 3.  Alcohol abuse. Last drink over 4 years ago per pt. Stable. 4.  DJD/OA/chronic back and neck pain. Patient utilizes medical marijuana at home. Tylenol for mild/mod pain, tramadol for severe pain, naproxen breakthrough pain. Lidocaine patch daily, Robaxin as needed. 5.  Gait abnormality. PT OT consulted. 6.  Hypokalemia. Resolved based on BMP on 9/21/2023.

## 2023-09-25 NOTE — PROGRESS NOTES
Progress Note - Behavioral Health   Randee Gusman 74 y.o. male MRN: [id number]  Unit/Bed#: [facility] 203-01 Encounter: [id number]    Assessment/Plan   Principal Problem:    Bipolar disorder, current episode manic without psychotic features, moderate (HCC)  Active Problems:    Anxiety    Gastroesophageal reflux disease    Alcoholic cirrhosis (HCC)    Chronic pain disorder    Generalized anxiety disorder    History of medication noncompliance    Insomnia    TBI (traumatic brain injury) ([address])    Cannabis abuse    Moderate protein-calorie malnutrition ([address])      Behavior over the last 24 hours:  unchanged  Sleep: normal  Appetite: normal  Medication side effects: No  ROS: complaints of neck pain and all other systems are negative    Subjective: Bipin Bhardwaj was seen today for psychiatric follow-up. Patient calm, cooperative. He is visible on the unit, social with peers. Patient is tearful at times, ruminative in thought with pressured speech. Patient compliant with current medication regimen. He denied any sleep disturbance, SI/HI/AVH. He did not appear internally preoccupied. Mental Status Evaluation:  Appearance:  age appropriate and casually dressed   Behavior:  cooperative   Speech:  pressured    Mood:  anxious   Affect:  mood-congruent   Thought Process:  increased rate of thought   Associations: circumstantial associations   Thought Content:  no overt delusions   Perceptual Disturbances: denied AVH, did not appear internally preoccupied   Risk Potential: Suicidal Ideations none  Homicidal Ideations none  Potential for Aggression No   Sensorium:  person, place and time/date   Memory:  recent and remote memory grossly intact   Consciousness:  alert and awake    Attention: attention span appeared shorter than expected for age   Insight:  limited   Judgment: limited   Gait/Station: normal gait/station   Motor Activity: no abnormal movements     Progress Toward Goals: Unchanged.  Patient continues to have poor insight into his current hospitalizations. He does not believe he needs to be hospitalized. He is compliant with current psychotropic medication regimen. He denied side effects from current psychotropic medication regimen. Will continue current psychotropic medication regimen. No discharge date at this time. Recommended Treatment: Continue with group therapy, milieu therapy and occupational therapy. Risks, benefits and possible side effects of Medications:   Risks, benefits, and possible side effects of medications explained to patient and patient verbalizes understanding.       Medications:   all current active meds have been reviewed and current meds:   Current Facility-Administered Medications   Medication Dose Route Frequency   • acetaminophen (TYLENOL) tablet 650 mg  650 mg Oral Q4H PRN   • acetaminophen (TYLENOL) tablet 650 mg  650 mg Oral Q4H PRN   • albuterol (PROVENTIL HFA,VENTOLIN HFA) inhaler 2 puff  2 puff Inhalation Q4H PRN   • benztropine (COGENTIN) tablet 0.5 mg  0.5 mg Oral Q4H PRN Max 6/day   • gabapentin (NEURONTIN) capsule 300 mg  300 mg Oral TID   • hydrOXYzine HCL (ATARAX) tablet 50 mg  50 mg Oral Q6H PRN Max 4/day   • lidocaine (LIDODERM) 5 % patch 1 patch  1 patch Topical Daily   • LORazepam (ATIVAN) injection 1 mg  1 mg Intramuscular Q6H PRN Max 3/day   • LORazepam (ATIVAN) tablet 0.5 mg  0.5 mg Oral Q6H PRN Max 4/day   • LORazepam (ATIVAN) tablet 1 mg  1 mg Oral Q6H PRN Max 3/day   • methocarbamol (ROBAXIN) tablet 500 mg  500 mg Oral Q6H PRN   • naproxen (NAPROSYN) tablet 500 mg  500 mg Oral BID PRN   • OLANZapine (ZyPREXA ZYDIS) dispersible tablet 15 mg  15 mg Oral HS   • OLANZapine (ZyPREXA) IM injection 5 mg  5 mg Intramuscular Q3H PRN Max 3/day   • OLANZapine (ZyPREXA) tablet 2.5 mg  2.5 mg Oral Q4H PRN Max 6/day   • OLANZapine (ZyPREXA) tablet 5 mg  5 mg Oral Q4H PRN Max 3/day   • OLANZapine (ZyPREXA) tablet 5 mg  5 mg Oral Q3H PRN Max 3/day   • pantoprazole (PROTONIX) EC tablet 20 mg  20 mg Oral Early Morning   • traMADol (ULTRAM) tablet 50 mg  50 mg Oral Q8H PRN   • traZODone (DESYREL) tablet 100 mg  100 mg Oral HS   . Labs: I have personally reviewed all pertinent laboratory/tests results. Most Recent Labs:   Lab Results   Component Value Date    WBC 9.11 09/19/2023    RBC 4.44 09/19/2023    HGB 14.4 09/19/2023    HCT 41.2 09/19/2023     09/19/2023    RDW 12.1 09/19/2023    NEUTROABS 7.15 09/19/2023    SODIUM 137 09/21/2023    K 4.0 09/21/2023     09/21/2023    CO2 27 09/21/2023    BUN 15 09/21/2023    CREATININE 0.59 (L) 09/21/2023    GLUC 91 09/21/2023    GLUF 91 09/21/2023    CALCIUM 9.2 09/21/2023    AST 27 09/19/2023    ALT 31 09/19/2023    ALKPHOS 70 09/19/2023    TP 6.8 09/19/2023    ALB 4.2 09/19/2023    TBILI 0.84 09/19/2023    TFV5ISWWHGUM 1.270 09/19/2023    HGBA1C 5.2 05/14/2021     05/14/2021       Counseling / Coordination of Care  Total floor / unit time spent today 25 minutes.

## 2023-09-25 NOTE — NURSING NOTE
Follow up post prn ativan.he states he feels better,obvious to this writer as he took a shower and has stopped crying. No c/o SE,will continue to monitor.

## 2023-09-25 NOTE — PROGRESS NOTES
09/25/23    Team Meeting   Meeting Type Daily Rounds   Team Members Present   Team Members Present Physician;Nurse;   Physician Team Member Dr. Latisha Abreu MD; Sam BAUER   Nursing Team Member Caterina Palafox RN   Care Management Team Member [facility]; Jaleel Gilman MS, [facility] [place] [facility]   Patient/Family Present   Patient Present No   Patient's Family Present No     Pt denied all. Tearful at times. Pressured speech, loud at times. Pt to d/c this week.

## 2023-09-25 NOTE — NURSING NOTE
Requested to speak with a comfort source again. Extreme anxiety with crying,regreasts,what the future hold with the current PFA. 1:1 ineffective,offered and accepted 1 mg po ativan with education on expected therapeutics and SE to report. HA of 30. Will monitor and console.

## 2023-09-25 NOTE — NURSING NOTE
Patient has been visible in the milieu. He is pleasant and cooperative. Social with peers. He has been compliant with his scheduled medications. His appetite is good. Pt reports mild anxiety and depression. Denies Si/Hi and hallucinations. Utilizes PRN Tramadol for neck pain. He offers no current complaints/ concerns. Plan of care continues. Q7 minute safety checks in progress.

## 2023-09-26 PROBLEM — G47.00 INSOMNIA: Status: RESOLVED | Noted: 2019-12-14 | Resolved: 2023-09-26

## 2023-09-26 PROBLEM — F31.12 BIPOLAR DISORDER, CURRENT EPISODE MANIC WITHOUT PSYCHOTIC FEATURES, MODERATE (HCC): Status: RESOLVED | Noted: 2023-09-20 | Resolved: 2023-09-26

## 2023-09-26 PROBLEM — F41.1 GENERALIZED ANXIETY DISORDER: Status: RESOLVED | Noted: 2023-04-29 | Resolved: 2023-09-26

## 2023-09-26 PROBLEM — Z91.148 HISTORY OF MEDICATION NONCOMPLIANCE: Status: RESOLVED | Noted: 2019-12-14 | Resolved: 2023-09-26

## 2023-09-26 PROCEDURE — 99232 SBSQ HOSP IP/OBS MODERATE 35: CPT

## 2023-09-26 RX ORDER — LIDOCAINE 50 MG/G
1 PATCH TOPICAL DAILY
Qty: 30 PATCH | Refills: 1 | Status: SHIPPED | OUTPATIENT
Start: 2023-09-27

## 2023-09-26 RX ORDER — GABAPENTIN 300 MG/1
300 CAPSULE ORAL 3 TIMES DAILY
Qty: 90 CAPSULE | Refills: 1 | Status: SHIPPED | OUTPATIENT
Start: 2023-09-26

## 2023-09-26 RX ORDER — METHOCARBAMOL 500 MG/1
500 TABLET, FILM COATED ORAL 2 TIMES DAILY
Qty: 60 TABLET | Refills: 0 | Status: SHIPPED | OUTPATIENT
Start: 2023-09-26 | End: 2023-09-26 | Stop reason: SDUPTHER

## 2023-09-26 RX ORDER — PANTOPRAZOLE SODIUM 20 MG/1
20 TABLET, DELAYED RELEASE ORAL
Qty: 30 TABLET | Refills: 0 | Status: SHIPPED | OUTPATIENT
Start: 2023-09-27 | End: 2023-09-26 | Stop reason: SDUPTHER

## 2023-09-26 RX ORDER — ALBUTEROL SULFATE 90 UG/1
2 AEROSOL, METERED RESPIRATORY (INHALATION) EVERY 4 HOURS PRN
Qty: 18 G | Refills: 0 | Status: SHIPPED | OUTPATIENT
Start: 2023-09-26 | End: 2023-09-26 | Stop reason: SDUPTHER

## 2023-09-26 RX ORDER — GABAPENTIN 300 MG/1
300 CAPSULE ORAL 3 TIMES DAILY
Qty: 90 CAPSULE | Refills: 1 | Status: SHIPPED | OUTPATIENT
Start: 2023-09-26 | End: 2023-09-26 | Stop reason: SDUPTHER

## 2023-09-26 RX ORDER — METHOCARBAMOL 500 MG/1
500 TABLET, FILM COATED ORAL 2 TIMES DAILY
Qty: 60 TABLET | Refills: 1 | Status: SHIPPED | OUTPATIENT
Start: 2023-09-26 | End: 2023-11-25

## 2023-09-26 RX ORDER — ALBUTEROL SULFATE 90 UG/1
2 AEROSOL, METERED RESPIRATORY (INHALATION) EVERY 4 HOURS PRN
Qty: 18 G | Refills: 1 | Status: SHIPPED | OUTPATIENT
Start: 2023-09-26

## 2023-09-26 RX ORDER — OLANZAPINE 15 MG/1
15 TABLET ORAL
Qty: 30 TABLET | Refills: 1 | Status: SHIPPED | OUTPATIENT
Start: 2023-09-26

## 2023-09-26 RX ORDER — TRAZODONE HYDROCHLORIDE 100 MG/1
100 TABLET ORAL
Qty: 30 TABLET | Refills: 1 | Status: SHIPPED | OUTPATIENT
Start: 2023-09-26

## 2023-09-26 RX ORDER — TRAMADOL HYDROCHLORIDE 50 MG/1
50 TABLET ORAL EVERY 8 HOURS PRN
Qty: 60 TABLET | Refills: 0 | Status: SHIPPED | OUTPATIENT
Start: 2023-09-26 | End: 2023-10-16

## 2023-09-26 RX ORDER — LIDOCAINE 50 MG/G
1 PATCH TOPICAL DAILY
Qty: 30 PATCH | Refills: 0 | Status: SHIPPED | OUTPATIENT
Start: 2023-09-27 | End: 2023-09-26 | Stop reason: SDUPTHER

## 2023-09-26 RX ORDER — PANTOPRAZOLE SODIUM 20 MG/1
20 TABLET, DELAYED RELEASE ORAL
Qty: 30 TABLET | Refills: 1 | Status: SHIPPED | OUTPATIENT
Start: 2023-09-27

## 2023-09-26 RX ORDER — OLANZAPINE 15 MG/1
15 TABLET ORAL
Status: DISCONTINUED | OUTPATIENT
Start: 2023-09-26 | End: 2023-09-27 | Stop reason: HOSPADM

## 2023-09-26 RX ADMIN — LIDOCAINE 5% 1 PATCH: 700 PATCH TOPICAL at 08:10

## 2023-09-26 RX ADMIN — GABAPENTIN 300 MG: 300 CAPSULE ORAL at 08:11

## 2023-09-26 RX ADMIN — LORAZEPAM 1 MG: 1 TABLET ORAL at 13:04

## 2023-09-26 RX ADMIN — GABAPENTIN 300 MG: 300 CAPSULE ORAL at 21:30

## 2023-09-26 RX ADMIN — GABAPENTIN 300 MG: 300 CAPSULE ORAL at 16:58

## 2023-09-26 RX ADMIN — TRAZODONE HYDROCHLORIDE 100 MG: 100 TABLET ORAL at 21:30

## 2023-09-26 RX ADMIN — PANTOPRAZOLE SODIUM 20 MG: 20 TABLET, DELAYED RELEASE ORAL at 06:45

## 2023-09-26 RX ADMIN — OLANZAPINE 15 MG: 15 TABLET, FILM COATED ORAL at 21:30

## 2023-09-26 NOTE — SOCIAL WORK
DELPHINE and team met with pt in conference room. Discussed d/c plan. Pt said he will d/c to [facility] and will have psych follow up. Pt to arrange transport via [employer] or friend Marilyn Adjutant. DELPHINE placed call to pt's daughter, Larry Jackson, at [phone]. DELPHINE let daughter know that pt declined to have her bring belongings to hospital and will  belongings in accordance with PFA after d/c. DELPHINE let daughter know pt is d/c'ing tomorrow morning and will be d/c'ing to [facility] in [facility] and will have psych follow-up. DELPHINE placed call to [facility] at [phone] to schedule pt intake. DELPHINE left message with , Elodia Taylor for return call. DELPHINE faxed PE and facesheet to [facility] attn Eric Huffman in intake requesting callback. DELPHINE obtained FIDELINA:    [facility] Marcus Jeronimo (psych)  Admin: [address] [place] [address]. Marilu VARMA  Ph: [phone]  Fax: [phone]    DELPHINE placed call to [facility] Marcus Jeronimo to schedule intake. [phone]. DELPHINE spoke with staff, who scheduled pt for intake on 10/5 @ 1:00 p.m. with Dr. Osvaldo Padilla. Remind pt to send in paperwork and insurance info and let pt know that staff will reach out to pt within 7 days of appt to provide instructions for virtual appt. Alan Sosa, called to say they cannot accept pt;s with Medicare only.

## 2023-09-26 NOTE — NURSING NOTE
Since prudence Cardenas Ronna has stopped crying,showered,has been visible on the unit,conversing with staff and peers as a support [place]. He endorses moderate to high anxiety,mild to moderate depression rt current PFA,future;denies SI,HI,AH,VH. .he is compliant with medications,unit rules,and has handled his situation tastefully. We discussed ways to increase coping skills. Will continue to support,consolle,educate,and provide safe,therapeutic milieu.

## 2023-09-26 NOTE — NURSING NOTE
Patient visible in milieu, mood and affect labile. Initially patient was pleasant, cooperative described anxiety and depression as being better in R/T his PFA. Denies SI/HI, hallucinations. As day progressed patient attempted to obtain a hotel room in preparation for discharge. Credit card declined. Patient became increasingly anxious, loud, hyper-verbal, tangential, tearful, pacing lind to curling up in bed crying. NP and  spoke with patient. PRN Ativan 1 mg PO given for severe anxiety, Araujo score of 27. Support provided. Remains medication compliant and on 7" checks for safety and behaviors.

## 2023-09-26 NOTE — SOCIAL WORK
DELPHINE met with pt in small group room to discuss pt d/c. Pt signed FIDELINA:    Dr. Jones Grullon (ortho)  [address] [address]  Ph: [phone]    DELPHINE placed call to orthopedic provider to determine appt day/time. DELPHINE spoke with **, who said pt has appt on Wed, 9/27/23 at 8:45 a.m. in [place]. Bonita Daniels pt could come to appt Thursday 9/28/23. 10:45 a.m. in [facility]. Fax is [phone]. SW to give pt choice of appts/locations. Pt asked any appts be scheduled next week, after Tuesday, 10/3/23.

## 2023-09-26 NOTE — NURSING NOTE
Patient continues with anxiety over discharge but of a lesser degree. No further loud,  hyper-verbal speech, tearfulness resolved. Patient attempting to contact friend via phone at this time. Remains on 7" checks for safety and behaviors.
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
## 2023-09-26 NOTE — PROGRESS NOTES
Progress Note - Deb Perry 74 y.o. male MRN: [id number]    Unit/Bed#[facility] 203-01 Encounter: [id number]        Subjective:   Patient seen and examined at bedside after reviewing the chart and discussing the case with the caring staff. Patient examined at bedside. Patient has no acute symptoms. Patient is a possible discharge tomorrow 9/27/2023. Patient is requesting all his prescriptions along with 1 refill. I reviewed and reconciled patient's problem list and medications. Physical Exam   Vitals: Blood pressure 119/64, pulse 63, temperature 97.8 °F (36.6 °C), temperature source Temporal, resp. rate 18, height 5' 7" (1.702 m), weight 67.4 kg (148 lb 9.6 oz), SpO2 97 %. ,Body mass index is 23.27 kg/m². Constitutional: Patient in no acute distress. HEENT: PERR, EOMI, MMM. Cardiovascular: Normal rate and regular rhythm. Pulmonary/Chest: Effort normal and breath sounds normal.   Abdomen: Soft, + BS, NT. Assessment/Plan:  Deb Perry is a(n) 74 y.o. male with bipolar disorder.     Medical clearance. Patient is medically cleared for discharge. All scripts will be sent out for the patient with 1 refill. 1.  GERD. Patient is on Protonix 40 mg daily (Prilosec nonformulary). 2.  Asthma. Stable. Albuterol inhaler as needed. 3.  Alcohol abuse. Last drink over 4 years ago per pt. Stable. 4.  DJD/OA/chronic back and neck pain. Patient utilizes medical marijuana at home. Tylenol for mild/mod pain, tramadol for severe pain, naproxen breakthrough pain. Lidocaine patch daily, Robaxin as needed. 5.  Gait abnormality. PT OT consulted. 6.  Hypokalemia. Resolved based on BMP on 9/21/2023.

## 2023-09-26 NOTE — SOCIAL WORK
DELPHINE placed call to pt's pcp at [phone] to inform of pt's d/c. DELPHINE spoke with Cristin, who said pt has appt with Dr. Vanesa Vogel on 10/12/23 @ 9:40 a.m. Verified fax number for d/c summary:   [phone].

## 2023-09-26 NOTE — SOCIAL WORK
SW placed call to pt's daughter, María Shah, at [phone] to discuss pt's d/c and to see if daughter can bring pt belongings. Daughter said pt can provide SW with a list of things he wants and to call her and she will drop at the hospital.  Daughter provided pt's friend Orlando's phone numbers.

## 2023-09-26 NOTE — PROGRESS NOTES
Progress Note - Behavioral Health   Dai Castrejon 74 y.o. male MRN: [id number]  Unit/Bed#: [facility] 203-01 Encounter: [id number]    Assessment/Plan   Principal Problem:    Bipolar disorder, current episode manic without psychotic features, moderate (HCC)  Active Problems:    Anxiety    Gastroesophageal reflux disease    Alcoholic cirrhosis (HCC)    Chronic pain disorder    Generalized anxiety disorder    History of medication noncompliance    Insomnia    TBI (traumatic brain injury) ([address])    Cannabis abuse    Moderate protein-calorie malnutrition ([address])      Behavior over the last 24 hours:  unchanged  Sleep: normal  Appetite: normal  Medication side effects: No  ROS: complaints of neck pain and all other systems are negative    Subjective: Akin Mayorga was seen today for psychiatric follow-up. Patient calm, cooperative. He is visible on the unit, social with peers. Patient is hyper verbal and pressured. He is discharge preoccupied and ruminative in thought. He denied any sleep disturbance, SI/HI/AVH. He did not appear internally preoccupied. Mental Status Evaluation:  Appearance:  age appropriate and casually dressed   Behavior:  cooperative   Speech:  pressured    Mood:  anxious   Affect:  mood-congruent   Thought Process:  increased rate of thought   Associations: circumstantial associations   Thought Content:  no overt delusions   Perceptual Disturbances: denied AVH, did not appear internally preoccupied   Risk Potential: Suicidal Ideations none  Homicidal Ideations none  Potential for Aggression No   Sensorium:  person, place and time/date   Memory:  recent and remote memory grossly intact   Consciousness:  alert and awake    Attention: attention span appeared shorter than expected for age   Insight:  limited   Judgment: limited   Gait/Station: normal gait/station   Motor Activity: no abnormal movements     Progress Toward Goals: Unchanged. Patient compliant with current psychotropic medication regimen.  He denied side effects from current psychotropic medication regimen. Will continue current psychotropic medication regimen. Anticipated discharge on 9/27/2023. Recommended Treatment: Continue with group therapy, milieu therapy and occupational therapy. Risks, benefits and possible side effects of Medications:   Risks, benefits, and possible side effects of medications explained to patient and patient verbalizes understanding. Medications:   all current active meds have been reviewed and current meds:   Current Facility-Administered Medications   Medication Dose Route Frequency   • acetaminophen (TYLENOL) tablet 650 mg  650 mg Oral Q4H PRN   • acetaminophen (TYLENOL) tablet 650 mg  650 mg Oral Q4H PRN   • albuterol (PROVENTIL HFA,VENTOLIN HFA) inhaler 2 puff  2 puff Inhalation Q4H PRN   • benztropine (COGENTIN) tablet 0.5 mg  0.5 mg Oral Q4H PRN Max 6/day   • gabapentin (NEURONTIN) capsule 300 mg  300 mg Oral TID   • hydrOXYzine HCL (ATARAX) tablet 50 mg  50 mg Oral Q6H PRN Max 4/day   • lidocaine (LIDODERM) 5 % patch 1 patch  1 patch Topical Daily   • LORazepam (ATIVAN) injection 1 mg  1 mg Intramuscular Q6H PRN Max 3/day   • LORazepam (ATIVAN) tablet 0.5 mg  0.5 mg Oral Q6H PRN Max 4/day   • LORazepam (ATIVAN) tablet 1 mg  1 mg Oral Q6H PRN Max 3/day   • methocarbamol (ROBAXIN) tablet 500 mg  500 mg Oral Q6H PRN   • naproxen (NAPROSYN) tablet 500 mg  500 mg Oral BID PRN   • OLANZapine (ZyPREXA ZYDIS) dispersible tablet 15 mg  15 mg Oral HS   • OLANZapine (ZyPREXA) IM injection 5 mg  5 mg Intramuscular Q3H PRN Max 3/day   • OLANZapine (ZyPREXA) tablet 2.5 mg  2.5 mg Oral Q4H PRN Max 6/day   • OLANZapine (ZyPREXA) tablet 5 mg  5 mg Oral Q4H PRN Max 3/day   • OLANZapine (ZyPREXA) tablet 5 mg  5 mg Oral Q3H PRN Max 3/day   • pantoprazole (PROTONIX) EC tablet 20 mg  20 mg Oral Early Morning   • traMADol (ULTRAM) tablet 50 mg  50 mg Oral Q8H PRN   • traZODone (DESYREL) tablet 100 mg  100 mg Oral HS   . Labs:  I have personally reviewed all pertinent laboratory/tests results. Most Recent Labs:   Lab Results   Component Value Date    WBC 9.11 09/19/2023    RBC 4.44 09/19/2023    HGB 14.4 09/19/2023    HCT 41.2 09/19/2023     09/19/2023    RDW 12.1 09/19/2023    NEUTROABS 7.15 09/19/2023    SODIUM 137 09/21/2023    K 4.0 09/21/2023     09/21/2023    CO2 27 09/21/2023    BUN 15 09/21/2023    CREATININE 0.59 (L) 09/21/2023    GLUC 91 09/21/2023    GLUF 91 09/21/2023    CALCIUM 9.2 09/21/2023    AST 27 09/19/2023    ALT 31 09/19/2023    ALKPHOS 70 09/19/2023    TP 6.8 09/19/2023    ALB 4.2 09/19/2023    TBILI 0.84 09/19/2023    XID3GUKRFOIL 1.270 09/19/2023    HGBA1C 5.2 05/14/2021     05/14/2021       Counseling / Coordination of Care  Total floor / unit time spent today 25 minutes.

## 2023-09-26 NOTE — DISCHARGE INSTR - OTHER ORDERS
Chalino Patiño, [facility] are being [facility] at [address] in [address] [facility] [address] [facility] [address] [facility]. Hotel number is [phone]. Your contact number is [phone]. Triggers you have identified during your hospitalization that led to your admission include a distressed mood. Coping skills you have identified during your hospitalization include playing music. If you are unable to deal with your distressed mood alone please contact your psychiatric provider at [facility] Marcus Jeronimo at [phone], your primary care provider Dr. Emanuel Dean at [phone], or your friend Nhung Lloyd at [phone]. If that is not effective and you continue to have a distressed mood, feel overwhelmed, or are in crisis, please contact Eron Lanal Krysten at [phone], dial [phone], or go to the nearest emergency center. Chalino Patiño, you declined inpatient drug and alcohol treatment at this time. Please follow up with the [facility] [place] [facility] at [phone] to obtain outpatient drug and alcohol treatment. [facility] Crisis Hotline: [facility] Suicide Prevention Lifeline:  954.746.7101  *Alcohol Anonymous: 855.949.8037  *[place] Drug & Alcohol Commission: (664) 159-8006  [address] on [address] [place] [address] ([facility] HELPLINE: 648.725.3487/Website: www.travis.org  *Substance Abuse and [address] Administration([facility]) [employer], which is a confidential, free, 24-hour-a-day, 365-day-a-year, information service for individuals and family members facing mental health and/or substance use disorders. This service provides referrals to local treatment facilities, support groups, and community-based organizations. Callers can also order free publications and other information.   Call 5-981.301.5999/Website: www.[facility].gov  *[facility] 2-1-1: This is a toll free, confidential, 24-hour-a-day service which connects you to a community  in your area who can help you find services and resources that are available to you locally and provide critical services that can improve and save lives. Call: 211  /Website: https://[employer]Live[employer]oriespinto.net/    Kenji Rivas or Jsesi, humberto Sanchez and Brayden, will be calling you after your discharge, on the phone number that you provided. They will be available as an additional support, if needed.  If you wish to speak with one of them, you may contact [address] [place] [address] at [phone] or Cuco Cool at [phone]

## 2023-09-26 NOTE — PLAN OF CARE
Problem: Ineffective Coping  Goal: Participates in unit activities  Description: Interventions:  - Provide therapeutic environment   - Provide required programming   - Redirect inappropriate behaviors   Outcome: Progressing     Problem: Ineffective Coping  Goal: Patient/Family verbalizes awareness of resources  Outcome: Progressing     Problem: Ineffective Coping  Goal: Understands least restrictive measures  Description: Interventions:  - Utilize least restrictive behavior  Outcome: Progressing     Problem: Ineffective Coping  Goal: Free from restraint events  Description: - Utilize least restrictive measures   - Provide behavioral interventions   - Redirect inappropriate behaviors   Outcome: Progressing     Problem: Depression  Goal: Verbalize thoughts and feelings  Description: Interventions:  - Assess and re-assess patient's level of risk   - Engage patient in 1:1 interactions, daily, for a minimum of 15 minutes   - Encourage patient to express feelings, fears, frustrations, hopes   Outcome: Progressing     Problem: Depression  Goal: Refrain from harming self  Description: Interventions:  - Monitor patient closely, per order   - Supervise medication ingestion, monitor effects and side effects   Outcome: Progressing     Problem: Depression  Goal: Refrain from isolation  Description: Interventions:  - Develop a trusting relationship   - Encourage socialization   Outcome: Progressing     Problem: Depression  Goal: Refrain from self-neglect  Outcome: Progressing     Problem: Depression  Goal: Attend and participate in unit activities, including therapeutic, recreational, and educational groups  Description: Interventions:  - Provide therapeutic and educational activities daily, encourage attendance and participation, and document same in the medical record   Outcome: Progressing     Problem: Depression  Goal: Complete daily ADLs, including personal hygiene independently, as able  Description: Interventions:  - Observe, teach, and assist patient with ADLS  -  Monitor and promote a balance of rest/activity, with adequate nutrition and elimination   Outcome: Progressing     Problem: Nutrition/Hydration-ADULT  Goal: Nutrient/Hydration intake appropriate for improving, restoring or maintaining nutritional needs  Description: Monitor and assess patient's nutrition/hydration status for malnutrition. Collaborate with interdisciplinary team and initiate plan and interventions as ordered. Monitor patient's weight and dietary intake as ordered or per policy. Utilize nutrition screening tool and intervene as necessary. Determine patient's food preferences and provide high-protein, high-caloric foods as appropriate.      INTERVENTIONS:  - Monitor oral intake, urinary output, labs, and treatment plans  - Assess nutrition and hydration status and recommend course of action  - Evaluate amount of meals eaten  - Assist patient with eating if necessary   - Allow adequate time for meals  - Recommend/ encourage appropriate diets, oral nutritional supplements, and vitamin/mineral supplements  - Order, calculate, and assess calorie counts as needed  - Recommend, monitor, and adjust tube feedings and TPN/PPN based on assessed needs  - Assess need for intravenous fluids  - Provide specific nutrition/hydration education as appropriate  - Include patient/family/caregiver in decisions related to nutrition  Outcome: Progressing

## 2023-09-27 VITALS
OXYGEN SATURATION: 97 % | HEART RATE: 60 BPM | BODY MASS INDEX: 23.32 KG/M2 | RESPIRATION RATE: 17 BRPM | HEIGHT: 67 IN | TEMPERATURE: 98.1 F | WEIGHT: 148.6 LBS | DIASTOLIC BLOOD PRESSURE: 63 MMHG | SYSTOLIC BLOOD PRESSURE: 137 MMHG

## 2023-09-27 PROCEDURE — 99238 HOSP IP/OBS DSCHRG MGMT 30/<: CPT

## 2023-09-27 RX ADMIN — TRAMADOL HYDROCHLORIDE 50 MG: 50 TABLET, COATED ORAL at 08:10

## 2023-09-27 RX ADMIN — GABAPENTIN 300 MG: 300 CAPSULE ORAL at 08:10

## 2023-09-27 RX ADMIN — PANTOPRAZOLE SODIUM 20 MG: 20 TABLET, DELAYED RELEASE ORAL at 05:50

## 2023-09-27 RX ADMIN — HYDROXYZINE HYDROCHLORIDE 50 MG: 50 TABLET, FILM COATED ORAL at 06:29

## 2023-09-27 RX ADMIN — LIDOCAINE 5% 1 PATCH: 700 PATCH TOPICAL at 08:09

## 2023-09-27 NOTE — SOCIAL WORK
DELPHINE met with pt and reviewed pt's right to appeal discharge through Medicare. Pt declined to appeal his discharge and signed IMM. DELPHINE sent doc to be scanned to chart.

## 2023-09-27 NOTE — PLAN OF CARE
Problem: Ineffective Coping  Goal: Identifies ineffective coping skills  Outcome: Progressing  Goal: Identifies healthy coping skills  Outcome: Progressing  Goal: Demonstrates healthy coping skills  Outcome: Progressing  Goal: Participates in unit activities  Description: Interventions:  - Provide therapeutic environment   - Provide required programming   - Redirect inappropriate behaviors   Outcome: Progressing  Goal: Patient/Family participate in treatment and DC plans  Description: Interventions:  - Provide therapeutic environment  Outcome: Progressing  Goal: Patient/Family verbalizes awareness of resources  Outcome: Progressing  Goal: Understands least restrictive measures  Description: Interventions:  - Utilize least restrictive behavior  Outcome: Progressing  Goal: Free from restraint events  Description: - Utilize least restrictive measures   - Provide behavioral interventions   - Redirect inappropriate behaviors   Outcome: Progressing     Problem: Depression  Goal: Treatment Goal: Demonstrate behavioral control of depressive symptoms, verbalize feelings of improved mood/affect, and adopt new coping skills prior to discharge  Outcome: Progressing  Goal: Verbalize thoughts and feelings  Description: Interventions:  - Assess and re-assess patient's level of risk   - Engage patient in 1:1 interactions, daily, for a minimum of 15 minutes   - Encourage patient to express feelings, fears, frustrations, hopes   Outcome: Progressing  Goal: Refrain from harming self  Description: Interventions:  - Monitor patient closely, per order   - Supervise medication ingestion, monitor effects and side effects   Outcome: Progressing  Goal: Refrain from isolation  Description: Interventions:  - Develop a trusting relationship   - Encourage socialization   Outcome: Progressing  Goal: Refrain from self-neglect  Outcome: Progressing  Goal: Attend and participate in unit activities, including therapeutic, recreational, and educational groups  Description: Interventions:  - Provide therapeutic and educational activities daily, encourage attendance and participation, and document same in the medical record   Outcome: Progressing  Goal: Complete daily ADLs, including personal hygiene independently, as able  Description: Interventions:  - Observe, teach, and assist patient with ADLS  -  Monitor and promote a balance of rest/activity, with adequate nutrition and elimination   Outcome: Progressing     Problem: Anxiety  Goal: Anxiety is at manageable level  Description: Interventions:  - Assess and monitor patient's anxiety level. - Monitor for signs and symptoms (heart palpitations, chest pain, shortness of breath, headaches, nausea, feeling jumpy, restlessness, irritable, apprehensive). - Collaborate with interdisciplinary team and initiate plan and interventions as ordered.   - [place] patient to unit/surroundings  - Explain treatment plan  - Encourage participation in care  - Encourage verbalization of concerns/fears  - Identify coping mechanisms  - Assist in developing anxiety-reducing skills  - Administer/offer alternative therapies  - Limit or eliminate stimulants  Outcome: Progressing     Problem: DISCHARGE PLANNING - CARE MANAGEMENT  Goal: Discharge to post-acute care or home with appropriate resources  Description: INTERVENTIONS:  - Conduct assessment to determine patient/family and health care team treatment goals, and need for post-acute services based on payer coverage, community resources, and patient preferences, and barriers to discharge  - Address psychosocial, clinical, and financial barriers to discharge as identified in assessment in conjunction with the patient/family and health care team  - Arrange appropriate level of post-acute services according to patient’s   needs and preference and payer coverage in collaboration with the physician and health care team  - Communicate with and update the patient/family, physician, and health care team regarding progress on the discharge plan  - Arrange appropriate transportation to post-acute venues  Outcome: Progressing     Problem: Nutrition/Hydration-ADULT  Goal: Nutrient/Hydration intake appropriate for improving, restoring or maintaining nutritional needs  Description: Monitor and assess patient's nutrition/hydration status for malnutrition. Collaborate with interdisciplinary team and initiate plan and interventions as ordered. Monitor patient's weight and dietary intake as ordered or per policy. Utilize nutrition screening tool and intervene as necessary. Determine patient's food preferences and provide high-protein, high-caloric foods as appropriate.      INTERVENTIONS:  - Monitor oral intake, urinary output, labs, and treatment plans  - Assess nutrition and hydration status and recommend course of action  - Evaluate amount of meals eaten  - Assist patient with eating if necessary   - Allow adequate time for meals  - Recommend/ encourage appropriate diets, oral nutritional supplements, and vitamin/mineral supplements  - Order, calculate, and assess calorie counts as needed  - Recommend, monitor, and adjust tube feedings and TPN/PPN based on assessed needs  - Assess need for intravenous fluids  - Provide specific nutrition/hydration education as appropriate  - Include patient/family/caregiver in decisions related to nutrition  Outcome: Progressing

## 2023-09-27 NOTE — NURSING NOTE
Patient ambulated from unit accompanied by staff. AVS reviewed w/ pt and he verbalizes understanding. All belongings sent w/ pt.

## 2023-09-27 NOTE — PROGRESS NOTES
Pt discharged with the following:    Underwear x1  Jeans x1  Gray shirt  x1  Breast cancer bracelet  Black flip flops  Set of keys  Black wallet  [place]  Medical marijuana card  5 medical cards  Credit cards x4  SS card  Medicare card  Passport  misc cards

## 2023-09-27 NOTE — PROGRESS NOTES
09/27/23   Team Meeting   Meeting Type Daily Rounds   Team Members Present   Team Members Present Physician;Nurse;;    Physician Team Member Dr. Re Walters MD; Ronan BAUER   Nursing Team Member Ksenia Nuñez RN   Care Management Team Member Jermaine MS, [facility], [facility]   Social Work Team Member Dania Parkinson [facility]   Patient/Family Present   Patient Present No   Patient's Family Present No       Pt anx about d/c. Pt had 'meltdown' last pm about d/c plans. Am anx about d/c; atarax given 6:20 a.m. pt d/c 9:30 to 10:30 a.m. pt's friend providing transportation.

## 2023-09-27 NOTE — NURSING NOTE
Patient appears to have slept through the overnight hours without issue. No complaints voiced. No acute behaviors observed. Jennifer Barcenas remains in bed sleeping at this time. Continuous safety rounding in progress.

## 2023-09-27 NOTE — PROGRESS NOTES
Progress Note - Dai Castrejon 74 y.o. male MRN: [id number]    Unit/Bed#Yvette Adkins 203-01 Encounter: [id number]        Subjective:   Patient seen and examined at bedside after reviewing the chart and discussing the case with the caring staff. Patient examined at bedside. Patient has no acute symptoms. Patient is being discharged today, Wednesday 9/27/2023. Physical Exam   Vitals: Blood pressure 137/63, pulse 60, temperature 98.1 °F (36.7 °C), temperature source Temporal, resp. rate 17, height 5' 7" (1.702 m), weight 67.4 kg (148 lb 9.6 oz), SpO2 97 %. ,Body mass index is 23.27 kg/m². Constitutional: Patient in no acute distress. HEENT: PERR, EOMI, MMM. Cardiovascular: Normal rate and regular rhythm. Pulmonary/Chest: Effort normal and breath sounds normal.   Abdomen: Soft, + BS, NT. Assessment/Plan:  Dai Castrejon is a(n) 74 y.o. male with bipolar disorder.     Medical clearance. Patient is medically cleared for discharge. All scripts were sent out for the patient with 1 refill. 1.  GERD. Patient is on Protonix 40 mg daily (Prilosec nonformulary). 2.  Asthma. Stable. Albuterol inhaler as needed. 3.  Alcohol abuse. Last drink over 4 years ago per pt. Stable. 4.  DJD/OA/chronic back and neck pain. Patient utilizes medical marijuana at home. Tylenol for mild/mod pain, tramadol for severe pain, naproxen breakthrough pain. Lidocaine patch daily, Robaxin as needed. 5.  Gait abnormality. PT OT consulted. The patient was discussed with Dr. Priya Ventura and he is in agreement with the above note.

## 2023-09-27 NOTE — NURSING NOTE
Patient remains in bed, appears to be sleeping. No acute behaviors observed during the overnight hours thus far. Continuous safety rounding in progress.

## 2023-09-27 NOTE — NURSING NOTE
Patient observed to be visible in the community this evening. He has been pleasant and calm during staff interactions. Endorses ongoing anxiety and depression, stating he had a "rough day today", however informs he is doing well this evening. Speech remains pressured and rambling. Later in the evening, patient was observed to be crying in his room. He states, "She is taking everything away from me and now she is taking my soul."  Verbal support given. Denies SI, HI and hallucinations. Minh Cortez was medication compliant at . Lidoderm patch not present when attempted to removed as patient states he removed it and discarded earlier in the day when he showered. Positive for snack and fluids tonight. Continuous safety rounding in progress.

## 2023-09-27 NOTE — NURSING NOTE
Patient is awake at this time; he spent time showering and shaving, however became increasingly anxious d/t upcoming discharge this morning. Observed mild restlessness and tearfulness. Performed a Araujo Scale rating with a result of 14. Administered PRN Atarax 50 mg as per order. Will monitor for medication effectiveness.

## 2023-09-27 NOTE — PROGRESS NOTES
09/26/23   Team Meeting   Meeting Type Daily Rounds   Team Members Present   Team Members Present Physician;Nurse;;    Physician Team Member Dr. Alejandra Blas MD; [place] CRNP   Nursing Team Member Teresita Drake RN   Care Management Team Member Dejuan Paiz MS, [facility], [facility] [place], [place]   Social Work Team Member Carlee LOWE   Patient/Family Present   Patient Present No   Patient's Family Present No     Yesterday pt reported moderate anx and dep. Pt served PFA on unit at 5:30 p.m. initially okay, then had 'meltdown'. Pt preoccupied, hyperverbal, better today. Discussed pt d/c to hotel; transportation by friend.

## 2023-09-27 NOTE — NURSING NOTE
On reassessment of PRN Atarax, patient informs the medication is working for him. He feels less anxious and less restless. Patient expresses thankfulness for care. Discussed coping skills that patient can utilize to lessen his anxiety such as walking, drawing, journaling and listening to music; patient verbalizes acceptance.

## 2023-12-13 NOTE — BH TRANSITION RECORD
Patient ID: Abhijeet is a 89 year old male.    Chief Complaint   Patient presents with    Earache     1 month to see if it's ear wax    Office Visit    Video Visit     This visit is being performed virtually via Non-integrated Video Visit. Consent to treat includes permission to submit charges to the patient's insurance. It was shared that without being seen and evaluated in person, there is a risk that the information and/or assessment may be incomplete or inaccurate. This video visit may be discontinued by patient or clinician, if it is felt that the videoconferencing connections are not adequate for his situation.   Clinical Location: [facility]  Abhijeet's location [facility] at [address] and is physically present in   the [facility] at the time of this visit.    Abhijeet has had decreased hearing in both of his ears for a month. Wants to see if it is a lot of wax. If so, he is aware that we cannot remove the wax today and he would either have to see his PCP or [facility] to remove the wax. Otherwise he does not have any complaints and feels well.    Hearing Problem  This is a new problem. Episode onset: 1 month. The problem has been unchanged. Pertinent negatives include no congestion, coughing, fever, headaches, myalgias or sore throat.       Patient's medications, allergies, past medical, surgical, social and family histories were reviewed and updated as appropriate.    Review of Systems   Constitutional: Negative.  Negative for fever.   HENT:  Positive for hearing loss. Negative for congestion, ear pain and sore throat.    Respiratory:  Negative for cough.    Musculoskeletal:  Negative for myalgias.   Neurological:  Negative for headaches.       Physical Exam  Constitutional:       General: He is not in acute distress.     Appearance: Normal appearance. He is not ill-appearing or toxic-appearing.   HENT:      Right Ear: There is impacted cerumen.      Left Ear: There  Contact Information: If you have any questions, concerns, pended studies, tests and/or procedures, or emergencies regarding your inpatient behavioral health visit. [facility] [phone] and ask to speak to a , nurse or physician. A contact is available 24 hours/ 7 days a week at this number. Summary of Procedures Performed During your Stay:  Below is a list of major procedures performed during your hospital stay and a summary of results:  - Cardiac Procedures/Studies: Ekg. Pending Studies (From admission, onward)    None        Please follow up on the above pending studies with your PCP and/or referring provider. is impacted cerumen.      Nose: Nose normal.      Mouth/Throat:      Mouth: Mucous membranes are moist.      Pharynx: Oropharynx is clear.   Cardiovascular:      Rate and Rhythm: Normal rate and regular rhythm.   Pulmonary:      Effort: Pulmonary effort is normal. No respiratory distress.      Breath sounds: Normal breath sounds. No rhonchi or rales.   Neurological:      Mental Status: He is alert.         Assessment       Valarie Elizabeth, CNP

## 2024-01-11 NOTE — PROGRESS NOTES
Daily Note     Today's date: 2018  Patient name: Daniele Carcamo  : 1955  MRN: [id number]  Referring provider: Remo Nyhan, DO  Dx:   Encounter Diagnosis     ICD-10-CM    1  Bilateral thoracic back pain, unspecified chronicity M54 6                   Subjective: no new c/o      Objective: See treatment diary below    Pain in 4/10      Assessment:   Good tolerance with therex  Minimal VC required   Apparent end range pain with MT    Plan: Continue with POC as tolerated         Specialty Daily Treatment Diary      Manual  18         Stretch BLE   15'  15                                                                                                 18  [facility] L1 12'           UBE  10  10         St hip flex/abd/ext            TR/HR    20         Mini squats    10         U stance  (samantha)             LTP/MTP Green 30x   green 30x         Lat pulldown             PPT :05 30x  5 30x         LTR :10x10  10x10         abd crunch             bridges 20x  20x         Add sq :05 20x  20x         t-band hip abd                                                                                                      Modalities 18         IFC/MHP Next Visit  15 MHP                                  
Unknown

## 2024-03-26 ENCOUNTER — EVALUATION (OUTPATIENT)
Dept: PHYSICAL THERAPY | Facility: CLINIC | Age: 69
End: 2024-03-26
Payer: COMMERCIAL

## 2024-03-26 DIAGNOSIS — Z09 ENCOUNTER FOR FOLLOW-UP EXAMINATION AFTER COMPLETED TREATMENT FOR CONDITIONS OTHER THAN MALIGNANT NEOPLASM: ICD-10-CM

## 2024-03-26 DIAGNOSIS — M54.12 CERVICAL RADICULOPATHY: Primary | ICD-10-CM

## 2024-03-26 PROCEDURE — 97163 PT EVAL HIGH COMPLEX 45 MIN: CPT | Performed by: PHYSICAL THERAPIST

## 2024-03-26 PROCEDURE — 97140 MANUAL THERAPY 1/> REGIONS: CPT | Performed by: PHYSICAL THERAPIST

## 2024-03-26 NOTE — PROGRESS NOTES
PT Evaluation     Today's date: 3/26/2024  Patient name: Scottie Rodriguez  : 1955  MRN: [id number]  Referring provider: Danny Kimble MD  Dx:   Encounter Diagnosis     ICD-10-CM    1. Cervical radiculopathy  M54.12       2. Encounter for follow-up examination after completed treatment for conditions other than malignant neoplasm  Z09                      Assessment  Impairments: abnormal muscle tone, abnormal or restricted ROM, activity intolerance, impaired physical strength, lacks appropriate home exercise program, pain with function and poor posture     Symptom irritability: highUnderstanding of Dx/Px/POC: good   Prognosis: fair    Goals  ST) Pt. Will be able to sleep through the night without being awoken with pain and with minimal to no pain upon waking in 6 weeks. 2) Pt. Will have not headaches while at work or with computer work at home.  3)  Pt.'s radicular symptoms will be centralized to neck in 6 weeks.  LT) Pt. Will be able to complete all chores at home without pain or limitations in 12 weeks.  2)  Pt. Will be able to to complete all physical tasks at work without restriction or limitations in 12 weeks.      Plan  Patient would benefit from: PT eval and skilled physical therapy  Referral necessary: Yes  Planned modality interventions: cryotherapy, thermotherapy: hydrocollator packs and unattended electrical stimulation  Planned therapy interventions: manual therapy, neuromuscular re-education, patient education, self care, therapeutic activities, therapeutic exercise and home exercise program  Frequency: 2x week  Duration in weeks: 8  Treatment plan discussed with: patient        Subjective Evaluation    History of Present Illness  Mechanism of injury: Pt presents with worsening of chronic neck pain and bilateral radicular symptoms.  Pt is also reporting worsening of headaches.  Pt does have malignancy in brain the is progressing negatively as well.  He states he feels it is  coming from his upper back and scapular area.          Recurrent probem    Quality of life: poor    Patient Goals  Patient goal: get rid of the numbness in his arms.  Pain  Current pain ratin  At best pain ratin  At worst pain ratin  Quality: radiating and burning  Progression: worsening      Diagnostic Tests    FCE comments: Pt is not workingTreatments  Previous treatment: physical therapy and injection treatment      Objective     Postural Observations  Seated posture: poor  Standing posture: poor  Correction of posture: has no consistent effect    Additional Postural Observation Details  Very stiff, almost fixed posture.  Severe forward head and rounded shoulders.    Palpation   Left   Hypertonic in the levator scapulae, middle trapezius, rhomboids, suboccipitals and upper trapezius.   Muscle spasm in the levator scapulae, middle trapezius, rhomboids, suboccipitals and upper trapezius.   Tenderness of the cervical paraspinals, levator scapulae, middle trapezius, rhomboids, suboccipitals and upper trapezius.   Trigger point to cervical paraspinals, levator scapulae, middle trapezius, rhomboids, suboccipitals and upper trapezius.     Right   Hypertonic in the levator scapulae, middle trapezius, rhomboids, suboccipitals and upper trapezius.   Muscle spasm in the levator scapulae, middle trapezius, rhomboids, suboccipitals and upper trapezius.   Tenderness of the levator scapulae, middle trapezius, rhomboids, suboccipitals and upper trapezius.   Trigger point to levator scapulae, middle trapezius, rhomboids, suboccipitals and upper trapezius.     Neurological Testing     Reflexes   Left   Biceps (C5/C6): absent (0)  Brachioradialis (C6): absent (0)  Triceps (C7): trace (1+)    Right   Biceps (C5/C6): trace (1+)  Brachioradialis (C6): normal (2+)  Triceps (C7): trace (1+)    Active Range of Motion   Cervical/Thoracic Spine       Cervical  Subcranial protraction:   Restriction level: minimal  Subcranial  "retraction:  with pain   Restriction level: maximal  Flexion:  Restriction level: moderate  Extension:  Restriction level: maximal  Left lateral flexion:  with pain Restriction level: maximal  Right lateral flexion:  with pain Restriction level maximal  Left rotation:  with pain Restriction level: maximal  Right rotation:  with pain Restriction level: moderate    Strength/Myotome Testing   Cervical Spine   Neck flexion: 3-    Left   Neck lateral flexion (C3): 3-    Right   Neck lateral flexion (C3): 3-    Left Shoulder     Planes of Motion   Flexion: 3-   Extension: 3-   Abduction: 3-     Right Shoulder     Planes of Motion   Flexion: 3-   Extension: 3-   Abduction: 3-     Left Elbow   Flexion: 3  Extension: 3    Right Elbow   Flexion: 3  Extension: 3                Precautions :  Hx of cervical fusion.  Progressing malignancy/ degenerative brain condition       Manuals 3-26-24       STM bilateral cervical and scapular muscles 25'                               Neuro Re-Ed                                                                 Ther Ex        Seated thoracic and lower cervical mobilization  Multiple levels  5\" x15 for each of 3 levels                                                               Ther Activity                        Gait Training                        Modalities        [facility] 15'                        "

## 2024-03-26 NOTE — LETTER
2024    Danny Kimble MD    Patient: Scottie Rodriguez   YOB: 1955   Date of Visit: 3/26/2024     Encounter Diagnosis     ICD-10-CM    1. Cervical radiculopathy  M54.12       2. Encounter for follow-up examination after completed treatment for conditions other than malignant neoplasm  Z09           Dear Dr. Kimble:    Thank you for your recent referral of Scottie Rodriguez. Please review the attached evaluation summary from Scottie's recent visit.     Please verify that you agree with the plan of care by signing the attached order.     If you have any questions or concerns, please do not hesitate to call.     I sincerely appreciate the opportunity to share in the care of one of your patients and hope to have another opportunity to work with you in the near future.       Sincerely,    Darin Ayala, PT      Referring Provider:      I certify that I have read the below Plan of Care and certify the need for these services furnished under this plan of treatment while under my care.                    Danny Kimble MD  [address] [place] [address]  Via Fax: [phone]          PT Evaluation     Today's date: 3/26/2024  Patient name: Scottie Rodriguez  : 1955  MRN: [id number]  Referring provider: Danny Kimble MD  Dx:   Encounter Diagnosis     ICD-10-CM    1. Cervical radiculopathy  M54.12       2. Encounter for follow-up examination after completed treatment for conditions other than malignant neoplasm  Z09                      Assessment  Impairments: abnormal muscle tone, abnormal or restricted ROM, activity intolerance, impaired physical strength, lacks appropriate home exercise program, pain with function and poor posture     Symptom irritability: highUnderstanding of Dx/Px/POC: good   Prognosis: fair    Goals  ST) Pt. Will be able to sleep through the night without being awoken with pain and with minimal to no pain upon waking in 6 weeks. 2) Pt. Will have not  headaches while at work or with computer work at home.  3)  Pt.'s radicular symptoms will be centralized to neck in 6 weeks.  LT) Pt. Will be able to complete all chores at home without pain or limitations in 12 weeks.  2)  Pt. Will be able to to complete all physical tasks at work without restriction or limitations in 12 weeks.      Plan  Patient would benefit from: PT eval and skilled physical therapy  Referral necessary: Yes  Planned modality interventions: cryotherapy, thermotherapy: hydrocollator packs and unattended electrical stimulation  Planned therapy interventions: manual therapy, neuromuscular re-education, patient education, self care, therapeutic activities, therapeutic exercise and home exercise program  Frequency: 2x week  Duration in weeks: 8  Treatment plan discussed with: patient        Subjective Evaluation    History of Present Illness  Mechanism of injury: Pt presents with worsening of chronic neck pain and bilateral radicular symptoms.  Pt is also reporting worsening of headaches.  Pt does have malignancy in brain the is progressing negatively as well.  He states he feels it is coming from his upper back and scapular area.          Recurrent probem    Quality of life: poor    Patient Goals  Patient goal: get rid of the numbness in his arms.  Pain  Current pain ratin  At best pain ratin  At worst pain ratin  Quality: radiating and burning  Progression: worsening      Diagnostic Tests    FCE comments: Pt is not workingTreatments  Previous treatment: physical therapy and injection treatment      Objective     Postural Observations  Seated posture: poor  Standing posture: poor  Correction of posture: has no consistent effect    Additional Postural Observation Details  Very stiff, almost fixed posture.  Severe forward head and rounded shoulders.    Palpation   Left   Hypertonic in the levator scapulae, middle trapezius, rhomboids, suboccipitals and upper trapezius.   Muscle spasm  in the levator scapulae, middle trapezius, rhomboids, suboccipitals and upper trapezius.   Tenderness of the cervical paraspinals, levator scapulae, middle trapezius, rhomboids, suboccipitals and upper trapezius.   Trigger point to cervical paraspinals, levator scapulae, middle trapezius, rhomboids, suboccipitals and upper trapezius.     Right   Hypertonic in the levator scapulae, middle trapezius, rhomboids, suboccipitals and upper trapezius.   Muscle spasm in the levator scapulae, middle trapezius, rhomboids, suboccipitals and upper trapezius.   Tenderness of the levator scapulae, middle trapezius, rhomboids, suboccipitals and upper trapezius.   Trigger point to levator scapulae, middle trapezius, rhomboids, suboccipitals and upper trapezius.     Neurological Testing     Reflexes   Left   Biceps (C5/C6): absent (0)  Brachioradialis (C6): absent (0)  Triceps (C7): trace (1+)    Right   Biceps (C5/C6): trace (1+)  Brachioradialis (C6): normal (2+)  Triceps (C7): trace (1+)    Active Range of Motion   Cervical/Thoracic Spine       Cervical  Subcranial protraction:   Restriction level: minimal  Subcranial retraction:  with pain   Restriction level: maximal  Flexion:  Restriction level: moderate  Extension:  Restriction level: maximal  Left lateral flexion:  with pain Restriction level: maximal  Right lateral flexion:  with pain Restriction level maximal  Left rotation:  with pain Restriction level: maximal  Right rotation:  with pain Restriction level: moderate    Strength/Myotome Testing   Cervical Spine   Neck flexion: 3-    Left   Neck lateral flexion (C3): 3-    Right   Neck lateral flexion (C3): 3-    Left Shoulder     Planes of Motion   Flexion: 3-   Extension: 3-   Abduction: 3-     Right Shoulder     Planes of Motion   Flexion: 3-   Extension: 3-   Abduction: 3-     Left Elbow   Flexion: 3  Extension: 3    Right Elbow   Flexion: 3  Extension: 3                Precautions :  Hx of cervical fusion.  Progressing  "malignancy/ degenerative brain condition       Manuals 3-26-24       STM bilateral cervical and scapular muscles 25'                               Neuro Re-Ed                                                                 Ther Ex        Seated thoracic and lower cervical mobilization  Multiple levels  5\" x15 for each of 3 levels                                                               Ther Activity                        Gait Training                        Modalities        [facility] 15'                                        "

## 2024-03-26 NOTE — LETTER
2024    Danny Kimble MD    Patient: Scottie Rodriguez   YOB: 1955   Date of Visit: 3/26/2024     Encounter Diagnosis     ICD-10-CM    1. Cervical radiculopathy  M54.12       2. Encounter for follow-up examination after completed treatment for conditions other than malignant neoplasm  Z09           Dear Dr. Kimble:    Thank you for your recent referral of Scottie Rodriguez. Please review the attached evaluation summary from Scottie's recent visit.     Please verify that you agree with the plan of care by signing the attached order.     If you have any questions or concerns, please do not hesitate to call.     I sincerely appreciate the opportunity to share in the care of one of your patients and hope to have another opportunity to work with you in the near future.       Sincerely,    Darin Ayala, PT      Referring Provider:      I certify that I have read the below Plan of Care and certify the need for these services furnished under this plan of treatment while under my care.                    Danny Kimble MD  [address] [address]  Via Fax: [phone]          PT Evaluation     Today's date: 3/26/2024  Patient name: Scottie Rodriguez  : 1955  MRN: [id number]  Referring provider: Danny Kimble MD  Dx:   Encounter Diagnosis     ICD-10-CM    1. Cervical radiculopathy  M54.12                      Assessment/Plan    Subjective Evaluation    History of Present Illness  Mechanism of injury: Pt presents with worsening of chronic neck pain and bilateral radicular symptoms.  Pt is also reporting worsening of headaches.  Pt does have malignancy in brain the is progressing negatively as well.  He states he feels it is coming from his upper back and scapular area.          Recurrent probem    Quality of life: poor    Patient Goals  Patient goal: get rid of the numbness in his arms.  Pain  Current pain ratin  At best pain ratin  At worst pain rating:  9  Quality: radiating and burning  Progression: worsening      Diagnostic Tests    FCE comments: Pt is not workingTreatments  Previous treatment: physical therapy and injection treatment    Objective     Postural Observations  Seated posture: poor  Standing posture: poor  Correction of posture: has no consistent effect    Additional Postural Observation Details  Very stiff, almost fixed posture.  Severe forward head and rounded shoulders.    Neurological Testing     Reflexes   Left   Biceps (C5/C6): absent (0)  Brachioradialis (C6): absent (0)  Triceps (C7): trace (1+)    Right   Biceps (C5/C6): trace (1+)  Brachioradialis (C6): normal (2+)  Triceps (C7): trace (1+)              Precautions ***       Manuals                                        Neuro Re-Ed                                                                 Ther Ex                                                                        Ther Activity                        Gait Training                        Modalities

## 2024-04-02 ENCOUNTER — APPOINTMENT (OUTPATIENT)
Dept: PHYSICAL THERAPY | Facility: CLINIC | Age: 69
End: 2024-04-02
Payer: COMMERCIAL

## 2024-04-02 ENCOUNTER — OFFICE VISIT (OUTPATIENT)
Dept: PHYSICAL THERAPY | Facility: CLINIC | Age: 69
End: 2024-04-02
Payer: COMMERCIAL

## 2024-04-02 DIAGNOSIS — M54.12 CERVICAL RADICULOPATHY: Primary | ICD-10-CM

## 2024-04-02 DIAGNOSIS — Z09 ENCOUNTER FOR FOLLOW-UP EXAMINATION AFTER COMPLETED TREATMENT FOR CONDITIONS OTHER THAN MALIGNANT NEOPLASM: ICD-10-CM

## 2024-04-02 PROCEDURE — 97140 MANUAL THERAPY 1/> REGIONS: CPT | Performed by: PHYSICAL THERAPIST

## 2024-04-02 PROCEDURE — 97110 THERAPEUTIC EXERCISES: CPT | Performed by: PHYSICAL THERAPIST

## 2024-04-02 NOTE — PROGRESS NOTES
"Daily Note     Today's date: 2024  Patient name: Scottie Rodriguez  : 1955  MRN: [id number]  Referring provider: Danny Kimble MD  Dx:   Encounter Diagnosis     ICD-10-CM    1. Cervical radiculopathy  M54.12       2. Encounter for follow-up examination after completed treatment for conditions other than malignant neoplasm  Z09                      Subjective: Pt reports the manual therapy done on his first visit did help.  He also reiterates wanting to push through some discomfort to try to get stronger.      Objective: See treatment diary below      Assessment: Tolerated treatment well. Patient would benefit from continued PT      Plan: Progress treatment as tolerated.  Attempt to progress into more strengthening.        Precautions :  Hx of cervical fusion.  Progressing malignancy/ degenerative brain condition       Manuals 3-26-24 -      STM bilateral cervical and scapular muscles 25' 25'                              Neuro Re-Ed         Upper cervical flexion stabilzation  10\" x12                                                      Ther Ex        Seated thoracic and lower cervical mobilization  Multiple levels  5\" x15 for each of 3 levels 5\" x15 for each of 3 levels      Upper trap and levetor stretch  10\" x12 samantha                                                      Ther Activity                        Gait Training                        Modalities        [facility] 15'                        "

## 2024-04-09 ENCOUNTER — OFFICE VISIT (OUTPATIENT)
Dept: PHYSICAL THERAPY | Facility: CLINIC | Age: 69
End: 2024-04-09
Payer: COMMERCIAL

## 2024-04-09 DIAGNOSIS — Z09 ENCOUNTER FOR FOLLOW-UP EXAMINATION AFTER COMPLETED TREATMENT FOR CONDITIONS OTHER THAN MALIGNANT NEOPLASM: ICD-10-CM

## 2024-04-09 DIAGNOSIS — M54.12 CERVICAL RADICULOPATHY: Primary | ICD-10-CM

## 2024-04-09 PROCEDURE — 97110 THERAPEUTIC EXERCISES: CPT | Performed by: PHYSICAL THERAPIST

## 2024-04-09 PROCEDURE — 97140 MANUAL THERAPY 1/> REGIONS: CPT | Performed by: PHYSICAL THERAPIST

## 2024-04-09 NOTE — PROGRESS NOTES
"Daily Note     Today's date: 2024  Patient name: Scottie Rodriguez  : 1955  MRN: [id number]  Referring provider: Danny Kimble MD  Dx:   Encounter Diagnosis     ICD-10-CM    1. Cervical radiculopathy  M54.12       2. Encounter for follow-up examination after completed treatment for conditions other than malignant neoplasm  Z09                      Subjective: Pt reports it hasn't been his worst day regarding pain.      Objective: See treatment diary below      Assessment: Tolerated treatment well. Patient demonstrated fatigue post treatment and would benefit from continued PT      Plan: Progress treatment as tolerated.       Precautions :  Hx of cervical fusion.  Progressing malignancy/ degenerative brain condition       Manuals 3-26-24 4-2-24 4-9-24     STM bilateral cervical and scapular muscles '                             Neuro Re-Ed         Upper cervical flexion stabilzation  10\" x12 10\" x12     LTP/ MTP while holding good cervico-scapular posture   2x10 green each     Scap retraction/ ER   Green 2x10                                     Ther Ex        Seated thoracic and lower cervical mobilization  Multiple levels  5\" x15 for each of 3 levels 5\" x15 for each of 3 levels 5\" x15 for each of 3 levels     Upper trap and levetor stretch  10\" x12 samantha 10\" x12 samantha                                                     Ther Activity                        Gait Training                        Modalities        [facility] 15'  15'                        "

## 2024-04-16 ENCOUNTER — OFFICE VISIT (OUTPATIENT)
Dept: PHYSICAL THERAPY | Facility: CLINIC | Age: 69
End: 2024-04-16
Payer: COMMERCIAL

## 2024-04-16 DIAGNOSIS — Z09 ENCOUNTER FOR FOLLOW-UP EXAMINATION AFTER COMPLETED TREATMENT FOR CONDITIONS OTHER THAN MALIGNANT NEOPLASM: ICD-10-CM

## 2024-04-16 DIAGNOSIS — M54.12 CERVICAL RADICULOPATHY: Primary | ICD-10-CM

## 2024-04-16 PROCEDURE — 97140 MANUAL THERAPY 1/> REGIONS: CPT | Performed by: PHYSICAL THERAPIST

## 2024-04-16 PROCEDURE — 97112 NEUROMUSCULAR REEDUCATION: CPT | Performed by: PHYSICAL THERAPIST

## 2024-04-16 PROCEDURE — 97110 THERAPEUTIC EXERCISES: CPT | Performed by: PHYSICAL THERAPIST

## 2024-04-16 NOTE — PROGRESS NOTES
"Daily Note     Today's date: 2024  Patient name: Scottie Rodriguez  : 1955  MRN: [id number]  Referring provider: Danny Kimble MD  Dx:   Encounter Diagnosis     ICD-10-CM    1. Cervical radiculopathy  M54.12       2. Encounter for follow-up examination after completed treatment for conditions other than malignant neoplasm  Z09                      Subjective: Pt is sore but reports he thinks some of the severe trigger points are already getting better.      Objective: See treatment diary below      Assessment: Tolerated treatment well. Patient would benefit from continued PT      Plan: Continue per plan of care.  Progress treatment as tolerated.       Precautions :  Hx of cervical fusion.  Progressing malignancy/ degenerative brain condition       Manuals 3-26-24 4-2-24 4-9-24 4-16-24    STM bilateral cervical and scapular muscles 25 25' 25                            Neuro Re-Ed         Upper cervical flexion stabilzation  10\" x12 10\" x12 10\" x12    LTP/ MTP while holding good cervico-scapular posture   2x10 green each 3x10 blue    Scap retraction/ ER   Green 2x10 Green 2x10                                    Ther Ex        Seated thoracic and lower cervical mobilization  Multiple levels  5\" x15 for each of 3 levels 5\" x15 for each of 3 levels 5\" x15 for each of 3 levels 5\" x15 for each of 3 levels    Upper trap and levetor stretch  10\" x12 samantha 10\" x12 samantha 10\" x12 samantha                                                    Ther Activity                        Gait Training                        Modalities        [facility] 15'  15' Defer today                         "

## 2024-04-23 ENCOUNTER — OFFICE VISIT (OUTPATIENT)
Dept: PHYSICAL THERAPY | Facility: CLINIC | Age: 69
End: 2024-04-23
Payer: COMMERCIAL

## 2024-04-23 DIAGNOSIS — M54.12 CERVICAL RADICULOPATHY: Primary | ICD-10-CM

## 2024-04-23 PROCEDURE — 97110 THERAPEUTIC EXERCISES: CPT

## 2024-04-23 PROCEDURE — 97140 MANUAL THERAPY 1/> REGIONS: CPT

## 2024-04-23 NOTE — PROGRESS NOTES
"Daily Note     Today's date: 2024  Patient name: Scottie Rodriguez  : 1955  MRN: [id number]  Referring provider: Danny Kimble MD  Dx:   Encounter Diagnosis     ICD-10-CM    1. Cervical radiculopathy  M54.12                      Subjective: Izaiah reports R shoulder being the most painful. He arrives late to therapy as he was in pain last night in c-spine and R shoulder and woke up at 4 and fell back asleep.       Objective: See treatment diary below      Assessment: Visual and VC to ensure correct exercise technique. Improvement in soft tissue quality to samantha UT, cerv paraspinals, and suboccipitals following manuals. Most challenged with samantha ER with scap retraction but was able to complete. Pt would continue to benefit from skilled PT.       Plan: Continue with current POC to address pt deficits.      Precautions :  Hx of cervical fusion.  Progressing malignancy/ degenerative brain condition       Manuals 3-26-24 4-2-24 4-9-24 4-16-24 4-23-24   STM bilateral cervical and scapular muscles                            Neuro Re-Ed         Upper cervical flexion stabilzation  10\" x12 10\" x12 10\" x12 10\"x12   LTP/ MTP while holding good cervico-scapular posture   2x10 green each 3x10 blue 3x10 blue ea    Scap retraction/ ER   Green 2x10 Green 2x10 Green 3x10                                   Ther Ex        Seated thoracic and lower cervical mobilization  Multiple levels  5\" x15 for each of 3 levels 5\" x15 for each of 3 levels 5\" x15 for each of 3 levels 5\" x15 for each of 3 levels 5\" x15 for ea    Upper trap and levetor stretch  10\" x12 samantha 10\" x12 samantha 10\" x12 samantha 10\"x12 samantha                                                    Ther Activity                        Gait Training                        Modalities        [facility] 15'  15' Defer today defer                          "

## 2024-05-07 ENCOUNTER — OFFICE VISIT (OUTPATIENT)
Dept: PHYSICAL THERAPY | Facility: CLINIC | Age: 69
End: 2024-05-07
Payer: COMMERCIAL

## 2024-05-07 DIAGNOSIS — Z09 ENCOUNTER FOR FOLLOW-UP EXAMINATION AFTER COMPLETED TREATMENT FOR CONDITIONS OTHER THAN MALIGNANT NEOPLASM: ICD-10-CM

## 2024-05-07 DIAGNOSIS — M54.12 CERVICAL RADICULOPATHY: Primary | ICD-10-CM

## 2024-05-07 PROCEDURE — 97140 MANUAL THERAPY 1/> REGIONS: CPT | Performed by: PHYSICAL THERAPIST

## 2024-05-07 PROCEDURE — 97112 NEUROMUSCULAR REEDUCATION: CPT | Performed by: PHYSICAL THERAPIST

## 2024-05-07 PROCEDURE — 97110 THERAPEUTIC EXERCISES: CPT | Performed by: PHYSICAL THERAPIST

## 2024-05-07 NOTE — PROGRESS NOTES
"Daily Note     Today's date: 2024  Patient name: Scottie Rodriguez  : 1955  MRN: [id number]  Referring provider: Danny Kimble MD  Dx:   Encounter Diagnosis     ICD-10-CM    1. Cervical radiculopathy  M54.12       2. Encounter for follow-up examination after completed treatment for conditions other than malignant neoplasm  Z09                      Subjective: Pt reports his left arm has started to get painful in (triceps and radial nerve) from shoulder to past elbow.      Objective: See treatment diary below      Assessment: Tolerated treatment well. Patient showing improved postural control and progressing well with postural strengthening exercises.  Left arm symptoms improved post treatment.      Plan: Progress treatment as tolerated.       Precautions :  Hx of cervical fusion.  Progressing malignancy/ degenerative brain condition       Manuals 24   STM bilateral cervical and scapular muscles 25' 25' '                            Neuro Re-Ed         Upper cervical flexion stabilzation 10\" x12 10\" x12 10\" x12 10\" x12 10\"x12   LTP/ MTP while holding good cervico-scapular posture 3x10 blue each  2x10 green each 3x10 blue 3x10 blue ea    Scap retraction/ ER Green 3x10  Green 2x10 Green 2x10 Green 3x10                                   Ther Ex        Seated thoracic and lower cervical mobilization  Multiple levels  5\" x15 for each of 3 levels 5\" x15 for each of 3 levels 5\" x15 for each of 3 levels 5\" x15 for each of 3 levels 5\" x15 for ea    Upper trap and levetor stretch 10\" x12 samantha 10\" x12 samantha 10\" x12 samantha 10\" x12 samantha 10\"x12 samantha                                                    Ther Activity                        Gait Training                        Modalities        [facility]   15' Defer today defer                            "

## 2024-05-21 ENCOUNTER — EVALUATION (OUTPATIENT)
Dept: PHYSICAL THERAPY | Facility: CLINIC | Age: 69
End: 2024-05-21
Payer: COMMERCIAL

## 2024-05-21 DIAGNOSIS — Z09 ENCOUNTER FOR FOLLOW-UP EXAMINATION AFTER COMPLETED TREATMENT FOR CONDITIONS OTHER THAN MALIGNANT NEOPLASM: ICD-10-CM

## 2024-05-21 DIAGNOSIS — M54.12 CERVICAL RADICULOPATHY: Primary | ICD-10-CM

## 2024-05-21 PROCEDURE — 97112 NEUROMUSCULAR REEDUCATION: CPT | Performed by: PHYSICAL THERAPIST

## 2024-05-21 PROCEDURE — 97110 THERAPEUTIC EXERCISES: CPT | Performed by: PHYSICAL THERAPIST

## 2024-05-21 PROCEDURE — 97164 PT RE-EVAL EST PLAN CARE: CPT | Performed by: PHYSICAL THERAPIST

## 2024-05-21 PROCEDURE — 97140 MANUAL THERAPY 1/> REGIONS: CPT | Performed by: PHYSICAL THERAPIST

## 2024-05-21 NOTE — LETTER
May 21, 2024    Danny Kimble MD  [address] [facility] [address]    Patient: Scottie Rodriguez   YOB: 1955   Date of Visit: 2024     Encounter Diagnosis     ICD-10-CM    1. Cervical radiculopathy  M54.12       2. Encounter for follow-up examination after completed treatment for conditions other than malignant neoplasm  Z09           Dear Dr. Kimble:    Thank you for your recent referral of Scottie Rodriguez. Please review the attached evaluation summary from Scottie's recent visit.     Please verify that you agree with the plan of care by signing the attached order.     If you have any questions or concerns, please do not hesitate to call.     I sincerely appreciate the opportunity to share in the care of one of your patients and hope to have another opportunity to work with you in the near future.       Sincerely,    Darin Ayala, PT      Referring Provider:      I certify that I have read the below Plan of Care and certify the need for these services furnished under this plan of treatment while under my care.                    Danny Kimble MD  [address] [facility] [address]  Via Fax: [phone]          PT Re-Evaluation    Today's date: 2024  Patient name: Scottie Rodriguez  : 1955  MRN: [id number]  Referring provider: Danny Kimble MD  Dx:   Encounter Diagnosis     ICD-10-CM    1. Cervical radiculopathy  M54.12       2. Encounter for follow-up examination after completed treatment for conditions other than malignant neoplasm  Z09                      Assessment  Impairments: abnormal muscle tone, abnormal or restricted ROM, activity intolerance, impaired physical strength, lacks appropriate home exercise program, pain with function and poor posture   Symptom irritability: moderate    Assessment details: Pt has been responding more favorably to PT this round than prior times.  This is true even with him only attending a few visits thus far.  He still needs to  work on improving postural control and functional strength in order to improve functionality at home.  Understanding of Dx/Px/POC: good     Prognosis: fair    Goals  ST) Pt. Will be able to sleep through the night without being awoken with pain and with minimal to no pain upon waking in 6 weeks. -GOOD PROGRESS 2) Pt. Will have not headaches while at work or with computer work at home. -SOME PROGRESS  3)  Pt.'s radicular symptoms will be centralized to neck in 6 weeks. -SOME PROGRESS  LT) Pt. Will be able to complete all chores at home without pain or limitations in 12 weeks. -SLIGHT PROGRESS   2)  Pt. Will be able to to complete all physical tasks at work without restriction or limitations in 12 weeks.  -SLIGHT PROGRESS    Plan  Patient would benefit from: PT eval and skilled physical therapy  Referral necessary: Yes  Planned modality interventions: cryotherapy, thermotherapy: hydrocollator packs and unattended electrical stimulation    Planned therapy interventions: manual therapy, neuromuscular re-education, patient education, self care, therapeutic activities, therapeutic exercise and home exercise program    Frequency: 2x week  Duration in weeks: 8  Treatment plan discussed with: patient        Subjective Evaluation    History of Present Illness  Mechanism of injury: Pt presents with worsening of chronic neck pain and bilateral radicular symptoms.  Pt is also reporting worsening of headaches.  Pt does have malignancy in brain the is progressing negatively as well.  He states he feels it is coming from his upper back and scapular area.    -24:  Pt reports not as much radicular pain and his arms are feeling better since starting therapy.          Recurrent probem    Quality of life: fair    Patient Goals  Patient goal: get rid of the numbness in his arms.  Pain  Current pain ratin  At best pain ratin  At worst pain ratin  Quality: radiating and burning  Progression:  worsening      Diagnostic Tests    FCE comments: Pt is not workingTreatments  Previous treatment: physical therapy and injection treatment      Objective     Postural Observations  Seated posture: poor  Standing posture: poor  Correction of posture: has no consistent effect    Additional Postural Observation Details  Very stiff, almost fixed posture.  Severe forward head and rounded shoulders.    Palpation   Left   Hypertonic in the levator scapulae, middle trapezius, rhomboids, suboccipitals and upper trapezius.   Muscle spasm in the levator scapulae, middle trapezius, rhomboids, suboccipitals and upper trapezius.   Tenderness of the cervical paraspinals, levator scapulae, middle trapezius, rhomboids, suboccipitals and upper trapezius.   Trigger point to cervical paraspinals, levator scapulae, middle trapezius, rhomboids, suboccipitals and upper trapezius.     Right   Hypertonic in the levator scapulae, middle trapezius, rhomboids, suboccipitals and upper trapezius.   Muscle spasm in the levator scapulae, middle trapezius, rhomboids, suboccipitals and upper trapezius.   Tenderness of the levator scapulae, middle trapezius, rhomboids, suboccipitals and upper trapezius.   Trigger point to levator scapulae, middle trapezius, rhomboids, suboccipitals and upper trapezius.     Neurological Testing     Reflexes   Left   Biceps (C5/C6): absent (0)  Brachioradialis (C6): absent (0)  Triceps (C7): trace (1+)    Right   Biceps (C5/C6): trace (1+)  Brachioradialis (C6): normal (2+)  Triceps (C7): trace (1+)    Active Range of Motion   Cervical/Thoracic Spine       Cervical  Subcranial protraction:   Restriction level: minimal  Subcranial retraction:  with pain   Restriction level: maximal  Flexion:  Restriction level: moderate  Extension:  Restriction level: maximal  Left lateral flexion:  with pain Restriction level: maximal  Right lateral flexion:  with pain Restriction level maximal  Left rotation:  with pain Restriction  "level: maximal  Right rotation:  with pain Restriction level: moderate    Strength/Myotome Testing   Cervical Spine   Neck flexion: 3-    Left   Neck lateral flexion (C3): 3-    Right   Neck lateral flexion (C3): 3-    Left Shoulder     Planes of Motion   Flexion: 3-   Extension: 3-   Abduction: 3-     Right Shoulder     Planes of Motion   Flexion: 3-   Extension: 3-   Abduction: 3-     Left Elbow   Flexion: 3  Extension: 3    Right Elbow   Flexion: 3  Extension: 3                Precautions :  Hx of cervical fusion.  Progressing malignancy/ degenerative brain condition       Manuals 5-7-24 5-21-24 4-9-24 4-16-24 4-23-24   STM bilateral cervical and scapular muscles 25' 25' 25' 25' 23'                           Neuro Re-Ed         Upper cervical flexion stabilzation 10\" x12 10\" x12 10\" x12 10\" x12 10\"x12   LTP/ MTP while holding good cervico-scapular posture 3x10 blue each 3x10 each Blue 2x10 green each 3x10 blue 3x10 blue ea    Scap retraction/ ER Green 3x10 Green 3x10 Green 2x10 Green 2x10 Green 3x10                                   Ther Ex        Seated thoracic and lower cervical mobilization  Multiple levels  5\" x15 for each of 3 levels 5\" x15 for each of 3 levels 5\" x15 for each of 3 levels 5\" x15 for each of 3 levels 5\" x15 for ea    Upper trap and levetor stretch 10\" x12 samantha 10\" x12 samantha 10\" x12 samantha 10\" x12 samantha 10\"x12 samantha                                                    Ther Activity                        Gait Training                        Modalities        [facility]   15' Defer today defer                                    "

## 2024-05-21 NOTE — PROGRESS NOTES
PT Re-Evaluation    Today's date: 2024  Patient name: Scottie Rodriguez  : 1955  MRN: [id number]  Referring provider: Danny Kimble MD  Dx:   Encounter Diagnosis     ICD-10-CM    1. Cervical radiculopathy  M54.12       2. Encounter for follow-up examination after completed treatment for conditions other than malignant neoplasm  Z09                      Assessment  Impairments: abnormal muscle tone, abnormal or restricted ROM, activity intolerance, impaired physical strength, lacks appropriate home exercise program, pain with function and poor posture   Symptom irritability: moderate    Assessment details: Pt has been responding more favorably to PT this round than prior times.  This is true even with him only attending a few visits thus far.  He still needs to work on improving postural control and functional strength in order to improve functionality at home.  Understanding of Dx/Px/POC: good     Prognosis: fair    Goals  ST) Pt. Will be able to sleep through the night without being awoken with pain and with minimal to no pain upon waking in 6 weeks. -GOOD PROGRESS 2) Pt. Will have not headaches while at work or with computer work at home. -SOME PROGRESS  3)  Pt.'s radicular symptoms will be centralized to neck in 6 weeks. -SOME PROGRESS  LT) Pt. Will be able to complete all chores at home without pain or limitations in 12 weeks. -SLIGHT PROGRESS   2)  Pt. Will be able to to complete all physical tasks at work without restriction or limitations in 12 weeks.  -SLIGHT PROGRESS    Plan  Patient would benefit from: PT eval and skilled physical therapy  Referral necessary: Yes  Planned modality interventions: cryotherapy, thermotherapy: hydrocollator packs and unattended electrical stimulation    Planned therapy interventions: manual therapy, neuromuscular re-education, patient education, self care, therapeutic activities, therapeutic exercise and home exercise program    Frequency: 2x  week  Duration in weeks: 8  Treatment plan discussed with: patient        Subjective Evaluation    History of Present Illness  Mechanism of injury: Pt presents with worsening of chronic neck pain and bilateral radicular symptoms.  Pt is also reporting worsening of headaches.  Pt does have malignancy in brain the is progressing negatively as well.  He states he feels it is coming from his upper back and scapular area.    24:  Pt reports not as much radicular pain and his arms are feeling better since starting therapy.          Recurrent probem    Quality of life: fair    Patient Goals  Patient goal: get rid of the numbness in his arms.  Pain  Current pain ratin  At best pain ratin  At worst pain ratin  Quality: radiating and burning  Progression: worsening      Diagnostic Tests    FCE comments: Pt is not workingTreatments  Previous treatment: physical therapy and injection treatment      Objective     Postural Observations  Seated posture: poor  Standing posture: poor  Correction of posture: has no consistent effect    Additional Postural Observation Details  Very stiff, almost fixed posture.  Severe forward head and rounded shoulders.    Palpation   Left   Hypertonic in the levator scapulae, middle trapezius, rhomboids, suboccipitals and upper trapezius.   Muscle spasm in the levator scapulae, middle trapezius, rhomboids, suboccipitals and upper trapezius.   Tenderness of the cervical paraspinals, levator scapulae, middle trapezius, rhomboids, suboccipitals and upper trapezius.   Trigger point to cervical paraspinals, levator scapulae, middle trapezius, rhomboids, suboccipitals and upper trapezius.     Right   Hypertonic in the levator scapulae, middle trapezius, rhomboids, suboccipitals and upper trapezius.   Muscle spasm in the levator scapulae, middle trapezius, rhomboids, suboccipitals and upper trapezius.   Tenderness of the levator scapulae, middle trapezius, rhomboids, suboccipitals and upper  "trapezius.   Trigger point to levator scapulae, middle trapezius, rhomboids, suboccipitals and upper trapezius.     Neurological Testing     Reflexes   Left   Biceps (C5/C6): absent (0)  Brachioradialis (C6): absent (0)  Triceps (C7): trace (1+)    Right   Biceps (C5/C6): trace (1+)  Brachioradialis (C6): normal (2+)  Triceps (C7): trace (1+)    Active Range of Motion   Cervical/Thoracic Spine       Cervical  Subcranial protraction:   Restriction level: minimal  Subcranial retraction:  with pain   Restriction level: maximal  Flexion:  Restriction level: moderate  Extension:  Restriction level: maximal  Left lateral flexion:  with pain Restriction level: maximal  Right lateral flexion:  with pain Restriction level maximal  Left rotation:  with pain Restriction level: maximal  Right rotation:  with pain Restriction level: moderate    Strength/Myotome Testing   Cervical Spine   Neck flexion: 3-    Left   Neck lateral flexion (C3): 3-    Right   Neck lateral flexion (C3): 3-    Left Shoulder     Planes of Motion   Flexion: 3-   Extension: 3-   Abduction: 3-     Right Shoulder     Planes of Motion   Flexion: 3-   Extension: 3-   Abduction: 3-     Left Elbow   Flexion: 3  Extension: 3    Right Elbow   Flexion: 3  Extension: 3                Precautions :  Hx of cervical fusion.  Progressing malignancy/ degenerative brain condition       Manuals 5-7-24 5-21-24 4-9-24 4-16-24 4-23-24   STM bilateral cervical and scapular muscles 25' 25' 25' 25' 23'                           Neuro Re-Ed         Upper cervical flexion stabilzation 10\" x12 10\" x12 10\" x12 10\" x12 10\"x12   LTP/ MTP while holding good cervico-scapular posture 3x10 blue each 3x10 each Blue 2x10 green each 3x10 blue 3x10 blue ea    Scap retraction/ ER Green 3x10 Green 3x10 Green 2x10 Green 2x10 Green 3x10                                   Ther Ex        Seated thoracic and lower cervical mobilization  Multiple levels  5\" x15 for each of 3 levels 5\" x15 for each of " "3 levels 5\" x15 for each of 3 levels 5\" x15 for each of 3 levels 5\" x15 for ea    Upper trap and levetor stretch 10\" x12 samantha 10\" x12 samantha 10\" x12 samantha 10\" x12 samantha 10\"x12 samantha                                                    Ther Activity                        Gait Training                        Modalities        [facility]   15' Defer today defer                      "

## 2024-05-21 NOTE — LETTER
May 31, 2024    Eric R Marisol, DO  [address] [facility] [address]    Patient: Scottie Rodriguez   YOB: 1955   Date of Visit: 2024     Encounter Diagnosis     ICD-10-CM    1. Cervical radiculopathy  M54.12       2. Encounter for follow-up examination after completed treatment for conditions other than malignant neoplasm  Z09           Dear Dr. Damon:    Thank you for your recent referral of Scottie Rodriguez. Please review the attached evaluation summary from Scottie's recent visit.     Please verify that you agree with the plan of care by signing the attached order.     If you have any questions or concerns, please do not hesitate to call.     I sincerely appreciate the opportunity to share in the care of one of your patients and hope to have another opportunity to work with you in the near future.       Sincerely,    Darin Ayala, PT      Referring Provider:      I certify that I have read the below Plan of Care and certify the need for these services furnished under this plan of treatment while under my care.                    Eric JESS DO Marisol  [address] [facility] [address]  Via Fax: [phone]          PT Re-Evaluation    Today's date: 2024  Patient name: Scottie Rodriguez  : 1955  MRN: [id number]  Referring provider: Danny Kimble MD  Dx:   Encounter Diagnosis     ICD-10-CM    1. Cervical radiculopathy  M54.12       2. Encounter for follow-up examination after completed treatment for conditions other than malignant neoplasm  Z09                      Assessment  Impairments: abnormal muscle tone, abnormal or restricted ROM, activity intolerance, impaired physical strength, lacks appropriate home exercise program, pain with function and poor posture   Symptom irritability: moderate    Assessment details: Pt has been responding more favorably to PT this round than prior times.  This is true even with him only attending a few visits thus far.  He still  needs to work on improving postural control and functional strength in order to improve functionality at home.  Understanding of Dx/Px/POC: good     Prognosis: fair    Goals  ST) Pt. Will be able to sleep through the night without being awoken with pain and with minimal to no pain upon waking in 6 weeks. -GOOD PROGRESS 2) Pt. Will have not headaches while at work or with computer work at home. -SOME PROGRESS  3)  Pt.'s radicular symptoms will be centralized to neck in 6 weeks. -SOME PROGRESS  LT) Pt. Will be able to complete all chores at home without pain or limitations in 12 weeks. -SLIGHT PROGRESS   2)  Pt. Will be able to to complete all physical tasks at work without restriction or limitations in 12 weeks.  -SLIGHT PROGRESS    Plan  Patient would benefit from: PT eval and skilled physical therapy  Referral necessary: Yes  Planned modality interventions: cryotherapy, thermotherapy: hydrocollator packs and unattended electrical stimulation    Planned therapy interventions: manual therapy, neuromuscular re-education, patient education, self care, therapeutic activities, therapeutic exercise and home exercise program    Frequency: 2x week  Duration in weeks: 8  Treatment plan discussed with: patient        Subjective Evaluation    History of Present Illness  Mechanism of injury: Pt presents with worsening of chronic neck pain and bilateral radicular symptoms.  Pt is also reporting worsening of headaches.  Pt does have malignancy in brain the is progressing negatively as well.  He states he feels it is coming from his upper back and scapular area.    -24:  Pt reports not as much radicular pain and his arms are feeling better since starting therapy.          Recurrent probem    Quality of life: fair    Patient Goals  Patient goal: get rid of the numbness in his arms.  Pain  Current pain ratin  At best pain ratin  At worst pain ratin  Quality: radiating and burning  Progression:  worsening      Diagnostic Tests    FCE comments: Pt is not workingTreatments  Previous treatment: physical therapy and injection treatment      Objective     Postural Observations  Seated posture: poor  Standing posture: poor  Correction of posture: has no consistent effect    Additional Postural Observation Details  Very stiff, almost fixed posture.  Severe forward head and rounded shoulders.    Palpation   Left   Hypertonic in the levator scapulae, middle trapezius, rhomboids, suboccipitals and upper trapezius.   Muscle spasm in the levator scapulae, middle trapezius, rhomboids, suboccipitals and upper trapezius.   Tenderness of the cervical paraspinals, levator scapulae, middle trapezius, rhomboids, suboccipitals and upper trapezius.   Trigger point to cervical paraspinals, levator scapulae, middle trapezius, rhomboids, suboccipitals and upper trapezius.     Right   Hypertonic in the levator scapulae, middle trapezius, rhomboids, suboccipitals and upper trapezius.   Muscle spasm in the levator scapulae, middle trapezius, rhomboids, suboccipitals and upper trapezius.   Tenderness of the levator scapulae, middle trapezius, rhomboids, suboccipitals and upper trapezius.   Trigger point to levator scapulae, middle trapezius, rhomboids, suboccipitals and upper trapezius.     Neurological Testing     Reflexes   Left   Biceps (C5/C6): absent (0)  Brachioradialis (C6): absent (0)  Triceps (C7): trace (1+)    Right   Biceps (C5/C6): trace (1+)  Brachioradialis (C6): normal (2+)  Triceps (C7): trace (1+)    Active Range of Motion   Cervical/Thoracic Spine       Cervical  Subcranial protraction:   Restriction level: minimal  Subcranial retraction:  with pain   Restriction level: maximal  Flexion:  Restriction level: moderate  Extension:  Restriction level: maximal  Left lateral flexion:  with pain Restriction level: maximal  Right lateral flexion:  with pain Restriction level maximal  Left rotation:  with pain Restriction  "level: maximal  Right rotation:  with pain Restriction level: moderate    Strength/Myotome Testing   Cervical Spine   Neck flexion: 3-    Left   Neck lateral flexion (C3): 3-    Right   Neck lateral flexion (C3): 3-    Left Shoulder     Planes of Motion   Flexion: 3-   Extension: 3-   Abduction: 3-     Right Shoulder     Planes of Motion   Flexion: 3-   Extension: 3-   Abduction: 3-     Left Elbow   Flexion: 3  Extension: 3    Right Elbow   Flexion: 3  Extension: 3                Precautions :  Hx of cervical fusion.  Progressing malignancy/ degenerative brain condition       Manuals 5-7-24 5-21-24 4-9-24 4-16-24 4-23-24   STM bilateral cervical and scapular muscles 25' 25' 25' 25' 23'                           Neuro Re-Ed         Upper cervical flexion stabilzation 10\" x12 10\" x12 10\" x12 10\" x12 10\"x12   LTP/ MTP while holding good cervico-scapular posture 3x10 blue each 3x10 each Blue 2x10 green each 3x10 blue 3x10 blue ea    Scap retraction/ ER Green 3x10 Green 3x10 Green 2x10 Green 2x10 Green 3x10                                   Ther Ex        Seated thoracic and lower cervical mobilization  Multiple levels  5\" x15 for each of 3 levels 5\" x15 for each of 3 levels 5\" x15 for each of 3 levels 5\" x15 for each of 3 levels 5\" x15 for ea    Upper trap and levetor stretch 10\" x12 samantha 10\" x12 samantha 10\" x12 samantha 10\" x12 samantha 10\"x12 samantha                                                    Ther Activity                        Gait Training                        Modalities        [facility]   15' Defer today defer                                      "

## 2024-06-04 ENCOUNTER — OFFICE VISIT (OUTPATIENT)
Dept: PHYSICAL THERAPY | Facility: CLINIC | Age: 69
End: 2024-06-04
Payer: COMMERCIAL

## 2024-06-04 DIAGNOSIS — Z09 ENCOUNTER FOR FOLLOW-UP EXAMINATION AFTER COMPLETED TREATMENT FOR CONDITIONS OTHER THAN MALIGNANT NEOPLASM: ICD-10-CM

## 2024-06-04 DIAGNOSIS — M54.12 CERVICAL RADICULOPATHY: Primary | ICD-10-CM

## 2024-06-04 PROCEDURE — 97110 THERAPEUTIC EXERCISES: CPT | Performed by: PHYSICAL THERAPIST

## 2024-06-04 PROCEDURE — 97140 MANUAL THERAPY 1/> REGIONS: CPT | Performed by: PHYSICAL THERAPIST

## 2024-06-04 NOTE — PROGRESS NOTES
"Daily Note     Today's date: 2024  Patient name: Scottie Rodriguez  : 1955  MRN: [id number]  Referring provider: Eric Damon DO  Dx:   Encounter Diagnosis     ICD-10-CM    1. Cervical radiculopathy  M54.12       2. Encounter for follow-up examination after completed treatment for conditions other than malignant neoplasm  Z09                      Subjective: Pt reports being more achy than intense pain in neck and shoulders.      Objective: See treatment diary below      Assessment: Tolerated treatment well. Patient having continued need for postural improvement and functional strengthening      Plan: Continue per plan of care.  Progress treatment as tolerated.          Precautions :  Hx of cervical fusion.  Progressing malignancy/ degenerative brain condition       Manuals 24   STM bilateral cervical and scapular muscles                            Neuro Re-Ed         Upper cervical flexion stabilzation 10\" x12 10\" x12 10\" x12 10\" x12 10\"x12   LTP/ MTP while holding good cervico-scapular posture 3x10 blue each 3x10 each Blue 2x10 green each 3x10 blue 3x10 blue ea    Scap retraction/ ER Green 3x10 Green 3x10 Green 2x10 Green 2x10 Green 3x10                                   Ther Ex        Seated thoracic and lower cervical mobilization  Multiple levels  5\" x15 for each of 3 levels 5\" x15 for each of 3 levels 5\" x15 for each of 3 levels 5\" x15 for each of 3 levels 5\" x15 for ea    Upper trap and levetor stretch 10\" x12 samantha 10\" x12 samantha 10\" x12 samantha 10\" x12 samantha 10\"x12 samantha                                                    Ther Activity                        Gait Training                        Modalities        P   15' Defer today defer                      "

## 2024-06-11 ENCOUNTER — APPOINTMENT (OUTPATIENT)
Dept: PHYSICAL THERAPY | Facility: CLINIC | Age: 69
End: 2024-06-11
Payer: COMMERCIAL

## 2024-06-13 ENCOUNTER — APPOINTMENT (OUTPATIENT)
Dept: PHYSICAL THERAPY | Facility: CLINIC | Age: 69
End: 2024-06-13
Payer: COMMERCIAL

## 2024-06-18 ENCOUNTER — OFFICE VISIT (OUTPATIENT)
Dept: PHYSICAL THERAPY | Facility: CLINIC | Age: 69
End: 2024-06-18
Payer: COMMERCIAL

## 2024-06-18 DIAGNOSIS — M54.12 CERVICAL RADICULOPATHY: Primary | ICD-10-CM

## 2024-06-18 PROCEDURE — 97140 MANUAL THERAPY 1/> REGIONS: CPT

## 2024-06-18 PROCEDURE — 97112 NEUROMUSCULAR REEDUCATION: CPT

## 2024-06-18 PROCEDURE — 97110 THERAPEUTIC EXERCISES: CPT

## 2024-06-18 NOTE — PROGRESS NOTES
"Daily Note     Today's date: 2024  Patient name: Scottie Rodriguez  : 1955  MRN: [id number]  Referring provider: Eric Damon DO  Dx:   Encounter Diagnosis     ICD-10-CM    1. Cervical radiculopathy  M54.12           Start Time: 739          Subjective: Izaiah denies any radicular symtpoms into L elbow today. He has been putting patio furniture together and doing more around the house with less pain in c-spine. He reports HA 1x a month.       Objective: See treatment diary below      Assessment: Improvement in soft tissue quality to samantha UT, cerv paraspinals, subocciptials following manuals. Muscular fatigue following scap retraction with samantha ER but denied any increases in pain with exercises performed. Pt would continue to benefit from skilled PT. Progress as able.       Plan: Continue with current POC to address pt deficits.      Precautions :  Hx of cervical fusion.  Progressing malignancy/ degenerative brain condition       Manuals 24   STM bilateral cervical and scapular muscles                            Neuro Re-Ed         Upper cervical flexion stabilzation 10\" x12 10\" x12 10\" x12 10\"x12 10\"x12   LTP/ MTP while holding good cervico-scapular posture 3x10 blue each 3x10 each Blue 2x10 green each 3x10 blue ea  3x10 blue ea    Scap retraction/ ER Green 3x10 Green 3x10 Green 2x10 Grn 3x10 Green 3x10                                   Ther Ex        Seated thoracic and lower cervical mobilization  Multiple levels  5\" x15 for each of 3 levels 5\" x15 for each of 3 levels 5\" x15 for each of 3 levels 5\" x15 for each  5\" x15 for ea    Upper trap and levetor stretch 10\" x12 samantha 10\" x12 samantha 10\" x12 samantha 10\"x12 samantha  10\"x12 samantha                                                    Ther Activity                        Gait Training                        Modalities        P   15'  defer                        "

## 2024-06-25 ENCOUNTER — APPOINTMENT (OUTPATIENT)
Dept: PHYSICAL THERAPY | Facility: CLINIC | Age: 69
End: 2024-06-25
Payer: COMMERCIAL

## 2024-07-02 ENCOUNTER — OFFICE VISIT (OUTPATIENT)
Dept: PHYSICAL THERAPY | Facility: CLINIC | Age: 69
End: 2024-07-02
Payer: COMMERCIAL

## 2024-07-02 DIAGNOSIS — M54.12 CERVICAL RADICULOPATHY: Primary | ICD-10-CM

## 2024-07-02 DIAGNOSIS — Z09 ENCOUNTER FOR FOLLOW-UP EXAMINATION AFTER COMPLETED TREATMENT FOR CONDITIONS OTHER THAN MALIGNANT NEOPLASM: ICD-10-CM

## 2024-07-02 PROCEDURE — 97140 MANUAL THERAPY 1/> REGIONS: CPT

## 2024-07-02 PROCEDURE — 97112 NEUROMUSCULAR REEDUCATION: CPT

## 2024-07-02 PROCEDURE — 97110 THERAPEUTIC EXERCISES: CPT

## 2024-07-02 NOTE — PROGRESS NOTES
"Daily Note     Today's date: 2024  Patient name: Scottie Rodriguez  : 1955  MRN: [id number]  Referring provider: Eric Damon DO  Dx:   Encounter Diagnosis     ICD-10-CM    1. Cervical radiculopathy  M54.12       2. Encounter for follow-up examination after completed treatment for conditions other than malignant neoplasm  Z09                      Subjective: Izaiah reports       Objective: See treatment diary below      Assessment: Improvement in soft tissue quality to samantha UT, cerv paraspinals, scap retractors following manuals. Visual and VC to ensure correct exercise technique. Fatigue following strengthening program. Pt would continue to benefit from skilled PT.       Plan: Continue with current POC to address pt deficits.      Precautions :  Hx of cervical fusion.  Progressing malignancy/ degenerative brain condition       Manuals 24   STM bilateral cervical and scapular muscles 25' 25' 25' 25' '                           Neuro Re-Ed         Upper cervical flexion stabilzation 10\" x12 10\" x12 10\" x12 10\"x12 10\"x12   LTP/ MTP while holding good cervico-scapular posture 3x10 blue each 3x10 each Blue 2x10 green each 3x10 blue ea  3x10 blue ea    Scap retraction/ ER Green 3x10 Green 3x10 Green 2x10 Grn 3x10 Grn 3x10                                   Ther Ex        Seated thoracic and lower cervical mobilization  Multiple levels  5\" x15 for each of 3 levels 5\" x15 for each of 3 levels 5\" x15 for each of 3 levels 5\" x15 for each  5\" x15 for each    Upper trap and levetor stretch 10\" x12 samantha 10\" x12 samantha 10\" x12 samantha 10\"x12 samantha  10\"x12 samantha                                                    Ther Activity                        Gait Training                        Modalities        [facility]HP   15'                            "

## 2024-07-09 ENCOUNTER — OFFICE VISIT (OUTPATIENT)
Dept: PHYSICAL THERAPY | Facility: CLINIC | Age: 69
End: 2024-07-09
Payer: COMMERCIAL

## 2024-07-09 DIAGNOSIS — Z09 ENCOUNTER FOR FOLLOW-UP EXAMINATION AFTER COMPLETED TREATMENT FOR CONDITIONS OTHER THAN MALIGNANT NEOPLASM: ICD-10-CM

## 2024-07-09 DIAGNOSIS — M54.12 CERVICAL RADICULOPATHY: Primary | ICD-10-CM

## 2024-07-09 PROCEDURE — 97112 NEUROMUSCULAR REEDUCATION: CPT

## 2024-07-09 PROCEDURE — 97140 MANUAL THERAPY 1/> REGIONS: CPT

## 2024-07-09 NOTE — PROGRESS NOTES
"Daily Note     Today's date: 2024  Patient name: Scottie Rodriguez  : 1955  MRN: [id number]  Referring provider: Eric Damon DO  Dx:   Encounter Diagnosis     ICD-10-CM    1. Cervical radiculopathy  M54.12       2. Encounter for follow-up examination after completed treatment for conditions other than malignant neoplasm  Z09                      Subjective: Izaiah denies any radicular symptoms symptoms in UE today. He continues to do work around the house and denies any extreme flare ups in symptoms.       Objective: See treatment diary below      Assessment: Visual and VC to ensure correct exercise technique. Improvement in soft tissue quality following STM to samantha UT, cerv paraspinals, suboccipitals, scap retractors. Fatigue/challenged with scap retract with samantha ER. Increased tband resistance to further progress pt strength. Denied any increases in pain with exercises performed. Progress as able.       Plan: Continue with current POC to address pt deficits.      Precautions :  Hx of cervical fusion.  Progressing malignancy/ degenerative brain condition       Manuals 24   STM bilateral cervical and scapular muscles 18' 25' 25' 25' 25'                           Neuro Re-Ed         Upper cervical flexion stabilzation 10\"x12 10\" x12 10\" x12 10\"x12 10\"x12   LTP/ MTP while holding good cervico-scapular posture 3x10 black ea  3x10 each Blue 2x10 green each 3x10 blue ea  3x10 blue ea    Scap retraction/ ER Blue 2x10 Green 3x10 Green 2x10 Grn 3x10 Grn 3x10                                   Ther Ex        Seated thoracic and lower cervical mobilization  5\" x15 for each  5\" x15 for each of 3 levels 5\" x15 for each of 3 levels 5\" x15 for each  5\" x15 for each    Upper trap and levetor stretch 10\"x12 samantha  10\" x12 samantha 10\" x12 samantha 10\"x12 samantha  10\"x12 samantha                                                    Ther Activity                        Gait Training                      "   Modalities        [facility]   15'

## 2024-07-16 ENCOUNTER — APPOINTMENT (OUTPATIENT)
Dept: PHYSICAL THERAPY | Facility: CLINIC | Age: 69
End: 2024-07-16
Payer: COMMERCIAL

## 2024-07-17 ENCOUNTER — EVALUATION (OUTPATIENT)
Dept: PHYSICAL THERAPY | Facility: CLINIC | Age: 69
End: 2024-07-17
Payer: COMMERCIAL

## 2024-07-17 DIAGNOSIS — M54.12 CERVICAL RADICULOPATHY: Primary | ICD-10-CM

## 2024-07-17 DIAGNOSIS — Z09 ENCOUNTER FOR FOLLOW-UP EXAMINATION AFTER COMPLETED TREATMENT FOR CONDITIONS OTHER THAN MALIGNANT NEOPLASM: ICD-10-CM

## 2024-07-17 PROCEDURE — 97110 THERAPEUTIC EXERCISES: CPT | Performed by: PHYSICAL THERAPIST

## 2024-07-17 PROCEDURE — 97112 NEUROMUSCULAR REEDUCATION: CPT | Performed by: PHYSICAL THERAPIST

## 2024-07-17 PROCEDURE — 97140 MANUAL THERAPY 1/> REGIONS: CPT | Performed by: PHYSICAL THERAPIST

## 2024-07-17 NOTE — PROGRESS NOTES
PT Re-Evaluation    Today's date: 2024  Patient name: Scottie Rodriguez  : 1955  MRN: [id number]  Referring provider: Eric Damon DO  Dx:   Encounter Diagnosis     ICD-10-CM    1. Cervical radiculopathy  M54.12       2. Encounter for follow-up examination after completed treatment for conditions other than malignant neoplasm  Z09                      Assessment  Impairments: abnormal muscle tone, abnormal or restricted ROM, activity intolerance, impaired physical strength, lacks appropriate home exercise program, pain with function and poor posture   Symptom irritability: moderate    Assessment details: Pt has been responding more favorably to PT this round than prior times.  This is true even with him only attending a few visits thus far.  He still needs to work on improving postural control and functional strength in order to improve functionality at home.    24:  Pt has had significant improvements in radicular symptoms with more moderate improvements in neck pain and function.  Pt needs to progress to further functional strengthening in order to progress towards goals.  Understanding of Dx/Px/POC: good     Prognosis: fair    Goals  ST) Pt. Will be able to sleep through the night without being awoken with pain and with minimal to no pain upon waking in 6 weeks. -NEARLY MET 2) Pt. Will have not headaches while at work or with computer work at home. -SOME PROGRESS  3)  Pt.'s radicular symptoms will be centralized to neck in 6 weeks. -nEARLY MET  LT) Pt. Will be able to complete all chores at home without pain or limitations in 12 weeks. -GOOD PROGRESS   2)  Pt. Will be able to to complete all physical tasks at work without restriction or limitations in 12 weeks.  -MODEST  PROGRESS    Plan  Patient would benefit from: PT eval and skilled physical therapy  Referral necessary: Yes  Planned modality interventions: cryotherapy, thermotherapy: hydrocollator packs and unattended  electrical stimulation    Planned therapy interventions: manual therapy, neuromuscular re-education, patient education, self care, therapeutic activities, therapeutic exercise and home exercise program    Frequency: 2x week  Duration in weeks: 6  Treatment plan discussed with: patient        Subjective Evaluation    History of Present Illness  Mechanism of injury: Pt presents with worsening of chronic neck pain and bilateral radicular symptoms.  Pt is also reporting worsening of headaches.  Pt does have malignancy in brain the is progressing negatively as well.  He states he feels it is coming from his upper back and scapular area.    24:  Pt reports not as much radicular pain and his arms are feeling better since starting therapy.    24:  Pt reports at least 50% improvement since resuming this bout of therapy.          Recurrent probem    Quality of life: fair    Patient Goals  Patient goal: get rid of the numbness in his arms.  Pain  Current pain rating: 3  At best pain ratin  At worst pain ratin  Quality: burning  Progression: improved      Diagnostic Tests    FCE comments: Pt is not workingTreatments  Previous treatment: physical therapy and injection treatment      Objective     Postural Observations  Seated posture: poor  Standing posture: poor  Correction of posture: has no consistent effect    Additional Postural Observation Details  Very stiff, almost fixed posture.  Severe forward head and rounded shoulders.    Palpation   Left   Hypertonic in the levator scapulae, middle trapezius, rhomboids, suboccipitals and upper trapezius.   Muscle spasm in the levator scapulae, middle trapezius, rhomboids and upper trapezius.   Tenderness of the levator scapulae, middle trapezius, rhomboids, suboccipitals and upper trapezius.   Trigger point to levator scapulae, middle trapezius, rhomboids and upper trapezius.     Right   Hypertonic in the levator scapulae, middle trapezius, rhomboids, suboccipitals  "and upper trapezius.   Muscle spasm in the levator scapulae, middle trapezius, rhomboids, suboccipitals and upper trapezius.   Tenderness of the cervical paraspinals, levator scapulae, middle trapezius, rhomboids, suboccipitals and upper trapezius.   Trigger point to cervical paraspinals, levator scapulae, middle trapezius, rhomboids, suboccipitals and upper trapezius.     Neurological Testing     Reflexes   Left   Biceps (C5/C6): absent (0)  Brachioradialis (C6): absent (0)  Triceps (C7): trace (1+)    Right   Biceps (C5/C6): trace (1+)  Brachioradialis (C6): normal (2+)  Triceps (C7): trace (1+)    Active Range of Motion   Cervical/Thoracic Spine       Cervical  Subcranial protraction:   Restriction level: minimal  Subcranial retraction:  with pain   Restriction level: maximal  Flexion:  Restriction level: moderate  Extension:  Restriction level: maximal  Left lateral flexion:  with pain Restriction level: maximal  Right lateral flexion:  with pain Restriction level maximal  Left rotation:  with pain Restriction level: maximal  Right rotation:  with pain Restriction level: moderate    Strength/Myotome Testing   Cervical Spine   Neck flexion: 3-    Left   Neck lateral flexion (C3): 3-    Right   Neck lateral flexion (C3): 3-    Left Shoulder     Planes of Motion   Flexion: 3-   Extension: 3-   Abduction: 3-     Right Shoulder     Planes of Motion   Flexion: 3-   Extension: 3-   Abduction: 3-     Left Elbow   Flexion: 3  Extension: 3    Right Elbow   Flexion: 3  Extension: 3                Precautions :  Hx of cervical fusion.  Progressing malignancy/ degenerative brain condition       Manuals 7-9-24 7-17-24 6-4-24 6-18-24 7-2-24   STM bilateral cervical and scapular muscles 18' 25' 25' 25' 25'                           Neuro Re-Ed         Upper cervical flexion stabilzation 10\"x12 10\" x12 10\" x12 10\"x12 10\"x12   LTP/ MTP while holding good cervico-scapular posture 3x10 black ea  3x10 each Blue 2x10 green each 3x10 " "blue ea  3x10 blue ea    Scap retraction/ ER Blue 2x10 Green 3x10 Green 2x10 Grn 3x10 Grn 3x10                                   Ther Ex        Seated thoracic and lower cervical mobilization  5\" x15 for each  5\" x15 for each of 3 levels 5\" x15 for each of 3 levels 5\" x15 for each  5\" x15 for each    Upper trap and levetor stretch 10\"x12 samantha  10\" x12 samantha 10\" x12 samantha 10\"x12 samantha  10\"x12 samantha                                                    Ther Activity                        Gait Training                        Modalities        [facility]   15'                          "

## 2024-07-23 ENCOUNTER — OFFICE VISIT (OUTPATIENT)
Dept: PHYSICAL THERAPY | Facility: CLINIC | Age: 69
End: 2024-07-23
Payer: COMMERCIAL

## 2024-07-23 DIAGNOSIS — Z09 ENCOUNTER FOR FOLLOW-UP EXAMINATION AFTER COMPLETED TREATMENT FOR CONDITIONS OTHER THAN MALIGNANT NEOPLASM: ICD-10-CM

## 2024-07-23 DIAGNOSIS — M54.12 CERVICAL RADICULOPATHY: Primary | ICD-10-CM

## 2024-07-23 PROCEDURE — 97110 THERAPEUTIC EXERCISES: CPT

## 2024-07-23 PROCEDURE — 97112 NEUROMUSCULAR REEDUCATION: CPT

## 2024-07-23 PROCEDURE — 97140 MANUAL THERAPY 1/> REGIONS: CPT

## 2024-07-23 NOTE — PROGRESS NOTES
"Daily Note     Today's date: 2024  Patient name: Scottie Rodriguez  : 1955  MRN: [id number]  Referring provider: Eric Damon DO  Dx:   Encounter Diagnosis     ICD-10-CM    1. Cervical radiculopathy  M54.12       2. Encounter for follow-up examination after completed treatment for conditions other than malignant neoplasm  Z09                      Subjective: Izaiah reports for the past 6 days experiencing sharp pain to posterior aspect of L UE down to elbow but not past with insidious onset.       Objective: See treatment diary below      Assessment: Visual and VC to ensure correct exercise technique. Improvement in soft tissue quality following STM to samantha UT, cerv paraspinals, samantha UT following manuals. Radicular symptoms decreased with manuals. Pt would continue to benefit from skilled PT.       Plan: Continue with current POC to address pt deficits.         Precautions :  Hx of cervical fusion.  Progressing malignancy/ degenerative brain condition       Manuals 24   STM bilateral cervical and scapular muscles 18 25' ' 25                           Neuro Re-Ed         Upper cervical flexion stabilzation 10\"x12 10\" x12 10\"x12 10\"x12 10\"x12   LTP/ MTP while holding good cervico-scapular posture 3x10 black ea  3x10 each Blue 3x10 LTP blue  3x10 black MTP  3x10 blue ea  3x10 blue ea    Scap retraction/ ER Blue 2x10 Green 3x10 Grn 2x10 Grn 3x10 Grn 3x10                                   Ther Ex        Seated thoracic and lower cervical mobilization  5\" x15 for each  5\" x15 for each of 3 levels 5\" x15 for each of 3 levels 5\" x15 for each  5\" x15 for each    Upper trap and levetor stretch 10\"x12 samantha  10\" x12 samantha 10\"x12 samantha ea 10\"x12 samantha  10\"x12 samantha                                                    Ther Activity                        Gait Training                        Modalities        [facility]HP                             "

## 2024-07-29 NOTE — PROGRESS NOTES
"Daily Note     Today's date: 2024  Patient name: Scottie Rodriguez  : 1955  MRN: [id number]  Referring provider: Eric Damon DO  Dx:   Encounter Diagnosis     ICD-10-CM    1. Cervical radiculopathy  M54.12       2. Encounter for follow-up examination after completed treatment for conditions other than malignant neoplasm  Z09                      Subjective: The patient states that his L arm has been bothering him more lately.  Radicular pain down posterior L upper arm this morning.         Objective: See treatment diary below      Assessment: Tolerated treatment well with no increase in symptoms noted during session.  He had muscle tightness along B UT region and along medial scapulas, L side > R side.  Good tolerance to manual therapy with decreased tightness and decreased radicular symptoms into L UE after.  Patient demonstrated fatigue post treatment and would benefit from continued PT      Plan: Continue per plan of care.      Precautions :  Hx of cervical fusion.  Progressing malignancy/ degenerative brain condition       Manuals 24   STM bilateral cervical and scapular muscles 18' 25' ' 25                           Neuro Re-Ed         Upper cervical flexion stabilzation 10\"x12 10\" x12 10\"x12 10\"x12 10\"x12   LTP/ MTP while holding good cervico-scapular posture 3x10 black ea  3x10 each Blue 3x10 LTP blue  3x10 black MTP  3x10 blue LTP  3x10 black MTP 3x10 blue ea    Scap retraction/ ER Blue 2x10 Green 3x10 Grn 2x10 Grn 2x10 Grn 3x10                                   Ther Ex        Seated thoracic and lower cervical mobilization  5\" x15 for each  5\" x15 for each of 3 levels 5\" x15 for each of 3 levels 5\" x15 for each of 3 levels 5\" x15 for each    Upper trap and levetor stretch 10\"x12 samantha  10\" x12 samantha 10\"x12 samantha ea 10\"x12 samantha ea 10\"x12 samantha                                                    Ther Activity                        Gait Training              "           Modalities        P

## 2024-07-30 ENCOUNTER — OFFICE VISIT (OUTPATIENT)
Dept: PHYSICAL THERAPY | Facility: CLINIC | Age: 69
End: 2024-07-30
Payer: COMMERCIAL

## 2024-07-30 DIAGNOSIS — M54.12 CERVICAL RADICULOPATHY: Primary | ICD-10-CM

## 2024-07-30 DIAGNOSIS — Z09 ENCOUNTER FOR FOLLOW-UP EXAMINATION AFTER COMPLETED TREATMENT FOR CONDITIONS OTHER THAN MALIGNANT NEOPLASM: ICD-10-CM

## 2024-07-30 PROCEDURE — 97140 MANUAL THERAPY 1/> REGIONS: CPT | Performed by: PHYSICAL THERAPIST

## 2024-07-30 PROCEDURE — 97112 NEUROMUSCULAR REEDUCATION: CPT | Performed by: PHYSICAL THERAPIST

## 2024-08-06 ENCOUNTER — EVALUATION (OUTPATIENT)
Dept: PHYSICAL THERAPY | Facility: CLINIC | Age: 69
End: 2024-08-06
Payer: OTHER MISCELLANEOUS

## 2024-08-06 DIAGNOSIS — M54.12 CERVICAL RADICULOPATHY: Primary | ICD-10-CM

## 2024-08-06 DIAGNOSIS — Z09 ENCOUNTER FOR FOLLOW-UP EXAMINATION AFTER COMPLETED TREATMENT FOR CONDITIONS OTHER THAN MALIGNANT NEOPLASM: ICD-10-CM

## 2024-08-06 PROCEDURE — 97140 MANUAL THERAPY 1/> REGIONS: CPT | Performed by: PHYSICAL THERAPIST

## 2024-08-06 NOTE — PROGRESS NOTES
"Daily Note     Today's date: 2024  Patient name: Scottie Rodriguez  : 1955  MRN: [id number]  Referring provider: Eric Damon DO  Dx:   Encounter Diagnosis     ICD-10-CM    1. Cervical radiculopathy  M54.12       2. Encounter for follow-up examination after completed treatment for conditions other than malignant neoplasm  Z09                      Subjective: Pt reports the right side of his shoulders not nearly as bad as they have been.      Objective: See treatment diary below      Assessment: Tolerated treatment well. Patient progressing well and would benefit from more progressive postural and UE strengthening.      Plan: Continue per plan of care.  Progress treatment as tolerated.       Precautions :  Hx of cervical fusion.  Progressing malignancy/ degenerative brain condition       Manuals 24   STM bilateral cervical and scapular muscles 18' 25' '                            Neuro Re-Ed         Upper cervical flexion stabilzation 10\"x12 10\" x12 10\"x12 10\"x12 10\"x12   LTP/ MTP while holding good cervico-scapular posture 3x10 black ea  3x10 each Blue 3x10 LTP blue  3x10 black MTP  3x10 blue LTP  3x10 black MTP 3x10 Black ea    Scap retraction/ ER Blue 2x10 Green 3x10 Grn 2x10 Grn 2x10 Grn 3x10                                   Ther Ex        Seated thoracic and lower cervical mobilization  5\" x15 for each  5\" x15 for each of 3 levels 5\" x15 for each of 3 levels 5\" x15 for each of 3 levels 5\" x15 for each    Upper trap and levetor stretch 10\"x12 samantha  10\" x12 samantha 10\"x12 samantha ea 10\"x12 samantha ea 10\"x12 samantha                                                    Ther Activity                        Gait Training                        Modalities        MHP                                 "

## 2024-08-13 ENCOUNTER — OFFICE VISIT (OUTPATIENT)
Dept: PHYSICAL THERAPY | Facility: CLINIC | Age: 69
End: 2024-08-13
Payer: OTHER MISCELLANEOUS

## 2024-08-13 DIAGNOSIS — Z09 ENCOUNTER FOR FOLLOW-UP EXAMINATION AFTER COMPLETED TREATMENT FOR CONDITIONS OTHER THAN MALIGNANT NEOPLASM: ICD-10-CM

## 2024-08-13 DIAGNOSIS — M54.12 CERVICAL RADICULOPATHY: Primary | ICD-10-CM

## 2024-08-13 PROCEDURE — 97140 MANUAL THERAPY 1/> REGIONS: CPT | Performed by: PHYSICAL THERAPIST

## 2024-08-13 PROCEDURE — 97112 NEUROMUSCULAR REEDUCATION: CPT | Performed by: PHYSICAL THERAPIST

## 2024-08-13 NOTE — LETTER
2024    Eric Damon DO  [address] [facility] [address]    Patient: Scottie Rodriguez   YOB: 1955   Date of Visit: 2024     Encounter Diagnosis     ICD-10-CM    1. Cervical radiculopathy  M54.12       2. Encounter for follow-up examination after completed treatment for conditions other than malignant neoplasm  Z09           Dear Dr. Damon:    Thank you for your recent referral of Scottie Rodriguez. Please review the attached evaluation summary from Scottie's recent visit.     Please verify that you agree with the plan of care by signing the attached order.     If you have any questions or concerns, please do not hesitate to call.     I sincerely appreciate the opportunity to share in the care of one of your patients and hope to have another opportunity to work with you in the near future.       Sincerely,    Darin Ayala, PT      Referring Provider:      I certify that I have read the below Plan of Care and certify the need for these services furnished under this plan of treatment while under my care.                    Eric Damon DO  [address] [facility] [address]  Via Fax: [phone]          PT Re-Evaluation    Today's date: 2024  Patient name: Scottie Rodriguez  : 1955  MRN: [id number]  Referring provider: Eric Damon DO  Dx:   Encounter Diagnosis     ICD-10-CM    1. Cervical radiculopathy  M54.12       2. Encounter for follow-up examination after completed treatment for conditions other than malignant neoplasm  Z09                      Assessment  Impairments: abnormal muscle tone, abnormal or restricted ROM, activity intolerance, impaired physical strength, lacks appropriate home exercise program, pain with function and poor posture   Symptom irritability: low    Assessment details: Pt has been responding more favorably to PT this round than prior times.  This is true even with him only attending a few visits thus far.  He still needs  to work on improving postural control and functional strength in order to improve functionality at home.    824:  With this round of therapy, patient has shown a more significant improvement in reduction of muscle spasms, some more permanent improvements in posture.  Understanding of Dx/Px/POC: good     Prognosis: fair    Goals  ST) Pt. Will be able to sleep through the night without being awoken with pain and with minimal to no pain upon waking in 6 weeks. -GOOD PROGRESS 2) Pt. Will have not headaches while at work or with computer work at home. -GOOD PROGRESS  3)  Pt.'s radicular symptoms will be centralized to neck in 6 weeks. -SOME PROGRESS  LT) Pt. Will be able to complete all chores at home without pain or limitations in 12 weeks. -GOOD PROGRESS   2)  Pt. Will be able to to complete all physical tasks at work without restriction or limitations in 12 weeks.  -GOOD PROGRESS    Plan  Patient would benefit from: PT eval and skilled physical therapy  Referral necessary: Yes  Planned modality interventions: cryotherapy, thermotherapy: hydrocollator packs and unattended electrical stimulation    Planned therapy interventions: manual therapy, neuromuscular re-education, patient education, self care, therapeutic activities, therapeutic exercise and home exercise program    Frequency: 2x week  Duration in weeks: 8  Treatment plan discussed with: patient        Subjective Evaluation    History of Present Illness  Mechanism of injury: Pt presents with worsening of chronic neck pain and bilateral radicular symptoms.  Pt is also reporting worsening of headaches.  Pt does have malignancy in brain the is progressing negatively as well.  He states he feels it is coming from his upper back and scapular area.    -24:  Pt reports not as much radicular pain and his arms are feeling better since starting therapy.          Recurrent probem    Quality of life: fair    Patient Goals  Patient goal: get rid of the  numbness in his arms.  Pain  Current pain ratin  At best pain ratin  At worst pain ratin  Quality: radiating and burning  Progression: worsening      Diagnostic Tests    FCE comments: Pt is not workingTreatments  Previous treatment: physical therapy and injection treatment    Objective     Postural Observations  Seated posture: poor  Standing posture: poor  Correction of posture: has no consistent effect    Additional Postural Observation Details  Very stiff, almost fixed posture.  Severe forward head and rounded shoulders.    Palpation   Left   Hypertonic in the levator scapulae, middle trapezius, rhomboids, suboccipitals and upper trapezius.   Muscle spasm in the levator scapulae, middle trapezius, rhomboids, suboccipitals and upper trapezius.   Tenderness of the cervical paraspinals, levator scapulae, middle trapezius, rhomboids, suboccipitals and upper trapezius.   Trigger point to cervical paraspinals, levator scapulae, middle trapezius, rhomboids, suboccipitals and upper trapezius.     Right   Hypertonic in the levator scapulae, middle trapezius, rhomboids, suboccipitals and upper trapezius.   Muscle spasm in the levator scapulae, middle trapezius, rhomboids, suboccipitals and upper trapezius.   Tenderness of the levator scapulae, middle trapezius, rhomboids, suboccipitals and upper trapezius.   Trigger point to levator scapulae, middle trapezius, rhomboids, suboccipitals and upper trapezius.     Neurological Testing     Reflexes   Left   Biceps (C5/C6): absent (0)  Brachioradialis (C6): absent (0)  Triceps (C7): trace (1+)    Right   Biceps (C5/C6): trace (1+)  Brachioradialis (C6): normal (2+)  Triceps (C7): trace (1+)    Active Range of Motion   Cervical/Thoracic Spine       Cervical  Subcranial protraction:   Restriction level: minimal  Subcranial retraction:  with pain   Restriction level: maximal  Flexion:  Restriction level: moderate  Extension:  Restriction level: maximal  Left lateral  "flexion:  with pain Restriction level: maximal  Right lateral flexion:  with pain Restriction level maximal  Left rotation:  with pain Restriction level: maximal  Right rotation:  with pain Restriction level: moderate    Strength/Myotome Testing   Cervical Spine   Neck flexion: 3-    Left   Neck lateral flexion (C3): 3-    Right   Neck lateral flexion (C3): 3-    Left Shoulder     Planes of Motion   Flexion: 3-   Extension: 3-   Abduction: 3-     Right Shoulder     Planes of Motion   Flexion: 3-   Extension: 3-   Abduction: 3-     Left Elbow   Flexion: 3  Extension: 3    Right Elbow   Flexion: 3  Extension: 3                Precautions :  Hx of cervical fusion.  Progressing malignancy/ degenerative brain condition       Manuals 8-13-24 7-17-24 7-23-24 7/30/24 8-6-24   STM bilateral cervical and scapular muscles 18' 25' 23' 25' 25'                           Neuro Re-Ed         Upper cervical flexion stabilzation 10\"x12 10\" x12 10\"x12 10\"x12 10\"x12   LTP/ MTP while holding good cervico-scapular posture 3x10 black ea  3x10 each Blue 3x10 LTP blue  3x10 black MTP  3x10 blue LTP  3x10 black MTP 3x10 Black ea    Scap retraction/ ER Blue 2x10 Green 3x10 Grn 2x10 Grn 2x10 Grn 3x10                                   Ther Ex        Seated thoracic and lower cervical mobilization  5\" x15 for each  5\" x15 for each of 3 levels 5\" x15 for each of 3 levels 5\" x15 for each of 3 levels 5\" x15 for each    Upper trap and levetor stretch 10\"x12 samantha  10\" x12 samantha 10\"x12 samantha ea 10\"x12 samantha ea 10\"x12 samantha                                                    Ther Activity                        Gait Training                        Modalities        MHP                                           "

## 2024-08-13 NOTE — PROGRESS NOTES
PT Re-Evaluation    Today's date: 2024  Patient name: Scottie Rodriguez  : 1955  MRN: [id number]  Referring provider: Eric Damon DO  Dx:   Encounter Diagnosis     ICD-10-CM    1. Cervical radiculopathy  M54.12       2. Encounter for follow-up examination after completed treatment for conditions other than malignant neoplasm  Z09                      Assessment  Impairments: abnormal muscle tone, abnormal or restricted ROM, activity intolerance, impaired physical strength, lacks appropriate home exercise program, pain with function and poor posture   Symptom irritability: low    Assessment details: Pt has been responding more favorably to PT this round than prior times.  This is true even with him only attending a few visits thus far.  He still needs to work on improving postural control and functional strength in order to improve functionality at home.    24:  With this round of therapy, patient has shown a more significant improvement in reduction of muscle spasms, some more permanent improvements in posture.  Understanding of Dx/Px/POC: good     Prognosis: fair    Goals  ST) Pt. Will be able to sleep through the night without being awoken with pain and with minimal to no pain upon waking in 6 weeks. -GOOD PROGRESS 2) Pt. Will have not headaches while at work or with computer work at home. -GOOD PROGRESS  3)  Pt.'s radicular symptoms will be centralized to neck in 6 weeks. -SOME PROGRESS  LT) Pt. Will be able to complete all chores at home without pain or limitations in 12 weeks. -GOOD PROGRESS   2)  Pt. Will be able to to complete all physical tasks at work without restriction or limitations in 12 weeks.  -GOOD PROGRESS    Plan  Patient would benefit from: PT eval and skilled physical therapy  Referral necessary: Yes  Planned modality interventions: cryotherapy, thermotherapy: hydrocollator packs and unattended electrical stimulation    Planned therapy interventions: manual  therapy, neuromuscular re-education, patient education, self care, therapeutic activities, therapeutic exercise and home exercise program    Frequency: 2x week  Duration in weeks: 8  Treatment plan discussed with: patient        Subjective Evaluation    History of Present Illness  Mechanism of injury: Pt presents with worsening of chronic neck pain and bilateral radicular symptoms.  Pt is also reporting worsening of headaches.  Pt does have malignancy in brain the is progressing negatively as well.  He states he feels it is coming from his upper back and scapular area.    24:  Pt reports not as much radicular pain and his arms are feeling better since starting therapy.          Recurrent probem    Quality of life: fair    Patient Goals  Patient goal: get rid of the numbness in his arms.  Pain  Current pain ratin  At best pain ratin  At worst pain ratin  Quality: radiating and burning  Progression: worsening      Diagnostic Tests    FCE comments: Pt is not workingTreatments  Previous treatment: physical therapy and injection treatment    Objective     Postural Observations  Seated posture: poor  Standing posture: poor  Correction of posture: has no consistent effect    Additional Postural Observation Details  Very stiff, almost fixed posture.  Severe forward head and rounded shoulders.    Palpation   Left   Hypertonic in the levator scapulae, middle trapezius, rhomboids, suboccipitals and upper trapezius.   Muscle spasm in the levator scapulae, middle trapezius, rhomboids, suboccipitals and upper trapezius.   Tenderness of the cervical paraspinals, levator scapulae, middle trapezius, rhomboids, suboccipitals and upper trapezius.   Trigger point to cervical paraspinals, levator scapulae, middle trapezius, rhomboids, suboccipitals and upper trapezius.     Right   Hypertonic in the levator scapulae, middle trapezius, rhomboids, suboccipitals and upper trapezius.   Muscle spasm in the levator scapulae,  "middle trapezius, rhomboids, suboccipitals and upper trapezius.   Tenderness of the levator scapulae, middle trapezius, rhomboids, suboccipitals and upper trapezius.   Trigger point to levator scapulae, middle trapezius, rhomboids, suboccipitals and upper trapezius.     Neurological Testing     Reflexes   Left   Biceps (C5/C6): absent (0)  Brachioradialis (C6): absent (0)  Triceps (C7): trace (1+)    Right   Biceps (C5/C6): trace (1+)  Brachioradialis (C6): normal (2+)  Triceps (C7): trace (1+)    Active Range of Motion   Cervical/Thoracic Spine       Cervical  Subcranial protraction:   Restriction level: minimal  Subcranial retraction:  with pain   Restriction level: maximal  Flexion:  Restriction level: moderate  Extension:  Restriction level: maximal  Left lateral flexion:  with pain Restriction level: maximal  Right lateral flexion:  with pain Restriction level maximal  Left rotation:  with pain Restriction level: maximal  Right rotation:  with pain Restriction level: moderate    Strength/Myotome Testing   Cervical Spine   Neck flexion: 3-    Left   Neck lateral flexion (C3): 3-    Right   Neck lateral flexion (C3): 3-    Left Shoulder     Planes of Motion   Flexion: 3-   Extension: 3-   Abduction: 3-     Right Shoulder     Planes of Motion   Flexion: 3-   Extension: 3-   Abduction: 3-     Left Elbow   Flexion: 3  Extension: 3    Right Elbow   Flexion: 3  Extension: 3                Precautions :  Hx of cervical fusion.  Progressing malignancy/ degenerative brain condition       Manuals 8-13-24 7-17-24 7-23-24 7/30/24 8-6-24   STM bilateral cervical and scapular muscles 18' 25' 23' 25' 25'                           Neuro Re-Ed         Upper cervical flexion stabilzation 10\"x12 10\" x12 10\"x12 10\"x12 10\"x12   LTP/ MTP while holding good cervico-scapular posture 3x10 black ea  3x10 each Blue 3x10 LTP blue  3x10 black MTP  3x10 blue LTP  3x10 black MTP 3x10 Black ea    Scap retraction/ ER Blue 2x10 Green 3x10 Grn " "2x10 Grn 2x10 Grn 3x10                                   Ther Ex        Seated thoracic and lower cervical mobilization  5\" x15 for each  5\" x15 for each of 3 levels 5\" x15 for each of 3 levels 5\" x15 for each of 3 levels 5\" x15 for each    Upper trap and levetor stretch 10\"x12 samantha  10\" x12 samantha 10\"x12 samantha ea 10\"x12 samantha ea 10\"x12 samantha                                                    Ther Activity                        Gait Training                        Modalities        MHP                             "

## 2024-08-20 ENCOUNTER — APPOINTMENT (OUTPATIENT)
Dept: PHYSICAL THERAPY | Facility: CLINIC | Age: 69
End: 2024-08-20
Payer: OTHER MISCELLANEOUS

## 2024-08-27 ENCOUNTER — OFFICE VISIT (OUTPATIENT)
Dept: PHYSICAL THERAPY | Facility: CLINIC | Age: 69
End: 2024-08-27
Payer: OTHER MISCELLANEOUS

## 2024-08-27 DIAGNOSIS — Z09 ENCOUNTER FOR FOLLOW-UP EXAMINATION AFTER COMPLETED TREATMENT FOR CONDITIONS OTHER THAN MALIGNANT NEOPLASM: ICD-10-CM

## 2024-08-27 DIAGNOSIS — M54.12 CERVICAL RADICULOPATHY: Primary | ICD-10-CM

## 2024-08-27 PROCEDURE — 97112 NEUROMUSCULAR REEDUCATION: CPT | Performed by: PHYSICAL THERAPIST

## 2024-08-27 PROCEDURE — 97140 MANUAL THERAPY 1/> REGIONS: CPT | Performed by: PHYSICAL THERAPIST

## 2024-08-27 PROCEDURE — 97110 THERAPEUTIC EXERCISES: CPT | Performed by: PHYSICAL THERAPIST

## 2024-08-27 NOTE — PROGRESS NOTES
"Daily Note     Today's date: 2024  Patient name: Scottie Rodriguez  : 1955  MRN: [id number]  Referring provider: Eric Damon DO  Dx:   Encounter Diagnosis     ICD-10-CM    1. Cervical radiculopathy  M54.12       2. Encounter for follow-up examination after completed treatment for conditions other than malignant neoplasm  Z09                      Subjective: Pt reports still getting a little better every week.      Objective: See treatment diary below      Assessment: Tolerated treatment well. Patient demonstrated fatigue post treatment, exhibited good technique with therapeutic exercises, and would benefit from continued PT      Plan: Continue per plan of care.  Progress treatment as tolerated.          Precautions :  Hx of cervical fusion.  Progressing malignancy/ degenerative brain condition       Manuals 24   STM bilateral cervical and scapular muscles 18'                            Neuro Re-Ed         Upper cervical flexion stabilzation 10\"x12 10\" x12 10\"x12 10\"x12 10\"x12   LTP/ MTP while holding good cervico-scapular posture 3x10 black ea  3x10 each Black 3x10 LTP blue  3x10 black MTP  3x10 blue LTP  3x10 black MTP 3x10 Black ea    Scap retraction/ ER Blue 2x10 Blue 3x10 Grn 2x10 Grn 2x10 Grn 3x10                                   Ther Ex        Seated thoracic and lower cervical mobilization  5\" x15 for each  5\" x15 for each of 3 levels 5\" x15 for each of 3 levels 5\" x15 for each of 3 levels 5\" x15 for each    Upper trap and levetor stretch 10\"x12 samantha  10\" x12 samantha 10\"x12 samantha ea 10\"x12 samantha ea 10\"x12 samantha                                                    Ther Activity                        Gait Training                        Modalities        MHP                             "

## 2024-09-03 ENCOUNTER — OFFICE VISIT (OUTPATIENT)
Dept: PHYSICAL THERAPY | Facility: CLINIC | Age: 69
End: 2024-09-03
Payer: OTHER MISCELLANEOUS

## 2024-09-03 DIAGNOSIS — M54.12 CERVICAL RADICULOPATHY: Primary | ICD-10-CM

## 2024-09-03 DIAGNOSIS — Z09 ENCOUNTER FOR FOLLOW-UP EXAMINATION AFTER COMPLETED TREATMENT FOR CONDITIONS OTHER THAN MALIGNANT NEOPLASM: ICD-10-CM

## 2024-09-03 PROCEDURE — 97112 NEUROMUSCULAR REEDUCATION: CPT

## 2024-09-03 PROCEDURE — 97110 THERAPEUTIC EXERCISES: CPT

## 2024-09-03 PROCEDURE — 97140 MANUAL THERAPY 1/> REGIONS: CPT

## 2024-09-03 NOTE — PROGRESS NOTES
"Daily Note     Today's date: 9/3/2024  Patient name: Scottie Rodriguez  : 1955  MRN: [id number]  Referring provider: Eric Damon DO  Dx:   Encounter Diagnosis     ICD-10-CM    1. Cervical radiculopathy  M54.12       2. Encounter for follow-up examination after completed treatment for conditions other than malignant neoplasm  Z09                      Subjective: Izaiah reports physical therapy is helping keep his neck pain from worsening. He denies any radicular symptoms.       Objective: See treatment diary below      Assessment: Improvement in soft tissue quality following STM to samantha UT, scap retractors, cerv paraspinals. Denied any increases in pain with exercises performed. Pt would continue to benefit from skilled PT.       Plan: Continue with current POC to address pt deficits.      Precautions :  Hx of cervical fusion.  Progressing malignancy/ degenerative brain condition       Manuals 8-13-24 8-27-24 9-3-24 7/30/24 8-6-24   STM bilateral cervical and scapular muscles 18' 25' 25' 25                           Neuro Re-Ed         Upper cervical flexion stabilzation 10\"x12 10\" x12 10\"x12 10\"x12 10\"x12   LTP/ MTP while holding good cervico-scapular posture 3x10 black ea  3x10 each Black 3x10 ea black  3x10 blue LTP  3x10 black MTP 3x10 Black ea    Scap retraction/ ER Blue 2x10 Blue 3x10 Blue 3x10 Grn 2x10 Grn 3x10                                   Ther Ex        Seated thoracic and lower cervical mobilization  5\" x15 for each  5\" x15 for each of 3 levels 5\" x15 for each of 3 levels 5\" x15 for each of 3 levels 5\" x15 for each    Upper trap and levetor stretch 10\"x12 samantha  10\" x12 samantha 10\"x12 ea samantha  10\"x12 samantha ea 10\"x12 samantha                                                    Ther Activity                        Gait Training                        Modalities        MHP                               "

## 2024-09-10 ENCOUNTER — OFFICE VISIT (OUTPATIENT)
Dept: PHYSICAL THERAPY | Facility: CLINIC | Age: 69
End: 2024-09-10
Payer: OTHER MISCELLANEOUS

## 2024-09-10 DIAGNOSIS — M54.12 CERVICAL RADICULOPATHY: Primary | ICD-10-CM

## 2024-09-10 PROCEDURE — 97110 THERAPEUTIC EXERCISES: CPT

## 2024-09-10 PROCEDURE — 97140 MANUAL THERAPY 1/> REGIONS: CPT

## 2024-09-10 PROCEDURE — 97112 NEUROMUSCULAR REEDUCATION: CPT

## 2024-09-10 NOTE — PROGRESS NOTES
"Daily Note     Today's date: 9/10/2024  Patient name: Scottie Rodriguez  : 1955  MRN: [id number]  Referring provider: Eric Damon DO  Dx:   Encounter Diagnosis     ICD-10-CM    1. Cervical radiculopathy  M54.12                      Subjective: Izaiah reports no change since last week in regards to pain lvls in c-spine. Denies any radicular symptoms.       Objective: See treatment diary below      Assessment: Visual and VC to ensure correct exercise technique. Improvement in soft tissue quality following STM to samantha UT, samantha cerv paraspinals, samantha Scap retractors following manuals in both seated and supine positions. Pt challenged with samantha ER with scap retraction but denied any increases in pain. Pt would continue to benefit from skilled PT.       Plan: Continue with current POC to address pt deficits.      Precautions :  Hx of cervical fusion.  Progressing malignancy/ degenerative brain condition       Manuals 8-13-24 8-27-24 9-3-24 9-10-24 8-6-24   STM bilateral cervical and scapular muscles 18' 25' 25' 25                           Neuro Re-Ed         Upper cervical flexion stabilzation 10\"x12 10\" x12 10\"x12 10\"x12 10\"x12   LTP/ MTP while holding good cervico-scapular posture 3x10 black ea  3x10 each Black 3x10 ea black  3x10 ea black  3x10 Black ea    Scap retraction/ ER Blue 2x10 Blue 3x10 Blue 3x10 Blue 3x10 Grn 3x10                                   Ther Ex        Seated thoracic and lower cervical mobilization  5\" x15 for each  5\" x15 for each of 3 levels 5\" x15 for each of 3 levels 5\" x15 for each of 3 levels 5\" x15 for each    Upper trap and levetor stretch 10\"x12 samantha  10\" x12 samantha 10\"x12 ea samantha  10\"x12 ea samantha  10\"x12 samantha                                                    Ther Activity                        Gait Training                        Modalities        MHP                                 "

## 2024-09-17 ENCOUNTER — OFFICE VISIT (OUTPATIENT)
Dept: PHYSICAL THERAPY | Facility: CLINIC | Age: 69
End: 2024-09-17
Payer: OTHER MISCELLANEOUS

## 2024-09-17 DIAGNOSIS — Z09 ENCOUNTER FOR FOLLOW-UP EXAMINATION AFTER COMPLETED TREATMENT FOR CONDITIONS OTHER THAN MALIGNANT NEOPLASM: ICD-10-CM

## 2024-09-17 DIAGNOSIS — M54.12 CERVICAL RADICULOPATHY: Primary | ICD-10-CM

## 2024-09-17 PROCEDURE — 97110 THERAPEUTIC EXERCISES: CPT

## 2024-09-17 PROCEDURE — 97112 NEUROMUSCULAR REEDUCATION: CPT

## 2024-09-17 PROCEDURE — 97140 MANUAL THERAPY 1/> REGIONS: CPT

## 2024-09-17 NOTE — PROGRESS NOTES
"Daily Note     Today's date: 2024  Patient name: Scottie Rodriguez  : 1955  MRN: [id number]  Referring provider: Eric Damon DO  Dx:   Encounter Diagnosis     ICD-10-CM    1. Cervical radiculopathy  M54.12       2. Encounter for follow-up examination after completed treatment for conditions other than malignant neoplasm  Z09                      Subjective: Izaiah reports doing well in reference to c-spine. He was able to seal coat his driveway. He reports having discomfort in samantha shoulder blades.       Objective: See treatment diary below      Assessment: Improvement in soft tissue quality to samantha scap musculature, samantha UT, samantha cerv paraspinals following manuals. Visual and VC to ensure correct exercise technique. Denied any increases in pain with exercises performed. Progress as able.      Plan: Continue with current POC to address pt deficits.      Precautions :  Hx of cervical fusion.  Progressing malignancy/ degenerative brain condition       Manuals 8-13-24 8-27-24 9-3-24 9-10-24 9-17-24   STM bilateral cervical and scapular muscles 18' 25' 25' 25                           Neuro Re-Ed         Upper cervical flexion stabilzation 10\"x12 10\" x12 10\"x12 10\"x12 10\"x12   LTP/ MTP while holding good cervico-scapular posture 3x10 black ea  3x10 each Black 3x10 ea black  3x10 ea black  3x10 ea black    Scap retraction/ ER Blue 2x10 Blue 3x10 Blue 3x10 Blue 3x10 Blue 3x10                                   Ther Ex        Seated thoracic and lower cervical mobilization  5\" x15 for each  5\" x15 for each of 3 levels 5\" x15 for each of 3 levels 5\" x15 for each of 3 levels 5\" x15 for each of 3 levels   Upper trap and levetor stretch 10\"x12 samantha  10\" x12 samantha 10\"x12 ea samantha  10\"x12 ea samantha  10\"x12 ea samantha                                                    Ther Activity                        Gait Training                        Modalities        MHP                                   "

## 2024-09-24 ENCOUNTER — EVALUATION (OUTPATIENT)
Dept: PHYSICAL THERAPY | Facility: CLINIC | Age: 69
End: 2024-09-24
Payer: OTHER MISCELLANEOUS

## 2024-09-24 DIAGNOSIS — Z09 ENCOUNTER FOR FOLLOW-UP EXAMINATION AFTER COMPLETED TREATMENT FOR CONDITIONS OTHER THAN MALIGNANT NEOPLASM: ICD-10-CM

## 2024-09-24 DIAGNOSIS — M54.12 CERVICAL RADICULOPATHY: Primary | ICD-10-CM

## 2024-09-24 PROCEDURE — 97110 THERAPEUTIC EXERCISES: CPT | Performed by: PHYSICAL THERAPIST

## 2024-09-24 PROCEDURE — 97140 MANUAL THERAPY 1/> REGIONS: CPT | Performed by: PHYSICAL THERAPIST

## 2024-09-24 PROCEDURE — 97112 NEUROMUSCULAR REEDUCATION: CPT | Performed by: PHYSICAL THERAPIST

## 2024-09-24 PROCEDURE — 97164 PT RE-EVAL EST PLAN CARE: CPT | Performed by: PHYSICAL THERAPIST

## 2024-09-24 NOTE — PROGRESS NOTES
"Daily Note     Today's date: 2024  Patient name: Scottie Rodriguez  : 1955  MRN: [id number]  Referring provider: Eric Damon DO  Dx:   Encounter Diagnosis     ICD-10-CM    1. Cervical radiculopathy  M54.12       2. Encounter for follow-up examination after completed treatment for conditions other than malignant neoplasm  Z09                      Subjective: Pt reports left side shoulder blade is a little tight and sore today.      Objective: See treatment diary below      Assessment: Tolerated treatment well. Patient has overall less area of tightness but still struggling to maintain good posture.      Plan: Continue per plan of care.  Progress treatment as tolerated.       Precautions :  Hx of cervical fusion.  Progressing malignancy/ degenerative brain condition       Manuals 9-24-24 8-27-24 9-3-24 9-10-24 9-17-24   STM bilateral cervical and scapular muscles 18' 25' 25' 25                           Neuro Re-Ed         Upper cervical flexion stabilzation 10\"x12 10\" x12 10\"x12 10\"x12 10\"x12   LTP/ MTP while holding good cervico-scapular posture 3x10 black ea  3x10 each Black 3x10 ea black  3x10 ea black  3x10 ea black    Scap retraction/ ER Blue 2x10 Blue 3x10 Blue 3x10 Blue 3x10 Blue 3x10                                   Ther Ex        Seated thoracic and lower cervical mobilization  5\" x15 for each  5\" x15 for each of 3 levels 5\" x15 for each of 3 levels 5\" x15 for each of 3 levels 5\" x15 for each of 3 levels   Upper trap and levetor stretch 10\"x12 samantha  10\" x12 samantha 10\"x12 ea samantha  10\"x12 ea samantha  10\"x12 ea samantha                                                    Ther Activity                        Gait Training                        Modalities        MHP                                     "

## 2024-09-24 NOTE — LETTER
2024    Eric Damon DO  [address] [facility] [address]    Patient: Scottie Rodriguez   YOB: 1955   Date of Visit: 2024     Encounter Diagnosis     ICD-10-CM    1. Cervical radiculopathy  M54.12       2. Encounter for follow-up examination after completed treatment for conditions other than malignant neoplasm  Z09           Dear Dr. Damon:    Thank you for your recent referral of Scottie Rodriguez. Please review the attached evaluation summary from Scottie's recent visit.     Please verify that you agree with the plan of care by signing the attached order.     If you have any questions or concerns, please do not hesitate to call.     I sincerely appreciate the opportunity to share in the care of one of your patients and hope to have another opportunity to work with you in the near future.       Sincerely,    Darin Ayala, PT      Referring Provider:      I certify that I have read the below Plan of Care and certify the need for these services furnished under this plan of treatment while under my care.                    Eric Damon DO  [address] [facility] [address]  Via Fax: [phone]          PT Re-Evaluation    Today's date: 2024  Patient name: Scottie Rodriguez  : 1955  MRN: [id number]  Referring provider: Eric Damon DO  Dx:   Encounter Diagnosis     ICD-10-CM    1. Cervical radiculopathy  M54.12       2. Encounter for follow-up examination after completed treatment for conditions other than malignant neoplasm  Z09           Start Time: 730  Stop Time: 815  Total time in clinic (min): 45 minutes    Assessment  Impairments: abnormal muscle tone, abnormal or restricted ROM, activity intolerance, impaired physical strength, lacks appropriate home exercise program, pain with function and poor posture   Symptom irritability: low    Assessment details: Pt has been responding more favorably to PT this round than prior times.  This is  true even with him only attending a few visits thus far.  He still needs to work on improving postural control and functional strength in order to improve functionality at home.    24:  With this round of therapy, patient has shown a more significant improvement in reduction of muscle spasms, some more permanent improvements in posture.    24:  Pt still benefiting from PT to address postural and soft tissue restriction.  A good amount of reduction in overall intensity of right sided soft tissue restrictions is present.  Understanding of Dx/Px/POC: good     Prognosis: fair    Goals  ST) Pt. Will be able to sleep through the night without being awoken with pain and with minimal to no pain upon waking in 6 weeks. -GOOD PROGRESS 2) Pt. Will have not headaches while at work or with computer work at home. -GOOD PROGRESS  3)  Pt.'s radicular symptoms will be centralized to neck in 6 weeks. -GOOD PROGRESS  LT) Pt. Will be able to complete all chores at home without pain or limitations in 12 weeks. -GOOD PROGRESS   2)  Pt. Will be able to to complete all physical tasks at work without restriction or limitations in 12 weeks.  -GOOD PROGRESS    Plan  Patient would benefit from: PT eval and skilled physical therapy  Referral necessary: Yes  Planned modality interventions: cryotherapy, thermotherapy: hydrocollator packs and unattended electrical stimulation    Planned therapy interventions: manual therapy, neuromuscular re-education, patient education, self care, therapeutic activities, therapeutic exercise and home exercise program    Frequency: 1x week  Duration in weeks: 8  Treatment plan discussed with: patient  Plan details: Progress with functional strengthening        Subjective Evaluation    History of Present Illness  Mechanism of injury: Pt presents with worsening of chronic neck pain and bilateral radicular symptoms.  Pt is also reporting worsening of headaches.  Pt does have malignancy in brain the  is progressing negatively as well.  He states he feels it is coming from his upper back and scapular area.    24:  Pt reports not as much radicular pain and his arms are feeling better since starting therapy.    24:  Pt reports having improvements but still having a significant spasm in left scapular area.          Recurrent probem    Quality of life: fair    Patient Goals  Patient goal: get rid of the numbness in his arms.  Pain  Current pain ratin  At best pain rating: 3  At worst pain ratin  Quality: radiating and burning  Progression: worsening      Diagnostic Tests    FCE comments: Pt is not workingTreatments  Previous treatment: physical therapy and injection treatment    Objective     Postural Observations  Seated posture: poor  Standing posture: poor  Correction of posture: has no consistent effect    Additional Postural Observation Details  Very stiff, almost fixed posture.  Severe forward head and rounded shoulders.    Palpation   Left   Hypertonic in the levator scapulae, middle trapezius, rhomboids, suboccipitals and upper trapezius.   Muscle spasm in the levator scapulae, middle trapezius, rhomboids, suboccipitals and upper trapezius.   Tenderness of the cervical paraspinals, levator scapulae, middle trapezius, rhomboids, suboccipitals and upper trapezius.   Trigger point to cervical paraspinals, levator scapulae, middle trapezius, rhomboids, suboccipitals and upper trapezius.     Right   Hypertonic in the levator scapulae, middle trapezius, rhomboids, suboccipitals and upper trapezius.   Muscle spasm in the rhomboids.   Tenderness of the levator scapulae, middle trapezius, rhomboids, suboccipitals and upper trapezius.   Trigger point to middle trapezius and rhomboids.     Neurological Testing     Reflexes   Left   Biceps (C5/C6): absent (0)  Brachioradialis (C6): absent (0)  Triceps (C7): trace (1+)    Right   Biceps (C5/C6): trace (1+)  Brachioradialis (C6): normal (2+)  Triceps  "(C7): trace (1+)    Active Range of Motion   Cervical/Thoracic Spine       Cervical  Subcranial protraction:   Restriction level: minimal  Subcranial retraction:  with pain   Restriction level: maximal  Flexion:  Restriction level: moderate  Extension:  Restriction level: moderate  Left lateral flexion:  with pain Restriction level: moderate  Right lateral flexion:  with pain Restriction level moderate  Left rotation:  with pain Restriction level: moderate  Right rotation:  with pain Restriction level: moderate    Strength/Myotome Testing   Cervical Spine   Neck flexion: 3-    Left   Neck lateral flexion (C3): 3-    Right   Neck lateral flexion (C3): 3-    Left Shoulder     Planes of Motion   Flexion: 3-   Extension: 3-   Abduction: 3-     Right Shoulder     Planes of Motion   Flexion: 3-   Extension: 3-   Abduction: 3-     Left Elbow   Flexion: 3  Extension: 3    Right Elbow   Flexion: 3  Extension: 3                  Precautions :  Hx of cervical fusion.  Progressing malignancy/ degenerative brain condition       Manuals 9-24-24 8-27-24 9-3-24 9-10-24 9-17-24   STM bilateral cervical and scapular muscles 18' 25' 25' 25' 25'                           Neuro Re-Ed         Upper cervical flexion stabilzation 10\"x12 10\" x12 10\"x12 10\"x12 10\"x12   LTP/ MTP while holding good cervico-scapular posture 3x10 black ea  3x10 each Black 3x10 ea black  3x10 ea black  3x10 ea black    Scap retraction/ ER Blue 3x10 Blue 3x10 Blue 3x10 Blue 3x10 Blue 3x10                                   Ther Ex        Seated thoracic and lower cervical mobilization  5\" x15 for each  5\" x15 for each of 3 levels 5\" x15 for each of 3 levels 5\" x15 for each of 3 levels 5\" x15 for each of 3 levels   Upper trap and levetor stretch 10\"x12 samantha  10\" x12 samantha 10\"x12 ea samantha  10\"x12 ea samantha  10\"x12 ea samantha                                                    Ther Activity                        Gait Training                        Modalities        MHP           "

## 2024-09-24 NOTE — PROGRESS NOTES
PT Re-Evaluation    Today's date: 2024  Patient name: Scottie Rodriguez  : 1955  MRN: [id number]  Referring provider: Eric Damon DO  Dx:   Encounter Diagnosis     ICD-10-CM    1. Cervical radiculopathy  M54.12       2. Encounter for follow-up examination after completed treatment for conditions other than malignant neoplasm  Z09           Start Time: 730  Stop Time: 815  Total time in clinic (min): 45 minutes    Assessment  Impairments: abnormal muscle tone, abnormal or restricted ROM, activity intolerance, impaired physical strength, lacks appropriate home exercise program, pain with function and poor posture   Symptom irritability: low    Assessment details: Pt has been responding more favorably to PT this round than prior times.  This is true even with him only attending a few visits thus far.  He still needs to work on improving postural control and functional strength in order to improve functionality at home.    24:  With this round of therapy, patient has shown a more significant improvement in reduction of muscle spasms, some more permanent improvements in posture.    24:  Pt still benefiting from PT to address postural and soft tissue restriction.  A good amount of reduction in overall intensity of right sided soft tissue restrictions is present.  Understanding of Dx/Px/POC: good     Prognosis: fair    Goals  ST) Pt. Will be able to sleep through the night without being awoken with pain and with minimal to no pain upon waking in 6 weeks. -GOOD PROGRESS 2) Pt. Will have not headaches while at work or with computer work at home. -GOOD PROGRESS  3)  Pt.'s radicular symptoms will be centralized to neck in 6 weeks. -GOOD PROGRESS  LT) Pt. Will be able to complete all chores at home without pain or limitations in 12 weeks. -GOOD PROGRESS   2)  Pt. Will be able to to complete all physical tasks at work without restriction or limitations in 12 weeks.  -GOOD  PROGRESS    Plan  Patient would benefit from: PT eval and skilled physical therapy  Referral necessary: Yes  Planned modality interventions: cryotherapy, thermotherapy: hydrocollator packs and unattended electrical stimulation    Planned therapy interventions: manual therapy, neuromuscular re-education, patient education, self care, therapeutic activities, therapeutic exercise and home exercise program    Frequency: 1x week  Duration in weeks: 8  Treatment plan discussed with: patient  Plan details: Progress with functional strengthening        Subjective Evaluation    History of Present Illness  Mechanism of injury: Pt presents with worsening of chronic neck pain and bilateral radicular symptoms.  Pt is also reporting worsening of headaches.  Pt does have malignancy in brain the is progressing negatively as well.  He states he feels it is coming from his upper back and scapular area.    24:  Pt reports not as much radicular pain and his arms are feeling better since starting therapy.    24:  Pt reports having improvements but still having a significant spasm in left scapular area.          Recurrent probem    Quality of life: fair    Patient Goals  Patient goal: get rid of the numbness in his arms.  Pain  Current pain ratin  At best pain rating: 3  At worst pain ratin  Quality: radiating and burning  Progression: worsening      Diagnostic Tests    FCE comments: Pt is not workingTreatments  Previous treatment: physical therapy and injection treatment    Objective     Postural Observations  Seated posture: poor  Standing posture: poor  Correction of posture: has no consistent effect    Additional Postural Observation Details  Very stiff, almost fixed posture.  Severe forward head and rounded shoulders.    Palpation   Left   Hypertonic in the levator scapulae, middle trapezius, rhomboids, suboccipitals and upper trapezius.   Muscle spasm in the levator scapulae, middle trapezius, rhomboids,  suboccipitals and upper trapezius.   Tenderness of the cervical paraspinals, levator scapulae, middle trapezius, rhomboids, suboccipitals and upper trapezius.   Trigger point to cervical paraspinals, levator scapulae, middle trapezius, rhomboids, suboccipitals and upper trapezius.     Right   Hypertonic in the levator scapulae, middle trapezius, rhomboids, suboccipitals and upper trapezius.   Muscle spasm in the rhomboids.   Tenderness of the levator scapulae, middle trapezius, rhomboids, suboccipitals and upper trapezius.   Trigger point to middle trapezius and rhomboids.     Neurological Testing     Reflexes   Left   Biceps (C5/C6): absent (0)  Brachioradialis (C6): absent (0)  Triceps (C7): trace (1+)    Right   Biceps (C5/C6): trace (1+)  Brachioradialis (C6): normal (2+)  Triceps (C7): trace (1+)    Active Range of Motion   Cervical/Thoracic Spine       Cervical  Subcranial protraction:   Restriction level: minimal  Subcranial retraction:  with pain   Restriction level: maximal  Flexion:  Restriction level: moderate  Extension:  Restriction level: moderate  Left lateral flexion:  with pain Restriction level: moderate  Right lateral flexion:  with pain Restriction level moderate  Left rotation:  with pain Restriction level: moderate  Right rotation:  with pain Restriction level: moderate    Strength/Myotome Testing   Cervical Spine   Neck flexion: 3-    Left   Neck lateral flexion (C3): 3-    Right   Neck lateral flexion (C3): 3-    Left Shoulder     Planes of Motion   Flexion: 3-   Extension: 3-   Abduction: 3-     Right Shoulder     Planes of Motion   Flexion: 3-   Extension: 3-   Abduction: 3-     Left Elbow   Flexion: 3  Extension: 3    Right Elbow   Flexion: 3  Extension: 3                  Precautions :  Hx of cervical fusion.  Progressing malignancy/ degenerative brain condition       Manuals 9-24-24 8-27-24 9-3-24 9-10-24 9-17-24   STM bilateral cervical and scapular muscles 18' 25' 25' 25' 25'          "                  Neuro Re-Ed         Upper cervical flexion stabilzation 10\"x12 10\" x12 10\"x12 10\"x12 10\"x12   LTP/ MTP while holding good cervico-scapular posture 3x10 black ea  3x10 each Black 3x10 ea black  3x10 ea black  3x10 ea black    Scap retraction/ ER Blue 3x10 Blue 3x10 Blue 3x10 Blue 3x10 Blue 3x10                                   Ther Ex        Seated thoracic and lower cervical mobilization  5\" x15 for each  5\" x15 for each of 3 levels 5\" x15 for each of 3 levels 5\" x15 for each of 3 levels 5\" x15 for each of 3 levels   Upper trap and levetor stretch 10\"x12 samantha  10\" x12 samantha 10\"x12 ea samantha  10\"x12 ea samantha  10\"x12 ea samantha                                                    Ther Activity                        Gait Training                        Modalities        MHP                         "

## 2024-10-01 ENCOUNTER — OFFICE VISIT (OUTPATIENT)
Dept: PHYSICAL THERAPY | Facility: CLINIC | Age: 69
End: 2024-10-01
Payer: OTHER MISCELLANEOUS

## 2024-10-01 DIAGNOSIS — Z09 ENCOUNTER FOR FOLLOW-UP EXAMINATION AFTER COMPLETED TREATMENT FOR CONDITIONS OTHER THAN MALIGNANT NEOPLASM: ICD-10-CM

## 2024-10-01 DIAGNOSIS — M54.12 CERVICAL RADICULOPATHY: Primary | ICD-10-CM

## 2024-10-01 PROCEDURE — 97112 NEUROMUSCULAR REEDUCATION: CPT

## 2024-10-01 PROCEDURE — 97140 MANUAL THERAPY 1/> REGIONS: CPT

## 2024-10-01 PROCEDURE — 97110 THERAPEUTIC EXERCISES: CPT

## 2024-10-01 NOTE — PROGRESS NOTES
"Daily Note     Today's date: 10/1/2024  Patient name: Scottie Rodriguez  : 1955  MRN: [id number]  Referring provider: Eric Damon DO  Dx:   Encounter Diagnosis     ICD-10-CM    1. Cervical radiculopathy  M54.12       2. Encounter for follow-up examination after completed treatment for conditions other than malignant neoplasm  Z09                      Subjective: Izaiah reports discomfort to t-spine today. 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       Objective: See treatment diary below      Assessment: Visual and VC to ensure correct exercise technique. Improvement in soft tissue quality following STM to samantha UT, samantha cerv paraspinals, and samantha scap retractors. Significant TP to samantha scap retractors. Pt would continue to benefit from skilled PT. Continues to fatigue with scap retraction with samantha ER exercise. Consider machine weights NV to progress strength. Pt would continue to benefit from skilled PT.       Plan: Continue with current POC to address pt deficits.           Precautions :  Hx of cervical fusion.  Progressing malignancy/ degenerative brain condition       Manuals 9-24-24 10-1-24 9-3-24 9-10-24 9-17-24   STM bilateral cervical and scapular muscles 18' 23' 25' 25' 25'                           Neuro Re-Ed         Upper cervical flexion stabilzation 10\"x12 10\"x12 10\"x12 10\"x12 10\"x12   LTP/ MTP while holding good cervico-scapular posture 3x10 black ea  3x10 black ea  3x10 ea black  3x10 ea black  3x10 ea black    Scap retraction/ ER Blue 3x10 Blue 3x10 Blue 3x10 Blue 3x10 Blue 3x10                                   Ther Ex        Seated thoracic and lower cervical mobilization  5\" x15 for each  5\"x15 for ea  5\" x15 for each of 3 levels 5\" x15 for each of 3 levels 5\" x15 for each of 3 levels   Upper trap and levetor stretch 10\"x12 samantha  10\"x12 samantha ea  10\"x12 ea samantha  10\"x12 ea samantha  10\"x12 ea samantha                               "                      Ther Activity                        Gait Training                        Modalities        P

## 2024-10-08 ENCOUNTER — OFFICE VISIT (OUTPATIENT)
Dept: PHYSICAL THERAPY | Facility: CLINIC | Age: 69
End: 2024-10-08
Payer: OTHER MISCELLANEOUS

## 2024-10-08 DIAGNOSIS — Z09 ENCOUNTER FOR FOLLOW-UP EXAMINATION AFTER COMPLETED TREATMENT FOR CONDITIONS OTHER THAN MALIGNANT NEOPLASM: ICD-10-CM

## 2024-10-08 DIAGNOSIS — M54.12 CERVICAL RADICULOPATHY: Primary | ICD-10-CM

## 2024-10-08 PROCEDURE — 97110 THERAPEUTIC EXERCISES: CPT

## 2024-10-08 PROCEDURE — 97140 MANUAL THERAPY 1/> REGIONS: CPT

## 2024-10-08 PROCEDURE — 97112 NEUROMUSCULAR REEDUCATION: CPT

## 2024-10-08 NOTE — PROGRESS NOTES
"Daily Note     Today's date: 10/8/2024  Patient name: Scottie Rodriguez  : 1955  MRN: [id number]  Referring provider: Eric Damon DO  Dx:   Encounter Diagnosis     ICD-10-CM    1. Cervical radiculopathy  M54.12       2. Encounter for follow-up examination after completed treatment for conditions other than malignant neoplasm  Z09                      Subjective: Izaiah reports discomfort in samantha scap when sleeping. He denies any radicular symptoms.       Objective: See treatment diary below      Assessment: Visual and VC to ensure correct exercise technique. Progressed tband LTP/MTP to machine lat pulldown and machine row with increased challenge; denied any increases in pain. Improvement in soft tissue quality following STM to samantha UT, samantha cerv paraspinals, samantha scap retractors. Pt would continue to benefit from skilled PT.       Plan: Continue with current POC to address pt deficits.      Precautions :  Hx of cervical fusion.  Progressing malignancy/ degenerative brain condition       Manuals 9-24-24 10-1-24 10-8-24 9-10-24 9-17-24   STM bilateral cervical and scapular muscles 18' 23' 23' 25                           Neuro Re-Ed         Upper cervical flexion stabilzation 10\"x12 10\"x12 10\"x12 10\"x12 10\"x12   LTP/ MTP while holding good cervico-scapular posture 3x10 black ea  3x10 black ea   3x10 ea black  3x10 ea black    Scap retraction/ ER Blue 3x10 Blue 3x10 Blue 3x10 Blue 3x10 Blue 3x10   Lat pulldown    30# 3x10     Machine row    25# 3x10                     Ther Ex        Seated thoracic and lower cervical mobilization  5\" x15 for each  5\"x15 for ea  5\"x15 for ea  5\" x15 for each of 3 levels 5\" x15 for each of 3 levels   Upper trap and levetor stretch 10\"x12 samantha  10\"x12 samantha ea  10\"x12 samantha ea  10\"x12 ea samantha  10\"x12 ea samantha                                                    Ther Activity                        Gait Training                        Modalities        MHP                           "

## 2024-10-15 ENCOUNTER — APPOINTMENT (OUTPATIENT)
Dept: PHYSICAL THERAPY | Facility: CLINIC | Age: 69
End: 2024-10-15
Payer: OTHER MISCELLANEOUS

## 2024-10-16 ENCOUNTER — OFFICE VISIT (OUTPATIENT)
Dept: PHYSICAL THERAPY | Facility: CLINIC | Age: 69
End: 2024-10-16
Payer: OTHER MISCELLANEOUS

## 2024-10-16 DIAGNOSIS — Z09 ENCOUNTER FOR FOLLOW-UP EXAMINATION AFTER COMPLETED TREATMENT FOR CONDITIONS OTHER THAN MALIGNANT NEOPLASM: ICD-10-CM

## 2024-10-16 DIAGNOSIS — M54.12 CERVICAL RADICULOPATHY: Primary | ICD-10-CM

## 2024-10-16 PROCEDURE — 97110 THERAPEUTIC EXERCISES: CPT

## 2024-10-16 PROCEDURE — 97112 NEUROMUSCULAR REEDUCATION: CPT

## 2024-10-16 PROCEDURE — 97140 MANUAL THERAPY 1/> REGIONS: CPT

## 2024-10-16 NOTE — PROGRESS NOTES
"Daily Note     Today's date: 10/16/2024  Patient name: Scottie Rodriguez  : 1955  MRN: [id number]  Referring provider: Eric Damon DO  Dx:   Encounter Diagnosis     ICD-10-CM    1. Cervical radiculopathy  M54.12       2. Encounter for follow-up examination after completed treatment for conditions other than malignant neoplasm  Z09                      Subjective: Izaiah reports some discomfort to L aspect of c-spine and some discomfort to samantha scap retractors but overall feels he is improving.       Objective: See treatment diary below      Assessment: Visual and VC to ensure correct exercise technique. Improvement in soft tissue quality following STM to samantha cerv paraspinals, samantha UT, samantha scap retractors. Able to increase resistance for machines and increase reps with muscular fatigue following. Pt would continue to benefit from skilled PT.       Plan: Continue with current POC to address pt deficits.      Precautions :  Hx of cervical fusion.  Progressing malignancy/ degenerative brain condition       Manuals 9-24-24 10-1-24 10-8-24 10-16-24 9-17-24   STM bilateral cervical and scapular muscles 18' 23' 23' 23 25                           Neuro Re-Ed         Upper cervical flexion stabilzation 10\"x12 10\"x12 10\"x12 10'x12 10\"x12   LTP/ MTP while holding good cervico-scapular posture 3x10 black ea  3x10 black ea    3x10 ea black    Scap retraction/ ER Blue 3x10 Blue 3x10 Blue 3x10 Blue 3x10 Blue 3x10   Lat pulldown    30# 3x10 50# 3x15    Machine row    25# 3x10 30# 3x10                    Ther Ex        Seated thoracic and lower cervical mobilization  5\" x15 for each  5\"x15 for ea  5\"x15 for ea  5\"x15 ea 5\" x15 for each of 3 levels   Upper trap and levetor stretch 10\"x12 samantha  10\"x12 samantha ea  10\"x12 samantha ea  10\"x12 samantha ea  10\"x12 ea samantha                                                    Ther Activity                        Gait Training                        Modalities        MHP                           "

## 2024-10-18 ENCOUNTER — OFFICE VISIT (OUTPATIENT)
Dept: PHYSICAL THERAPY | Facility: CLINIC | Age: 69
End: 2024-10-18
Payer: OTHER MISCELLANEOUS

## 2024-10-18 DIAGNOSIS — M54.12 CERVICAL RADICULOPATHY: Primary | ICD-10-CM

## 2024-10-18 DIAGNOSIS — Z09 ENCOUNTER FOR FOLLOW-UP EXAMINATION AFTER COMPLETED TREATMENT FOR CONDITIONS OTHER THAN MALIGNANT NEOPLASM: ICD-10-CM

## 2024-10-18 PROCEDURE — 97140 MANUAL THERAPY 1/> REGIONS: CPT | Performed by: PHYSICAL THERAPIST

## 2024-10-18 PROCEDURE — 97112 NEUROMUSCULAR REEDUCATION: CPT | Performed by: PHYSICAL THERAPIST

## 2024-10-18 NOTE — PROGRESS NOTES
"Daily Note     Today's date: 10/18/2024  Patient name: Scottie Rodriguez  : 1955  MRN: [id number]  Referring provider: Eric Damon DO  Dx:   Encounter Diagnosis     ICD-10-CM    1. Cervical radiculopathy  M54.12       2. Encounter for follow-up examination after completed treatment for conditions other than malignant neoplasm  Z09                      Subjective: Pt reports he is sore from starting machines but wants to continue      Objective: See treatment diary below      Assessment: Tolerated treatment well. Patient demonstrated fatigue post treatment, exhibited good technique with therapeutic exercises, and would benefit from continued PT      Plan: Continue per plan of care.  Progress treatment as tolerated.       Precautions :  Hx of cervical fusion.  Progressing malignancy/ degenerative brain condition       Manuals 9-24-24 10-1-24 10-8-24 10-16-24 10-18-24   STM bilateral cervical and scapular muscles 18' 23' 23'                            Neuro Re-Ed         Upper cervical flexion stabilzation 10\"x12 10\"x12 10\"x12 10'x12 10\"x12   LTP/ MTP while holding good cervico-scapular posture 3x10 black ea  3x10 black ea    3x10 ea black    Scap retraction/ ER Blue 3x10 Blue 3x10 Blue 3x10 Blue 3x10 Blue 3x10   Lat pulldown    30# 3x10 50# 3x15 50# 3x15   Machine row    25# 3x10 30# 3x10 30# 3x10                   Ther Ex        Seated thoracic and lower cervical mobilization  5\" x15 for each  5\"x15 for ea  5\"x15 for ea  5\"x15 ea 5\" x15 for each of 3 levels   Upper trap and levetor stretch 10\"x12 samantha  10\"x12 samantha ea  10\"x12 samantha ea  10\"x12 samantha ea  10\"x12 ea samantha                                                    Ther Activity                        Gait Training                        Modalities        MHP                               "

## 2024-10-22 ENCOUNTER — OFFICE VISIT (OUTPATIENT)
Dept: PHYSICAL THERAPY | Facility: CLINIC | Age: 69
End: 2024-10-22
Payer: OTHER MISCELLANEOUS

## 2024-10-22 DIAGNOSIS — M54.12 CERVICAL RADICULOPATHY: Primary | ICD-10-CM

## 2024-10-22 DIAGNOSIS — Z09 ENCOUNTER FOR FOLLOW-UP EXAMINATION AFTER COMPLETED TREATMENT FOR CONDITIONS OTHER THAN MALIGNANT NEOPLASM: ICD-10-CM

## 2024-10-22 PROCEDURE — 97140 MANUAL THERAPY 1/> REGIONS: CPT | Performed by: PHYSICAL THERAPIST

## 2024-10-22 PROCEDURE — 97112 NEUROMUSCULAR REEDUCATION: CPT | Performed by: PHYSICAL THERAPIST

## 2024-10-22 NOTE — PROGRESS NOTES
"Daily Note     Today's date: 10/22/2024  Patient name: Scottie Rodriguez  : 1955  MRN: [id number]  Referring provider: Eric Damon DO  Dx:   Encounter Diagnosis     ICD-10-CM    1. Cervical radiculopathy  M54.12       2. Encounter for follow-up examination after completed treatment for conditions other than malignant neoplasm  Z09                      Subjective: Pt reports he doing a little better with the tightness in his muscles.      Objective: See treatment diary below      Assessment: Tolerated treatment well. Patient demonstrated fatigue post treatment, exhibited good technique with therapeutic exercises, and would benefit from continued PT      Plan: Continue per plan of care.  Progress treatment as tolerated.       Precautions :  Hx of cervical fusion.  Progressing malignancy/ degenerative brain condition       Manuals 10-22-24 10-1-24 10-8-24 10-16-24 10-18-24   STM bilateral cervical and scapular muscles 25' 23'                            Neuro Re-Ed         Upper cervical flexion stabilzation 10\"x12 10\"x12 10\"x12 10'x12 10\"x12   LTP/ MTP while holding good cervico-scapular posture 3x10 black ea  3x10 black ea    3x10 ea black    Scap retraction/ ER Blue 3x10 Blue 3x10 Blue 3x10 Blue 3x10 Blue 3x10   Lat pulldown  50# 3x10  30# 3x10 50# 3x15 50# 3x15   Machine row  35# 3x10  25# 3x10 30# 3x10 30# 3x10                   Ther Ex        Seated thoracic and lower cervical mobilization  5\" x15 for each  5\"x15 for ea  5\"x15 for ea  5\"x15 ea 5\" x15 for each of 3 levels   Upper trap and levetor stretch 10\"x12 samantha  10\"x12 samantha ea  10\"x12 samantha ea  10\"x12 samantha ea  10\"x12 ea samantha                                                    Ther Activity                        Gait Training                        Modalities        MHP                                 "

## 2024-10-25 ENCOUNTER — OFFICE VISIT (OUTPATIENT)
Dept: PHYSICAL THERAPY | Facility: CLINIC | Age: 69
End: 2024-10-25
Payer: OTHER MISCELLANEOUS

## 2024-10-25 DIAGNOSIS — Z09 ENCOUNTER FOR FOLLOW-UP EXAMINATION AFTER COMPLETED TREATMENT FOR CONDITIONS OTHER THAN MALIGNANT NEOPLASM: ICD-10-CM

## 2024-10-25 DIAGNOSIS — M54.12 CERVICAL RADICULOPATHY: Primary | ICD-10-CM

## 2024-10-25 PROCEDURE — 97140 MANUAL THERAPY 1/> REGIONS: CPT | Performed by: PHYSICAL THERAPIST

## 2024-10-25 PROCEDURE — 97112 NEUROMUSCULAR REEDUCATION: CPT | Performed by: PHYSICAL THERAPIST

## 2024-10-25 NOTE — PROGRESS NOTES
"Daily Note     Today's date: 10/25/2024  Patient name: Scottie Rodriguez  : 1955  MRN: [id number]  Referring provider: Eric Damon DO  Dx:   Encounter Diagnosis     ICD-10-CM    1. Cervical radiculopathy  M54.12       2. Encounter for follow-up examination after completed treatment for conditions other than malignant neoplasm  Z09                      Subjective: No new complaints      Objective: See treatment diary below      Assessment: Tolerated treatment well. Patient continues to have palpable improvements in neck and upper trap with more restrictions in scapular area.      Plan: Continue per plan of care.  Progress treatment as tolerated.       Precautions :  Hx of cervical fusion.  Progressing malignancy/ degenerative brain condition       Manuals 10-22-24 10-25-24 10-8-24 10-16-24 10-18-24   STM bilateral cervical and scapular muscles                            Neuro Re-Ed         Upper cervical flexion stabilzation 10\"x12 10\"x12 10\"x12 10'x12 10\"x12   LTP/ MTP while holding good cervico-scapular posture 3x10 black ea  3x10 black ea    3x10 ea black    Scap retraction/ ER Blue 3x10 Blue 3x10 Blue 3x10 Blue 3x10 Blue 3x10   Lat pulldown  50# 3x10  30# 3x10 50# 3x15 50# 3x15   Machine row  35# 3x10  25# 3x10 30# 3x10 30# 3x10                   Ther Ex        Seated thoracic and lower cervical mobilization  5\" x15 for each  5\"x15 for ea  5\"x15 for ea  5\"x15 ea 5\" x15 for each of 3 levels   Upper trap and levetor stretch 10\"x12 samantha  10\"x12 samantha ea  10\"x12 samantha ea  10\"x12 samantha ea  10\"x12 ea samantha                                                    Ther Activity                        Gait Training                        Modalities        MHP                                   "

## 2024-10-29 ENCOUNTER — APPOINTMENT (OUTPATIENT)
Dept: PHYSICAL THERAPY | Facility: CLINIC | Age: 69
End: 2024-10-29
Payer: OTHER MISCELLANEOUS

## 2024-11-05 ENCOUNTER — OFFICE VISIT (OUTPATIENT)
Dept: PHYSICAL THERAPY | Facility: CLINIC | Age: 69
End: 2024-11-05
Payer: OTHER MISCELLANEOUS

## 2024-11-05 DIAGNOSIS — M54.12 CERVICAL RADICULOPATHY: Primary | ICD-10-CM

## 2024-11-05 DIAGNOSIS — Z09 ENCOUNTER FOR FOLLOW-UP EXAMINATION AFTER COMPLETED TREATMENT FOR CONDITIONS OTHER THAN MALIGNANT NEOPLASM: ICD-10-CM

## 2024-11-05 PROCEDURE — 97112 NEUROMUSCULAR REEDUCATION: CPT

## 2024-11-05 PROCEDURE — 97140 MANUAL THERAPY 1/> REGIONS: CPT

## 2024-11-05 PROCEDURE — 97110 THERAPEUTIC EXERCISES: CPT

## 2024-11-05 NOTE — PROGRESS NOTES
"Daily Note     Today's date: 2024  Patient name: Scottie Rodriguez  : 1955  MRN: [id number]  Referring provider: Eric Damon DO  Dx:   Encounter Diagnosis     ICD-10-CM    1. Cervical radiculopathy  M54.12       2. Encounter for follow-up examination after completed treatment for conditions other than malignant neoplasm  Z09                      Subjective: Izaiah reports LB is painful today as he was in the car driving to [place] and back. He reports still doing better in reference to c-spine.       Objective: See treatment diary below      Assessment: Improvement in soft tissue quality to samantha UT, cerv paraspinals, samantha scap retractors, and suboccipitals following manuals. Increased TP noted this visit most likely due to the long drive and static posturing to [place]. Visual and VC to ensure correct exercise technique. Muscular fatigue following program but denied any increases in pain. Progress as able.       Plan: Continue with current POC to address pt deficits.      Precautions :  Hx of cervical fusion.  Progressing malignancy/ degenerative brain condition       Manuals 10-22-24 10-25-24 11-5-24 10-16-24 10-18-24   STM bilateral cervical and scapular muscles ' 23' 23' 23                           Neuro Re-Ed         Upper cervical flexion stabilzation 10\"x12 10\"x12 10\"x12 10'x12 10\"x12   LTP/ MTP while holding good cervico-scapular posture 3x10 black ea  3x10 black ea    3x10 ea black    Scap retraction/ ER Blue 3x10 Blue 3x10 Blue 3x10 Blue 3x10 Blue 3x10   Lat pulldown  50# 3x10  50# 3x10 50# 3x15 50# 3x15   Machine row  35# 3x10  35# 3x10 30# 3x10 30# 3x10                   Ther Ex        Seated thoracic and lower cervical mobilization  5\" x15 for each  5\"x15 for ea  5\" x15 for ea  5\"x15 ea 5\" x15 for each of 3 levels   Upper trap and levetor stretch 10\"x12 samantha  10\"x12 samantha ea  10\"x12 samantha ea  10\"x12 samantha ea  10\"x12 ea samantha                                                    Ther Activity "                        Gait Training                        Modalities        [facility]

## 2024-11-08 ENCOUNTER — OFFICE VISIT (OUTPATIENT)
Dept: PHYSICAL THERAPY | Facility: CLINIC | Age: 69
End: 2024-11-08
Payer: OTHER MISCELLANEOUS

## 2024-11-08 DIAGNOSIS — M54.12 CERVICAL RADICULOPATHY: Primary | ICD-10-CM

## 2024-11-08 DIAGNOSIS — Z09 ENCOUNTER FOR FOLLOW-UP EXAMINATION AFTER COMPLETED TREATMENT FOR CONDITIONS OTHER THAN MALIGNANT NEOPLASM: ICD-10-CM

## 2024-11-08 PROCEDURE — 97140 MANUAL THERAPY 1/> REGIONS: CPT | Performed by: PHYSICAL THERAPIST

## 2024-11-08 PROCEDURE — 97110 THERAPEUTIC EXERCISES: CPT | Performed by: PHYSICAL THERAPIST

## 2024-11-08 PROCEDURE — 97112 NEUROMUSCULAR REEDUCATION: CPT | Performed by: PHYSICAL THERAPIST

## 2024-11-08 NOTE — PROGRESS NOTES
"Daily Note     Today's date: 2024  Patient name: Scottie Rodriguez  : 1955  MRN: [id number]  Referring provider: Eric Damon DO  Dx:   Encounter Diagnosis     ICD-10-CM    1. Cervical radiculopathy  M54.12       2. Encounter for follow-up examination after completed treatment for conditions other than malignant neoplasm  Z09                      Subjective: Pt reports having spasms between his shoulder blades.      Objective: See treatment diary below      Assessment: Tolerated treatment well. Patient showing improving strength as well as reduces cervical spasm despite some increase in scapular restrictions      Plan: Continue per plan of care.  Progress treatment as tolerated.       Precautions :  Hx of cervical fusion.  Progressing malignancy/ degenerative brain condition       Manuals 10-22-24 10-25-24 11-5-24 11-8-24 10-18-24   STM bilateral cervical and scapular muscles                            Neuro Re-Ed         Upper cervical flexion stabilzation 10\"x12 10\"x12 10\"x12 10'x12 10\"x12   LTP/ MTP while holding good cervico-scapular posture 3x10 black ea  3x10 black ea   3x10 black ea 3x10 ea black    Scap retraction/ ER Blue 3x10 Blue 3x10 Blue 3x10 Blue 3x10 Blue 3x10   Lat pulldown  50# 3x10  50# 3x10 55# 3x15 50# 3x15   Machine row  35# 3x10  35# 3x10 35# 3x10 30# 3x10                   Ther Ex        Seated thoracic and lower cervical mobilization  5\" x15 for each  5\"x15 for ea  5\" x15 for ea  5\"x15 ea 5\" x15 for each of 3 levels   Upper trap and levetor stretch 10\"x12 samantha  10\"x12 samantha ea  10\"x12 samantha ea  10\"x12 samantha ea  10\"x12 ea samantha                                                    Ther Activity                        Gait Training                        Modalities        MHP                                       "

## 2024-11-15 ENCOUNTER — OFFICE VISIT (OUTPATIENT)
Dept: PHYSICAL THERAPY | Facility: CLINIC | Age: 69
End: 2024-11-15
Payer: OTHER MISCELLANEOUS

## 2024-11-15 DIAGNOSIS — Z09 ENCOUNTER FOR FOLLOW-UP EXAMINATION AFTER COMPLETED TREATMENT FOR CONDITIONS OTHER THAN MALIGNANT NEOPLASM: ICD-10-CM

## 2024-11-15 DIAGNOSIS — M54.12 CERVICAL RADICULOPATHY: Primary | ICD-10-CM

## 2024-11-15 PROCEDURE — 97140 MANUAL THERAPY 1/> REGIONS: CPT | Performed by: PHYSICAL THERAPIST

## 2024-11-15 PROCEDURE — 97112 NEUROMUSCULAR REEDUCATION: CPT | Performed by: PHYSICAL THERAPIST

## 2024-11-15 NOTE — PROGRESS NOTES
"Daily Note     Today's date: 11/15/2024  Patient name: Scottie Rodriguez  : 1955  MRN: [id number]  Referring provider: Eric Damon DO  Dx:   Encounter Diagnosis     ICD-10-CM    1. Cervical radiculopathy  M54.12       2. Encounter for follow-up examination after completed treatment for conditions other than malignant neoplasm  Z09                      Subjective: Pt reports neck remains fairly uncomfortable but he has been able to sleep through the night, so he is pleased with his progress.      Objective: See treatment diary below      Assessment: Tolerated treatment well. Patient showing somewhat less severe cervical and scapular muscle spasms and should be able to progress strengthening .      Plan: Continue per plan of care.  Progress treatment as tolerated.       Precautions :  Hx of cervical fusion.  Progressing malignancy/ degenerative brain condition       Manuals 10-22-24 10-25-24 11-5-24 11-8-24 11-15-24   STM bilateral cervical and scapular muscles                            Neuro Re-Ed         Upper cervical flexion stabilzation 10\"x12 10\"x12 10\"x12 10'x12 10\"x12   LTP/ MTP while holding good cervico-scapular posture 3x10 black ea  3x10 black ea   3x10 black ea 3x10 ea black    Scap retraction/ ER Blue 3x10 Blue 3x10 Blue 3x10 Blue 3x10 Blue 3x10   Lat pulldown  50# 3x10  50# 3x10 55# 3x15 55# 3x15   Machine row  35# 3x10  35# 3x10 35# 3x10 35# 3x10                   Ther Ex        Seated thoracic and lower cervical mobilization  5\" x15 for each  5\"x15 for ea  5\" x15 for ea  5\"x15 ea 5\" x15 for each of 3 levels   Upper trap and levetor stretch 10\"x12 samantha  10\"x12 samantha ea  10\"x12 samantha ea  10\"x12 samantha ea  10\"x12 ea samantha                                                    Ther Activity                        Gait Training                        Modalities        MHP                                         "

## 2024-11-19 ENCOUNTER — OFFICE VISIT (OUTPATIENT)
Dept: PHYSICAL THERAPY | Facility: CLINIC | Age: 69
End: 2024-11-19
Payer: OTHER MISCELLANEOUS

## 2024-11-19 DIAGNOSIS — Z09 ENCOUNTER FOR FOLLOW-UP EXAMINATION AFTER COMPLETED TREATMENT FOR CONDITIONS OTHER THAN MALIGNANT NEOPLASM: ICD-10-CM

## 2024-11-19 DIAGNOSIS — M54.12 CERVICAL RADICULOPATHY: Primary | ICD-10-CM

## 2024-11-19 PROCEDURE — 97140 MANUAL THERAPY 1/> REGIONS: CPT | Performed by: PHYSICAL THERAPIST

## 2024-11-19 PROCEDURE — 97112 NEUROMUSCULAR REEDUCATION: CPT

## 2024-11-19 PROCEDURE — 97110 THERAPEUTIC EXERCISES: CPT

## 2024-11-19 NOTE — PROGRESS NOTES
"Daily Note     Today's date: 2024  Patient name: Scottie Rodriguez  : 1955  MRN: [id number]  Referring provider: Eric Damon DO  Dx:   Encounter Diagnosis     ICD-10-CM    1. Cervical radiculopathy  M54.12       2. Encounter for follow-up examination after completed treatment for conditions other than malignant neoplasm  Z09                      Subjective: Pt reports being very tired this morning and having his neck be most bothersome after having to do some work above his head yesterday.      Objective: See treatment diary below      Assessment: Tolerated treatment well. Patient demonstrated fatigue post treatment, exhibited good technique with therapeutic exercises, and would benefit from continued PT      Plan: Continue per plan of care.  Progress treatment as tolerated.       Precautions :  Hx of cervical fusion.  Progressing malignancy/ degenerative brain condition       Manuals 11-19-24 10-25-24 11-5-24 11-8-24 11-15-24   STM bilateral cervical and scapular muscles                            Neuro Re-Ed         Upper cervical flexion stabilzation 10\"x12 10\"x12 10\"x12 10'x12 10\"x12   LTP/ MTP while holding good cervico-scapular posture 3x10 black ea  3x10 black ea   3x10 black ea 3x10 ea black    Scap retraction/ ER Blue 3x10 Blue 3x10 Blue 3x10 Blue 3x10 Blue 3x10   Lat pulldown  50# 3x10  50# 3x10 55# 3x15 55# 3x15   Machine row  40# 3x10  35# 3x10 35# 3x10 35# 3x10                   Ther Ex        Seated thoracic and lower cervical mobilization  5\" x15 for each  5\"x15 for ea  5\" x15 for ea  5\"x15 ea 5\" x15 for each of 3 levels   Upper trap and levetor stretch 10\"x12 samantha  10\"x12 samantha ea  10\"x12 samantha ea  10\"x12 samantha ea  10\"x12 ea samantha                                                    Ther Activity                        Gait Training                        Modalities        MHP                                           "

## 2024-11-22 ENCOUNTER — APPOINTMENT (OUTPATIENT)
Dept: PHYSICAL THERAPY | Facility: CLINIC | Age: 69
End: 2024-11-22
Payer: OTHER MISCELLANEOUS

## 2024-11-26 ENCOUNTER — OFFICE VISIT (OUTPATIENT)
Dept: PHYSICAL THERAPY | Facility: CLINIC | Age: 69
End: 2024-11-26
Payer: OTHER MISCELLANEOUS

## 2024-11-26 DIAGNOSIS — M54.12 CERVICAL RADICULOPATHY: Primary | ICD-10-CM

## 2024-11-26 DIAGNOSIS — Z09 ENCOUNTER FOR FOLLOW-UP EXAMINATION AFTER COMPLETED TREATMENT FOR CONDITIONS OTHER THAN MALIGNANT NEOPLASM: ICD-10-CM

## 2024-11-26 PROCEDURE — 97140 MANUAL THERAPY 1/> REGIONS: CPT

## 2024-11-26 PROCEDURE — 97112 NEUROMUSCULAR REEDUCATION: CPT

## 2024-11-26 PROCEDURE — 97110 THERAPEUTIC EXERCISES: CPT

## 2024-11-26 NOTE — LETTER
2024    Eric Damon DO  [address] [facility] [address]    Patient: Scottie Rodriguez   YOB: 1955   Date of Visit: 2024     Encounter Diagnosis     ICD-10-CM    1. Cervical radiculopathy  M54.12       2. Encounter for follow-up examination after completed treatment for conditions other than malignant neoplasm  Z09           Dear Dr. Damon:    Thank you for your recent referral of Scottie Rodriguez. Please review the attached evaluation summary from Scottie's recent visit.     Please verify that you agree with the plan of care by signing the attached order.     If you have any questions or concerns, please do not hesitate to call.     I sincerely appreciate the opportunity to share in the care of one of your patients and hope to have another opportunity to work with you in the near future.       Sincerely,    Emilee Hussein      Referring Provider:      I certify that I have read the below Plan of Care and certify the need for these services furnished under this plan of treatment while under my care.                    Eric Damon DO  [address] [facility] [address]  Via Fax: [phone]          PT Re-Evaluation  Collection of subjective/objective data performed by Emilee Hussein PTA; Assessment, POC, and Goal attainment performed by Vidal Ayala DPT      Today's date: 2024  Patient name: Scottie Rodriguez  : 1955  MRN: [id number]  Referring provider: Eric Damon DO  Dx:   Encounter Diagnosis     ICD-10-CM    1. Cervical radiculopathy  M54.12       2. Encounter for follow-up examination after completed treatment for conditions other than malignant neoplasm  Z09                      Assessment  Impairments: abnormal muscle tone, abnormal or restricted ROM, activity intolerance, impaired physical strength, lacks appropriate home exercise program, pain with function and poor posture   Symptom irritability: low    Assessment details: Pt has been  responding more favorably to PT this round than prior times.  This is true even with him only attending a few visits thus far.  He still needs to work on improving postural control and functional strength in order to improve functionality at home.    24:  With this round of therapy, patient has shown a more significant improvement in reduction of muscle spasms, some more permanent improvements in posture.    24:  Pt still benefiting from PT to address postural and soft tissue restriction.  A good amount of reduction in overall intensity of right sided soft tissue restrictions is present.    24:  Pt showing palpable improvements in cervical spine muscular spasms and pain is decreasing while reported function is improving.  Understanding of Dx/Px/POC: good     Prognosis: fair    Goals  ST) Pt. Will be able to sleep through the night without being awoken with pain and with minimal to no pain upon waking in 6 weeks. -GOOD PROGRESS 2) Pt. Will have not headaches while at work or with computer work at home. -GOOD PROGRESS  3)  Pt.'s radicular symptoms will be centralized to neck in 6 weeks. -GOOD PROGRESS  LT) Pt. Will be able to complete all chores at home without pain or limitations in 12 weeks. -GOOD PROGRESS   2)  Pt. Will be able to to complete all physical tasks at work without restriction or limitations in 12 weeks.  -GOOD PROGRESS    Plan  Patient would benefit from: PT eval and skilled physical therapy  Referral necessary: Yes  Planned modality interventions: cryotherapy, thermotherapy: hydrocollator packs and unattended electrical stimulation    Planned therapy interventions: manual therapy, neuromuscular re-education, patient education, self care, therapeutic activities, therapeutic exercise and home exercise program    Frequency: 2x week  Duration in weeks: 6  Treatment plan discussed with: patient  Plan details: Progress with functional strengthening      Subjective  Evaluation    History of Present Illness  Mechanism of injury: Pt presents with worsening of chronic neck pain and bilateral radicular symptoms.  Pt is also reporting worsening of headaches.  Pt does have malignancy in brain the is progressing negatively as well.  He states he feels it is coming from his upper back and scapular area.    24:  Pt reports not as much radicular pain and his arms are feeling better since starting therapy.    24:  Pt reports having improvements but still having a significant spasm in left scapular area.    24: Izaiah reports overall improvement in functional capacity, and decreased pain in c-spine and samantha shoulders since started skilled PT. He reports difficulty with lifting OH or lifting an object in front of him due to pain and difficulty in c-spine and shoulders.           Recurrent probem    Quality of life: fair    Patient Goals  Patient goal: get rid of the numbness in his arms.  Pain  Current pain ratin  At best pain ratin  At worst pain ratin  Quality: radiating and burning  Progression: worsening      Diagnostic Tests    FCE comments: Pt is not workingTreatments  Previous treatment: physical therapy and injection treatment    Objective     Postural Observations  Seated posture: poor  Standing posture: poor  Correction of posture: has no consistent effect    Additional Postural Observation Details  Very stiff, almost fixed posture.  Severe forward head and rounded shoulders.    Palpation   Left   Hypertonic in the levator scapulae, middle trapezius, rhomboids, suboccipitals and upper trapezius.   Muscle spasm in the levator scapulae, middle trapezius, rhomboids, suboccipitals and upper trapezius.   Tenderness of the cervical paraspinals, levator scapulae, middle trapezius, rhomboids, suboccipitals and upper trapezius.   Trigger point to cervical paraspinals, levator scapulae, middle trapezius, rhomboids, suboccipitals and upper trapezius.     Right  "  Hypertonic in the levator scapulae, middle trapezius, rhomboids, suboccipitals and upper trapezius.   Muscle spasm in the rhomboids.   Tenderness of the levator scapulae, middle trapezius, rhomboids, suboccipitals and upper trapezius.   Trigger point to middle trapezius and rhomboids.     Neurological Testing     Reflexes   Left   Biceps (C5/C6): absent (0)  Brachioradialis (C6): absent (0)  Triceps (C7): trace (1+)    Right   Biceps (C5/C6): trace (1+)  Brachioradialis (C6): normal (2+)  Triceps (C7): trace (1+)    Active Range of Motion   Cervical/Thoracic Spine       Cervical  Subcranial protraction:   Restriction level: minimal  Subcranial retraction:  with pain   Restriction level: maximal  Flexion:  Restriction level: minimal  Extension:  Restriction level: moderate  Left lateral flexion:  Restriction level: moderate  Right lateral flexion:  Restriction level moderate  Left rotation:  with pain Restriction level: moderate  Right rotation:  with pain Restriction level: moderate    Strength/Myotome Testing   Cervical Spine   Neck flexion: 3    Left   Neck lateral flexion (C3): 3-    Right   Neck lateral flexion (C3): 3-    Left Shoulder     Planes of Motion   Flexion: 4-   Extension: 4-   Abduction: 3+     Right Shoulder     Planes of Motion   Flexion: 4-   Abduction: 3+     Left Elbow   Flexion: 3+  Extension: 3+    Right Elbow   Flexion: 3+  Extension: 3+      Precautions :  Hx of cervical fusion.  Progressing malignancy/ degenerative brain condition       Manuals 11-19-24 11-26-24 11-5-24 11-8-24 11-15-24   STM bilateral cervical and scapular muscles 25' 25' 23' 23' 25'                           Neuro Re-Ed         Upper cervical flexion stabilzation 10\"x12 10\"x12 10\"x12 10'x12 10\"x12   LTP/ MTP while holding good cervico-scapular posture 3x10 black ea    3x10 black ea 3x10 ea black    Scap retraction/ ER Blue 3x10  Blue 3x10 Blue 3x10 Blue 3x10   Lat pulldown  50# 3x10 50# 3x20 50# 3x10 55# 3x15 55# 3x15 " "  Machine row  40# 3x10 40# 2x10 35# 3x10 35# 3x10 35# 3x10   Scaption raises   NV              Ther Ex        Seated thoracic and lower cervical mobilization  5\" x15 for each  5\" x15 for each  5\" x15 for ea  5\"x15 ea 5\" x15 for each of 3 levels   Upper trap and levetor stretch 10\"x12 samantha  10\"x12 samantha  10\"x12 samantha ea  10\"x12 samantha ea  10\"x12 ea samantha    Objective measures   TB                                               Ther Activity                        Gait Training                        Modalities        MHP                                       "

## 2024-11-26 NOTE — PROGRESS NOTES
PT Re-Evaluation  Collection of subjective/objective data performed by Emilee Hussein PTA; Assessment, POC, and Goal attainment performed by Vidal Ayala DPT      Today's date: 2024  Patient name: Scottie Rodriguez  : 1955  MRN: [id number]  Referring provider: Eric Damon DO  Dx:   Encounter Diagnosis     ICD-10-CM    1. Cervical radiculopathy  M54.12       2. Encounter for follow-up examination after completed treatment for conditions other than malignant neoplasm  Z09                      Assessment  Impairments: abnormal muscle tone, abnormal or restricted ROM, activity intolerance, impaired physical strength, lacks appropriate home exercise program, pain with function and poor posture   Symptom irritability: low    Assessment details: Pt has been responding more favorably to PT this round than prior times.  This is true even with him only attending a few visits thus far.  He still needs to work on improving postural control and functional strength in order to improve functionality at home.    24:  With this round of therapy, patient has shown a more significant improvement in reduction of muscle spasms, some more permanent improvements in posture.    24:  Pt still benefiting from PT to address postural and soft tissue restriction.  A good amount of reduction in overall intensity of right sided soft tissue restrictions is present.    24:  Pt showing palpable improvements in cervical spine muscular spasms and pain is decreasing while reported function is improving.  Understanding of Dx/Px/POC: good     Prognosis: fair    Goals  ST) Pt. Will be able to sleep through the night without being awoken with pain and with minimal to no pain upon waking in 6 weeks. -GOOD PROGRESS 2) Pt. Will have not headaches while at work or with computer work at home. -GOOD PROGRESS  3)  Pt.'s radicular symptoms will be centralized to neck in 6 weeks. -GOOD PROGRESS  LT) Pt. Will be able to  complete all chores at home without pain or limitations in 12 weeks. -GOOD PROGRESS   2)  Pt. Will be able to to complete all physical tasks at work without restriction or limitations in 12 weeks.  -GOOD PROGRESS    Plan  Patient would benefit from: PT eval and skilled physical therapy  Referral necessary: Yes  Planned modality interventions: cryotherapy, thermotherapy: hydrocollator packs and unattended electrical stimulation    Planned therapy interventions: manual therapy, neuromuscular re-education, patient education, self care, therapeutic activities, therapeutic exercise and home exercise program    Frequency: 2x week  Duration in weeks: 6  Treatment plan discussed with: patient  Plan details: Progress with functional strengthening      Subjective Evaluation    History of Present Illness  Mechanism of injury: Pt presents with worsening of chronic neck pain and bilateral radicular symptoms.  Pt is also reporting worsening of headaches.  Pt does have malignancy in brain the is progressing negatively as well.  He states he feels it is coming from his upper back and scapular area.    24:  Pt reports not as much radicular pain and his arms are feeling better since starting therapy.    24:  Pt reports having improvements but still having a significant spasm in left scapular area.    24: Izaiah reports overall improvement in functional capacity, and decreased pain in c-spine and samantha shoulders since started skilled PT. He reports difficulty with lifting OH or lifting an object in front of him due to pain and difficulty in c-spine and shoulders.           Recurrent probem    Quality of life: fair    Patient Goals  Patient goal: get rid of the numbness in his arms.  Pain  Current pain ratin  At best pain ratin  At worst pain ratin  Quality: radiating and burning  Progression: worsening      Diagnostic Tests    FCE comments: Pt is not workingTreatments  Previous treatment: physical therapy and  injection treatment    Objective     Postural Observations  Seated posture: poor  Standing posture: poor  Correction of posture: has no consistent effect    Additional Postural Observation Details  Very stiff, almost fixed posture.  Severe forward head and rounded shoulders.    Palpation   Left   Hypertonic in the levator scapulae, middle trapezius, rhomboids, suboccipitals and upper trapezius.   Muscle spasm in the levator scapulae, middle trapezius, rhomboids, suboccipitals and upper trapezius.   Tenderness of the cervical paraspinals, levator scapulae, middle trapezius, rhomboids, suboccipitals and upper trapezius.   Trigger point to cervical paraspinals, levator scapulae, middle trapezius, rhomboids, suboccipitals and upper trapezius.     Right   Hypertonic in the levator scapulae, middle trapezius, rhomboids, suboccipitals and upper trapezius.   Muscle spasm in the rhomboids.   Tenderness of the levator scapulae, middle trapezius, rhomboids, suboccipitals and upper trapezius.   Trigger point to middle trapezius and rhomboids.     Neurological Testing     Reflexes   Left   Biceps (C5/C6): absent (0)  Brachioradialis (C6): absent (0)  Triceps (C7): trace (1+)    Right   Biceps (C5/C6): trace (1+)  Brachioradialis (C6): normal (2+)  Triceps (C7): trace (1+)    Active Range of Motion   Cervical/Thoracic Spine       Cervical  Subcranial protraction:   Restriction level: minimal  Subcranial retraction:  with pain   Restriction level: maximal  Flexion:  Restriction level: minimal  Extension:  Restriction level: moderate  Left lateral flexion:  Restriction level: moderate  Right lateral flexion:  Restriction level moderate  Left rotation:  with pain Restriction level: moderate  Right rotation:  with pain Restriction level: moderate    Strength/Myotome Testing   Cervical Spine   Neck flexion: 3    Left   Neck lateral flexion (C3): 3-    Right   Neck lateral flexion (C3): 3-    Left Shoulder     Planes of Motion  "  Flexion: 4-   Extension: 4-   Abduction: 3+     Right Shoulder     Planes of Motion   Flexion: 4-   Abduction: 3+     Left Elbow   Flexion: 3+  Extension: 3+    Right Elbow   Flexion: 3+  Extension: 3+      Precautions :  Hx of cervical fusion.  Progressing malignancy/ degenerative brain condition       Manuals 11-19-24 11-26-24 11-5-24 11-8-24 11-15-24   STM bilateral cervical and scapular muscles 25' 25' 23' 23' 25'                           Neuro Re-Ed         Upper cervical flexion stabilzation 10\"x12 10\"x12 10\"x12 10'x12 10\"x12   LTP/ MTP while holding good cervico-scapular posture 3x10 black ea    3x10 black ea 3x10 ea black    Scap retraction/ ER Blue 3x10  Blue 3x10 Blue 3x10 Blue 3x10   Lat pulldown  50# 3x10 50# 3x20 50# 3x10 55# 3x15 55# 3x15   Machine row  40# 3x10 40# 2x10 35# 3x10 35# 3x10 35# 3x10   Scaption raises   NV              Ther Ex        Seated thoracic and lower cervical mobilization  5\" x15 for each  5\" x15 for each  5\" x15 for ea  5\"x15 ea 5\" x15 for each of 3 levels   Upper trap and levetor stretch 10\"x12 samantha  10\"x12 samantha  10\"x12 samantha ea  10\"x12 samantha ea  10\"x12 ea samantha    Objective measures   TB                                               Ther Activity                        Gait Training                        Modalities        MHP                         "

## 2024-12-13 ENCOUNTER — OFFICE VISIT (OUTPATIENT)
Dept: PHYSICAL THERAPY | Facility: CLINIC | Age: 69
End: 2024-12-13
Payer: COMMERCIAL

## 2024-12-13 DIAGNOSIS — M54.12 CERVICAL RADICULOPATHY: Primary | ICD-10-CM

## 2024-12-13 DIAGNOSIS — Z09 ENCOUNTER FOR FOLLOW-UP EXAMINATION AFTER COMPLETED TREATMENT FOR CONDITIONS OTHER THAN MALIGNANT NEOPLASM: ICD-10-CM

## 2024-12-13 PROCEDURE — 97110 THERAPEUTIC EXERCISES: CPT | Performed by: PHYSICAL THERAPIST

## 2024-12-13 PROCEDURE — 97140 MANUAL THERAPY 1/> REGIONS: CPT | Performed by: PHYSICAL THERAPIST

## 2024-12-13 PROCEDURE — 97112 NEUROMUSCULAR REEDUCATION: CPT | Performed by: PHYSICAL THERAPIST

## 2024-12-13 NOTE — PROGRESS NOTES
"Daily Note     Today's date: 2024  Patient name: Scottie Rodriguez  : 1955  MRN: [id number]  Referring provider: Eric Damon DO  Dx:   Encounter Diagnosis     ICD-10-CM    1. Cervical radiculopathy  M54.12       2. Encounter for follow-up examination after completed treatment for conditions other than malignant neoplasm  Z09                      Subjective: Pt reports being stiff and achy after missing a bit of PT waiting for a script      Objective: See treatment diary below      Assessment: Tolerated treatment well. Patient demonstrated fatigue post treatment, exhibited good technique with therapeutic exercises, and would benefit from continued PT      Plan: Continue per plan of care.  Progress treatment as tolerated.       Precautions :  Hx of cervical fusion.  Progressing malignancy/ degenerative brain condition       Manuals 11-19-24 10-25-24 11-5-24 11-8-24 11-15-24   STM bilateral cervical and scapular muscles '                            Neuro Re-Ed         Upper cervical flexion stabilzation 10\"x12 10\"x12 10\"x12 10'x12 10\"x12   LTP/ MTP while holding good cervico-scapular posture 3x10 black ea  3x10 black ea   3x10 black ea 3x10 ea black    Scap retraction/ ER Blue 3x10 Blue 3x10 Blue 3x10 Blue 3x10 Blue 3x10   Lat pulldown  50# 3x10  50# 3x10 55# 3x15 55# 3x15   Machine row  40# 3x10  35# 3x10 35# 3x10 35# 3x10                   Ther Ex        Seated thoracic and lower cervical mobilization  5\" x15 for each  5\"x15 for ea  5\" x15 for ea  5\"x15 ea 5\" x15 for each of 3 levels   Upper trap and levetor stretch 10\"x12 samantha  10\"x12 samantha ea  10\"x12 samantha ea  10\"x12 samantha ea  10\"x12 ea samantha                                                    Ther Activity                        Gait Training                        Modalities        MHP                                             "

## 2024-12-17 ENCOUNTER — OFFICE VISIT (OUTPATIENT)
Dept: PHYSICAL THERAPY | Facility: CLINIC | Age: 69
End: 2024-12-17
Payer: OTHER MISCELLANEOUS

## 2024-12-17 DIAGNOSIS — Z09 ENCOUNTER FOR FOLLOW-UP EXAMINATION AFTER COMPLETED TREATMENT FOR CONDITIONS OTHER THAN MALIGNANT NEOPLASM: ICD-10-CM

## 2024-12-17 DIAGNOSIS — M54.12 CERVICAL RADICULOPATHY: Primary | ICD-10-CM

## 2024-12-17 PROCEDURE — 97140 MANUAL THERAPY 1/> REGIONS: CPT

## 2024-12-17 PROCEDURE — 97112 NEUROMUSCULAR REEDUCATION: CPT

## 2024-12-17 PROCEDURE — 97110 THERAPEUTIC EXERCISES: CPT

## 2024-12-17 NOTE — PROGRESS NOTES
"Daily Note     Today's date: 2024  Patient name: Scottie Rodriguez  : 1955  MRN: [id number]  Referring provider: Eric Damon DO  Dx:   Encounter Diagnosis     ICD-10-CM    1. Cervical radiculopathy  M54.12       2. Encounter for follow-up examination after completed treatment for conditions other than malignant neoplasm  Z09                      Subjective: Izaiah reports doing well today offers no new complaints in regards to c-spine.       Objective: See treatment diary below      Assessment: Added chest press this visit to improve ability to push off of surfaces. Tolerated strengthening program well denying any increases in pain. Improvement in soft tissue quality following STM to samantha cerv paraspinals, samantha UT, samantha scap retractors, and suboccipitals. Pt would continue to benefit from skilled PT.       Plan: Continue with current POC to address pt deficits.      Precautions :  Hx of cervical fusion.  Progressing malignancy/ degenerative brain condition       Manuals 11-19-24 12-17-24 11-5-24 11-8-24 11-15-24   STM bilateral cervical and scapular muscles                            Neuro Re-Ed         Upper cervical flexion stabilzation 10\"x12 10\"x12 10\"x12 10'x12 10\"x12   LTP/ MTP while holding good cervico-scapular posture 3x10 black ea    3x10 black ea 3x10 ea black    Scap retraction/ ER Blue 3x10 Blue 3x10 Blue 3x10 Blue 3x10 Blue 3x10   Lat pulldown  50# 3x10 50# 3x10 50# 3x10 55# 3x15 55# 3x15   Machine row  40# 3x10 40# 3x10 35# 3x10 35# 3x10 35# 3x10   Chest press   40# 3x10              Ther Ex        Seated thoracic and lower cervical mobilization  5\" x15 for each  5\" x15 for each  5\" x15 for ea  5\"x15 ea 5\" x15 for each of 3 levels   Upper trap and levetor stretch 10\"x12 samantha  10\"x12 samantha ea 10\"x12 samantha ea  10\"x12 samantha ea  10\"x12 ea samantha                                                    Ther Activity                        Gait Training                        Modalities    "     SONAM

## 2024-12-19 ENCOUNTER — APPOINTMENT (OUTPATIENT)
Dept: PHYSICAL THERAPY | Facility: CLINIC | Age: 69
End: 2024-12-19
Payer: OTHER MISCELLANEOUS

## 2024-12-20 ENCOUNTER — OFFICE VISIT (OUTPATIENT)
Dept: PHYSICAL THERAPY | Facility: CLINIC | Age: 69
End: 2024-12-20
Payer: OTHER MISCELLANEOUS

## 2024-12-20 DIAGNOSIS — M54.12 CERVICAL RADICULOPATHY: Primary | ICD-10-CM

## 2024-12-20 DIAGNOSIS — Z09 ENCOUNTER FOR FOLLOW-UP EXAMINATION AFTER COMPLETED TREATMENT FOR CONDITIONS OTHER THAN MALIGNANT NEOPLASM: ICD-10-CM

## 2024-12-20 PROCEDURE — 97110 THERAPEUTIC EXERCISES: CPT

## 2024-12-20 PROCEDURE — 97140 MANUAL THERAPY 1/> REGIONS: CPT

## 2024-12-20 PROCEDURE — 97112 NEUROMUSCULAR REEDUCATION: CPT

## 2024-12-20 NOTE — PROGRESS NOTES
"Daily Note     Today's date: 2024  Patient name: Scottie Rodriguez  : 1955  MRN: [id number]  Referring provider: Eric Damon DO  Dx:   Encounter Diagnosis     ICD-10-CM    1. Cervical radiculopathy  M54.12       2. Encounter for follow-up examination after completed treatment for conditions other than malignant neoplasm  Z09                      Subjective: Izaiah reports his shoulders are bothering him. He reports waking up with scapular pain.       Objective: See treatment diary below      Assessment: Visual and VC to ensure correct exercise technique. Improvement in soft tissue quality to samantha cerv paraspinals, samantha UT, samantha scap retractor. Increased TP noted to R scap retractors. Muscular fatigue following strengthening program. Pt would continue to benefit from skilled PT.       Plan: Continue with current POC to address pt deficits.      Precautions :  Hx of cervical fusion.  Progressing malignancy/ degenerative brain condition       Manuals 11-19-24 12-17-24 12-20-24 11-8-24 11-15-24   STM bilateral cervical and scapular muscles                            Neuro Re-Ed         Upper cervical flexion stabilzation 10\"x12 10\"x12 10\"x12 10'x12 10\"x12   LTP/ MTP while holding good cervico-scapular posture 3x10 black ea    3x10 black ea 3x10 ea black    Scap retraction/ ER Blue 3x10 Blue 3x10 Blue 3x10 Blue 3x10 Blue 3x10   Lat pulldown  50# 3x10 50# 3x10 50# 3x10 55# 3x15 55# 3x15   Machine row  40# 3x10 40# 3x10 40# 3x10 35# 3x10 35# 3x10   Chest press   40# 3x10 40# 3x10             Ther Ex        Seated thoracic and lower cervical mobilization  5\" x15 for each  5\" x15 for each  5\" x15 for each  5\"x15 ea 5\" x15 for each of 3 levels   Upper trap and levetor stretch 10\"x12 samantha  10\"x12 samantha ea 10\"x12 samantha ea 10\"x12 samantha ea  10\"x12 ea samantha                                                    Ther Activity                        Gait Training                        Modalities        MHP      "

## 2024-12-26 ENCOUNTER — APPOINTMENT (OUTPATIENT)
Dept: PHYSICAL THERAPY | Facility: CLINIC | Age: 69
End: 2024-12-26
Payer: OTHER MISCELLANEOUS

## 2024-12-27 ENCOUNTER — OFFICE VISIT (OUTPATIENT)
Dept: PHYSICAL THERAPY | Facility: CLINIC | Age: 69
End: 2024-12-27
Payer: OTHER MISCELLANEOUS

## 2024-12-27 DIAGNOSIS — M54.12 CERVICAL RADICULOPATHY: Primary | ICD-10-CM

## 2024-12-27 DIAGNOSIS — Z09 ENCOUNTER FOR FOLLOW-UP EXAMINATION AFTER COMPLETED TREATMENT FOR CONDITIONS OTHER THAN MALIGNANT NEOPLASM: ICD-10-CM

## 2024-12-27 PROCEDURE — 97112 NEUROMUSCULAR REEDUCATION: CPT

## 2024-12-27 PROCEDURE — 97140 MANUAL THERAPY 1/> REGIONS: CPT

## 2024-12-27 NOTE — PROGRESS NOTES
"Daily Note     Today's date: 2024  Patient name: Scottie Rodriguez  : 1955  MRN: [id number]  Referring provider: Eric Damon DO  Dx:   Encounter Diagnosis     ICD-10-CM    1. Cervical radiculopathy  M54.12       2. Encounter for follow-up examination after completed treatment for conditions other than malignant neoplasm  Z09                      Subjective: Izaiah reports some pain to samantha scap retractors but otherwise has been doing well in reference to c-spine. He reports his family telling him his posture has been improving since attending PT.       Objective: See treatment diary below      Assessment: Less TP noted in samantha UT, cerv paraspinals. Improvement in soft tissue quality to samantha scap retractors. Pt would continue to benefit from skilled PT. Denied any increases in pain with exercises performed. Progress as able.       Plan: Continue with current POC to address pt deficits.      Precautions :  Hx of cervical fusion.  Progressing malignancy/ degenerative brain condition       Manuals 11-19-24 12-17-24 12-20-24 12-27-24 11-15-24   STM bilateral cervical and scapular muscles                            Neuro Re-Ed         Upper cervical flexion stabilzation 10\"x12 10\"x12 10\"x12 10\"x12 10\"x12   LTP/ MTP while holding good cervico-scapular posture 3x10 black ea     3x10 ea black    Scap retraction/ ER Blue 3x10 Blue 3x10 Blue 3x10 Not today-resume NV  Blue 3x10   Lat pulldown  50# 3x10 50# 3x10 50# 3x10 50# 3x10 55# 3x15   Machine row  40# 3x10 40# 3x10 40# 3x10 40# 3x10 35# 3x10   Chest press   40# 3x10 40# 3x10 40# 3x10            Ther Ex        Seated thoracic and lower cervical mobilization  5\" x15 for each  5\" x15 for each  5\" x15 for each  5\"x15 for ea 5\" x15 for each of 3 levels   Upper trap and levetor stretch 10\"x12 samantha  10\"x12 samantha ea 10\"x12 samantha ea 10\"x12 samantha ea  10\"x12 ea samantha                                                    Ther Activity                        Gait " Training                        Modalities        P

## 2024-12-31 ENCOUNTER — OFFICE VISIT (OUTPATIENT)
Dept: PHYSICAL THERAPY | Facility: CLINIC | Age: 69
End: 2024-12-31
Payer: OTHER MISCELLANEOUS

## 2024-12-31 DIAGNOSIS — Z09 ENCOUNTER FOR FOLLOW-UP EXAMINATION AFTER COMPLETED TREATMENT FOR CONDITIONS OTHER THAN MALIGNANT NEOPLASM: ICD-10-CM

## 2024-12-31 DIAGNOSIS — M54.12 CERVICAL RADICULOPATHY: Primary | ICD-10-CM

## 2024-12-31 PROCEDURE — 97112 NEUROMUSCULAR REEDUCATION: CPT

## 2024-12-31 PROCEDURE — 97140 MANUAL THERAPY 1/> REGIONS: CPT

## 2024-12-31 PROCEDURE — 97110 THERAPEUTIC EXERCISES: CPT

## 2024-12-31 NOTE — PROGRESS NOTES
"Daily Note     Today's date: 2024  Patient name: Scottie Rodriguez  : 1955  MRN: [id number]  Referring provider: Eric Damon DO  Dx:   Encounter Diagnosis     ICD-10-CM    1. Cervical radiculopathy  M54.12       2. Encounter for follow-up examination after completed treatment for conditions other than malignant neoplasm  Z09                      Subjective: Izaiah reports less pain in scapular region. He reports more pain in LB today compared to c-spine. He reports doing more cooking.       Objective: See treatment diary below      Assessment: Visual and VC to ensure correct exercise technique. Increased machine weight resistance on lat pulldown and row to further progress strength/ increased challenge but no increases in pain and demonstrated correct technique. Pt would continue to benefit from skilled PT. Progress as able.       Plan: Continue with current POC to address pt deficits.      Precautions :  Hx of cervical fusion.  Progressing malignancy/ degenerative brain condition       Manuals 24   STM bilateral cervical and scapular muscles  23 23                           Neuro Re-Ed         Upper cervical flexion stabilzation 10\"x12 10\"x12 10\"x12 10\"x12 10\"x12   LTP/ MTP while holding good cervico-scapular posture 3x10 black ea        Scap retraction/ ER Blue 3x10 Blue 3x10 Blue 3x10 Not today-resume NV  Blue 3x10   Lat pulldown  50# 3x10 50# 3x10 50# 3x10 50# 3x10 55# 3x10   Machine row  40# 3x10 40# 3x10 40# 3x10 40# 3x10 45# 3x10   Chest press   40# 3x10 40# 3x10 40# 3x10 40# 3x10           Ther Ex        Seated thoracic and lower cervical mobilization  5\" x15 for each  5\" x15 for each  5\" x15 for each  5\"x15 for ea 5\"x15 for ea   Upper trap and levetor stretch 10\"x12 samantha  10\"x12 samantha ea 10\"x12 samantha ea 10\"x12 samantha ea  10\"x12 samantha ea                                                    Ther Activity                        Gait Training          "               Modalities        P

## 2025-01-07 ENCOUNTER — EVALUATION (OUTPATIENT)
Dept: PHYSICAL THERAPY | Facility: CLINIC | Age: 70
End: 2025-01-07
Payer: OTHER MISCELLANEOUS

## 2025-01-07 DIAGNOSIS — M54.12 CERVICAL RADICULOPATHY: Primary | ICD-10-CM

## 2025-01-07 DIAGNOSIS — Z09 ENCOUNTER FOR FOLLOW-UP EXAMINATION AFTER COMPLETED TREATMENT FOR CONDITIONS OTHER THAN MALIGNANT NEOPLASM: ICD-10-CM

## 2025-01-07 PROCEDURE — 97164 PT RE-EVAL EST PLAN CARE: CPT | Performed by: PHYSICAL THERAPIST

## 2025-01-07 PROCEDURE — 97140 MANUAL THERAPY 1/> REGIONS: CPT | Performed by: PHYSICAL THERAPIST

## 2025-01-07 PROCEDURE — 97112 NEUROMUSCULAR REEDUCATION: CPT | Performed by: PHYSICAL THERAPIST

## 2025-01-07 PROCEDURE — 97110 THERAPEUTIC EXERCISES: CPT | Performed by: PHYSICAL THERAPIST

## 2025-01-07 NOTE — PROGRESS NOTES
"Daily Note     Today's date: 2025  Patient name: Scottie Rodriguez  : 1955  MRN: [id number]  Referring provider: No ref. provider found  Dx:   Encounter Diagnosis     ICD-10-CM    1. Cervical radiculopathy  M54.12       2. Encounter for follow-up examination after completed treatment for conditions other than malignant neoplasm  Z09                      Subjective: ***      Objective: See treatment diary below      Assessment: Tolerated treatment {Tolerated treatment :8562946303}. Patient {assessment:5851245930}      Plan: {PLAN:[id number]}     Precautions :  Hx of cervical fusion.  Progressing malignancy/ degenerative brain condition       Manuals 24   STM bilateral cervical and scapular muscles                            Neuro Re-Ed         Upper cervical flexion stabilzation 10\"x12 10\"x12 10\"x12 10\"x12 10\"x12   LTP/ MTP while holding good cervico-scapular posture 3x10 black ea        Scap retraction/ ER Blue 3x10 Blue 3x10 Blue 3x10 Not today-resume NV  Blue 3x10   Lat pulldown  50# 3x10 50# 3x10 50# 3x10 50# 3x10 55# 3x10   Machine row  40# 3x10 40# 3x10 40# 3x10 40# 3x10 45# 3x10   Chest press   40# 3x10 40# 3x10 40# 3x10 40# 3x10           Ther Ex        Seated thoracic and lower cervical mobilization  5\" x15 for each  5\" x15 for each  5\" x15 for each  5\"x15 for ea 5\"x15 for ea   Upper trap and levetor stretch 10\"x12 samantha  10\"x12 samantha ea 10\"x12 samantha ea 10\"x12 samantha ea  10\"x12 samantha ea                                                    Ther Activity                        Gait Training                        Modalities        MHP                                                       "

## 2025-01-07 NOTE — LETTER
2025    No Recipients    Patient: Scottie Rodriguez   YOB: 1955   Date of Visit: 2025     Encounter Diagnosis     ICD-10-CM    1. Cervical radiculopathy  M54.12       2. Encounter for follow-up examination after completed treatment for conditions other than malignant neoplasm  Z09           Dear Dr. Fink Recipients:    Thank you for your recent referral of Scottie Rodriguez. Please review the attached evaluation summary from Scottie's recent visit.     Please verify that you agree with the plan of care by signing the attached order.     If you have any questions or concerns, please do not hesitate to call.     I sincerely appreciate the opportunity to share in the care of one of your patients and hope to have another opportunity to work with you in the near future.       Sincerely,    Darin Ayala, PT      Referring Provider:      I certify that I have read the below Plan of Care and certify the need for these services furnished under this plan of treatment while under my care.                    No Recipients          PT Re-Evaluation    Today's date: 2025  Patient name: Scottie Rodriguez  : 1955  MRN: [id number]  Referring provider: No ref. provider found  Dx:   Encounter Diagnosis     ICD-10-CM    1. Cervical radiculopathy  M54.12       2. Encounter for follow-up examination after completed treatment for conditions other than malignant neoplasm  Z09                      Assessment  Impairments: abnormal muscle tone, abnormal or restricted ROM, activity intolerance, impaired physical strength, lacks appropriate home exercise program, pain with function and poor posture   Symptom irritability: low    Assessment details: Pt has been responding more favorably to PT this round than prior times.  This is true even with him only attending a few visits thus far.  He still needs to work on improving postural control and functional strength in order to improve functionality at  home.    24:  With this round of therapy, patient has shown a more significant improvement in reduction of muscle spasms, some more permanent improvements in posture.    24:  Pt still benefiting from PT to address postural and soft tissue restriction.  A good amount of reduction in overall intensity of right sided soft tissue restrictions is present.    25:  Pt showing improvements in strength, mobility and posture and would benefit from continued progression in these areas.  Understanding of Dx/Px/POC: good     Prognosis: fair    Goals  ST) Pt. Will be able to sleep through the night without being awoken with pain and with minimal to no pain upon waking in 6 weeks. -GOOD PROGRESS 2) Pt. Will have not headaches while at work or with computer work at home. -GOOD PROGRESS  3)  Pt.'s radicular symptoms will be centralized to neck in 6 weeks. -GOOD PROGRESS  LT) Pt. Will be able to complete all chores at home without pain or limitations in 12 weeks. -GOOD PROGRESS   2)  Pt. Will be able to to complete all physical tasks at work without restriction or limitations in 12 weeks.  -GOOD PROGRESS    Plan  Patient would benefit from: PT eval and skilled physical therapy  Referral necessary: Yes  Planned modality interventions: cryotherapy, thermotherapy: hydrocollator packs and unattended electrical stimulation    Planned therapy interventions: manual therapy, neuromuscular re-education, patient education, self care, therapeutic activities, therapeutic exercise and home exercise program    Frequency: 2x week  Duration in weeks: 6  Treatment plan discussed with: patient  Plan details: Progress with functional strengthening      Subjective Evaluation    History of Present Illness  Mechanism of injury: Pt presents with worsening of chronic neck pain and bilateral radicular symptoms.  Pt is also reporting worsening of headaches.  Pt does have malignancy in brain the is progressing negatively as well.  He  states he feels it is coming from his upper back and scapular area.    24:  Pt reports not as much radicular pain and his arms are feeling better since starting therapy.    24:  Pt reports having improvements but still having a significant spasm in left scapular area.    25:  Pt reports improved pain and muscle restriction throughout his upper traps and neck.  States discomfort and muscle tightness remains between his shoulder blades.          Recurrent probem    Quality of life: fair    Patient Goals  Patient goal: get rid of the numbness in his arms.  Pain  Current pain rating: 3  At best pain ratin  At worst pain ratin  Quality: radiating and burning  Progression: worsening      Diagnostic Tests    FCE comments: Pt is not workingTreatments  Previous treatment: physical therapy and injection treatment    Objective     Postural Observations  Seated posture: poor  Standing posture: poor  Correction of posture: has no consistent effect    Additional Postural Observation Details  Very stiff, almost fixed posture.  Severe forward head and rounded shoulders.    25:    Pt presents with improvement in rounded shoulders and cervical protraction    Palpation   Left   Hypertonic in the levator scapulae, middle trapezius, rhomboids, suboccipitals and upper trapezius.   Muscle spasm in the levator scapulae.   Tenderness of the cervical paraspinals, levator scapulae, middle trapezius, rhomboids, suboccipitals and upper trapezius.   Trigger point to levator scapulae.     Right   Hypertonic in the levator scapulae, middle trapezius, rhomboids, suboccipitals and upper trapezius.   Muscle spasm in the rhomboids.   Tenderness of the levator scapulae, middle trapezius, rhomboids, suboccipitals and upper trapezius.     Neurological Testing     Reflexes   Left   Biceps (C5/C6): absent (0)  Brachioradialis (C6): absent (0)  Triceps (C7): trace (1+)    Right   Biceps (C5/C6): trace (1+)  Brachioradialis (C6):  "normal (2+)  Triceps (C7): trace (1+)    Active Range of Motion   Cervical/Thoracic Spine       Cervical    Subcranial retraction:   Restriction level: moderate  Flexion:  Restriction level: moderate  Extension:  Restriction level: moderate  Left lateral flexion:  Restriction level: moderate  Right lateral flexion:  Restriction level moderate  Left rotation:  Restriction level: moderate  Right rotation:  Restriction level: moderate    Strength/Myotome Testing   Cervical Spine   Neck flexion: 3    Left   Neck lateral flexion (C3): 3    Right   Neck lateral flexion (C3): 3    Left Shoulder     Planes of Motion   Flexion: 3   Extension: 3   Abduction: 3     Right Shoulder     Planes of Motion   Flexion: 3   Extension: 3   Abduction: 3     Left Elbow   Flexion: 3  Extension: 3    Right Elbow   Flexion: 3  Extension: 3                  Precautions :  Hx of cervical fusion.  Progressing malignancy/ degenerative brain condition       Manuals 11-19-24 12-17-24 12-20-24 12-27-24 12-31-24   STM bilateral cervical and scapular muscles 25' 23' 23' 23' 23'                           Neuro Re-Ed         Upper cervical flexion stabilzation 10\"x12 10\"x12 10\"x12 10\"x12 10\"x12   LTP/ MTP while holding good cervico-scapular posture 3x10 black ea        Scap retraction/ ER Blue 3x10 Blue 3x10 Blue 3x10 Not today-resume NV  Blue 3x10   Lat pulldown  50# 3x10 50# 3x10 50# 3x10 50# 3x10 55# 3x10   Machine row  40# 3x10 40# 3x10 40# 3x10 40# 3x10 45# 3x10   Chest press   40# 3x10 40# 3x10 40# 3x10 40# 3x10           Ther Ex        Seated thoracic and lower cervical mobilization  5\" x15 for each  5\" x15 for each  5\" x15 for each  5\"x15 for ea 5\"x15 for ea   Upper trap and levetor stretch 10\"x12 samantha  10\"x12 samantha ea 10\"x12 samantha ea 10\"x12 samantha ea  10\"x12 samantha ea                                                    Ther Activity                        Gait Training                        Modalities        [facility]                                           "

## 2025-01-07 NOTE — PROGRESS NOTES
PT Re-Evaluation    Today's date: 2025  Patient name: Scottie Rodriguez  : 1955  MRN: [id number]  Referring provider: No ref. provider found  Dx:   Encounter Diagnosis     ICD-10-CM    1. Cervical radiculopathy  M54.12       2. Encounter for follow-up examination after completed treatment for conditions other than malignant neoplasm  Z09                      Assessment  Impairments: abnormal muscle tone, abnormal or restricted ROM, activity intolerance, impaired physical strength, lacks appropriate home exercise program, pain with function and poor posture   Symptom irritability: low    Assessment details: Pt has been responding more favorably to PT this round than prior times.  This is true even with him only attending a few visits thus far.  He still needs to work on improving postural control and functional strength in order to improve functionality at home.    24:  With this round of therapy, patient has shown a more significant improvement in reduction of muscle spasms, some more permanent improvements in posture.    24:  Pt still benefiting from PT to address postural and soft tissue restriction.  A good amount of reduction in overall intensity of right sided soft tissue restrictions is present.    25:  Pt showing improvements in strength, mobility and posture and would benefit from continued progression in these areas.  Understanding of Dx/Px/POC: good     Prognosis: fair    Goals  ST) Pt. Will be able to sleep through the night without being awoken with pain and with minimal to no pain upon waking in 6 weeks. -GOOD PROGRESS 2) Pt. Will have not headaches while at work or with computer work at home. -GOOD PROGRESS  3)  Pt.'s radicular symptoms will be centralized to neck in 6 weeks. -GOOD PROGRESS  LT) Pt. Will be able to complete all chores at home without pain or limitations in 12 weeks. -GOOD PROGRESS   2)  Pt. Will be able to to complete all physical tasks at work  without restriction or limitations in 12 weeks.  -GOOD PROGRESS    Plan  Patient would benefit from: PT eval and skilled physical therapy  Referral necessary: Yes  Planned modality interventions: cryotherapy, thermotherapy: hydrocollator packs and unattended electrical stimulation    Planned therapy interventions: manual therapy, neuromuscular re-education, patient education, self care, therapeutic activities, therapeutic exercise and home exercise program    Frequency: 2x week  Duration in weeks: 6  Treatment plan discussed with: patient  Plan details: Progress with functional strengthening      Subjective Evaluation    History of Present Illness  Mechanism of injury: Pt presents with worsening of chronic neck pain and bilateral radicular symptoms.  Pt is also reporting worsening of headaches.  Pt does have malignancy in brain the is progressing negatively as well.  He states he feels it is coming from his upper back and scapular area.    24:  Pt reports not as much radicular pain and his arms are feeling better since starting therapy.    24:  Pt reports having improvements but still having a significant spasm in left scapular area.    25:  Pt reports improved pain and muscle restriction throughout his upper traps and neck.  States discomfort and muscle tightness remains between his shoulder blades.          Recurrent probem    Quality of life: fair    Patient Goals  Patient goal: get rid of the numbness in his arms.  Pain  Current pain rating: 3  At best pain ratin  At worst pain ratin  Quality: radiating and burning  Progression: worsening      Diagnostic Tests    FCE comments: Pt is not workingTreatments  Previous treatment: physical therapy and injection treatment    Objective     Postural Observations  Seated posture: poor  Standing posture: poor  Correction of posture: has no consistent effect    Additional Postural Observation Details  Very stiff, almost fixed posture.  Severe forward  head and rounded shoulders.    1-7-25:    Pt presents with improvement in rounded shoulders and cervical protraction    Palpation   Left   Hypertonic in the levator scapulae, middle trapezius, rhomboids, suboccipitals and upper trapezius.   Muscle spasm in the levator scapulae.   Tenderness of the cervical paraspinals, levator scapulae, middle trapezius, rhomboids, suboccipitals and upper trapezius.   Trigger point to levator scapulae.     Right   Hypertonic in the levator scapulae, middle trapezius, rhomboids, suboccipitals and upper trapezius.   Muscle spasm in the rhomboids.   Tenderness of the levator scapulae, middle trapezius, rhomboids, suboccipitals and upper trapezius.     Neurological Testing     Reflexes   Left   Biceps (C5/C6): absent (0)  Brachioradialis (C6): absent (0)  Triceps (C7): trace (1+)    Right   Biceps (C5/C6): trace (1+)  Brachioradialis (C6): normal (2+)  Triceps (C7): trace (1+)    Active Range of Motion   Cervical/Thoracic Spine       Cervical    Subcranial retraction:   Restriction level: moderate  Flexion:  Restriction level: moderate  Extension:  Restriction level: moderate  Left lateral flexion:  Restriction level: moderate  Right lateral flexion:  Restriction level moderate  Left rotation:  Restriction level: moderate  Right rotation:  Restriction level: moderate    Strength/Myotome Testing   Cervical Spine   Neck flexion: 3    Left   Neck lateral flexion (C3): 3    Right   Neck lateral flexion (C3): 3    Left Shoulder     Planes of Motion   Flexion: 3   Extension: 3   Abduction: 3     Right Shoulder     Planes of Motion   Flexion: 3   Extension: 3   Abduction: 3     Left Elbow   Flexion: 3  Extension: 3    Right Elbow   Flexion: 3  Extension: 3                  Precautions :  Hx of cervical fusion.  Progressing malignancy/ degenerative brain condition       Manuals 11-19-24 12-17-24 12-20-24 12-27-24 12-31-24   STM bilateral cervical and scapular muscles 25' 23' 23' 23' 23'          "                  Neuro Re-Ed         Upper cervical flexion stabilzation 10\"x12 10\"x12 10\"x12 10\"x12 10\"x12   LTP/ MTP while holding good cervico-scapular posture 3x10 black ea        Scap retraction/ ER Blue 3x10 Blue 3x10 Blue 3x10 Not today-resume NV  Blue 3x10   Lat pulldown  50# 3x10 50# 3x10 50# 3x10 50# 3x10 55# 3x10   Machine row  40# 3x10 40# 3x10 40# 3x10 40# 3x10 45# 3x10   Chest press   40# 3x10 40# 3x10 40# 3x10 40# 3x10           Ther Ex        Seated thoracic and lower cervical mobilization  5\" x15 for each  5\" x15 for each  5\" x15 for each  5\"x15 for ea 5\"x15 for ea   Upper trap and levetor stretch 10\"x12 samantha  10\"x12 samantha ea 10\"x12 samantha ea 10\"x12 samantha ea  10\"x12 samantha ea                                                    Ther Activity                        Gait Training                        Modalities        [facility]                           "

## 2025-01-10 ENCOUNTER — APPOINTMENT (OUTPATIENT)
Dept: PHYSICAL THERAPY | Facility: CLINIC | Age: 70
End: 2025-01-10
Payer: OTHER MISCELLANEOUS

## 2025-01-14 ENCOUNTER — OFFICE VISIT (OUTPATIENT)
Dept: PHYSICAL THERAPY | Facility: CLINIC | Age: 70
End: 2025-01-14
Payer: OTHER MISCELLANEOUS

## 2025-01-14 DIAGNOSIS — M54.12 CERVICAL RADICULOPATHY: Primary | ICD-10-CM

## 2025-01-14 DIAGNOSIS — Z09 ENCOUNTER FOR FOLLOW-UP EXAMINATION AFTER COMPLETED TREATMENT FOR CONDITIONS OTHER THAN MALIGNANT NEOPLASM: ICD-10-CM

## 2025-01-14 PROCEDURE — 97112 NEUROMUSCULAR REEDUCATION: CPT

## 2025-01-14 PROCEDURE — 97140 MANUAL THERAPY 1/> REGIONS: CPT

## 2025-01-14 PROCEDURE — 97110 THERAPEUTIC EXERCISES: CPT

## 2025-01-14 NOTE — PROGRESS NOTES
"Daily Note     Today's date: 2025  Patient name: Scottie Rodriguez  : 1955  MRN: [id number]  Referring provider: Eric Damon DO  Dx:   Encounter Diagnosis     ICD-10-CM    1. Cervical radiculopathy  M54.12       2. Encounter for follow-up examination after completed treatment for conditions other than malignant neoplasm  Z09                      Subjective: Izaiah reports not sleeping well since his dog  last Friday.       Objective: See treatment diary below      Assessment: Improvement in soft tissue quality to suboccipitals, samantha cerv paraspinals, samantha UT, samantha scap retractors following manuals. Increased TP noted during manuals. Able to maintain correct exercise technique with postural and strengthening exercises. Pt would continue to benefit from skilled PT. Progress as able.       Plan: Continue with current POC to address pt deficits.           Precautions :  Hx of cervical fusion.  Progressing malignancy/ degenerative brain condition     Manuals 24   STM bilateral cervical and scapular muscles 23 23 23                           Neuro Re-Ed         Upper cervical flexion stabilzation 10\"x12 10\"x12 10\"x12 10\"x12 10\"x12   LTP/ MTP while holding good cervico-scapular posture        Scap retraction/ ER Blue 3x10 Blue 3x10 Blue 3x10 Not today-resume NV  Blue 3x10   Lat pulldown  55# 3x10 50# 3x10 50# 3x10 50# 3x10 55# 3x10   Machine row  45# 3x10 40# 3x10 40# 3x10 40# 3x10 45# 3x10   Chest press  40# 3x10 40# 3x10 40# 3x10 40# 3x10 40# 3x10           Ther Ex        Seated thoracic and lower cervical mobilization  5\" x15 for ea  5\" x15 for each  5\" x15 for each  5\"x15 for ea 5\"x15 for ea   Upper trap and levetor stretch 10\"x12 samantha ea  10\"x12 samantha ea 10\"x12 samantha ea 10\"x12 samantha ea  10\"x12 samantha ea                                                    Ther Activity                        Gait Training                        Modalities        [facility]              "

## 2025-01-17 ENCOUNTER — OFFICE VISIT (OUTPATIENT)
Dept: PHYSICAL THERAPY | Facility: CLINIC | Age: 70
End: 2025-01-17
Payer: OTHER MISCELLANEOUS

## 2025-01-17 DIAGNOSIS — M54.12 CERVICAL RADICULOPATHY: Primary | ICD-10-CM

## 2025-01-17 DIAGNOSIS — Z09 ENCOUNTER FOR FOLLOW-UP EXAMINATION AFTER COMPLETED TREATMENT FOR CONDITIONS OTHER THAN MALIGNANT NEOPLASM: ICD-10-CM

## 2025-01-17 PROCEDURE — 97140 MANUAL THERAPY 1/> REGIONS: CPT

## 2025-01-17 PROCEDURE — 97110 THERAPEUTIC EXERCISES: CPT

## 2025-01-17 PROCEDURE — 97112 NEUROMUSCULAR REEDUCATION: CPT

## 2025-01-17 NOTE — PROGRESS NOTES
"Daily Note     Today's date: 2025  Patient name: Scottie Rodriguez  : 1955  MRN: [id number]  Referring provider: Eric Damon DO  Dx:   Encounter Diagnosis     ICD-10-CM    1. Cervical radiculopathy  M54.12       2. Encounter for follow-up examination after completed treatment for conditions other than malignant neoplasm  Z09                      Subjective: Izaiah feels that if he didn't have PT then he would regress in symptoms. He offers no new complaints in regards to c-spine.       Objective: See treatment diary below      Assessment: Visual and VC to ensure correct exercise technique. Improvement in soft tissue quality, noting less TP in samantha UT, samantha cerv paraspinals, samantha UT compared to previous visits. Able to increase reps for all machine resistance with appropriate muscular fatigue following. Pt would continue to benefit from skilled PT.       Plan: Continue with current POC to address pt deficits.      Precautions :  Hx of cervical fusion.  Progressing malignancy/ degenerative brain condition     Manuals 24   STM bilateral cervical and scapular muscles 23 23 23                           Neuro Re-Ed         Upper cervical flexion stabilzation 10\"x12 10\"x12 10\"x12 10\"x12 10\"x12   LTP/ MTP while holding good cervico-scapular posture        Scap retraction/ ER Blue 3x10 Blue 3x10 Blue 3x10 Not today-resume NV  Blue 3x10   Lat pulldown  55# 3x10 55# 2x15  1x10 50# 3x10 50# 3x10 55# 3x10   Machine row  45# 3x10 45# 3x15 40# 3x10 40# 3x10 45# 3x10   Chest press  40# 3x10 40# 2x15  1x10 40# 3x10 40# 3x10 40# 3x10           Ther Ex        Seated thoracic and lower cervical mobilization  5\" x15 for ea  5\" x15 for ea  5\" x15 for each  5\"x15 for ea 5\"x15 for ea   Upper trap and levetor stretch 10\"x12 samantha ea  10\"x12 samantha ea  10\"x12 samantha ea 10\"x12 samantha ea  10\"x12 samantha ea                                                    Ther Activity                      "   Gait Training                        Modalities        [facility]

## 2025-01-21 ENCOUNTER — OFFICE VISIT (OUTPATIENT)
Dept: PHYSICAL THERAPY | Facility: CLINIC | Age: 70
End: 2025-01-21
Payer: OTHER MISCELLANEOUS

## 2025-01-21 DIAGNOSIS — Z09 ENCOUNTER FOR FOLLOW-UP EXAMINATION AFTER COMPLETED TREATMENT FOR CONDITIONS OTHER THAN MALIGNANT NEOPLASM: ICD-10-CM

## 2025-01-21 DIAGNOSIS — M54.12 CERVICAL RADICULOPATHY: Primary | ICD-10-CM

## 2025-01-21 PROCEDURE — 97110 THERAPEUTIC EXERCISES: CPT

## 2025-01-21 PROCEDURE — 97112 NEUROMUSCULAR REEDUCATION: CPT | Performed by: PHYSICAL THERAPIST

## 2025-01-21 PROCEDURE — 97140 MANUAL THERAPY 1/> REGIONS: CPT | Performed by: PHYSICAL THERAPIST

## 2025-01-21 NOTE — PROGRESS NOTES
"Daily Note     Today's date: 2025  Patient name: Scottie Rodriguez  : 1955  MRN: [id number]  Referring provider: Eric Damon DO  Dx:   Encounter Diagnosis     ICD-10-CM    1. Cervical radiculopathy  M54.12       2. Encounter for follow-up examination after completed treatment for conditions other than malignant neoplasm  Z09                      Subjective: Neck continues to hold steady and he feels he is getting stronger.      Objective: See treatment diary below      Assessment: Tolerated treatment well. Patient showing progress with strengthening, seems to be managing symptoms better.  Also, there is decrease in palpable soft tissue restrictions.      Plan: Continue per plan of care.  Progress treatment as tolerated.       Precautions :  Hx of cervical fusion.  Progressing malignancy/ degenerative brain condition     Manuals 24   STM bilateral cervical and scapular muscles 23 23' 23 23                           Neuro Re-Ed         Upper cervical flexion stabilzation 10\"x12 10\"x12 10\"x12 10\"x12 10\"x12   LTP/ MTP while holding good cervico-scapular posture        Scap retraction/ ER Blue 3x10 Blue 3x10 Blue 3x10 Not today-resume NV  Blue 3x10   Lat pulldown  55# 3x10 55# 2x15  1x10 55# 3x10 50# 3x10 55# 3x10   Machine row  45# 3x10 45# 3x15 45# 3x10 40# 3x10 45# 3x10   Chest press  40# 3x10 40# 2x15  1x10 45# 3x10 40# 3x10 40# 3x10           Ther Ex        Seated thoracic and lower cervical mobilization  5\" x15 for ea  5\" x15 for ea  5\" x15 for each  5\"x15 for ea 5\"x15 for ea   Upper trap and levetor stretch 10\"x12 samantha ea  10\"x12 samantha ea  10\"x12 samantha ea 10\"x12 samantha ea  10\"x12 samantha ea                                                    Ther Activity                        Gait Training                        Modalities        [facility]                               "

## 2025-01-24 ENCOUNTER — OFFICE VISIT (OUTPATIENT)
Dept: PHYSICAL THERAPY | Facility: CLINIC | Age: 70
End: 2025-01-24
Payer: OTHER MISCELLANEOUS

## 2025-01-24 DIAGNOSIS — M54.12 CERVICAL RADICULOPATHY: Primary | ICD-10-CM

## 2025-01-24 DIAGNOSIS — Z09 ENCOUNTER FOR FOLLOW-UP EXAMINATION AFTER COMPLETED TREATMENT FOR CONDITIONS OTHER THAN MALIGNANT NEOPLASM: ICD-10-CM

## 2025-01-24 PROCEDURE — 97110 THERAPEUTIC EXERCISES: CPT | Performed by: PHYSICAL THERAPIST

## 2025-01-24 PROCEDURE — 97112 NEUROMUSCULAR REEDUCATION: CPT | Performed by: PHYSICAL THERAPIST

## 2025-01-24 PROCEDURE — 97140 MANUAL THERAPY 1/> REGIONS: CPT | Performed by: PHYSICAL THERAPIST

## 2025-01-24 NOTE — PROGRESS NOTES
"Daily Note     Today's date: 2025  Patient name: Scottie Rodriguez  : 1955  MRN: [id number]  Referring provider: No ref. provider found  Dx:   Encounter Diagnosis     ICD-10-CM    1. Cervical radiculopathy  M54.12       2. Encounter for follow-up examination after completed treatment for conditions other than malignant neoplasm  Z09                      Subjective: Pt reports his symptoms are not worsening and more often manageable compared to a few months ago.      Objective: See treatment diary below      Assessment: Tolerated treatment well. Patient continuing to gradually push himself to increase functional strength and would benefit from continued progression.      Plan: Continue per plan of care.  Progress treatment as tolerated.       Precautions :  Hx of cervical fusion.  Progressing malignancy/ degenerative brain condition     Manuals 24   STM bilateral cervical and scapular muscles 23 23' 23 23                           Neuro Re-Ed         Upper cervical flexion stabilzation 10\"x12 10\"x12 10\"x12 10\"x12 10\"x12   LTP/ MTP while holding good cervico-scapular posture        Scap retraction/ ER Blue 3x10 Blue 3x10 Blue 3x10 Not today-resume NV  Blue 3x10   Lat pulldown  55# 3x10 55# 2x15  1x10 55# 3x10 50# 3x10 55# 3x10   Machine row  45# 3x10 45# 3x15 45# 3x10 40# 3x10 45# 3x10   Chest press  40# 3x10 40# 2x15  1x10 45# 3x10 40# 3x10 40# 3x10           Ther Ex        Seated thoracic and lower cervical mobilization  5\" x15 for ea  5\" x15 for ea  5\" x15 for each  5\"x15 for ea 5\"x15 for ea   Upper trap and levetor stretch 10\"x12 samantha ea  10\"x12 samantha ea  10\"x12 samantha ea 10\"x12 samantha ea  10\"x12 samantha ea                                                    Ther Activity                        Gait Training                        Modalities        MHP                                 "

## 2025-01-28 ENCOUNTER — OFFICE VISIT (OUTPATIENT)
Dept: PHYSICAL THERAPY | Facility: CLINIC | Age: 70
End: 2025-01-28
Payer: OTHER MISCELLANEOUS

## 2025-01-28 DIAGNOSIS — Z09 ENCOUNTER FOR FOLLOW-UP EXAMINATION AFTER COMPLETED TREATMENT FOR CONDITIONS OTHER THAN MALIGNANT NEOPLASM: ICD-10-CM

## 2025-01-28 DIAGNOSIS — M54.12 CERVICAL RADICULOPATHY: Primary | ICD-10-CM

## 2025-01-28 PROCEDURE — 97112 NEUROMUSCULAR REEDUCATION: CPT

## 2025-01-28 PROCEDURE — 97140 MANUAL THERAPY 1/> REGIONS: CPT

## 2025-01-28 PROCEDURE — 97110 THERAPEUTIC EXERCISES: CPT

## 2025-01-28 NOTE — PROGRESS NOTES
"Daily Note     Today's date: 2025  Patient name: Scottie Rodriguez  : 1955  MRN: [id number]  Referring provider: Eric Damon DO  Dx:   Encounter Diagnosis     ICD-10-CM    1. Cervical radiculopathy  M54.12       2. Encounter for follow-up examination after completed treatment for conditions other than malignant neoplasm  Z09                      Subjective: Izaiah reports overall doing better in regards to cervical spine pain and function. He has been going to the gym. He reports scap retractors wakes him up at times.       Objective: See treatment diary below      Assessment: Visual and VC to ensure correct exercise technique. Improvement in postural awareness. Improvement in soft tissue quality to samantha UT, samantha cerv paraspinals, samnatha scap retractors, and suboccipitals. Pt would continue to benefit from skilled PT.       Plan: Continue with current POC to address pt deficits.      Precautions :  Hx of cervical fusion.  Progressing malignancy/ degenerative brain condition     Manuals 25   STM bilateral cervical and scapular muscles 23 23' 23 23                           Neuro Re-Ed         Upper cervical flexion stabilzation 10\"x12 10\"x12 10\"x12 10\"x12 10\"x12   LTP/ MTP while holding good cervico-scapular posture        Scap retraction/ ER Blue 3x10 Blue 3x10 Blue 3x10 Not today-resume NV  Blue 3x10   Lat pulldown  55# 3x10 55# 2x15  1x10 55# 3x10 50# 3x10 50# 3x10   Machine row  45# 3x10 45# 3x15 45# 3x10 40# 3x10 40# 3x10   Chest press  40# 3x10 40# 2x15  1x10 45# 3x10 40# 3x10 55# 3x10           Ther Ex        Seated thoracic and lower cervical mobilization  5\" x15 for ea  5\" x15 for ea  5\" x15 for each  5\"x15 for ea 5\" x15 for ea    Upper trap and levetor stretch 10\"x12 samantha ea  10\"x12 samantha ea  10\"x12 samantha ea 10\"x12 samantha ea  10\"x12 samantha ea                                                    Ther Activity                        Gait Training                      "   Modalities        P

## 2025-01-31 ENCOUNTER — APPOINTMENT (OUTPATIENT)
Dept: PHYSICAL THERAPY | Facility: CLINIC | Age: 70
End: 2025-01-31
Payer: OTHER MISCELLANEOUS

## 2025-02-04 ENCOUNTER — OFFICE VISIT (OUTPATIENT)
Dept: PHYSICAL THERAPY | Facility: CLINIC | Age: 70
End: 2025-02-04
Payer: OTHER MISCELLANEOUS

## 2025-02-04 DIAGNOSIS — M54.12 CERVICAL RADICULOPATHY: Primary | ICD-10-CM

## 2025-02-04 DIAGNOSIS — Z09 ENCOUNTER FOR FOLLOW-UP EXAMINATION AFTER COMPLETED TREATMENT FOR CONDITIONS OTHER THAN MALIGNANT NEOPLASM: ICD-10-CM

## 2025-02-04 PROCEDURE — 97110 THERAPEUTIC EXERCISES: CPT

## 2025-02-04 PROCEDURE — 97140 MANUAL THERAPY 1/> REGIONS: CPT

## 2025-02-04 PROCEDURE — 97112 NEUROMUSCULAR REEDUCATION: CPT

## 2025-02-04 NOTE — PROGRESS NOTES
"Daily Note     Today's date: 2025  Patient name: Scottie Rodriguez  : 1955  MRN: [id number]  Referring provider: Eric Damon DO  Dx:   Encounter Diagnosis     ICD-10-CM    1. Cervical radiculopathy  M54.12       2. Encounter for follow-up examination after completed treatment for conditions other than malignant neoplasm  Z09                      Subjective: Izaiah missed last Friday due to ice. He notes that he has had to take more baths and has been more uncomfortable in regards to c-spine.       Objective: See treatment diary below      Assessment: Visual and VC to ensure correct exercise technique. Increased TP noted t/o samantha UT, R cerv paraspinals, samantha scap retractors; improvement in soft tissue quality following manuals. Muscle fatigue following strengthening exercises.  Pt would continue to benefit from skilled PT.       Plan: Continue with current POC to address pt deficits.     Precautions :  Hx of cervical fusion.  Progressing malignancy/ degenerative brain condition     Manuals 25   STM bilateral cervical and scapular muscles 23 23 23 23                           Neuro Re-Ed         Upper cervical flexion stabilzation  10\"x12 10\"x12 10\"x12 10\"x12   LTP/ MTP while holding good cervico-scapular posture        Scap retraction/ ER Blue 3x10 Blue 3x10 Blue 3x10 Not today-resume NV  Blue 3x10   Lat pulldown  50# 3x10 55# 2x15  1x10 55# 3x10 50# 3x10 50# 3x10   Machine row  40# 3x10 45# 3x15 45# 3x10 40# 3x10 40# 3x10   Chest press  55# 3x10 40# 2x15  1x10 45# 3x10 40# 3x10 55# 3x10           Ther Ex        Seated thoracic and lower cervical mobilization  5\" x15 ea 5\" x15 for ea  5\" x15 for each  5\"x15 for ea 5\" x15 for ea    Upper trap and levetor stretch 10\"x12 samantha ea  10\"x12 samantha ea  10\"x12 samantha ea 10\"x12 samantha ea  10\"x12 samantha ea                                                    Ther Activity                        Gait Training                      "   Modalities        P

## 2025-02-07 ENCOUNTER — OFFICE VISIT (OUTPATIENT)
Dept: PHYSICAL THERAPY | Facility: CLINIC | Age: 70
End: 2025-02-07
Payer: OTHER MISCELLANEOUS

## 2025-02-07 DIAGNOSIS — M54.12 CERVICAL RADICULOPATHY: Primary | ICD-10-CM

## 2025-02-07 DIAGNOSIS — Z09 ENCOUNTER FOR FOLLOW-UP EXAMINATION AFTER COMPLETED TREATMENT FOR CONDITIONS OTHER THAN MALIGNANT NEOPLASM: ICD-10-CM

## 2025-02-07 PROCEDURE — 97140 MANUAL THERAPY 1/> REGIONS: CPT

## 2025-02-07 PROCEDURE — 97112 NEUROMUSCULAR REEDUCATION: CPT

## 2025-02-07 PROCEDURE — 97110 THERAPEUTIC EXERCISES: CPT

## 2025-02-07 NOTE — PROGRESS NOTES
"Daily Note     Today's date: 2025  Patient name: Scottie Rodriguez  : 1955  MRN: [id number]  Referring provider: Eric Damon DO  Dx:   Encounter Diagnosis     ICD-10-CM    1. Cervical radiculopathy  M54.12       2. Encounter for follow-up examination after completed treatment for conditions other than malignant neoplasm  Z09                      Subjective: Izaiah reports middle of shoulder blades has been spasming. He has been taking whirpool baths. He is driving to the [employer] after therapy.       Objective: See treatment diary below      Assessment: Increased reps for machine row this visit noting improvements in strength. Improvement in soft tissue quality following STM to samantha cerv paraspinals, samantha UT, suboccipitals, samantha scap retractors. Pt would continue to benefit from skilled PT. Continue to progress strength as able.       Plan: Continue with current POC to address pt deficits.      Precautions :  Hx of cervical fusion.  Progressing malignancy/ degenerative brain condition     Manuals 25   STM bilateral cervical and scapular muscles 23 23' 23 23                           Neuro Re-Ed         Upper cervical flexion stabilzation   10\"x12 10\"x12 10\"x12   LTP/ MTP while holding good cervico-scapular posture        Scap retraction/ ER Blue 3x10 Blue 3x10 Blue 3x10 Not today-resume NV  Blue 3x10   Lat pulldown  50# 3x10 55# 3x10 55# 3x10 50# 3x10 50# 3x10   Machine row  40# 3x10 45# 3x15 45# 3x10 40# 3x10 40# 3x10   Chest press  55# 3x10 55# 3x10 45# 3x10 40# 3x10 55# 3x10           Ther Ex        Seated thoracic and lower cervical mobilization  5\" x15 ea 5\"x15 ea 5\" x15 for each  5\"x15 for ea 5\" x15 for ea    Upper trap and levetor stretch 10\"x12 samantha ea  10\"x12 samantha ea  10\"x12 samantha ea 10\"x12 samantha ea  10\"x12 samantha ea                                                    Ther Activity                        Gait Training                        Modalities        MHP     "

## 2025-02-11 ENCOUNTER — OFFICE VISIT (OUTPATIENT)
Dept: PHYSICAL THERAPY | Facility: CLINIC | Age: 70
End: 2025-02-11
Payer: OTHER MISCELLANEOUS

## 2025-02-11 DIAGNOSIS — M54.12 CERVICAL RADICULOPATHY: Primary | ICD-10-CM

## 2025-02-11 DIAGNOSIS — Z09 ENCOUNTER FOR FOLLOW-UP EXAMINATION AFTER COMPLETED TREATMENT FOR CONDITIONS OTHER THAN MALIGNANT NEOPLASM: ICD-10-CM

## 2025-02-11 PROCEDURE — 97110 THERAPEUTIC EXERCISES: CPT

## 2025-02-11 PROCEDURE — 97140 MANUAL THERAPY 1/> REGIONS: CPT

## 2025-02-11 PROCEDURE — 97112 NEUROMUSCULAR REEDUCATION: CPT

## 2025-02-11 NOTE — PROGRESS NOTES
"Daily Note     Today's date: 2025  Patient name: Scottie Rodriguez  : 1955  MRN: [id number]  Referring provider: Eric Damon DO  Dx:   Encounter Diagnosis     ICD-10-CM    1. Cervical radiculopathy  M54.12       2. Encounter for follow-up examination after completed treatment for conditions other than malignant neoplasm  Z09                      Subjective: Izaiah reports continued samantha scap retractor pain/spasm which he feels is attributed to one of his medications.       Objective: See treatment diary below      Assessment: Visual and VC to ensure correct exercise technique. Improvement in soft tissue quality to samantha UT, samantha cerv paraspinals, and samantha scap retractors following manuals. Overall strength is improving. Pt would continue to benefit from skilled PT. Progress as able.       Plan: Continue with current POC to address pt deficits.      Precautions :  Hx of cervical fusion.  Progressing malignancy/ degenerative brain condition     Manuals 25   STM bilateral cervical and scapular muscles 23 23 23 23                           Neuro Re-Ed         Upper cervical flexion stabilzation    10\"x12 10\"x12   LTP/ MTP while holding good cervico-scapular posture        Scap retraction/ ER Blue 3x10 Blue 3x10 Blue 3x10 Not today-resume NV  Blue 3x10   Lat pulldown  50# 3x10 55# 3x10 55# 3x10 50# 3x10 50# 3x10   Machine row  40# 3x10 45# 3x15 45# 3x15 40# 3x10 40# 3x10   Chest press  55# 3x10 55# 3x10 55# 3x10 40# 3x10 55# 3x10           Ther Ex        Seated thoracic and lower cervical mobilization  5\" x15 ea 5\"x15 ea 5\"x15 ea 5\"x15 for ea 5\" x15 for ea    Upper trap and levetor stretch 10\"x12 samantha ea  10\"x12 samantha ea  10\"x12 samantha ea  10\"x12 samantha ea  10\"x12 samantha ea                                                    Ther Activity                        Gait Training                        Modalities        MHP                                         "

## 2025-02-14 ENCOUNTER — APPOINTMENT (OUTPATIENT)
Dept: PHYSICAL THERAPY | Facility: CLINIC | Age: 70
End: 2025-02-14
Payer: OTHER MISCELLANEOUS

## 2025-02-18 ENCOUNTER — EVALUATION (OUTPATIENT)
Dept: PHYSICAL THERAPY | Facility: CLINIC | Age: 70
End: 2025-02-18
Payer: OTHER MISCELLANEOUS

## 2025-02-18 DIAGNOSIS — M54.12 CERVICAL RADICULOPATHY: Primary | ICD-10-CM

## 2025-02-18 DIAGNOSIS — Z09 ENCOUNTER FOR FOLLOW-UP EXAMINATION AFTER COMPLETED TREATMENT FOR CONDITIONS OTHER THAN MALIGNANT NEOPLASM: ICD-10-CM

## 2025-02-18 PROCEDURE — 97110 THERAPEUTIC EXERCISES: CPT | Performed by: PHYSICAL THERAPIST

## 2025-02-18 PROCEDURE — 97140 MANUAL THERAPY 1/> REGIONS: CPT | Performed by: PHYSICAL THERAPIST

## 2025-02-18 PROCEDURE — 97112 NEUROMUSCULAR REEDUCATION: CPT | Performed by: PHYSICAL THERAPIST

## 2025-02-18 NOTE — LETTER
2025    Eric JESS DO Marisol  [address] [facility] [address]    Patient: Scottie Rodriguez   YOB: 1955   Date of Visit: 2025     Encounter Diagnosis     ICD-10-CM    1. Cervical radiculopathy  M54.12       2. Encounter for follow-up examination after completed treatment for conditions other than malignant neoplasm  Z09           Dear Dr. Damon:    Thank you for your recent referral of Scottie Rodriguez. Please review the attached evaluation summary from Scottie's recent visit.     Please verify that you agree with the plan of care by signing the attached order.     If you have any questions or concerns, please do not hesitate to call.     I sincerely appreciate the opportunity to share in the care of one of your patients and hope to have another opportunity to work with you in the near future.       Sincerely,    Darin Ayala, PT      Referring Provider:      I certify that I have read the below Plan of Care and certify the need for these services furnished under this plan of treatment while under my care.                    Eric Damon DO  [address] [facility] [address]  Via Fax: [phone]          PT Re-Evaluation    Today's date: 2025  Patient name: Scottie Rodriguez  : 1955  MRN: [id number]  Referring provider: No ref. provider found  Dx:   Encounter Diagnosis     ICD-10-CM    1. Cervical radiculopathy  M54.12       2. Encounter for follow-up examination after completed treatment for conditions other than malignant neoplasm  Z09                      Assessment  Impairments: abnormal muscle tone, abnormal or restricted ROM, activity intolerance, impaired physical strength, lacks appropriate home exercise program, pain with function and poor posture   Symptom irritability: low    Assessment details: Pt has been responding more favorably to PT this round than prior times.  This is true even with him only attending a few visits thus far.  He still  needs to work on improving postural control and functional strength in order to improve functionality at home.    24:  With this round of therapy, patient has shown a more significant improvement in reduction of muscle spasms, some more permanent improvements in posture.    24:  Pt still benefiting from PT to address postural and soft tissue restriction.  A good amount of reduction in overall intensity of right sided soft tissue restrictions is present.    25:  Pt showing improvements in strength, mobility and posture and would benefit from continued progression in these areas.    25:  Pain decreasing, strength improving , function improving.  Benefit and progress still being seen from PT and shoulder continue to progress strengthening.  Understanding of Dx/Px/POC: good     Prognosis: fair    Goals  ST) Pt. Will be able to sleep through the night without being awoken with pain and with minimal to no pain upon waking in 6 weeks. -MET 2) Pt. Will have not headaches while at work or with computer work at home. -MET 3)  Pt.'s radicular symptoms will be centralized to neck in 6 weeks. -GOOD PROGRESS  LT) Pt. Will be able to complete all chores at home without pain or limitations in 12 weeks. -GOOD PROGRESS   2)  Pt. Will be able to to complete all physical tasks at work without restriction or limitations in 12 weeks.  -GOOD PROGRESS    Plan  Patient would benefit from: PT eval and skilled physical therapy  Referral necessary: Yes  Planned modality interventions: cryotherapy, thermotherapy: hydrocollator packs and unattended electrical stimulation    Planned therapy interventions: manual therapy, neuromuscular re-education, patient education, self care, therapeutic activities, therapeutic exercise and home exercise program    Frequency: 2x week  Duration in weeks: 6  Treatment plan discussed with: patient  Plan details: Progress with functional strengthening        Subjective  Evaluation    History of Present Illness  Mechanism of injury: Pt presents with worsening of chronic neck pain and bilateral radicular symptoms.  Pt is also reporting worsening of headaches.  Pt does have malignancy in brain the is progressing negatively as well.  He states he feels it is coming from his upper back and scapular area.    24:  Pt reports not as much radicular pain and his arms are feeling better since starting therapy.    24:  Pt reports having improvements but still having a significant spasm in left scapular area.    25:  Pt reports improved pain and muscle restriction throughout his upper traps and neck.  States discomfort and muscle tightness remains between his shoulder blades.    25:  Pt continues to feel better with his neck and shoulders.  He does state if he misses a PT visit, he feels it and is a bit more restricted.          Recurrent probem    Quality of life: fair    Patient Goals  Patient goal: get rid of the numbness in his arms.  Pain  Current pain ratin  At best pain ratin  At worst pain ratin  Quality: radiating and burning  Progression: worsening      Diagnostic Tests    FCE comments: Pt is not workingTreatments  Previous treatment: physical therapy and injection treatment      Objective     Postural Observations  Seated posture: poor  Standing posture: poor  Correction of posture: has no consistent effect    Additional Postural Observation Details  Very stiff, almost fixed posture.  Severe forward head and rounded shoulders.    25:    Pt presents with improvement in rounded shoulders and cervical protraction    25:  Only having significant trigger point in left UT/ Levator.  Similar restrictions in range and mobility seen as of last assessment.    Palpation   Left   Hypertonic in the levator scapulae, middle trapezius, rhomboids, suboccipitals and upper trapezius.   Muscle spasm in the levator scapulae.   Tenderness of the cervical  "paraspinals, levator scapulae, middle trapezius, rhomboids, suboccipitals and upper trapezius.   Trigger point to levator scapulae.     Right   Hypertonic in the levator scapulae, middle trapezius, rhomboids, suboccipitals and upper trapezius.   Muscle spasm in the rhomboids.   Tenderness of the levator scapulae, middle trapezius, rhomboids, suboccipitals and upper trapezius.     Neurological Testing     Reflexes   Left   Biceps (C5/C6): absent (0)  Brachioradialis (C6): absent (0)  Triceps (C7): trace (1+)    Right   Biceps (C5/C6): trace (1+)  Brachioradialis (C6): normal (2+)  Triceps (C7): trace (1+)    Active Range of Motion   Cervical/Thoracic Spine       Cervical    Subcranial retraction:   Restriction level: moderate  Flexion:  Restriction level: moderate  Extension:  Restriction level: moderate  Left lateral flexion:  Restriction level: moderate  Right lateral flexion:  Restriction level moderate  Left rotation:  Restriction level: moderate  Right rotation:  Restriction level: moderate    Strength/Myotome Testing   Cervical Spine   Neck flexion: 3    Left   Neck lateral flexion (C3): 3    Right   Neck lateral flexion (C3): 3    Left Shoulder     Planes of Motion   Flexion: 3+   Extension: 3+   Abduction: 3+     Right Shoulder     Planes of Motion   Flexion: 3+   Extension: 3+   Abduction: 3+     Left Elbow   Flexion: 3+  Extension: 3+    Right Elbow   Flexion: 3+  Extension: 3+                  Precautions :  Hx of cervical fusion.  Progressing malignancy/ degenerative brain condition     Manuals 2-4-25 2-7-25 2-11-25 2-18-25 1-28-25   STM bilateral cervical and scapular muscles 23' 23' 23' 23' 23'                           Neuro Re-Ed         Upper cervical flexion stabilzation    10\"x12 10\"x12   LTP/ MTP while holding good cervico-scapular posture        Scap retraction/ ER Blue 3x10 Blue 3x10 Blue 3x10 Not today-resume NV  Blue 3x10   Lat pulldown  50# 3x10 55# 3x10 55# 3x10 55# 3x10 50# 3x10   Machine " "row  40# 3x10 45# 3x15 45# 3x15 55# 3x10 40# 3x10   Chest press  55# 3x10 55# 3x10 55# 3x10 55# 3x10 55# 3x10           Ther Ex        Seated thoracic and lower cervical mobilization  5\" x15 ea 5\"x15 ea 5\"x15 ea 5\"x15 for ea 5\" x15 for ea    Upper trap and levetor stretch 10\"x12 samantha ea  10\"x12 samantha ea  10\"x12 samantha ea  10\"x12 samantha ea  10\"x12 samantha ea                                                    Ther Activity                        Gait Training                        Modalities        MHP                                             "

## 2025-02-18 NOTE — PROGRESS NOTES
PT Re-Evaluation    Today's date: 2025  Patient name: Scottie Rodriguez  : 1955  MRN: [id number]  Referring provider: No ref. provider found  Dx:   Encounter Diagnosis     ICD-10-CM    1. Cervical radiculopathy  M54.12       2. Encounter for follow-up examination after completed treatment for conditions other than malignant neoplasm  Z09                      Assessment  Impairments: abnormal muscle tone, abnormal or restricted ROM, activity intolerance, impaired physical strength, lacks appropriate home exercise program, pain with function and poor posture   Symptom irritability: low    Assessment details: Pt has been responding more favorably to PT this round than prior times.  This is true even with him only attending a few visits thus far.  He still needs to work on improving postural control and functional strength in order to improve functionality at home.    24:  With this round of therapy, patient has shown a more significant improvement in reduction of muscle spasms, some more permanent improvements in posture.    24:  Pt still benefiting from PT to address postural and soft tissue restriction.  A good amount of reduction in overall intensity of right sided soft tissue restrictions is present.    25:  Pt showing improvements in strength, mobility and posture and would benefit from continued progression in these areas.    25:  Pain decreasing, strength improving , function improving.  Benefit and progress still being seen from PT and shoulder continue to progress strengthening.  Understanding of Dx/Px/POC: good     Prognosis: fair    Goals  ST) Pt. Will be able to sleep through the night without being awoken with pain and with minimal to no pain upon waking in 6 weeks. -MET 2) Pt. Will have not headaches while at work or with computer work at home. -MET 3)  Pt.'s radicular symptoms will be centralized to neck in 6 weeks. -GOOD PROGRESS  LT) Pt. Will be able to  complete all chores at home without pain or limitations in 12 weeks. -GOOD PROGRESS   2)  Pt. Will be able to to complete all physical tasks at work without restriction or limitations in 12 weeks.  -GOOD PROGRESS    Plan  Patient would benefit from: PT eval and skilled physical therapy  Referral necessary: Yes  Planned modality interventions: cryotherapy, thermotherapy: hydrocollator packs and unattended electrical stimulation    Planned therapy interventions: manual therapy, neuromuscular re-education, patient education, self care, therapeutic activities, therapeutic exercise and home exercise program    Frequency: 2x week  Duration in weeks: 6  Treatment plan discussed with: patient  Plan details: Progress with functional strengthening        Subjective Evaluation    History of Present Illness  Mechanism of injury: Pt presents with worsening of chronic neck pain and bilateral radicular symptoms.  Pt is also reporting worsening of headaches.  Pt does have malignancy in brain the is progressing negatively as well.  He states he feels it is coming from his upper back and scapular area.    24:  Pt reports not as much radicular pain and his arms are feeling better since starting therapy.    24:  Pt reports having improvements but still having a significant spasm in left scapular area.    25:  Pt reports improved pain and muscle restriction throughout his upper traps and neck.  States discomfort and muscle tightness remains between his shoulder blades.    25:  Pt continues to feel better with his neck and shoulders.  He does state if he misses a PT visit, he feels it and is a bit more restricted.          Recurrent probem    Quality of life: fair    Patient Goals  Patient goal: get rid of the numbness in his arms.  Pain  Current pain ratin  At best pain ratin  At worst pain ratin  Quality: radiating and burning  Progression: worsening      Diagnostic Tests    FCE comments: Pt is not  workingTreatments  Previous treatment: physical therapy and injection treatment      Objective     Postural Observations  Seated posture: poor  Standing posture: poor  Correction of posture: has no consistent effect    Additional Postural Observation Details  Very stiff, almost fixed posture.  Severe forward head and rounded shoulders.    1-7-25:    Pt presents with improvement in rounded shoulders and cervical protraction    2-18-25:  Only having significant trigger point in left UT/ Levator.  Similar restrictions in range and mobility seen as of last assessment.    Palpation   Left   Hypertonic in the levator scapulae, middle trapezius, rhomboids, suboccipitals and upper trapezius.   Muscle spasm in the levator scapulae.   Tenderness of the cervical paraspinals, levator scapulae, middle trapezius, rhomboids, suboccipitals and upper trapezius.   Trigger point to levator scapulae.     Right   Hypertonic in the levator scapulae, middle trapezius, rhomboids, suboccipitals and upper trapezius.   Muscle spasm in the rhomboids.   Tenderness of the levator scapulae, middle trapezius, rhomboids, suboccipitals and upper trapezius.     Neurological Testing     Reflexes   Left   Biceps (C5/C6): absent (0)  Brachioradialis (C6): absent (0)  Triceps (C7): trace (1+)    Right   Biceps (C5/C6): trace (1+)  Brachioradialis (C6): normal (2+)  Triceps (C7): trace (1+)    Active Range of Motion   Cervical/Thoracic Spine       Cervical    Subcranial retraction:   Restriction level: moderate  Flexion:  Restriction level: moderate  Extension:  Restriction level: moderate  Left lateral flexion:  Restriction level: moderate  Right lateral flexion:  Restriction level moderate  Left rotation:  Restriction level: moderate  Right rotation:  Restriction level: moderate    Strength/Myotome Testing   Cervical Spine   Neck flexion: 3    Left   Neck lateral flexion (C3): 3    Right   Neck lateral flexion (C3): 3    Left Shoulder     Planes of  "Motion   Flexion: 3+   Extension: 3+   Abduction: 3+     Right Shoulder     Planes of Motion   Flexion: 3+   Extension: 3+   Abduction: 3+     Left Elbow   Flexion: 3+  Extension: 3+    Right Elbow   Flexion: 3+  Extension: 3+                  Precautions :  Hx of cervical fusion.  Progressing malignancy/ degenerative brain condition     Manuals 2-4-25 2-7-25 2-11-25 2-18-25 1-28-25   STM bilateral cervical and scapular muscles 23' 23' 23' 23' 23'                           Neuro Re-Ed         Upper cervical flexion stabilzation    10\"x12 10\"x12   LTP/ MTP while holding good cervico-scapular posture        Scap retraction/ ER Blue 3x10 Blue 3x10 Blue 3x10 Not today-resume NV  Blue 3x10   Lat pulldown  50# 3x10 55# 3x10 55# 3x10 55# 3x10 50# 3x10   Machine row  40# 3x10 45# 3x15 45# 3x15 55# 3x10 40# 3x10   Chest press  55# 3x10 55# 3x10 55# 3x10 55# 3x10 55# 3x10           Ther Ex        Seated thoracic and lower cervical mobilization  5\" x15 ea 5\"x15 ea 5\"x15 ea 5\"x15 for ea 5\" x15 for ea    Upper trap and levetor stretch 10\"x12 samantha ea  10\"x12 samantha ea  10\"x12 samantha ea  10\"x12 samantha ea  10\"x12 samantha ea                                                    Ther Activity                        Gait Training                        Modalities        MHP                             "

## 2025-02-21 ENCOUNTER — APPOINTMENT (OUTPATIENT)
Dept: PHYSICAL THERAPY | Facility: CLINIC | Age: 70
End: 2025-02-21
Payer: OTHER MISCELLANEOUS

## 2025-02-25 ENCOUNTER — OFFICE VISIT (OUTPATIENT)
Dept: PHYSICAL THERAPY | Facility: CLINIC | Age: 70
End: 2025-02-25
Payer: OTHER MISCELLANEOUS

## 2025-02-25 DIAGNOSIS — Z09 ENCOUNTER FOR FOLLOW-UP EXAMINATION AFTER COMPLETED TREATMENT FOR CONDITIONS OTHER THAN MALIGNANT NEOPLASM: ICD-10-CM

## 2025-02-25 DIAGNOSIS — M54.12 CERVICAL RADICULOPATHY: Primary | ICD-10-CM

## 2025-02-25 PROCEDURE — 97140 MANUAL THERAPY 1/> REGIONS: CPT

## 2025-02-25 PROCEDURE — 97112 NEUROMUSCULAR REEDUCATION: CPT

## 2025-02-25 PROCEDURE — 97110 THERAPEUTIC EXERCISES: CPT

## 2025-02-25 NOTE — PROGRESS NOTES
"Daily Note     Today's date: 2025  Patient name: Scottie Rodriguez  : 1955  MRN: [id number]  Referring provider: Eric Damon DO  Dx:   Encounter Diagnosis     ICD-10-CM    1. Cervical radiculopathy  M54.12       2. Encounter for follow-up examination after completed treatment for conditions other than malignant neoplasm  Z09                      Subjective: Izaiah reports having a recent illness and reports soreness in ribs/chest from coughing. He reports soreness in c-spine.       Objective: See treatment diary below      Assessment: Decreased machine weights due to increased fatigue from recent illness; tolerated the modification well. Improvement in soft tissue quality to samantha cerv paraspinals, samantha UT, samantha scap retractors. Pt would continue to benefit from skilled PT.       Plan: Continue with current POC to address pt deficits.           Precautions :  Hx of cervical fusion.  Progressing malignancy/ degenerative brain condition     Manuals 25   STM bilateral cervical and scapular muscles 23 23 23 23                           Neuro Re-Ed         Upper cervical flexion stabilzation    10\"x12    LTP/ MTP while holding good cervico-scapular posture        Scap retraction/ ER Blue 3x10 Blue 3x10 Blue 3x10 Not today-resume NV  Blue 3x10   Lat pulldown  50# 3x10 55# 3x10 55# 3x10 55# 3x10 50# 3x10   Machine row  40# 3x10 45# 3x15 45# 3x15 55# 3x10 40# 3x10   Chest press  55# 3x10 55# 3x10 55# 3x10 55# 3x10 40# 3x10           Ther Ex        Seated thoracic and lower cervical mobilization  5\" x15 ea 5\"x15 ea 5\"x15 ea 5\"x15 for ea 5\" x15 for ea    Upper trap and levetor stretch 10\"x12 samantha ea  10\"x12 samantha ea  10\"x12 samantha ea  10\"x12 samantha ea  10\"x12 samantha ea                                                    Ther Activity                        Gait Training                        Modalities        MHP                             "

## 2025-02-27 ENCOUNTER — APPOINTMENT (OUTPATIENT)
Dept: PHYSICAL THERAPY | Facility: CLINIC | Age: 70
End: 2025-02-27
Payer: OTHER MISCELLANEOUS

## 2025-02-28 ENCOUNTER — OFFICE VISIT (OUTPATIENT)
Dept: PHYSICAL THERAPY | Facility: CLINIC | Age: 70
End: 2025-02-28
Payer: OTHER MISCELLANEOUS

## 2025-02-28 DIAGNOSIS — Z09 ENCOUNTER FOR FOLLOW-UP EXAMINATION AFTER COMPLETED TREATMENT FOR CONDITIONS OTHER THAN MALIGNANT NEOPLASM: ICD-10-CM

## 2025-02-28 DIAGNOSIS — M54.12 CERVICAL RADICULOPATHY: Primary | ICD-10-CM

## 2025-02-28 PROCEDURE — 97110 THERAPEUTIC EXERCISES: CPT

## 2025-02-28 PROCEDURE — 97140 MANUAL THERAPY 1/> REGIONS: CPT

## 2025-02-28 PROCEDURE — 97112 NEUROMUSCULAR REEDUCATION: CPT

## 2025-02-28 NOTE — PROGRESS NOTES
"Daily Note     Today's date: 2025  Patient name: Scottie Rodriguez  : 1955  MRN: [id number]  Referring provider: Eric Damon DO  Dx:   Encounter Diagnosis     ICD-10-CM    1. Cervical radiculopathy  M54.12       2. Encounter for follow-up examination after completed treatment for conditions other than malignant neoplasm  Z09                      Subjective: Izaiah offers no new complaints in regards to c-spine today.       Objective: See treatment diary below      Assessment: Increased TP to samantha scap retractors this visit; improvement in soft tissue quality to samantha cerv paraspinals, samantha UT, samantha scap retractors following manuals. Able to increase resistance on machine weights this visit. Denied any increases in pain with exercises performed. Pt would continue to benefit from skilled PT.       Plan: Continue with current POC to address pt deficits.      Precautions :  Hx of cervical fusion.  Progressing malignancy/ degenerative brain condition     Manuals 25   STM bilateral cervical and scapular muscles 23 23 23 23                           Neuro Re-Ed         Upper cervical flexion stabilzation    10\"x12    LTP/ MTP while holding good cervico-scapular posture        Scap retraction/ ER Blue 3x10 Blue 3x10 Blue 3x10 Not today-resume NV  Blue 3x10   Lat pulldown  55# 3x10 55# 3x10 55# 3x10 55# 3x10 50# 3x10   Machine row  40# 3x10 45# 3x15 45# 3x15 55# 3x10 40# 3x10   Chest press  45# 3x10 55# 3x10 55# 3x10 55# 3x10 40# 3x10           Ther Ex        Seated thoracic and lower cervical mobilization  5\" x15 for ea  5\"x15 ea 5\"x15 ea 5\"x15 for ea 5\" x15 for ea    Upper trap and levetor stretch 10\"x12 samantha ea  10\"x12 samantha ea  10\"x12 samantha ea  10\"x12 samantha ea  10\"x12 samantha ea                                                    Ther Activity                        Gait Training                        Modalities        MHP                               "

## 2025-03-04 ENCOUNTER — OFFICE VISIT (OUTPATIENT)
Dept: PHYSICAL THERAPY | Facility: CLINIC | Age: 70
End: 2025-03-04
Payer: OTHER MISCELLANEOUS

## 2025-03-04 DIAGNOSIS — M54.12 CERVICAL RADICULOPATHY: Primary | ICD-10-CM

## 2025-03-04 DIAGNOSIS — Z09 ENCOUNTER FOR FOLLOW-UP EXAMINATION AFTER COMPLETED TREATMENT FOR CONDITIONS OTHER THAN MALIGNANT NEOPLASM: ICD-10-CM

## 2025-03-04 PROCEDURE — 97112 NEUROMUSCULAR REEDUCATION: CPT | Performed by: PHYSICAL THERAPIST

## 2025-03-04 PROCEDURE — 97110 THERAPEUTIC EXERCISES: CPT | Performed by: PHYSICAL THERAPIST

## 2025-03-04 PROCEDURE — 97140 MANUAL THERAPY 1/> REGIONS: CPT | Performed by: PHYSICAL THERAPIST

## 2025-03-04 NOTE — PROGRESS NOTES
"Daily Note     Today's date: 3/4/2025  Patient name: Scottie Rodriguez  : 1955  MRN: [id number]  Referring provider: Eric Damon DO  Dx:   Encounter Diagnosis     ICD-10-CM    1. Cervical radiculopathy  M54.12       2. Encounter for follow-up examination after completed treatment for conditions other than malignant neoplasm  Z09                      Subjective:  Pt reports soreness in his (scapular area) after trying to get some work done around the house this past weekend.      Objective: See treatment diary below      Assessment: Tolerated treatment well. Patient still progressing in strengthening.  Neck feeling much better lately in regards to palpable soft tissue restriction.  Scapular musculature more frequently restricted.      Plan: Continue per plan of care.  Progress treatment as tolerated.       Precautions :  Hx of cervical fusion.  Progressing malignancy/ degenerative brain condition     Manuals 2-28-25 3-4-25 2-11-25 2-18-25 2-25-55   STM bilateral cervical and scapular muscles 23                           Neuro Re-Ed         Upper cervical flexion stabilzation  10\" x12  10\"x12    LTP/ MTP while holding good cervico-scapular posture        Scap retraction/ ER Blue 3x10 Blue 3x10 Blue 3x10 Not today-resume NV  Blue 3x10   Lat pulldown  55# 3x10 55# 3x10 55# 3x10 55# 3x10 50# 3x10   Machine row  40# 3x10 45# 3x15 45# 3x15 55# 3x10 40# 3x10   Chest press  45# 3x10 55# 3x10 55# 3x10 55# 3x10 40# 3x10           Ther Ex        Seated thoracic and lower cervical mobilization  5\" x15 for ea  5\"x15 ea 5\"x15 ea 5\"x15 for ea 5\" x15 for ea    Upper trap and levetor stretch 10\"x12 samantha ea  10\"x12 samantha ea  10\"x12 samantha ea  10\"x12 samantha ea  10\"x12 samantha ea                                                    Ther Activity                        Gait Training                        Modalities        MHP                                 "

## 2025-03-07 ENCOUNTER — OFFICE VISIT (OUTPATIENT)
Dept: PHYSICAL THERAPY | Facility: CLINIC | Age: 70
End: 2025-03-07
Payer: OTHER MISCELLANEOUS

## 2025-03-07 DIAGNOSIS — Z09 ENCOUNTER FOR FOLLOW-UP EXAMINATION AFTER COMPLETED TREATMENT FOR CONDITIONS OTHER THAN MALIGNANT NEOPLASM: ICD-10-CM

## 2025-03-07 DIAGNOSIS — M54.12 CERVICAL RADICULOPATHY: Primary | ICD-10-CM

## 2025-03-07 PROCEDURE — 97140 MANUAL THERAPY 1/> REGIONS: CPT | Performed by: PHYSICAL THERAPIST

## 2025-03-07 PROCEDURE — 97110 THERAPEUTIC EXERCISES: CPT | Performed by: PHYSICAL THERAPIST

## 2025-03-07 NOTE — PROGRESS NOTES
"Daily Note     Today's date: 3/7/2025  Patient name: Scottie Rodriguez  : 1955  MRN: [id number]  Referring provider: Eric Damon DO  Dx:   Encounter Diagnosis     ICD-10-CM    1. Cervical radiculopathy  M54.12       2. Encounter for follow-up examination after completed treatment for conditions other than malignant neoplasm  Z09                      Subjective: Pt reports having finished his work at home this week so hopefully he will start feeling better.      Objective: See treatment diary below      Assessment: Tolerated treatment well. Patient continues to progress in strengthening and, given his activity level this week, handled his activities without significant exacerbation.      Plan: Continue per plan of care.  Progress treatment as tolerated.       Precautions :  Hx of cervical fusion.  Progressing malignancy/ degenerative brain condition     Manuals 2-28-25 3-4-25 3-7-25 2-18-25 2-25-55   STM bilateral cervical and scapular muscles 23                           Neuro Re-Ed         Upper cervical flexion stabilzation  10\" x12 10\" x12 10\"x12    LTP/ MTP while holding good cervico-scapular posture        Scap retraction/ ER Blue 3x10 Blue 3x10 Blue 3x10 Not today-resume NV  Blue 3x10   Lat pulldown  55# 3x10 55# 3x10 55# 3x10 55# 3x10 50# 3x10   Machine row  40# 3x10 45# 3x15 45# 3x15 55# 3x10 40# 3x10   Chest press  45# 3x10 55# 3x10 55# 3x10 55# 3x10 40# 3x10           Ther Ex        Seated thoracic and lower cervical mobilization  5\" x15 for ea  5\"x15 ea 5\"x15 ea 5\"x15 for ea 5\" x15 for ea    Upper trap and levetor stretch 10\"x12 samantha ea  10\"x12 samantha ea  10\"x12 samantha ea  10\"x12 samantha ea  10\"x12 samantha ea                                                    Ther Activity                        Gait Training                        Modalities        MHP                                   "

## 2025-03-11 ENCOUNTER — OFFICE VISIT (OUTPATIENT)
Dept: PHYSICAL THERAPY | Facility: CLINIC | Age: 70
End: 2025-03-11
Payer: OTHER MISCELLANEOUS

## 2025-03-11 DIAGNOSIS — Z09 ENCOUNTER FOR FOLLOW-UP EXAMINATION AFTER COMPLETED TREATMENT FOR CONDITIONS OTHER THAN MALIGNANT NEOPLASM: ICD-10-CM

## 2025-03-11 DIAGNOSIS — M54.12 CERVICAL RADICULOPATHY: Primary | ICD-10-CM

## 2025-03-11 PROCEDURE — 97112 NEUROMUSCULAR REEDUCATION: CPT | Performed by: PHYSICAL THERAPIST

## 2025-03-11 PROCEDURE — 97140 MANUAL THERAPY 1/> REGIONS: CPT | Performed by: PHYSICAL THERAPIST

## 2025-03-11 PROCEDURE — 97110 THERAPEUTIC EXERCISES: CPT | Performed by: PHYSICAL THERAPIST

## 2025-03-11 NOTE — PROGRESS NOTES
"Daily Note     Today's date: 3/11/2025  Patient name: Scottie Rodriguez  : 1955  MRN: [id number]  Referring provider: Eric Damon DO  Dx:   Encounter Diagnosis     ICD-10-CM    1. Cervical radiculopathy  M54.12       2. Encounter for follow-up examination after completed treatment for conditions other than malignant neoplasm  Z09                      Subjective: Pt reports still having some minor to moderate symptoms around shoulder blades as he is still working on fixing his home issues.      Objective: See treatment diary below      Assessment: Tolerated treatment well. Patient Has bilateral levator and mid trap/ rhomboid trigger points.  Patient is showing continued progression with strengthening.      Plan: Continue per plan of care.  Progress treatment as tolerated.       Precautions :  Hx of cervical fusion.  Progressing malignancy/ degenerative brain condition     Manuals 2-28-25 3-4-25 3-7-25 3-11-25 2-25-55   STM bilateral cervical and scapular muscles 23                           Neuro Re-Ed         Upper cervical flexion stabilzation  10\" x12 10\" x12 10\"x12    LTP/ MTP while holding good cervico-scapular posture        Scap retraction/ ER Blue 3x10 Blue 3x10 Blue 3x10 Not today-resume NV  Blue 3x10   Lat pulldown  55# 3x10 55# 3x10 55# 3x10 55# 3x10 50# 3x10   Machine row  40# 3x10 45# 3x15 45# 3x15 55# 3x10 40# 3x10   Chest press  45# 3x10 55# 3x10 55# 3x10 55# 3x10 40# 3x10           Ther Ex        Seated thoracic and lower cervical mobilization  5\" x15 for ea  5\"x15 ea 5\"x15 ea 5\"x15 for ea 5\" x15 for ea    Upper trap and levetor stretch 10\"x12 samantha ea  10\"x12 samantha ea  10\"x12 samantha ea  10\"x12 samantha ea  10\"x12 samantha ea                                                    Ther Activity                        Gait Training                        Modalities        MHP                                     "

## 2025-03-14 ENCOUNTER — APPOINTMENT (OUTPATIENT)
Dept: PHYSICAL THERAPY | Facility: CLINIC | Age: 70
End: 2025-03-14
Payer: OTHER MISCELLANEOUS

## 2025-03-18 ENCOUNTER — APPOINTMENT (OUTPATIENT)
Dept: PHYSICAL THERAPY | Facility: CLINIC | Age: 70
End: 2025-03-18
Payer: OTHER MISCELLANEOUS

## 2025-03-21 ENCOUNTER — OFFICE VISIT (OUTPATIENT)
Dept: PHYSICAL THERAPY | Facility: CLINIC | Age: 70
End: 2025-03-21
Payer: OTHER MISCELLANEOUS

## 2025-03-21 DIAGNOSIS — Z09 ENCOUNTER FOR FOLLOW-UP EXAMINATION AFTER COMPLETED TREATMENT FOR CONDITIONS OTHER THAN MALIGNANT NEOPLASM: ICD-10-CM

## 2025-03-21 DIAGNOSIS — M54.12 CERVICAL RADICULOPATHY: Primary | ICD-10-CM

## 2025-03-21 PROCEDURE — 97140 MANUAL THERAPY 1/> REGIONS: CPT | Performed by: PHYSICAL THERAPIST

## 2025-03-21 PROCEDURE — 97110 THERAPEUTIC EXERCISES: CPT | Performed by: PHYSICAL THERAPIST

## 2025-03-21 NOTE — PROGRESS NOTES
"Daily Note     Today's date: 3/21/2025  Patient name: Scottie Rodriguez  : 1955  MRN: [id number]  Referring provider: Eric Damon DO  Dx:   Encounter Diagnosis     ICD-10-CM    1. Cervical radiculopathy  M54.12       2. Encounter for follow-up examination after completed treatment for conditions other than malignant neoplasm  Z09                      Subjective: Pt reports doing a little better now that his home project is finished.      Objective: See treatment diary below      Assessment: Tolerated treatment well. Patient managing muscle spasms and restrictions fairly well and is gradually improving strength.      Plan: Continue per plan of care.  Progress treatment as tolerated.       Precautions :  Hx of cervical fusion.  Progressing malignancy/ degenerative brain condition     Manuals 2-28-25 3-4-25 3-7-25 3-11-25 3-21-25   STM bilateral cervical and scapular muscles 23                           Neuro Re-Ed         Upper cervical flexion stabilzation  10\" x12 10\" x12 10\"x12 10\" x12   LTP/ MTP while holding good cervico-scapular posture        Scap retraction/ ER Blue 3x10 Blue 3x10 Blue 3x10 Not today-resume NV  Blue 3x10   Lat pulldown  55# 3x10 55# 3x10 55# 3x10 55# 3x10 50# 3x10   Machine row  40# 3x10 45# 3x15 45# 3x15 55# 3x10 40# 3x10   Chest press  45# 3x10 55# 3x10 55# 3x10 55# 3x10 40# 3x10           Ther Ex        Seated thoracic and lower cervical mobilization  5\" x15 for ea  5\"x15 ea 5\"x15 ea 5\"x15 for ea 5\" x15 for ea    Upper trap and levetor stretch 10\"x12 samantha ea  10\"x12 samantha ea  10\"x12 samantha ea  10\"x12 samantha ea  10\"x12 samantha ea                                                    Ther Activity                        Gait Training                        Modalities        MHP                                       "

## 2025-03-25 ENCOUNTER — OFFICE VISIT (OUTPATIENT)
Dept: PHYSICAL THERAPY | Facility: CLINIC | Age: 70
End: 2025-03-25
Payer: OTHER MISCELLANEOUS

## 2025-03-25 DIAGNOSIS — Z09 ENCOUNTER FOR FOLLOW-UP EXAMINATION AFTER COMPLETED TREATMENT FOR CONDITIONS OTHER THAN MALIGNANT NEOPLASM: ICD-10-CM

## 2025-03-25 DIAGNOSIS — M54.12 CERVICAL RADICULOPATHY: Primary | ICD-10-CM

## 2025-03-25 PROCEDURE — 97110 THERAPEUTIC EXERCISES: CPT

## 2025-03-25 PROCEDURE — 97112 NEUROMUSCULAR REEDUCATION: CPT

## 2025-03-25 PROCEDURE — 97140 MANUAL THERAPY 1/> REGIONS: CPT

## 2025-03-25 NOTE — PROGRESS NOTES
"Daily Note     Today's date: 3/25/2025  Patient name: Scottie Rodriguez  : 1955  MRN: [id number]  Referring provider: Eric Damon DO  Dx:   Encounter Diagnosis     ICD-10-CM    1. Cervical radiculopathy  M54.12       2. Encounter for follow-up examination after completed treatment for conditions other than malignant neoplasm  Z09                      Subjective: Izaiah reports laying floors in his house and reports having to take a bath due to discomfort to L aspect of c-spine.       Objective: See treatment diary below      Assessment: TP most prevalent to R cerv paraspinals and L UT/scap retractors which improved with STM. Visual and VC to ensure correct exercise technique. Fatigue with chest press but denied any increases in pain with machine resistance or tband exercises. Pt would continue to benefit from skilled PT.       Plan: Continue with current POC to address pt deficits.      Precautions :  Hx of cervical fusion.  Progressing malignancy/ degenerative brain condition     Manuals 3-25-25 3-4-25 3-7-25 3-11-25 3-21-25   STM bilateral cervical and scapular muscles 23                           Neuro Re-Ed         Upper cervical flexion stabilzation 10\"x12 10\" x12 10\" x12 10\"x12 10\" x12   LTP/ MTP while holding good cervico-scapular posture        Scap retraction/ ER Blue 3x10 Blue 3x10 Blue 3x10 Not today-resume NV  Blue 3x10   Lat pulldown  50# 3x10 55# 3x10 55# 3x10 55# 3x10 50# 3x10   Machine row  40# 3x10 45# 3x15 45# 3x15 55# 3x10 40# 3x10   Chest press  55# 3x10 55# 3x10 55# 3x10 55# 3x10 40# 3x10           Ther Ex        Seated thoracic and lower cervical mobilization  5\" x15 ea 5\"x15 ea 5\"x15 ea 5\"x15 for ea 5\" x15 for ea    Upper trap and levetor stretch 10\"x12 ea samantha  10\"x12 samantha ea  10\"x12 samantha ea  10\"x12 samantha ea  10\"x12 samantha ea                                                    Ther Activity                        Gait Training                        Modalities        MHP   "

## 2025-04-01 ENCOUNTER — OFFICE VISIT (OUTPATIENT)
Dept: PHYSICAL THERAPY | Facility: CLINIC | Age: 70
End: 2025-04-01
Payer: OTHER MISCELLANEOUS

## 2025-04-01 DIAGNOSIS — Z09 ENCOUNTER FOR FOLLOW-UP EXAMINATION AFTER COMPLETED TREATMENT FOR CONDITIONS OTHER THAN MALIGNANT NEOPLASM: ICD-10-CM

## 2025-04-01 DIAGNOSIS — M54.12 CERVICAL RADICULOPATHY: Primary | ICD-10-CM

## 2025-04-01 PROCEDURE — 97140 MANUAL THERAPY 1/> REGIONS: CPT

## 2025-04-01 PROCEDURE — 97112 NEUROMUSCULAR REEDUCATION: CPT

## 2025-04-01 PROCEDURE — 97110 THERAPEUTIC EXERCISES: CPT

## 2025-04-01 NOTE — LETTER
2025    Gregory Sy DO  [address]  [address]  [place] [address]    Patient: Scottie Rodriguez   YOB: 1955   Date of Visit: 2025     Encounter Diagnosis     ICD-10-CM    1. Cervical radiculopathy  M54.12       2. Encounter for follow-up examination after completed treatment for conditions other than malignant neoplasm  Z09           Dear Dr. Sy:    Thank you for your recent referral of Scottie Rodriguez. Please review the attached evaluation summary from Scottie's recent visit.     Please verify that you agree with the plan of care by signing the attached order.     If you have any questions or concerns, please do not hesitate to call.     I sincerely appreciate the opportunity to share in the care of one of your patients and hope to have another opportunity to work with you in the near future.       Sincerely,    Emilee Hussein      Referring Provider:      I certify that I have read the below Plan of Care and certify the need for these services furnished under this plan of treatment while under my care.                    Gregory Sy DO  [address]  [address]  [place] [address]  Via Fax: [phone]          PT Re-Evaluation  Collection of subjective/objective data performed by Emilee Hussein PTA; Assessment, POC, and Goal attainment performed by Vidal Ayala DPT      Today's date: 2025  Patient name: Scottie Rodriguez  : 1955  MRN: [id number]  Referring provider: Gregory Sy DO  Dx:   Encounter Diagnosis     ICD-10-CM    1. Cervical radiculopathy  M54.12       2. Encounter for follow-up examination after completed treatment for conditions other than malignant neoplasm  Z09                      Assessment  Impairments: abnormal muscle tone, abnormal or restricted ROM, activity intolerance, impaired physical strength, lacks appropriate home exercise program, pain with function and poor posture   Symptom irritability:  low    Assessment details: Pt has been responding more favorably to PT this round than prior times.  This is true even with him only attending a few visits thus far.  He still needs to work on improving postural control and functional strength in order to improve functionality at home.    24:  With this round of therapy, patient has shown a more significant improvement in reduction of muscle spasms, some more permanent improvements in posture.    24:  Pt still benefiting from PT to address postural and soft tissue restriction.  A good amount of reduction in overall intensity of right sided soft tissue restrictions is present.    25:  Pt showing improvements in strength, mobility and posture and would benefit from continued progression in these areas.    25:  Pain decreasing, strength improving , function improving.  Benefit and progress still being seen from PT and shoulder continue to progress strengthening.    25: Pt continues to show improvements in mobility and is continuing to be able to approach more chores and home tasks without increasing symptoms.  Continue with progression of functional strengthening while continuing symptom management.  Understanding of Dx/Px/POC: good     Prognosis: fair    Goals  ST) Pt. Will be able to sleep through the night without being awoken with pain and with minimal to no pain upon waking in 6 weeks. -MET 2) Pt. Will have not headaches while at work or with computer work at home. -MET 3)  Pt.'s radicular symptoms will be centralized to neck in 6 weeks. -GOOD PROGRESS  LT) Pt. Will be able to complete all chores at home without pain or limitations in 12 weeks. -GOOD PROGRESS   2)  Pt. Will be able to to complete all physical tasks at work without restriction or limitations in 12 weeks.  -GOOD PROGRESS    Plan  Patient would benefit from: PT eval and skilled physical therapy  Referral necessary: Yes  Planned modality interventions: cryotherapy,  thermotherapy: hydrocollator packs and unattended electrical stimulation    Planned therapy interventions: manual therapy, neuromuscular re-education, patient education, self care, therapeutic activities, therapeutic exercise and home exercise program    Frequency: 2x week  Duration in weeks: 6  Treatment plan discussed with: patient  Plan details: Progress with functional strengthening      Subjective Evaluation    History of Present Illness  Mechanism of injury: Pt presents with worsening of chronic neck pain and bilateral radicular symptoms.  Pt is also reporting worsening of headaches.  Pt does have malignancy in brain the is progressing negatively as well.  He states he feels it is coming from his upper back and scapular area.    24:  Pt reports not as much radicular pain and his arms are feeling better since starting therapy.    24:  Pt reports having improvements but still having a significant spasm in left scapular area.    25:  Pt reports improved pain and muscle restriction throughout his upper traps and neck.  States discomfort and muscle tightness remains between his shoulder blades.    25:  Pt continues to feel better with his neck and shoulders.  He does state if he misses a PT visit, he feels it and is a bit more restricted.    25: Izaiah reports physical therapy has been helping him improve activity levels. Reports muscular endurance still lacks as he has to take breaks due to fatigue getting projects done around the house. Difficulty sleeping in sidelying due to discomfort to scap retractors. He showers and takes baths to help combat symptoms. He would like to get stronger to continue to be able to increase his ability to do things.           Recurrent probem    Quality of life: fair    Patient Goals  Patient goal: get rid of the numbness in his arms.  Pain  Current pain ratin  At best pain ratin  At worst pain ratin  Quality: radiating and burning  Progression:  worsening      Diagnostic Tests    FCE comments: Pt is not workingTreatments  Previous treatment: physical therapy and injection treatment    Objective     Postural Observations  Seated posture: poor  Standing posture: poor  Correction of posture: has no consistent effect    Additional Postural Observation Details  Very stiff, almost fixed posture.  Severe forward head and rounded shoulders.    1-7-25:    Pt presents with improvement in rounded shoulders and cervical protraction    2-18-25:  Only having significant trigger point in left UT/ Levator.  Similar restrictions in range and mobility seen as of last assessment.    Palpation   Left   Hypertonic in the levator scapulae, middle trapezius, rhomboids, suboccipitals and upper trapezius.   Muscle spasm in the levator scapulae.   Tenderness of the cervical paraspinals, levator scapulae, middle trapezius, rhomboids, suboccipitals and upper trapezius.   Trigger point to levator scapulae.     Right   Hypertonic in the levator scapulae, middle trapezius, rhomboids, suboccipitals and upper trapezius.   Muscle spasm in the rhomboids.   Tenderness of the levator scapulae, middle trapezius, rhomboids, suboccipitals and upper trapezius.     Neurological Testing     Reflexes   Left   Biceps (C5/C6): absent (0)  Brachioradialis (C6): absent (0)  Triceps (C7): trace (1+)    Right   Biceps (C5/C6): trace (1+)  Brachioradialis (C6): normal (2+)  Triceps (C7): trace (1+)    Active Range of Motion   Cervical/Thoracic Spine       Cervical    Subcranial retraction:   Restriction level: moderate  Flexion:  Restriction level: minimal  Extension:  with pain Restriction level: minimal  Left lateral flexion:  Restriction level: moderate  Right lateral flexion:  Restriction level moderate  Left rotation:  Restriction level: minimal  Right rotation:  Restriction level: moderate    Strength/Myotome Testing   Cervical Spine   Neck flexion: 4-    Left   Neck lateral flexion (C3): 4-    Right  "  Neck lateral flexion (C3): 4-    Left Shoulder     Planes of Motion   Flexion: 4-   Extension: 4-   Abduction: 4     Right Shoulder     Planes of Motion   Flexion: 4-   Extension: 4-   Abduction: 4     Left Elbow   Flexion: 4  Extension: 4    Right Elbow   Flexion: 4  Extension: 4            Precautions :  Hx of cervical fusion.  Progressing malignancy/ degenerative brain condition     Manuals 3-25-25 4-1-25 3-7-25 3-11-25 3-21-25   STM bilateral cervical and scapular muscles 23' 23' 23' 23' 23'                           Neuro Re-Ed         Upper cervical flexion stabilzation 10\"x12 10\"x12 10\" x12 10\"x12 10\" x12   LTP/ MTP while holding good cervico-scapular posture        Scap retraction/ ER Blue 3x10 Blue 3x10 Blue 3x10 Not today-resume NV  Blue 3x10   Lat pulldown  50# 3x10 55# 3x10 55# 3x10 55# 3x10 50# 3x10   Machine row  40# 3x10 40# 3x10 45# 3x15 55# 3x10 40# 3x10   Chest press  55# 3x10 55# 3x10 55# 3x10 55# 3x10 40# 3x10           Ther Ex        Seated thoracic and lower cervical mobilization  5\" x15 ea 5\"x15 ea 5\"x15 ea 5\"x15 for ea 5\" x15 for ea    Upper trap and levetor stretch 10\"x12 ea samantha  10\"x12 ea samantha  10\"x12 samantha ea  10\"x12 samantha ea  10\"x12 samantha ea                                                    Ther Activity                        Gait Training                        Modalities        MHP                               Attestation signed by Darin Ayala PT at 4/1/2025 12:12 PM:  I supervised the visit.  We discussed the case to ensure appropriate continuation and progression of care and I reviewed the documentation.                     "

## 2025-04-01 NOTE — PROGRESS NOTES
PT Re-Evaluation  Collection of subjective/objective data performed by Emilee Hussein PTA; Assessment, POC, and Goal attainment performed by Vidal Ayala DPT      Today's date: 2025  Patient name: Scottie Rodriguez  : 1955  MRN: [id number]  Referring provider: Gregory Sy DO  Dx:   Encounter Diagnosis     ICD-10-CM    1. Cervical radiculopathy  M54.12       2. Encounter for follow-up examination after completed treatment for conditions other than malignant neoplasm  Z09                      Assessment  Impairments: abnormal muscle tone, abnormal or restricted ROM, activity intolerance, impaired physical strength, lacks appropriate home exercise program, pain with function and poor posture   Symptom irritability: low    Assessment details: Pt has been responding more favorably to PT this round than prior times.  This is true even with him only attending a few visits thus far.  He still needs to work on improving postural control and functional strength in order to improve functionality at home.    24:  With this round of therapy, patient has shown a more significant improvement in reduction of muscle spasms, some more permanent improvements in posture.    24:  Pt still benefiting from PT to address postural and soft tissue restriction.  A good amount of reduction in overall intensity of right sided soft tissue restrictions is present.    25:  Pt showing improvements in strength, mobility and posture and would benefit from continued progression in these areas.    25:  Pain decreasing, strength improving , function improving.  Benefit and progress still being seen from PT and shoulder continue to progress strengthening.    25: Pt continues to show improvements in mobility and is continuing to be able to approach more chores and home tasks without increasing symptoms.  Continue with progression of functional strengthening while continuing symptom management.  Understanding of  Dx/Px/POC: good     Prognosis: fair    Goals  ST) Pt. Will be able to sleep through the night without being awoken with pain and with minimal to no pain upon waking in 6 weeks. -MET 2) Pt. Will have not headaches while at work or with computer work at home. -MET 3)  Pt.'s radicular symptoms will be centralized to neck in 6 weeks. -GOOD PROGRESS  LT) Pt. Will be able to complete all chores at home without pain or limitations in 12 weeks. -GOOD PROGRESS   2)  Pt. Will be able to to complete all physical tasks at work without restriction or limitations in 12 weeks.  -GOOD PROGRESS    Plan  Patient would benefit from: PT eval and skilled physical therapy  Referral necessary: Yes  Planned modality interventions: cryotherapy, thermotherapy: hydrocollator packs and unattended electrical stimulation    Planned therapy interventions: manual therapy, neuromuscular re-education, patient education, self care, therapeutic activities, therapeutic exercise and home exercise program    Frequency: 2x week  Duration in weeks: 6  Treatment plan discussed with: patient  Plan details: Progress with functional strengthening      Subjective Evaluation    History of Present Illness  Mechanism of injury: Pt presents with worsening of chronic neck pain and bilateral radicular symptoms.  Pt is also reporting worsening of headaches.  Pt does have malignancy in brain the is progressing negatively as well.  He states he feels it is coming from his upper back and scapular area.    24:  Pt reports not as much radicular pain and his arms are feeling better since starting therapy.    24:  Pt reports having improvements but still having a significant spasm in left scapular area.    25:  Pt reports improved pain and muscle restriction throughout his upper traps and neck.  States discomfort and muscle tightness remains between his shoulder blades.    25:  Pt continues to feel better with his neck and shoulders.  He does  state if he misses a PT visit, he feels it and is a bit more restricted.    25: Izaiah reports physical therapy has been helping him improve activity levels. Reports muscular endurance still lacks as he has to take breaks due to fatigue getting projects done around the house. Difficulty sleeping in sidelying due to discomfort to scap retractors. He showers and takes baths to help combat symptoms. He would like to get stronger to continue to be able to increase his ability to do things.           Recurrent probem    Quality of life: fair    Patient Goals  Patient goal: get rid of the numbness in his arms.  Pain  Current pain ratin  At best pain ratin  At worst pain ratin  Quality: radiating and burning  Progression: worsening      Diagnostic Tests    FCE comments: Pt is not workingTreatments  Previous treatment: physical therapy and injection treatment    Objective     Postural Observations  Seated posture: poor  Standing posture: poor  Correction of posture: has no consistent effect    Additional Postural Observation Details  Very stiff, almost fixed posture.  Severe forward head and rounded shoulders.    25:    Pt presents with improvement in rounded shoulders and cervical protraction    25:  Only having significant trigger point in left UT/ Levator.  Similar restrictions in range and mobility seen as of last assessment.    Palpation   Left   Hypertonic in the levator scapulae, middle trapezius, rhomboids, suboccipitals and upper trapezius.   Muscle spasm in the levator scapulae.   Tenderness of the cervical paraspinals, levator scapulae, middle trapezius, rhomboids, suboccipitals and upper trapezius.   Trigger point to levator scapulae.     Right   Hypertonic in the levator scapulae, middle trapezius, rhomboids, suboccipitals and upper trapezius.   Muscle spasm in the rhomboids.   Tenderness of the levator scapulae, middle trapezius, rhomboids, suboccipitals and upper trapezius.  "    Neurological Testing     Reflexes   Left   Biceps (C5/C6): absent (0)  Brachioradialis (C6): absent (0)  Triceps (C7): trace (1+)    Right   Biceps (C5/C6): trace (1+)  Brachioradialis (C6): normal (2+)  Triceps (C7): trace (1+)    Active Range of Motion   Cervical/Thoracic Spine       Cervical    Subcranial retraction:   Restriction level: moderate  Flexion:  Restriction level: minimal  Extension:  with pain Restriction level: minimal  Left lateral flexion:  Restriction level: moderate  Right lateral flexion:  Restriction level moderate  Left rotation:  Restriction level: minimal  Right rotation:  Restriction level: moderate    Strength/Myotome Testing   Cervical Spine   Neck flexion: 4-    Left   Neck lateral flexion (C3): 4-    Right   Neck lateral flexion (C3): 4-    Left Shoulder     Planes of Motion   Flexion: 4-   Extension: 4-   Abduction: 4     Right Shoulder     Planes of Motion   Flexion: 4-   Extension: 4-   Abduction: 4     Left Elbow   Flexion: 4  Extension: 4    Right Elbow   Flexion: 4  Extension: 4            Precautions :  Hx of cervical fusion.  Progressing malignancy/ degenerative brain condition     Manuals 3-25-25 4-1-25 3-7-25 3-11-25 3-21-25   STM bilateral cervical and scapular muscles 23' 23' 23' 23' 23'                           Neuro Re-Ed         Upper cervical flexion stabilzation 10\"x12 10\"x12 10\" x12 10\"x12 10\" x12   LTP/ MTP while holding good cervico-scapular posture        Scap retraction/ ER Blue 3x10 Blue 3x10 Blue 3x10 Not today-resume NV  Blue 3x10   Lat pulldown  50# 3x10 55# 3x10 55# 3x10 55# 3x10 50# 3x10   Machine row  40# 3x10 40# 3x10 45# 3x15 55# 3x10 40# 3x10   Chest press  55# 3x10 55# 3x10 55# 3x10 55# 3x10 40# 3x10           Ther Ex        Seated thoracic and lower cervical mobilization  5\" x15 ea 5\"x15 ea 5\"x15 ea 5\"x15 for ea 5\" x15 for ea    Upper trap and levetor stretch 10\"x12 ea samantha  10\"x12 ea samantha  10\"x12 samantha ea  10\"x12 samantha ea  10\"x12 samantha ea                 "                                    Ther Activity                        Gait Training                        Modalities        P

## 2025-04-03 ENCOUNTER — APPOINTMENT (OUTPATIENT)
Dept: PHYSICAL THERAPY | Facility: CLINIC | Age: 70
End: 2025-04-03
Payer: OTHER MISCELLANEOUS

## 2025-04-04 ENCOUNTER — OFFICE VISIT (OUTPATIENT)
Dept: PHYSICAL THERAPY | Facility: CLINIC | Age: 70
End: 2025-04-04
Payer: OTHER MISCELLANEOUS

## 2025-04-04 DIAGNOSIS — M54.12 CERVICAL RADICULOPATHY: Primary | ICD-10-CM

## 2025-04-04 DIAGNOSIS — Z09 ENCOUNTER FOR FOLLOW-UP EXAMINATION AFTER COMPLETED TREATMENT FOR CONDITIONS OTHER THAN MALIGNANT NEOPLASM: ICD-10-CM

## 2025-04-04 PROCEDURE — 97140 MANUAL THERAPY 1/> REGIONS: CPT

## 2025-04-04 PROCEDURE — 97110 THERAPEUTIC EXERCISES: CPT

## 2025-04-04 PROCEDURE — 97112 NEUROMUSCULAR REEDUCATION: CPT

## 2025-04-04 NOTE — PROGRESS NOTES
"Daily Note     Today's date: 2025  Patient name: Scottie Rodriguez  : 1955  MRN: [id number]  Referring provider: Eric Damon DO  Dx:   Encounter Diagnosis     ICD-10-CM    1. Cervical radiculopathy  M54.12       2. Encounter for follow-up examination after completed treatment for conditions other than malignant neoplasm  Z09                      Subjective: Izaiah reports doing well in reference to c-spine. He finished his vic and did not have an increase in c-spine pain following.       Objective: See treatment diary below      Assessment: Improvement in soft tissue quality following manuals to samantha UT, samantha cerv paraspinals, suboccipitals, samantha scap retractors. Less TP noted overall. Denied any increases in pain with exercises performed. Pt would continue to benefit from skilled PT.       Plan: Continue with current POC to address pt deficits.           Precautions :  Hx of cervical fusion.  Progressing malignancy/ degenerative brain condition     Manuals 3-25-25 4-1-25 4-4-25 3-11-25 3-21-25   STM bilateral cervical and scapular muscles 23 23 23 23                           Neuro Re-Ed         Upper cervical flexion stabilzation 10\"x12 10\"x12 10\"x12 10\"x12 10\" x12   LTP/ MTP while holding good cervico-scapular posture        Scap retraction/ ER Blue 3x10 Blue 3x10 Blue 3x10 Not today-resume NV  Blue 3x10   Lat pulldown  50# 3x10 55# 3x10 55# 3x10 55# 3x10 50# 3x10   Machine row  40# 3x10 40# 3x10 40# 3x10 55# 3x10 40# 3x10   Chest press  55# 3x10 55# 3x10 55# 3x10 55# 3x10 40# 3x10             Ther Ex        Seated thoracic and lower cervical mobilization  5\" x15 ea 5\"x15 ea 5\"x15 5\"x15 for ea 5\" x15 for ea    Upper trap and levetor stretch 10\"x12 ea samantha  10\"x12 ea samantha  10\"x12 ea samantha  10\"x12 samantha ea  10\"x12 samantha ea                                                    Ther Activity                        Gait Training                        Modalities        MHP                             "

## 2025-04-08 ENCOUNTER — OFFICE VISIT (OUTPATIENT)
Dept: PHYSICAL THERAPY | Facility: CLINIC | Age: 70
End: 2025-04-08
Payer: OTHER MISCELLANEOUS

## 2025-04-08 DIAGNOSIS — M54.12 CERVICAL RADICULOPATHY: Primary | ICD-10-CM

## 2025-04-08 DIAGNOSIS — Z09 ENCOUNTER FOR FOLLOW-UP EXAMINATION AFTER COMPLETED TREATMENT FOR CONDITIONS OTHER THAN MALIGNANT NEOPLASM: ICD-10-CM

## 2025-04-08 PROCEDURE — 97140 MANUAL THERAPY 1/> REGIONS: CPT | Performed by: PHYSICAL THERAPIST

## 2025-04-08 PROCEDURE — 97110 THERAPEUTIC EXERCISES: CPT | Performed by: PHYSICAL THERAPIST

## 2025-04-08 PROCEDURE — 97112 NEUROMUSCULAR REEDUCATION: CPT | Performed by: PHYSICAL THERAPIST

## 2025-04-08 NOTE — PROGRESS NOTES
"Daily Note     Today's date: 2025  Patient name: Scottie Rodriguez  : 1955  MRN: [id number]  Referring provider: Eric Damon DO  Dx:   Encounter Diagnosis     ICD-10-CM    1. Cervical radiculopathy  M54.12       2. Encounter for follow-up examination after completed treatment for conditions other than malignant neoplasm  Z09                      Subjective: Pt reports having some soreness but holding steady and still able to participate in getting some projects done at home.      Objective: See treatment diary below      Assessment: Tolerated treatment well. Patient showing improved posture as well as progressing slowly in strength.      Plan: Continue per plan of care.  Progress treatment as tolerated.       Precautions :  Hx of cervical fusion.  Progressing malignancy/ degenerative brain condition     Manuals 3-25-25 4-1-25 4-4-25 4-7-25 3-21-25   STM bilateral cervical and scapular muscles 23                           Neuro Re-Ed         Upper cervical flexion stabilzation 10\"x12 10\"x12 10\"x12 10\"x12 10\" x12   LTP/ MTP while holding good cervico-scapular posture        Scap retraction/ ER Blue 3x10 Blue 3x10 Blue 3x10 Not today-resume NV  Blue 3x10   Lat pulldown  50# 3x10 55# 3x10 55# 3x10 55# 3x10 50# 3x10   Machine row  40# 3x10 40# 3x10 40# 3x10 55# 3x10 40# 3x10   Chest press  55# 3x10 55# 3x10 55# 3x10 55# 3x10 40# 3x10             Ther Ex        Seated thoracic and lower cervical mobilization  5\" x15 ea 5\"x15 ea 5\"x15 5\"x15 for ea 5\" x15 for ea    Upper trap and levetor stretch 10\"x12 ea samantha  10\"x12 ea samantha  10\"x12 ea samantha  10\"x12 samantha ea  10\"x12 samantha ea                                                    Ther Activity                        Gait Training                        Modalities        MHP                               "

## 2025-04-10 ENCOUNTER — APPOINTMENT (OUTPATIENT)
Dept: PHYSICAL THERAPY | Facility: CLINIC | Age: 70
End: 2025-04-10
Payer: OTHER MISCELLANEOUS

## 2025-04-11 ENCOUNTER — APPOINTMENT (OUTPATIENT)
Dept: PHYSICAL THERAPY | Facility: CLINIC | Age: 70
End: 2025-04-11
Payer: OTHER MISCELLANEOUS

## 2025-04-15 ENCOUNTER — OFFICE VISIT (OUTPATIENT)
Dept: PHYSICAL THERAPY | Facility: CLINIC | Age: 70
End: 2025-04-15
Payer: OTHER MISCELLANEOUS

## 2025-04-15 DIAGNOSIS — M54.12 CERVICAL RADICULOPATHY: Primary | ICD-10-CM

## 2025-04-15 DIAGNOSIS — Z09 ENCOUNTER FOR FOLLOW-UP EXAMINATION AFTER COMPLETED TREATMENT FOR CONDITIONS OTHER THAN MALIGNANT NEOPLASM: ICD-10-CM

## 2025-04-15 PROCEDURE — 97140 MANUAL THERAPY 1/> REGIONS: CPT | Performed by: PHYSICAL THERAPIST

## 2025-04-15 PROCEDURE — 97110 THERAPEUTIC EXERCISES: CPT | Performed by: PHYSICAL THERAPIST

## 2025-04-15 PROCEDURE — 97112 NEUROMUSCULAR REEDUCATION: CPT | Performed by: PHYSICAL THERAPIST

## 2025-04-15 NOTE — PROGRESS NOTES
"Daily Note     Today's date: 4/15/2025  Patient name: Scottie Rodriguez  : 1955  MRN: [id number]  Referring provider: Eric Damon DO  Dx:   Encounter Diagnosis     ICD-10-CM    1. Cervical radiculopathy  M54.12       2. Encounter for follow-up examination after completed treatment for conditions other than malignant neoplasm  Z09                      Subjective: Pt reports muscles continue to feel a little bit better.      Objective: See treatment diary below      Assessment: Tolerated treatment well. Patient demonstrated fatigue post treatment, exhibited good technique with therapeutic exercises, and would benefit from continued PT      Plan: Continue per plan of care.  Progress treatment as tolerated.       Precautions :  Hx of cervical fusion.  Progressing malignancy/ degenerative brain condition     Manuals 3-25-25 4-1-25 4-4-25 4-7-25 4-15-25   STM bilateral cervical and scapular muscles 23                           Neuro Re-Ed         Upper cervical flexion stabilzation 10\"x12 10\"x12 10\"x12 10\"x12 10\" x12   LTP/ MTP while holding good cervico-scapular posture        Scap retraction/ ER Blue 3x10 Blue 3x10 Blue 3x10 Not today-resume NV  Blue 3x10   Lat pulldown  50# 3x10 55# 3x10 55# 3x10 55# 3x10 55# 3x10   Machine row  40# 3x10 40# 3x10 40# 3x10 55# 3x10 55# 3x10   Chest press  55# 3x10 55# 3x10 55# 3x10 55# 3x10 55# 3x10             Ther Ex        Seated thoracic and lower cervical mobilization  5\" x15 ea 5\"x15 ea 5\"x15 5\"x15 for ea 5\" x15 for ea    Upper trap and levetor stretch 10\"x12 ea samantha  10\"x12 ea samantha  10\"x12 ea samantha  10\"x12 samantha ea  10\"x12 samantha ea                                                    Ther Activity                        Gait Training                        Modalities        MHP                                 "

## 2025-04-18 ENCOUNTER — OFFICE VISIT (OUTPATIENT)
Dept: PHYSICAL THERAPY | Facility: CLINIC | Age: 70
End: 2025-04-18
Payer: OTHER MISCELLANEOUS

## 2025-04-18 DIAGNOSIS — Z09 ENCOUNTER FOR FOLLOW-UP EXAMINATION AFTER COMPLETED TREATMENT FOR CONDITIONS OTHER THAN MALIGNANT NEOPLASM: ICD-10-CM

## 2025-04-18 DIAGNOSIS — M54.12 CERVICAL RADICULOPATHY: Primary | ICD-10-CM

## 2025-04-18 PROCEDURE — 97140 MANUAL THERAPY 1/> REGIONS: CPT

## 2025-04-18 PROCEDURE — 97110 THERAPEUTIC EXERCISES: CPT

## 2025-04-18 PROCEDURE — 97112 NEUROMUSCULAR REEDUCATION: CPT

## 2025-04-18 NOTE — PROGRESS NOTES
"Daily Note     Today's date: 2025  Patient name: Scottie Rodriguez  : 1955  MRN: [id number]  Referring provider: Eric Damon DO  Dx:   Encounter Diagnosis     ICD-10-CM    1. Cervical radiculopathy  M54.12       2. Encounter for follow-up examination after completed treatment for conditions other than malignant neoplasm  Z09                      Subjective: Izaiah reports most discomfort in samantha UT. He is redoing floor in living room.       Objective: See treatment diary below      Assessment: Visual and VC to ensure correct exercise technique. Improvement in soft tissue quality to samantha cerv paraspinals, samantha UT, suboccipitals. Increased TP to R cerv paraspinals and L UT which improved with manuals. Good technique with postural and strengthening exercises. Denied any increases in pain with exercises performed. Progress as able.       Plan: Continue with current POC to address pt deficits.      Precautions :  Hx of cervical fusion.  Progressing malignancy/ degenerative brain condition     Manuals 25-1-25 4-4-25 4-7-25 4-15-25   STM bilateral cervical and scapular muscles 23                           Neuro Re-Ed         Upper cervical flexion stabilzation 10\"x12 10\"x12 10\"x12 10\"x12 10\" x12   LTP/ MTP while holding good cervico-scapular posture        Scap retraction/ ER Blue 3x10 Blue 3x10 Blue 3x10 Not today-resume NV  Blue 3x10   Lat pulldown  55# 3x10 55# 3x10 55# 3x10 55# 3x10 55# 3x10   Machine row  45# 3x10 40# 3x10 40# 3x10 55# 3x10 55# 3x10   Chest press  55# 3x10 55# 3x10 55# 3x10 55# 3x10 55# 3x10             Ther Ex        Seated thoracic and lower cervical mobilization  5\" x15 ea 5\"x15 ea 5\"x15 5\"x15 for ea 5\" x15 for ea    Upper trap and levetor stretch 10\"x12 ea samantha  10\"x12 ea samantha  10\"x12 ea samantha  10\"x12 samantha ea  10\"x12 samantha ea                                                    Ther Activity                        Gait Training                        Modalities      "   SONAM

## 2025-04-22 ENCOUNTER — OFFICE VISIT (OUTPATIENT)
Dept: PHYSICAL THERAPY | Facility: CLINIC | Age: 70
End: 2025-04-22
Payer: OTHER MISCELLANEOUS

## 2025-04-22 DIAGNOSIS — Z09 ENCOUNTER FOR FOLLOW-UP EXAMINATION AFTER COMPLETED TREATMENT FOR CONDITIONS OTHER THAN MALIGNANT NEOPLASM: ICD-10-CM

## 2025-04-22 DIAGNOSIS — M54.12 CERVICAL RADICULOPATHY: Primary | ICD-10-CM

## 2025-04-22 PROCEDURE — 97112 NEUROMUSCULAR REEDUCATION: CPT | Performed by: PHYSICAL THERAPIST

## 2025-04-22 PROCEDURE — 97110 THERAPEUTIC EXERCISES: CPT | Performed by: PHYSICAL THERAPIST

## 2025-04-22 PROCEDURE — 97140 MANUAL THERAPY 1/> REGIONS: CPT | Performed by: PHYSICAL THERAPIST

## 2025-04-22 NOTE — PROGRESS NOTES
"Daily Note     Today's date: 2025  Patient name: Scottie Rodriguez  : 1955  MRN: [id number]  Referring provider: Eric Damon DO  Dx:   Encounter Diagnosis     ICD-10-CM    1. Cervical radiculopathy  M54.12       2. Encounter for follow-up examination after completed treatment for conditions other than malignant neoplasm  Z09                      Subjective: Pt reports continuing to feel like he's able to do more around the house.      Objective: See treatment diary below      Assessment: Tolerated treatment well. Patient continues to show functional progression of strength.      Plan: Continue per plan of care.  Progress treatment as tolerated.       Precautions :  Hx of cervical fusion.  Progressing malignancy/ degenerative brain condition     Manuals 4-18-25 4-21-25 4-4-25 4-7-25 4-15-25   STM bilateral cervical and scapular muscles                            Neuro Re-Ed         Upper cervical flexion stabilzation 10\"x12 10\"x12 10\"x12 10\"x12 10\" x12   LTP/ MTP while holding good cervico-scapular posture        Scap retraction/ ER Blue 3x10 Blue 3x10 Blue 3x10 Not today-resume NV  Blue 3x10   Lat pulldown  55# 3x10 55# 3x10 55# 3x10 55# 3x10 55# 3x10   Machine row  45# 3x10 45# 3x10 40# 3x10 55# 3x10 55# 3x10   Chest press  55# 3x10 55# 3x10 55# 3x10 55# 3x10 55# 3x10             Ther Ex        Seated thoracic and lower cervical mobilization  5\" x15 ea 5\"x15 ea 5\"x15 5\"x15 for ea 5\" x15 for ea    Upper trap and levetor stretch 10\"x12 ea samantha  10\"x12 ea samantha  10\"x12 ea samantha  10\"x12 samantha ea  10\"x12 samantha ea                                                    Ther Activity                        Gait Training                        Modalities        MHP                                     "

## 2025-04-25 ENCOUNTER — APPOINTMENT (OUTPATIENT)
Dept: PHYSICAL THERAPY | Facility: CLINIC | Age: 70
End: 2025-04-25
Payer: OTHER MISCELLANEOUS

## 2025-04-29 ENCOUNTER — APPOINTMENT (OUTPATIENT)
Dept: PHYSICAL THERAPY | Facility: CLINIC | Age: 70
End: 2025-04-29
Payer: OTHER MISCELLANEOUS

## 2025-05-01 ENCOUNTER — OFFICE VISIT (OUTPATIENT)
Dept: PHYSICAL THERAPY | Facility: CLINIC | Age: 70
End: 2025-05-01
Payer: MEDICARE

## 2025-05-01 DIAGNOSIS — Z09 ENCOUNTER FOR FOLLOW-UP EXAMINATION AFTER COMPLETED TREATMENT FOR CONDITIONS OTHER THAN MALIGNANT NEOPLASM: ICD-10-CM

## 2025-05-01 DIAGNOSIS — M54.12 CERVICAL RADICULOPATHY: Primary | ICD-10-CM

## 2025-05-01 PROCEDURE — 97112 NEUROMUSCULAR REEDUCATION: CPT | Performed by: PHYSICAL THERAPIST

## 2025-05-01 PROCEDURE — 97110 THERAPEUTIC EXERCISES: CPT | Performed by: PHYSICAL THERAPIST

## 2025-05-01 PROCEDURE — 97140 MANUAL THERAPY 1/> REGIONS: CPT | Performed by: PHYSICAL THERAPIST

## 2025-05-01 NOTE — PROGRESS NOTES
"Daily Note     Today's date: 2025  Patient name: Scottie Rodriguez  : 1955  MRN: [id number]  Referring provider: No ref. provider found  Dx:   Encounter Diagnosis     ICD-10-CM    1. Cervical radiculopathy  M54.12       2. Encounter for follow-up examination after completed treatment for conditions other than malignant neoplasm  Z09                      Subjective: Pt reports continued gradual      Objective: See treatment diary below      Assessment: Tolerated treatment well. Patient continues to progress with strength gains that are translating into more activity tolerance outside of PT.    Plan: Continue per plan of care.  Progress treatment as tolerated.       Precautions :  Hx of cervical fusion.  Progressing malignancy/ degenerative brain condition     Manuals 4-18-25 4-21-25 5-1-25 4-7-25 4-15-25   STM bilateral cervical and scapular muscles                            Neuro Re-Ed         Upper cervical flexion stabilzation 10\"x12 10\"x12 10\"x12 10\"x12 10\" x12   LTP/ MTP while holding good cervico-scapular posture        Scap retraction/ ER Blue 3x10 Blue 3x10 Blue 3x10 Not today-resume NV  Blue 3x10   Lat pulldown  55# 3x10 55# 3x10 55# 3x10 55# 3x10 55# 3x10   Machine row  45# 3x10 45# 3x10 55# 3x10 55# 3x10 55# 3x10   Chest press  55# 3x10 55# 3x10 55# 3x10 55# 3x10 55# 3x10             Ther Ex        Seated thoracic and lower cervical mobilization  5\" x15 ea 5\"x15 ea 5\"x15 5\"x15 for ea 5\" x15 for ea    Upper trap and levetor stretch 10\"x12 ea samantha  10\"x12 ea samantha  10\"x12 ea samantha  10\"x12 samantha ea  10\"x12 samantha ea                                                    Ther Activity                        Gait Training                        Modalities        MHP                                       "

## 2025-05-02 ENCOUNTER — APPOINTMENT (OUTPATIENT)
Dept: PHYSICAL THERAPY | Facility: CLINIC | Age: 70
End: 2025-05-02
Payer: OTHER MISCELLANEOUS

## 2025-05-08 ENCOUNTER — OFFICE VISIT (OUTPATIENT)
Dept: PHYSICAL THERAPY | Facility: CLINIC | Age: 70
End: 2025-05-08
Payer: OTHER MISCELLANEOUS

## 2025-05-08 DIAGNOSIS — Z09 ENCOUNTER FOR FOLLOW-UP EXAMINATION AFTER COMPLETED TREATMENT FOR CONDITIONS OTHER THAN MALIGNANT NEOPLASM: ICD-10-CM

## 2025-05-08 DIAGNOSIS — M54.12 CERVICAL RADICULOPATHY: Primary | ICD-10-CM

## 2025-05-08 PROCEDURE — 97112 NEUROMUSCULAR REEDUCATION: CPT | Performed by: PHYSICAL THERAPIST

## 2025-05-08 PROCEDURE — 97140 MANUAL THERAPY 1/> REGIONS: CPT | Performed by: PHYSICAL THERAPIST

## 2025-05-08 PROCEDURE — 97110 THERAPEUTIC EXERCISES: CPT | Performed by: PHYSICAL THERAPIST

## 2025-05-08 NOTE — PROGRESS NOTES
"Daily Note     Today's date: 2025  Patient name: Scottie Rodriguez  : 1955  MRN: [id number]  Referring provider: Eric Damon DO  Dx:   Encounter Diagnosis     ICD-10-CM    1. Cervical radiculopathy  M54.12       2. Encounter for follow-up examination after completed treatment for conditions other than malignant neoplasm  Z09                      Subjective: Pt reports his right upper shoulder blade is bothering him today.      Objective: See treatment diary below      Assessment: Tolerated treatment well. Patient demonstrated fatigue post treatment, exhibited good technique with therapeutic exercises, and would benefit from continued PT      Plan: Continue per plan of care.  Progress treatment as tolerated.       Precautions :  Hx of cervical fusion.  Progressing malignancy/ degenerative brain condition     Manuals 4-18-25 4-21-25 5-1-25 5-8-25 4-15-25   STM bilateral cervical and scapular muscles 23 23                           Neuro Re-Ed         Upper cervical flexion stabilzation 10\"x12 10\"x12 10\"x12 10\"x12 10\" x12   LTP/ MTP while holding good cervico-scapular posture        Scap retraction/ ER Blue 3x10 Blue 3x10 Blue 3x10 Not today-resume NV  Blue 3x10   Lat pulldown  55# 3x10 55# 3x10 55# 3x10 55# 3x10 55# 3x10   Machine row  45# 3x10 45# 3x10 55# 3x10 55# 3x10 55# 3x10   Chest press  55# 3x10 55# 3x10 55# 3x10 55# 3x10 55# 3x10             Ther Ex        Seated thoracic and lower cervical mobilization  5\" x15 ea 5\"x15 ea 5\"x15 5\"x15 for ea 5\" x15 for ea    Upper trap and levetor stretch 10\"x12 ea samantha  10\"x12 ea samantha  10\"x12 ea samantha  10\"x12 samantha ea  10\"x12 samantha ea                                                    Ther Activity                        Gait Training                        Modalities        MHP                                         "

## 2025-05-15 ENCOUNTER — APPOINTMENT (OUTPATIENT)
Dept: PHYSICAL THERAPY | Facility: CLINIC | Age: 70
End: 2025-05-15
Payer: OTHER MISCELLANEOUS

## 2025-05-22 ENCOUNTER — OFFICE VISIT (OUTPATIENT)
Dept: PHYSICAL THERAPY | Facility: CLINIC | Age: 70
End: 2025-05-22
Payer: OTHER MISCELLANEOUS

## 2025-05-22 DIAGNOSIS — M54.12 CERVICAL RADICULOPATHY: Primary | ICD-10-CM

## 2025-05-22 DIAGNOSIS — Z09 ENCOUNTER FOR FOLLOW-UP EXAMINATION AFTER COMPLETED TREATMENT FOR CONDITIONS OTHER THAN MALIGNANT NEOPLASM: ICD-10-CM

## 2025-05-22 PROCEDURE — 97140 MANUAL THERAPY 1/> REGIONS: CPT | Performed by: PHYSICAL THERAPIST

## 2025-05-22 PROCEDURE — 97110 THERAPEUTIC EXERCISES: CPT | Performed by: PHYSICAL THERAPIST

## 2025-05-22 PROCEDURE — 97112 NEUROMUSCULAR REEDUCATION: CPT | Performed by: PHYSICAL THERAPIST

## 2025-05-22 NOTE — PROGRESS NOTES
"Daily Note     Today's date: 2025  Patient name: Scottie Rodriguez  : 1955  MRN: [id number]  Referring provider: No ref. provider found  Dx:   Encounter Diagnosis     ICD-10-CM    1. Cervical radiculopathy  M54.12       2. Encounter for follow-up examination after completed treatment for conditions other than malignant neoplasm  Z09                      Subjective: Pt reports still being sore but getting stronger and able to do more involved activities at home.      Objective: See treatment diary below      Assessment: Tolerated treatment well. Patient continues to show functional improvements in his ability to perform functional activities.      Plan: Continue per plan of care.  Progress treatment as tolerated.       Precautions :  Hx of cervical fusion.  Progressing malignancy/ degenerative brain condition     Manuals 25   STM bilateral cervical and scapular muscles                            Neuro Re-Ed         Upper cervical flexion stabilzation 10\"x12 10\"x12 10\"x12 10\"x12 10\" x12   LTP/ MTP while holding good cervico-scapular posture        Scap retraction/ ER Blue 3x10 Blue 3x10 Blue 3x10 Not today-resume NV  Blue 3x10   Lat pulldown  55# 3x10 55# 3x10 55# 3x10 55# 3x10 55# 3x10   Machine row  45# 3x10 45# 3x10 55# 3x10 55# 3x10 55# 3x10   Chest press  55# 3x10 55# 3x10 55# 3x10 55# 3x10 55# 3x10             Ther Ex        Seated thoracic and lower cervical mobilization  5\" x15 ea 5\"x15 ea 5\"x15 5\"x15 for ea 5\" x15 for ea    Upper trap and levetor stretch 10\"x12 ea samantha  10\"x12 ea samantha  10\"x12 ea samantha  10\"x12 samantha ea  10\"x12 samantha ea                                                    Ther Activity                        Gait Training                        Modalities        MHP                                           "

## 2025-05-29 ENCOUNTER — OFFICE VISIT (OUTPATIENT)
Dept: PHYSICAL THERAPY | Facility: CLINIC | Age: 70
End: 2025-05-29
Payer: OTHER MISCELLANEOUS

## 2025-05-29 DIAGNOSIS — Z09 ENCOUNTER FOR FOLLOW-UP EXAMINATION AFTER COMPLETED TREATMENT FOR CONDITIONS OTHER THAN MALIGNANT NEOPLASM: ICD-10-CM

## 2025-05-29 DIAGNOSIS — M54.12 CERVICAL RADICULOPATHY: Primary | ICD-10-CM

## 2025-05-29 PROCEDURE — 97112 NEUROMUSCULAR REEDUCATION: CPT

## 2025-05-29 PROCEDURE — 97140 MANUAL THERAPY 1/> REGIONS: CPT

## 2025-05-29 PROCEDURE — 97110 THERAPEUTIC EXERCISES: CPT

## 2025-05-29 NOTE — PROGRESS NOTES
"Daily Note     Today's date: 2025  Patient name: Scottie Rodriguez  : 1955  MRN: [id number]  Referring provider: No ref. provider found  Dx:   Encounter Diagnosis     ICD-10-CM    1. Cervical radiculopathy  M54.12       2. Encounter for follow-up examination after completed treatment for conditions other than malignant neoplasm  Z09           Start Time: 0800  Stop Time: 0845  Total time in clinic (min): 45 minutes    Subjective: The patient states that his neck pain is usually worse in the morning and gets better as the day progresses. He rates his neck pain at 4/10 this morning.      Objective: See treatment diary below      Assessment: Tolerated treatment well. The patient was able to complete all exercises with no increased complaints of pain. The patient had good tolerance to manual therapy with several areas of muscle tightness noted in right scapular and cervical regions. Patient demonstrated fatigue post treatment, exhibited good technique with therapeutic exercises, and would benefit from continued PT      Plan: Continue per plan of care.      Precautions :  Hx of cervical fusion.  Progressing malignancy/ degenerative brain condition     Manuals 25   STM bilateral cervical and scapular muscles 23' 23' 23' 23' 23'                           Neuro Re-Ed         Upper cervical flexion stabilzation 10\"x12 10\"x12 10\"x12 10\"x12 10\" x12   LTP/ MTP while holding good cervico-scapular posture        Scap retraction/ ER Blue 3x10 Blue 3x10 Blue 3x10 Not today-resume NV  Blue 3x10   Lat pulldown  55# 3x10 55# 3x10 55# 3x10 55# 3x10 55# 3x10   Machine row  45# 3x10 45# 3x10 55# 3x10 55# 3x10 55# 3x10   Chest press  55# 3x10 55# 3x10 55# 3x10 55# 3x10 55# 3x10             Ther Ex        Seated thoracic and lower cervical mobilization  5\" x15 ea 5\"x15 ea 5\"x15 5\"x15 for ea 5\" x15 for ea    Upper trap and levetor stretch 10\"x12 ea samantha  10\"x12 ea samantha  10\"x12 ea samantha  10\"x12 " "samantha ea  10\"x12 samantha richardson                                                    Ther Activity                        Gait Training                        Modalities        [facility]                                             "

## 2025-06-05 ENCOUNTER — OFFICE VISIT (OUTPATIENT)
Dept: PHYSICAL THERAPY | Facility: CLINIC | Age: 70
End: 2025-06-05
Payer: OTHER MISCELLANEOUS

## 2025-06-05 DIAGNOSIS — Z09 ENCOUNTER FOR FOLLOW-UP EXAMINATION AFTER COMPLETED TREATMENT FOR CONDITIONS OTHER THAN MALIGNANT NEOPLASM: ICD-10-CM

## 2025-06-05 DIAGNOSIS — M54.12 CERVICAL RADICULOPATHY: Primary | ICD-10-CM

## 2025-06-05 PROCEDURE — 97110 THERAPEUTIC EXERCISES: CPT | Performed by: PHYSICAL THERAPIST

## 2025-06-05 PROCEDURE — 97112 NEUROMUSCULAR REEDUCATION: CPT | Performed by: PHYSICAL THERAPIST

## 2025-06-05 PROCEDURE — 97140 MANUAL THERAPY 1/> REGIONS: CPT | Performed by: PHYSICAL THERAPIST

## 2025-06-19 ENCOUNTER — APPOINTMENT (OUTPATIENT)
Dept: PHYSICAL THERAPY | Facility: CLINIC | Age: 70
End: 2025-06-19
Attending: FAMILY MEDICINE
Payer: OTHER MISCELLANEOUS

## 2025-06-26 ENCOUNTER — OFFICE VISIT (OUTPATIENT)
Dept: PHYSICAL THERAPY | Facility: CLINIC | Age: 70
End: 2025-06-26
Attending: FAMILY MEDICINE
Payer: OTHER MISCELLANEOUS

## 2025-06-26 DIAGNOSIS — M54.12 CERVICAL RADICULOPATHY: Primary | ICD-10-CM

## 2025-06-26 DIAGNOSIS — Z09 ENCOUNTER FOR FOLLOW-UP EXAMINATION AFTER COMPLETED TREATMENT FOR CONDITIONS OTHER THAN MALIGNANT NEOPLASM: ICD-10-CM

## 2025-06-26 PROCEDURE — 97110 THERAPEUTIC EXERCISES: CPT | Performed by: PHYSICAL MEDICINE & REHABILITATION

## 2025-06-26 PROCEDURE — 97112 NEUROMUSCULAR REEDUCATION: CPT | Performed by: PHYSICAL MEDICINE & REHABILITATION

## 2025-06-26 PROCEDURE — 97140 MANUAL THERAPY 1/> REGIONS: CPT | Performed by: PHYSICAL MEDICINE & REHABILITATION

## 2025-06-26 NOTE — PROGRESS NOTES
"Daily Note     Today's date: 2025  Patient name: Scottie Rodriguez  : 1955  MRN: [id number]  Referring provider: Eric Damon DO  Dx:   Encounter Diagnosis     ICD-10-CM    1. Cervical radiculopathy  M54.12       2. Encounter for follow-up examination after completed treatment for conditions other than malignant neoplasm  Z09                      Subjective: Pt notes no new sx/complaints. Notes his pain is \"the same\" between the shoulders. Continues to have a \"bad low back\". Has to leave early today.       Objective: See treatment diary below      Assessment: Tolerated treatment well. No adverse reaction to program or MT. Cont to demo several regions of mm tension/tightness in R/L shoulder blade, Uts, and B posterior c-spine; pt notes reduced mm tension after tx and MT. Cont with hypomobility of t-spine/c-spine with forward head/rounded shoulder posture. Decreased endurance with chest press today; decreased reps to tolerance. No complaints after tx; noted he felt \"a little better\" and \"looser\" after session. Patient demonstrated fatigue post treatment and would benefit from continued PT      Plan: Continue per plan of care.  Progress treatment as tolerated.       Precautions :  Hx of cervical fusion.  Progressing malignancy/ degenerative brain condition     Manuals 25   STM bilateral cervical and scapular muscles 23 23' 20' 23                           Neuro Re-Ed         Upper cervical flexion stabilzation 10\"x12 10\"x12 10\"x12 10\"x12 10\" x12   LTP/ MTP while holding good cervico-scapular posture        Scap retraction/ ER Blue 3x10 Blue 3x10 Blue 3x10 Not today-resume NV  Blue 3x10   Lat pulldown  55# 3x10 55# 3x10 55# 3x10  55# 3x10 55# 3x10   Machine row  45# 3x10 45# 3x10 50# 3x10 55# 3x10 55# 3x10   Chest press  55# 3x10 55# 3x10 55# 2x10  1x5  55# 3x10 55# 3x10             Ther Ex        Seated thoracic and lower cervical mobilization  5\" x15 ea 5\"x15 ea " "5\"x15 5\"x15 for ea 5\" x15 for ea    Upper trap and levetor stretch 10\"x12 ea samantha  10\"x12 ea samantha  10\"x12 ea samantha  10\"x12 samantha ea  10\"x12 samantha ea                                                    Ther Activity                        Gait Training                        Modalities        [facility]                                                 "

## 2025-07-03 ENCOUNTER — OFFICE VISIT (OUTPATIENT)
Dept: PHYSICAL THERAPY | Facility: CLINIC | Age: 70
End: 2025-07-03
Attending: FAMILY MEDICINE
Payer: OTHER MISCELLANEOUS

## 2025-07-03 DIAGNOSIS — Z09 ENCOUNTER FOR FOLLOW-UP EXAMINATION AFTER COMPLETED TREATMENT FOR CONDITIONS OTHER THAN MALIGNANT NEOPLASM: ICD-10-CM

## 2025-07-03 DIAGNOSIS — M54.12 CERVICAL RADICULOPATHY: Primary | ICD-10-CM

## 2025-07-03 PROCEDURE — 97112 NEUROMUSCULAR REEDUCATION: CPT

## 2025-07-03 PROCEDURE — 97140 MANUAL THERAPY 1/> REGIONS: CPT

## 2025-07-03 NOTE — PROGRESS NOTES
PT Re-Evaluation  Collection of subjective/objective data performed by Emilee Hussein PTA; Assessment, POC, and Goal attainment performed by Vidal Ayala DPT      Today's date: 7/3/2025  Patient name: Scottie Rodriguez  : 1955  MRN: [id number]  Referring provider: Gregory Sy DO  Dx:   Encounter Diagnosis     ICD-10-CM    1. Cervical radiculopathy  M54.12       2. Encounter for follow-up examination after completed treatment for conditions other than malignant neoplasm  Z09              Stop Time: 828       Assessment  Impairments: abnormal muscle tone, abnormal or restricted ROM, activity intolerance, impaired physical strength, lacks appropriate home exercise program, pain with function and poor posture   Symptom irritability: low    Assessment details: Pt has been responding more favorably to PT this round than prior times.  This is true even with him only attending a few visits thus far.  He still needs to work on improving postural control and functional strength in order to improve functionality at home.    24:  With this round of therapy, patient has shown a more significant improvement in reduction of muscle spasms, some more permanent improvements in posture.    24:  Pt still benefiting from PT to address postural and soft tissue restriction.  A good amount of reduction in overall intensity of right sided soft tissue restrictions is present.    1-25:  Pt showing improvements in strength, mobility and posture and would benefit from continued progression in these areas.    -25:  Pain decreasing, strength improving , function improving.  Benefit and progress still being seen from PT and shoulder continue to progress strengthening.    25: Pt continues to show improvements in mobility and is continuing to be able to approach more chores and home tasks without increasing symptoms.  Continue with progression of functional strengthening while continuing symptom  management.    7-3-25:  Pt is showing improved shoulder strength, more activities able to be complete at home.  Pt is benefiting from program despite only getting here one time per week.  He seems to be able to follow up his clinical treatment with home program.  Understanding of Dx/Px/POC: good     Prognosis: fair    Goals  ST) Pt. Will be able to sleep through the night without being awoken with pain and with minimal to no pain upon waking in 6 weeks. -MET 2) Pt. Will have not headaches while at work or with computer work at home. -MET 3)  Pt.'s radicular symptoms will be centralized to neck in 6 weeks. -GOOD PROGRESS  LT) Pt. Will be able to complete all chores at home without pain or limitations in 12 weeks. -GOOD PROGRESS   2)  Pt. Will be able to to complete all physical tasks at work without restriction or limitations in 12 weeks.  -GOOD PROGRESS    Plan  Patient would benefit from: PT eval and skilled physical therapy  Referral necessary: Yes  Planned modality interventions: cryotherapy, thermotherapy: hydrocollator packs and unattended electrical stimulation    Planned therapy interventions: manual therapy, neuromuscular re-education, patient education, self care, therapeutic activities, therapeutic exercise and home exercise program    Frequency: 1x week  Duration in weeks: 6  Treatment plan discussed with: patient  Plan details: Progress with functional strengthening      Subjective Evaluation    History of Present Illness  Mechanism of injury: Pt presents with worsening of chronic neck pain and bilateral radicular symptoms.  Pt is also reporting worsening of headaches.  Pt does have malignancy in brain the is progressing negatively as well.  He states he feels it is coming from his upper back and scapular area.    24:  Pt reports not as much radicular pain and his arms are feeling better since starting therapy.    24:  Pt reports having improvements but still having a significant spasm  in left scapular area.    25:  Pt reports improved pain and muscle restriction throughout his upper traps and neck.  States discomfort and muscle tightness remains between his shoulder blades.    25:  Pt continues to feel better with his neck and shoulders.  He does state if he misses a PT visit, he feels it and is a bit more restricted.    25: Izaiah reports physical therapy has been helping him improve activity levels. Reports muscular endurance still lacks as he has to take breaks due to fatigue getting projects done around the house. Difficulty sleeping in sidelying due to discomfort to scap retractors. He showers and takes baths to help combat symptoms. He would like to get stronger to continue to be able to increase his ability to do things.     7-3-25: Izaiah reports feeling benefits from physical therapy. He notes improvement in ability to do more work around the house. He has difficulty with lifting overhead and lifting in front of body. He denies any radicular symptoms into UE. Pain levels have lowered. He reports a dull ache pain in scapular region which sometimes prevents him from sleeping. Heat and hot shower helps decrease symptoms when he is symptomatic.           Recurrent probem    Quality of life: fair    Patient Goals  Patient goal: get rid of the numbness in his arms.  Pain  Current pain rating: 3  At best pain ratin  At worst pain ratin  Quality: radiating and burning  Progression: worsening      Diagnostic Tests    FCE comments: Pt is not workingTreatments  Previous treatment: physical therapy and injection treatment    Objective     Postural Observations  Seated posture: poor  Standing posture: poor  Correction of posture: has no consistent effect    Additional Postural Observation Details  Very stiff, almost fixed posture.  Severe forward head and rounded shoulders.    25:    Pt presents with improvement in rounded shoulders and cervical protraction    25:  Only having  significant trigger point in left UT/ Levator.  Similar restrictions in range and mobility seen as of last assessment.    Palpation   Left   Hypertonic in the levator scapulae, middle trapezius, rhomboids, suboccipitals and upper trapezius.   Muscle spasm in the levator scapulae.   Tenderness of the cervical paraspinals, levator scapulae, middle trapezius, rhomboids, suboccipitals and upper trapezius.   Trigger point to levator scapulae.     Right   Hypertonic in the levator scapulae, middle trapezius, rhomboids, suboccipitals and upper trapezius.   Muscle spasm in the rhomboids.   Tenderness of the levator scapulae, middle trapezius, rhomboids, suboccipitals and upper trapezius.     Neurological Testing     Reflexes   Left   Biceps (C5/C6): absent (0)  Brachioradialis (C6): absent (0)  Triceps (C7): trace (1+)    Right   Biceps (C5/C6): trace (1+)  Brachioradialis (C6): normal (2+)  Triceps (C7): trace (1+)    Active Range of Motion   Cervical/Thoracic Spine       Cervical    Subcranial retraction:   Restriction level: moderate  Flexion:  Restriction level: minimal  Extension:  with pain Restriction level: moderate  Left lateral flexion:  Restriction level: moderate  Right lateral flexion:  Restriction level moderate  Left rotation:  Restriction level: minimal  Right rotation:  Restriction level: moderate    Strength/Myotome Testing   Cervical Spine   Neck flexion: 4-    Left   Neck lateral flexion (C3): 4-    Right   Neck lateral flexion (C3): 4-    Left Shoulder     Planes of Motion   Flexion: 4-   Extension: 4   Abduction: 4+     Right Shoulder     Planes of Motion   Flexion: 4-   Extension: 4   Abduction: 4+     Left Elbow   Flexion: 4  Extension: 4+    Right Elbow   Flexion: 4  Extension: 4+            Precautions :  Hx of cervical fusion.  Progressing malignancy/ degenerative brain condition     Manuals 5-29-25 6-5-25 6/26 7-3-25 5-22-25   STM bilateral cervical and scapular muscles 23' 23' 20' 23' 23'         "                   Neuro Re-Ed         Upper cervical flexion stabilzation 10\"x12 10\"x12 10\"x12 10\"x12 10\" x12   LTP/ MTP while holding good cervico-scapular posture        Scap retraction/ ER Blue 3x10 Blue 3x10 Blue 3x10 Blue 3x10 Blue 3x10   Lat pulldown  55# 3x10 55# 3x10 55# 3x10  55# 3x10 55# 3x10   Machine row  45# 3x10 45# 3x10 50# 3x10 50# 3x10 55# 3x10   Chest press  55# 3x10 55# 3x10 55# 2x10  1x5  55# 3x10 55# 3x10             Ther Ex        Seated thoracic and lower cervical mobilization  5\" x15 ea 5\"x15 ea 5\"x15 5\"x15 5\" x15 for ea    Upper trap and levetor stretch 10\"x12 ea samantha  10\"x12 ea samantha  10\"x12 ea samantha  10\"x12 ea samantha  10\"x12 samantha ea                                                    Ther Activity                        Gait Training                        Modalities        [facility]                             "

## 2025-07-03 NOTE — LETTER
July 3, 2025    Gregory Sy DO  [address]  [address]  [facility] [address]    Patient: Scottie Rodriguez   YOB: 1955   Date of Visit: 7/3/2025     Encounter Diagnosis     ICD-10-CM    1. Cervical radiculopathy  M54.12       2. Encounter for follow-up examination after completed treatment for conditions other than malignant neoplasm  Z09           Dear Dr. Gregory Sy, DO:    Thank you for your recent referral of Scottie Rodriguez. Please review the attached evaluation summary from Scottie's recent visit.     Please verify that you agree with the plan of care by signing the attached order.     If you have any questions or concerns, please do not hesitate to call.     I sincerely appreciate the opportunity to share in the care of one of your patients and hope to have another opportunity to work with you in the near future.       Sincerely,    Emilee Hussein      Referring Provider:      I certify that I have read the below Plan of Care and certify the need for these services furnished under this plan of treatment while under my care.                    Gregory Sy DO  [address]  [address]  [facility] [address]  Via Fax: [phone]          PT Re-Evaluation  Collection of subjective/objective data performed by Emilee Hussein PTA; Assessment, POC, and Goal attainment performed by Vidal Ayala DPT      Today's date: 7/3/2025  Patient name: Scottie Rodriguez  : 1955  MRN: [id number]  Referring provider: Gregory Sy DO  Dx:   Encounter Diagnosis     ICD-10-CM    1. Cervical radiculopathy  M54.12       2. Encounter for follow-up examination after completed treatment for conditions other than malignant neoplasm  Z09              Stop Time: 0828       Assessment  Impairments: abnormal muscle tone, abnormal or restricted ROM, activity intolerance, impaired physical strength, lacks appropriate home exercise program, pain with function and poor posture    Symptom irritability: low    Assessment details: Pt has been responding more favorably to PT this round than prior times.  This is true even with him only attending a few visits thus far.  He still needs to work on improving postural control and functional strength in order to improve functionality at home.    24:  With this round of therapy, patient has shown a more significant improvement in reduction of muscle spasms, some more permanent improvements in posture.    24:  Pt still benefiting from PT to address postural and soft tissue restriction.  A good amount of reduction in overall intensity of right sided soft tissue restrictions is present.    1:  Pt showing improvements in strength, mobility and posture and would benefit from continued progression in these areas.    25:  Pain decreasing, strength improving , function improving.  Benefit and progress still being seen from PT and shoulder continue to progress strengthening.    25: Pt continues to show improvements in mobility and is continuing to be able to approach more chores and home tasks without increasing symptoms.  Continue with progression of functional strengthening while continuing symptom management.    7-3-25:  Pt is showing improved shoulder strength, more activities able to be complete at home.  Pt is benefiting from program despite only getting here one time per week.  He seems to be able to follow up his clinical treatment with home program.  Understanding of Dx/Px/POC: good     Prognosis: fair    Goals  ST) Pt. Will be able to sleep through the night without being awoken with pain and with minimal to no pain upon waking in 6 weeks. -MET 2) Pt. Will have not headaches while at work or with computer work at home. -MET 3)  Pt.'s radicular symptoms will be centralized to neck in 6 weeks. -GOOD PROGRESS  LT) Pt. Will be able to complete all chores at home without pain or limitations in 12 weeks. -GOOD PROGRESS    2)  Pt. Will be able to to complete all physical tasks at work without restriction or limitations in 12 weeks.  -GOOD PROGRESS    Plan  Patient would benefit from: PT eval and skilled physical therapy  Referral necessary: Yes  Planned modality interventions: cryotherapy, thermotherapy: hydrocollator packs and unattended electrical stimulation    Planned therapy interventions: manual therapy, neuromuscular re-education, patient education, self care, therapeutic activities, therapeutic exercise and home exercise program    Frequency: 1x week  Duration in weeks: 6  Treatment plan discussed with: patient  Plan details: Progress with functional strengthening      Subjective Evaluation    History of Present Illness  Mechanism of injury: Pt presents with worsening of chronic neck pain and bilateral radicular symptoms.  Pt is also reporting worsening of headaches.  Pt does have malignancy in brain the is progressing negatively as well.  He states he feels it is coming from his upper back and scapular area.    5-21-24:  Pt reports not as much radicular pain and his arms are feeling better since starting therapy.    9-24-24:  Pt reports having improvements but still having a significant spasm in left scapular area.    1-7-25:  Pt reports improved pain and muscle restriction throughout his upper traps and neck.  States discomfort and muscle tightness remains between his shoulder blades.    2-18-25:  Pt continues to feel better with his neck and shoulders.  He does state if he misses a PT visit, he feels it and is a bit more restricted.    4-1-25: Izaiah reports physical therapy has been helping him improve activity levels. Reports muscular endurance still lacks as he has to take breaks due to fatigue getting projects done around the house. Difficulty sleeping in sidelying due to discomfort to scap retractors. He showers and takes baths to help combat symptoms. He would like to get stronger to continue to be able to increase his  ability to do things.     7-3-25: Izaiah reports feeling benefits from physical therapy. He notes improvement in ability to do more work around the house. He has difficulty with lifting overhead and lifting in front of body. He denies any radicular symptoms into UE. Pain levels have lowered. He reports a dull ache pain in scapular region which sometimes prevents him from sleeping. Heat and hot shower helps decrease symptoms when he is symptomatic.           Recurrent probem    Quality of life: fair    Patient Goals  Patient goal: get rid of the numbness in his arms.  Pain  Current pain rating: 3  At best pain ratin  At worst pain ratin  Quality: radiating and burning  Progression: worsening      Diagnostic Tests    FCE comments: Pt is not workingTreatments  Previous treatment: physical therapy and injection treatment    Objective     Postural Observations  Seated posture: poor  Standing posture: poor  Correction of posture: has no consistent effect    Additional Postural Observation Details  Very stiff, almost fixed posture.  Severe forward head and rounded shoulders.    25:    Pt presents with improvement in rounded shoulders and cervical protraction    25:  Only having significant trigger point in left UT/ Levator.  Similar restrictions in range and mobility seen as of last assessment.    Palpation   Left   Hypertonic in the levator scapulae, middle trapezius, rhomboids, suboccipitals and upper trapezius.   Muscle spasm in the levator scapulae.   Tenderness of the cervical paraspinals, levator scapulae, middle trapezius, rhomboids, suboccipitals and upper trapezius.   Trigger point to levator scapulae.     Right   Hypertonic in the levator scapulae, middle trapezius, rhomboids, suboccipitals and upper trapezius.   Muscle spasm in the rhomboids.   Tenderness of the levator scapulae, middle trapezius, rhomboids, suboccipitals and upper trapezius.     Neurological Testing     Reflexes   Left   Biceps  "(C5/C6): absent (0)  Brachioradialis (C6): absent (0)  Triceps (C7): trace (1+)    Right   Biceps (C5/C6): trace (1+)  Brachioradialis (C6): normal (2+)  Triceps (C7): trace (1+)    Active Range of Motion   Cervical/Thoracic Spine       Cervical    Subcranial retraction:   Restriction level: moderate  Flexion:  Restriction level: minimal  Extension:  with pain Restriction level: moderate  Left lateral flexion:  Restriction level: moderate  Right lateral flexion:  Restriction level moderate  Left rotation:  Restriction level: minimal  Right rotation:  Restriction level: moderate    Strength/Myotome Testing   Cervical Spine   Neck flexion: 4-    Left   Neck lateral flexion (C3): 4-    Right   Neck lateral flexion (C3): 4-    Left Shoulder     Planes of Motion   Flexion: 4-   Extension: 4   Abduction: 4+     Right Shoulder     Planes of Motion   Flexion: 4-   Extension: 4   Abduction: 4+     Left Elbow   Flexion: 4  Extension: 4+    Right Elbow   Flexion: 4  Extension: 4+            Precautions :  Hx of cervical fusion.  Progressing malignancy/ degenerative brain condition     Manuals 5-29-25 6-5-25 6/26 7-3-25 5-22-25   STM bilateral cervical and scapular muscles 23' 23' 20' 23' 23'                           Neuro Re-Ed         Upper cervical flexion stabilzation 10\"x12 10\"x12 10\"x12 10\"x12 10\" x12   LTP/ MTP while holding good cervico-scapular posture        Scap retraction/ ER Blue 3x10 Blue 3x10 Blue 3x10 Blue 3x10 Blue 3x10   Lat pulldown  55# 3x10 55# 3x10 55# 3x10  55# 3x10 55# 3x10   Machine row  45# 3x10 45# 3x10 50# 3x10 50# 3x10 55# 3x10   Chest press  55# 3x10 55# 3x10 55# 2x10  1x5  55# 3x10 55# 3x10             Ther Ex        Seated thoracic and lower cervical mobilization  5\" x15 ea 5\"x15 ea 5\"x15 5\"x15 5\" x15 for ea    Upper trap and levetor stretch 10\"x12 ea samantha  10\"x12 ea samantha  10\"x12 ea samantha  10\"x12 ea samantha  10\"x12 samantha ea                                                    Ther Activity                   "      Gait Training                        Modalities        [facility]                               Attestation signed by Darin Ayala PT at 7/3/2025 11:13 AM:  I supervised the visit.  We discussed the case to ensure appropriate continuation and progression of care and I reviewed the documentation.

## 2025-07-10 ENCOUNTER — APPOINTMENT (OUTPATIENT)
Dept: PHYSICAL THERAPY | Facility: CLINIC | Age: 70
End: 2025-07-10
Attending: FAMILY MEDICINE
Payer: OTHER MISCELLANEOUS

## 2025-07-17 ENCOUNTER — OFFICE VISIT (OUTPATIENT)
Dept: PHYSICAL THERAPY | Facility: CLINIC | Age: 70
End: 2025-07-17
Attending: FAMILY MEDICINE
Payer: OTHER MISCELLANEOUS

## 2025-07-17 DIAGNOSIS — Z09 ENCOUNTER FOR FOLLOW-UP EXAMINATION AFTER COMPLETED TREATMENT FOR CONDITIONS OTHER THAN MALIGNANT NEOPLASM: ICD-10-CM

## 2025-07-17 DIAGNOSIS — M54.12 CERVICAL RADICULOPATHY: Primary | ICD-10-CM

## 2025-07-17 PROCEDURE — 97110 THERAPEUTIC EXERCISES: CPT | Performed by: PHYSICAL THERAPIST

## 2025-07-17 PROCEDURE — 97140 MANUAL THERAPY 1/> REGIONS: CPT | Performed by: PHYSICAL THERAPIST

## 2025-07-17 NOTE — PROGRESS NOTES
"Daily Note     Today's date: 2025  Patient name: Scottie Rodriguez  : 1955  MRN: [id number]  Referring provider: Eric Damon DO  Dx:   Encounter Diagnosis     ICD-10-CM    1. Cervical radiculopathy  M54.12       2. Encounter for follow-up examination after completed treatment for conditions other than malignant neoplasm  Z09                      Subjective: No new C/o      Objective: See treatment diary below      Assessment: Tolerated treatment well. Patient continues to show gallo spots of improvement and is benefitting from strengthening.      Plan: Continue per plan of care.  Progress treatment as tolerated.            Precautions :  Hx of cervical fusion.  Progressing malignancy/ degenerative brain condition     Manuals 5-29-25 6-5-25 6/26 7-3-25 7-17-25   STM bilateral cervical and scapular muscles                              Neuro Re-Ed         Upper cervical flexion stabilzation 10\"x12 10\"x12 10\"x12 10\"x12 10\" x12   LTP/ MTP while holding good cervico-scapular posture        Scap retraction/ ER Blue 3x10 Blue 3x10 Blue 3x10 Blue 3x10 Blue 3x10   Lat pulldown  55# 3x10 55# 3x10 55# 3x10  55# 3x10 55# 3x10   Machine row  45# 3x10 45# 3x10 50# 3x10 50# 3x10 55# 3x10   Chest press  55# 3x10 55# 3x10 55# 2x10  1x5  55# 3x10 55# 3x10             Ther Ex        Seated thoracic and lower cervical mobilization  5\" x15 ea 5\"x15 ea 5\"x15 5\"x15 5\" x15 for ea    Upper trap and levetor stretch 10\"x12 ea samantha  10\"x12 ea samantha  10\"x12 ea samantha  10\"x12 ea samantha  10\"x12 samantha ea                                                    Ther Activity                        Gait Training                        Modalities        MHP                             "

## 2025-07-24 ENCOUNTER — OFFICE VISIT (OUTPATIENT)
Dept: PHYSICAL THERAPY | Facility: CLINIC | Age: 70
End: 2025-07-24
Attending: FAMILY MEDICINE
Payer: OTHER MISCELLANEOUS

## 2025-07-24 DIAGNOSIS — M54.12 CERVICAL RADICULOPATHY: Primary | ICD-10-CM

## 2025-07-24 DIAGNOSIS — Z09 ENCOUNTER FOR FOLLOW-UP EXAMINATION AFTER COMPLETED TREATMENT FOR CONDITIONS OTHER THAN MALIGNANT NEOPLASM: ICD-10-CM

## 2025-07-24 PROCEDURE — 97140 MANUAL THERAPY 1/> REGIONS: CPT

## 2025-07-24 PROCEDURE — 97112 NEUROMUSCULAR REEDUCATION: CPT

## 2025-07-24 PROCEDURE — 97110 THERAPEUTIC EXERCISES: CPT

## 2025-07-24 NOTE — PROGRESS NOTES
"Daily Note     Today's date: 2025  Patient name: Scottie Rodriguez  : 1955  MRN: [id number]  Referring provider: Gregory Sy DO  Dx:   Encounter Diagnosis     ICD-10-CM    1. Cervical radiculopathy  M54.12       2. Encounter for follow-up examination after completed treatment for conditions other than malignant neoplasm  Z09                      Subjective: Izaiah reports scapular pain that prevents I'm from sleeping at times and can wake him up. He reports continued LBP. Overall improvement in capacity for movement and work at home.       Objective: See treatment diary below      Assessment: Visual and VC to ensure correct exercise technique. Improvement in soft tissue quality to samantha cerv paraspinals, bl UT, samantha levator scap, samantha scap retractors, suboccipitals following manuals. Demonstrated good posture with scap retraction with samantha ER. Pt would continue to benefit from skilled PT. Patient treated by JAY JAY Tomlinson under my direct supervision.         Plan: Continue with current POC to address pt deficits.      Precautions :  Hx of cervical fusion.  Progressing malignancy/ degenerative brain condition     Manuals 7-24-25 6-5-25 6/26 7-3-25 7-17-25   STM bilateral cervical and scapular muscles 23 23' 20 23'                              Neuro Re-Ed         Upper cervical flexion stabilzation 10\"x12 10\"x12 10\"x12 10\"x12 10\" x12   LTP/ MTP while holding good cervico-scapular posture        Scap retraction/ ER Blue 3x10 Blue 3x10 Blue 3x10 Blue 3x10 Blue 3x10   Lat pulldown  55# 3x10 55# 3x10 55# 3x10  55# 3x10 55# 3x10   Machine row  55# 3x10 45# 3x10 50# 3x10 50# 3x10 55# 3x10   Chest press  50# 3x10 55# 3x10 55# 2x10  1x5  55# 3x10 55# 3x10             Ther Ex        Seated thoracic and lower cervical mobilization  5\"x15 for ea  5\"x15 ea 5\"x15 5\"x15 5\" x15 for ea    Upper trap and levetor stretch 10\"x12 ea samantha  10\"x12 ea samantha  10\"x12 ea samantha  10\"x12 ea samantha  10\"x12 samantha ea                        "                             Ther Activity                        Gait Training                        Modalities        P

## 2025-07-31 ENCOUNTER — OFFICE VISIT (OUTPATIENT)
Dept: PHYSICAL THERAPY | Facility: CLINIC | Age: 70
End: 2025-07-31
Attending: FAMILY MEDICINE
Payer: OTHER MISCELLANEOUS

## 2025-07-31 DIAGNOSIS — M54.12 CERVICAL RADICULOPATHY: Primary | ICD-10-CM

## 2025-07-31 DIAGNOSIS — Z09 ENCOUNTER FOR FOLLOW-UP EXAMINATION AFTER COMPLETED TREATMENT FOR CONDITIONS OTHER THAN MALIGNANT NEOPLASM: ICD-10-CM

## 2025-07-31 PROCEDURE — 97110 THERAPEUTIC EXERCISES: CPT | Performed by: PHYSICAL THERAPIST

## 2025-07-31 PROCEDURE — 97140 MANUAL THERAPY 1/> REGIONS: CPT | Performed by: PHYSICAL THERAPIST

## 2025-07-31 PROCEDURE — 97112 NEUROMUSCULAR REEDUCATION: CPT | Performed by: PHYSICAL THERAPIST

## 2025-08-07 ENCOUNTER — OFFICE VISIT (OUTPATIENT)
Dept: PHYSICAL THERAPY | Facility: CLINIC | Age: 70
End: 2025-08-07
Payer: OTHER MISCELLANEOUS

## 2025-08-07 DIAGNOSIS — M54.12 CERVICAL RADICULOPATHY: Primary | ICD-10-CM

## 2025-08-07 DIAGNOSIS — Z09 ENCOUNTER FOR FOLLOW-UP EXAMINATION AFTER COMPLETED TREATMENT FOR CONDITIONS OTHER THAN MALIGNANT NEOPLASM: ICD-10-CM

## 2025-08-07 PROCEDURE — 97140 MANUAL THERAPY 1/> REGIONS: CPT

## 2025-08-07 PROCEDURE — 97112 NEUROMUSCULAR REEDUCATION: CPT

## 2025-08-07 PROCEDURE — 97110 THERAPEUTIC EXERCISES: CPT

## 2025-08-14 ENCOUNTER — EVALUATION (OUTPATIENT)
Dept: PHYSICAL THERAPY | Facility: CLINIC | Age: 70
End: 2025-08-14
Payer: OTHER MISCELLANEOUS

## 2025-08-21 ENCOUNTER — OFFICE VISIT (OUTPATIENT)
Dept: PHYSICAL THERAPY | Facility: CLINIC | Age: 70
End: 2025-08-21
Payer: OTHER MISCELLANEOUS

## 2025-08-21 DIAGNOSIS — M54.12 CERVICAL RADICULOPATHY: ICD-10-CM

## 2025-08-21 DIAGNOSIS — Z09 ENCOUNTER FOR FOLLOW-UP EXAMINATION AFTER COMPLETED TREATMENT FOR CONDITIONS OTHER THAN MALIGNANT NEOPLASM: Primary | ICD-10-CM

## 2025-08-21 PROCEDURE — 97112 NEUROMUSCULAR REEDUCATION: CPT | Performed by: PHYSICAL THERAPIST

## 2025-08-21 PROCEDURE — 97140 MANUAL THERAPY 1/> REGIONS: CPT | Performed by: PHYSICAL THERAPIST

## 2025-08-21 PROCEDURE — 97110 THERAPEUTIC EXERCISES: CPT | Performed by: PHYSICAL THERAPIST
